# Patient Record
Sex: FEMALE | Race: WHITE | NOT HISPANIC OR LATINO | Employment: OTHER | URBAN - METROPOLITAN AREA
[De-identification: names, ages, dates, MRNs, and addresses within clinical notes are randomized per-mention and may not be internally consistent; named-entity substitution may affect disease eponyms.]

---

## 2017-01-17 RX ORDER — ALENDRONATE SODIUM 70 MG/1
TABLET ORAL
Qty: 12 TABLET | Refills: 0 | Status: SHIPPED | OUTPATIENT
Start: 2017-01-17 | End: 2017-04-03 | Stop reason: SDUPTHER

## 2017-01-23 ENCOUNTER — TELEPHONE (OUTPATIENT)
Dept: INTERNAL MEDICINE | Facility: CLINIC | Age: 77
End: 2017-01-23

## 2017-01-23 ENCOUNTER — DOCUMENTATION ONLY (OUTPATIENT)
Dept: INTERNAL MEDICINE | Facility: CLINIC | Age: 77
End: 2017-01-23

## 2017-01-23 DIAGNOSIS — D64.9 ANEMIA OF UNKNOWN ETIOLOGY: Primary | ICD-10-CM

## 2017-01-23 DIAGNOSIS — E55.9 MILD VITAMIN D DEFICIENCY: Primary | ICD-10-CM

## 2017-01-23 DIAGNOSIS — R06.09 DYSPNEA ON EXERTION: ICD-10-CM

## 2017-01-23 NOTE — TELEPHONE ENCOUNTER
Please call patient and remind her to come in for her blood work prior to her appt. I have added a Ferritin level to her blood work.  Thanks

## 2017-01-23 NOTE — PROGRESS NOTES
Pre-Visit Review & Summary:    75 y/o female with hx of Osteoprosis, Compression Fracture T12, DJD Cervical, Thoracic, and Lumbar Spine with Chronic LBP, Vitamin D Deficiency, GERD, Pre-DM, OA Knees, S/P R-TKR, Obesity, Hx of Colon Polyps, and Hx of DVT 11/2013, has a scheduled appt 2/1/17 for her Annual PE.        PMH:    1. DDD and DJD Cervical, Thoracic, and Lumbar Spine with Kyphoscoliosis           Thoracic Kyphosis with Wedge Deformity T12              Followed by Dr. Beckett ([facility], [place], operated by [facility])            Degenerative Scoliosis with Grade 2 Spondylolisthesis L5-S1            S/P Lumbar Fusion 1/2015            Gabapentin 100 mg BID    2. GERD              Asymptomatic since 11/2013            (-) H.pylori 9/2013, (-) Abd US 1/2014      3. Vit D Deficiency              Vit D Level (3/2014) - 25                                (10/2015): 23            Cholecalciferol 5,000 units/day - resumed 10/2015            Does not tolerate Ergocalciferol    4. Osteoporosis           BMD (1/2014) (-)1.9             Evidence of Thoracic Wedge Deformity (T12)             Calcium + D           Fosamax 70 mg/week - resumed 10/2015           Cholecalciferol 5,000 units/day  - resumed 10/2015    5. Allergic Rhinitis & Allergic Conjunctivitis             Zyrtec 10 mg prn           Hx of (+) Allergy Testing in past      6. OA Knees             Right TKR - Dr. Washburn 11/14/13      7. Obesity(BMI-34)      8. Hx of Colon Polyps             Colonoscopy 7/2011 - hyperplastic polyp      9. Questionable Hx of DVT RLE 11/17/13            Venous US 11/2013 - negative    10. Pre-DM    11. Mild Chronic Anemia             Normal Iron Studies 3/2014    12. Essential Tremor            Inderal 10 mg BID    13. Chronic ACOSTA            Stress Echo (10/2015): negative for Ischemia, normal EF, Diastolic Dysfunction      MEDS:                 Baclofen 10 mg hs         Calcium + D           Cholecalciferol 5,000 units daily         Flonase  Nasal spray prn         Fosamax 70 mg/week         Gabapentin 100 mg BID prn         Norco 5-325 mg prn         Inderal 10 mg BID         Trazodone 50 mg hs prn          Zyrtec 10 mg prn        ALLERGIES:         Sulfa       Oxycodone         Fentanyl         Diclofenac         Cymbalta        Ergocalciferol    Surgical Hx:          Left Shoulder Arthroscopy          Appendectomy          Tonsillectomy          Right TKR 11/14/13         Lumbar Fusion (L5-S1) 1/2015    Social Hx:          Smoking Hx: (+) 1/3 PPD x 40 yrs. Quit 2011.          ETOH: rare          Exercise: Sedentary          Work Hx: Retired           Home Environment: Single, lives next door to daughter who recently had CVA     Family Hx:          (+) HTN - Mother          (+) DM - daughter           (+) CVA - daughter (43 yrs old)          (+) OA - Mother          (+) Breast CA - Mother          (+) Bone CA - Father          (+) Skin CA - Father, Daughter, GM       Preventive Health Screening & Recent Lab Results:           Annual PE: 10/29/15         CBC (12/2015): H/H - 11.6/35.2          CMP (12/2015): BUN-27, Cr-1.0, eGFR-55         TSH (10/2015): 1.092          Lipids (10/2015): Chol-200, TG-224, HDL-37, LDL-118           Hgb A1c   (4/2014): 5.8                        (10/2015): 5.8         Vit D Level (9/2014) - 26                             (10/2015): 23 - not taking Vit D         GYN Exam (12/2014) - Dr. Garrido III           Eye Exam - 9/2014           Mammogram 10/2015 - normal           Colonoscopy 11/2016 - normal, repeat in 5 yrs         Bone Density Study 1/2014 - Hx of T12 Compression Fx = Osteoporosis Dx, T Score (-)1.9           Immunizations                  Influenza - 10/2015                  Tdp - 9/2013                  Pneumovax - 9/2013                 Prevnar 13 - 12/2015                Zostovax - 2011            Other Lab:                  H.pylori (9/2013): negative                Anemia Workup (12/2013 and  3/2014): normal S. 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                 Pharmaceutical Stress Test (10/2015): negative for ischemia with normal EF,                                                                                mild LAE, and (+) Diastolic Dysfunction.    IMP:  1. Annual PE  2. Chronic ACOSTA - negative Pharm Stress Test for Ischemia 10/2015, Diastolic Dysfunction, Obesity, Deconditioning  3. Obesity   4. Osteoporosis   5. DJD Cervical, Thoracic, and Lumbar Spine with Kyphoscoliosis and Chronic LBP  6. Vit D Deficiency   7. GERD - asymptomatic on no medication  8. Allergic Rhinitis  9. OA Knees - S/P R-TKR  10. Hx of Colon Polyps  11. Essential Tremor   12. Pre-DM    Plan:  1. Flu Vaccine, DXA-BMD

## 2017-01-30 ENCOUNTER — LAB VISIT (OUTPATIENT)
Dept: LAB | Facility: HOSPITAL | Age: 77
End: 2017-01-30
Attending: INTERNAL MEDICINE
Payer: MEDICARE

## 2017-01-30 DIAGNOSIS — R06.09 DYSPNEA ON EXERTION: ICD-10-CM

## 2017-01-30 DIAGNOSIS — D64.9 ANEMIA OF UNKNOWN ETIOLOGY: ICD-10-CM

## 2017-01-30 DIAGNOSIS — E55.9 MILD VITAMIN D DEFICIENCY: ICD-10-CM

## 2017-01-30 DIAGNOSIS — R73.03 PRE-DIABETES: ICD-10-CM

## 2017-01-30 DIAGNOSIS — E66.09 OBESITY DUE TO EXCESS CALORIES, UNSPECIFIED OBESITY SEVERITY: ICD-10-CM

## 2017-01-30 LAB
25(OH)D3+25(OH)D2 SERPL-MCNC: 22 NG/ML
ALBUMIN SERPL BCP-MCNC: 3.5 G/DL
ALP SERPL-CCNC: 48 U/L
ALT SERPL W/O P-5'-P-CCNC: 11 U/L
ANION GAP SERPL CALC-SCNC: 6 MMOL/L
AST SERPL-CCNC: 17 U/L
BASOPHILS # BLD AUTO: 0.04 K/UL
BASOPHILS NFR BLD: 0.7 %
BILIRUB SERPL-MCNC: 0.9 MG/DL
BNP SERPL-MCNC: 74 PG/ML
BUN SERPL-MCNC: 19 MG/DL
CALCIUM SERPL-MCNC: 8.9 MG/DL
CHLORIDE SERPL-SCNC: 104 MMOL/L
CHOLEST/HDLC SERPL: 5.1 {RATIO}
CO2 SERPL-SCNC: 28 MMOL/L
CREAT SERPL-MCNC: 0.9 MG/DL
DIFFERENTIAL METHOD: ABNORMAL
EOSINOPHIL # BLD AUTO: 0.1 K/UL
EOSINOPHIL NFR BLD: 1.7 %
ERYTHROCYTE [DISTWIDTH] IN BLOOD BY AUTOMATED COUNT: 12.9 %
EST. GFR  (AFRICAN AMERICAN): >60 ML/MIN/1.73 M^2
EST. GFR  (NON AFRICAN AMERICAN): >60 ML/MIN/1.73 M^2
ESTIMATED AVG GLUCOSE: 117 MG/DL
FERRITIN SERPL-MCNC: 53 NG/ML
GLUCOSE SERPL-MCNC: 96 MG/DL
HBA1C MFR BLD HPLC: 5.7 %
HCT VFR BLD AUTO: 36.1 %
HDL/CHOLESTEROL RATIO: 19.7 %
HDLC SERPL-MCNC: 193 MG/DL
HDLC SERPL-MCNC: 38 MG/DL
HGB BLD-MCNC: 11.6 G/DL
LDLC SERPL CALC-MCNC: 117 MG/DL
LYMPHOCYTES # BLD AUTO: 2.3 K/UL
LYMPHOCYTES NFR BLD: 39.6 %
MCH RBC QN AUTO: 31.2 PG
MCHC RBC AUTO-ENTMCNC: 32.1 %
MCV RBC AUTO: 97 FL
MONOCYTES # BLD AUTO: 0.6 K/UL
MONOCYTES NFR BLD: 10.7 %
NEUTROPHILS # BLD AUTO: 2.7 K/UL
NEUTROPHILS NFR BLD: 47 %
NONHDLC SERPL-MCNC: 155 MG/DL
PLATELET # BLD AUTO: 287 K/UL
PMV BLD AUTO: 9.1 FL
POTASSIUM SERPL-SCNC: 3.7 MMOL/L
PROT SERPL-MCNC: 6.8 G/DL
RBC # BLD AUTO: 3.72 M/UL
SODIUM SERPL-SCNC: 138 MMOL/L
TRIGL SERPL-MCNC: 190 MG/DL
TSH SERPL DL<=0.005 MIU/L-ACNC: 1.4 UIU/ML
WBC # BLD AUTO: 5.79 K/UL

## 2017-01-30 PROCEDURE — 82728 ASSAY OF FERRITIN: CPT

## 2017-01-30 PROCEDURE — 84443 ASSAY THYROID STIM HORMONE: CPT

## 2017-01-30 PROCEDURE — 83036 HEMOGLOBIN GLYCOSYLATED A1C: CPT

## 2017-01-30 PROCEDURE — 80061 LIPID PANEL: CPT

## 2017-01-30 PROCEDURE — 36415 COLL VENOUS BLD VENIPUNCTURE: CPT

## 2017-01-30 PROCEDURE — 83880 ASSAY OF NATRIURETIC PEPTIDE: CPT

## 2017-01-30 PROCEDURE — 82306 VITAMIN D 25 HYDROXY: CPT

## 2017-01-30 PROCEDURE — 85025 COMPLETE CBC W/AUTO DIFF WBC: CPT

## 2017-01-30 PROCEDURE — 80053 COMPREHEN METABOLIC PANEL: CPT

## 2017-02-01 ENCOUNTER — OFFICE VISIT (OUTPATIENT)
Dept: INTERNAL MEDICINE | Facility: CLINIC | Age: 77
End: 2017-02-01
Payer: MEDICARE

## 2017-02-01 VITALS
DIASTOLIC BLOOD PRESSURE: 62 MMHG | OXYGEN SATURATION: 97 % | SYSTOLIC BLOOD PRESSURE: 130 MMHG | HEIGHT: 61 IN | BODY MASS INDEX: 35.84 KG/M2 | WEIGHT: 189.81 LBS | HEART RATE: 67 BPM

## 2017-02-01 DIAGNOSIS — K21.9 GASTROESOPHAGEAL REFLUX DISEASE, ESOPHAGITIS PRESENCE NOT SPECIFIED: ICD-10-CM

## 2017-02-01 DIAGNOSIS — E55.9 MILD VITAMIN D DEFICIENCY: ICD-10-CM

## 2017-02-01 DIAGNOSIS — Z00.00 ANNUAL PHYSICAL EXAM: Primary | ICD-10-CM

## 2017-02-01 DIAGNOSIS — M50.30 DDD (DEGENERATIVE DISC DISEASE), CERVICAL: ICD-10-CM

## 2017-02-01 DIAGNOSIS — E66.01 MORBID OBESITY DUE TO EXCESS CALORIES: ICD-10-CM

## 2017-02-01 DIAGNOSIS — M81.0 OSTEOPOROSIS: ICD-10-CM

## 2017-02-01 DIAGNOSIS — Z86.010 HISTORY OF COLONIC POLYPS: ICD-10-CM

## 2017-02-01 DIAGNOSIS — R73.03 PRE-DIABETES: ICD-10-CM

## 2017-02-01 DIAGNOSIS — Z96.659 STATUS POST TOTAL KNEE REPLACEMENT, UNSPECIFIED LATERALITY: ICD-10-CM

## 2017-02-01 DIAGNOSIS — M17.0 PRIMARY OSTEOARTHRITIS OF BOTH KNEES: ICD-10-CM

## 2017-02-01 DIAGNOSIS — M51.35 DDD (DEGENERATIVE DISC DISEASE), THORACOLUMBAR: ICD-10-CM

## 2017-02-01 PROCEDURE — 99213 OFFICE O/P EST LOW 20 MIN: CPT | Mod: PBBFAC | Performed by: INTERNAL MEDICINE

## 2017-02-01 PROCEDURE — 99215 OFFICE O/P EST HI 40 MIN: CPT | Mod: S$PBB,,, | Performed by: INTERNAL MEDICINE

## 2017-02-01 PROCEDURE — 99999 PR PBB SHADOW E&M-EST. PATIENT-LVL III: CPT | Mod: PBBFAC,,, | Performed by: INTERNAL MEDICINE

## 2017-02-01 RX ORDER — VIT C/E/ZN/COPPR/LUTEIN/ZEAXAN 250MG-90MG
CAPSULE ORAL
Qty: 90 CAPSULE | Refills: 11
Start: 2017-02-01

## 2017-02-01 NOTE — PROGRESS NOTES
Subjective:       Patient ID: Nicho Espinosa is a 76 y.o. female.    Chief Complaint: Annual Exam    HPI Comments: 75 y/o female with hx of Osteoprosis, Compression Fracture T12, DJD Cervical, Thoracic, and Lumbar Spine with Chronic LBP, Vitamin D Deficiency, GERD, Pre-DM, OA Knees, S/P R-TKR, Obesity, Hx of Colon Polyps, and Hx of DVT 11/2013, comes in for her Annual PE.    She has gained 8 lbs since last visit. She recently started [employer] and is planning to continue Exercise to lose weight.    She never started Cholecalciferol as recommended but plans to restart.        PMH:    1. DDD and DJD Cervical, Thoracic, and Lumbar Spine with Kyphoscoliosis           Thoracic Kyphosis with Wedge Deformity T12              Followed by Dr. Beckett ([facility]) & [facility]            Degenerative Scoliosis with Grade 2 Spondylolisthesis L5-S1            S/P Lumbar Fusion 1/2015    2. GERD              Asymptomatic since 11/2013            (-) H.pylori 9/2013, (-) Abd US 1/2014      3. Vit D Deficiency      4. Osteoporosis           BMD (1/2014) (-)1.9             Evidence of Thoracic Wedge Deformity (T12)             Fosamax 70 mg/week - resumed 10/2015    5. Allergic Rhinitis & Allergic Conjunctivitis             Zyrtec 10 mg prn           Hx of (+) Allergy Testing in past      6. OA Knees             Right TKR - Dr. Washburn 11/14/13      7. Obesity(BMI-34)      8. Hx of Colon Polyps             Colonoscopy 7/2011 - hyperplastic polyp      9. Questionable Hx of DVT RLE 11/17/13            Venous US 11/2013 - negative    10. Pre-DM    11. Mild Chronic Anemia    12. Essential Tremor            Inderal 10 mg BID    13. Chronic ACOSTA            Stress Echo (10/2015): negative for Ischemia, normal EF, Diastolic Dysfunction      MEDS:                 Fosamax 70 mg/week         Norco 5-325 mg prn         Inderal 10 mg BID         Trazodone 50 mg hs prn          Zyrtec 10 mg prn        ALLERGIES:          Sulfa       Oxycodone         Fentanyl         Diclofenac         Cymbalta        Ergocalciferol    Surgical Hx:          Left Shoulder Arthroscopy          Appendectomy          Tonsillectomy          Right TKR 11/14/13         Lumbar Fusion (L5-S1) 1/2015    Social Hx:          Smoking Hx: (+) 1/3 PPD x 40 yrs. Quit 2011.          ETOH: rare          Exercise: Sedentary          Work Hx: Retired           Home Environment: Single, lives next door to daughter who recently had CVA     Family Hx:          (+) HTN - Mother          (+) DM - daughter           (+) CVA - daughter (43 yrs old)          (+) OA - Mother          (+) Breast CA - Mother          (+) Bone CA - Father          (+) Skin CA - Father, Daughter, GM       Preventive Health Screening & Recent Lab Results:           Annual PE: 10/29/15         CBC (1/2017): H/H - 11.6/36.1          CMP (1/2017): normal         TSH (1/2017): 1.402          Lipids (1/2017): Chol-193, TG-190, HDL-38, LDL-117          Hgb A1c   (4/2014): 5.8                        (1/2017): 5.7         Vit D Level (9/2014) - 26                             (1/2017): 22 - not taking Vit D         GYN Exam (12/2014) - Dr. Garrido III           Eye Exam - 9/2014           Mammogram 10/2015 - normal           Colonoscopy 11/2016 - normal, repeat in 5 yrs         Bone Density Study 1/2014 - Hx of T12 Compression Fx = Osteoporosis Dx, T Score (-)1.9           Immunizations                  Influenza - 10/2016                 Tdp - 9/2013                  Pneumovax - 9/2013                 Prevnar 13 - 12/2015                Zostovax - 2011            Other Lab:                  Ferritin (1/2017): 53                BNP (1/2017): 74                H.pylori (9/2013): negative                Anemia Workup (12/2013 and 3/2014): normal S. 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                 Pharmaceutical Stress Test (10/2015): negative for ischemia with normal EF,                                                                                mild LAE, and (+) Diastolic Dysfunction.        Review of Systems   Constitutional: Negative for chills and fever.   HENT: Positive for rhinorrhea. Negative for trouble swallowing.    Respiratory: Negative for shortness of breath.    Cardiovascular: Negative for chest pain, palpitations and leg swelling.   Gastrointestinal: Negative for abdominal pain, blood in stool, constipation, diarrhea, nausea and vomiting.   Genitourinary: Negative for dysuria, hematuria and vaginal bleeding.   Musculoskeletal: Positive for back pain.   Neurological: Negative for dizziness, syncope and light-headedness.   Hematological: Negative for adenopathy.       Objective:      Physical Exam   Constitutional: She is oriented to person, place, and time. She appears well-developed and well-nourished. No distress.   HENT:   Head: Normocephalic.   Right Ear: External ear normal.   Left Ear: External ear normal.   Mouth/Throat: Oropharynx is clear and moist.   Eyes: Conjunctivae are normal. No scleral icterus.   Neck: No JVD present. No thyromegaly present.   Cardiovascular: Normal rate and regular rhythm.  Exam reveals no gallop.    No murmur heard.  Pulmonary/Chest: Effort normal and breath sounds normal. She has no wheezes.  She has no rales. Right breast exhibits no mass.   Abdominal: Soft. She exhibits no mass. There is no tenderness.   Musculoskeletal: She exhibits no edema.   Lymphadenopathy:     She has no cervical adenopathy.   Neurological: She is alert and oriented to person, place, and time. She has normal reflexes.   Skin: Skin is warm and dry.       Assessment:   1. Annual PE  2. Chronic ACOSTA - negative Pharm Stress Test for Ischemia 10/2015, Diastolic Dysfunction, Obesity, Deconditioning  3. Obesity   4. Osteoporosis   5. DJD Cervical, Thoracic, and Lumbar Spine with Kyphoscoliosis and Chronic LBP  6. Vit D Deficiency   7. GERD - asymptomatic on no medication  8. Allergic Rhinitis  9. OA Knees - S/P R-TKR  10. Hx of Colon Polyps  11. Essential Tremor   12. Pre-DM    Plan:   1. Resume Cholecalciferol 3,000 units daily. DXA-BMD. Follow up with [facility] - consider Lyrica  2. RTC prn or in 1 yr for Annual PE

## 2017-02-01 NOTE — MR AVS SNAPSHOT
[facility]  [address]  [facility] [address]  Phone: [phone]  Fax: [phone]                  Nicho Espinosa   2017 10:00 AM   Office Visit    Description:  Female : 1940   Provider:  Arleen Lara MD   Department:  [facility]           Reason for Visit     Annual Exam           Diagnoses this Visit        Comments    Annual physical exam    -  Primary     Osteoporosis         History of colonic polyps         Morbid obesity due to excess calories         Mild vitamin D deficiency         DDD (degenerative disc disease), thoracolumbar         DDD (degenerative disc disease), cervical         Primary osteoarthritis of both knees         Status post total knee replacement, unspecified laterality         Pre-diabetes         Gastroesophageal reflux disease, esophagitis presence not specified                To Do List           Goals (5 Years of Data)     None      Follow-Up and Disposition     Return in about 1 year (around 2018) for annual physical with lab.    Follow-up and Disposition History       These Medications        Disp Refills Start End    cholecalciferol, vitamin D3, 1,000 unit capsule 90 capsule 11 2017     Take 3 capsules daily    Pharmacy: [facility] [address] [place] [address] [address] AT [facility] Ph #: [phone]         [facility]     [facility] On Call Nurse Care Line -  Assistance  Registered nurses in the [facility] On Call Center provide clinical advisement, health education, appointment booking, and other advisory services.  Call for this free service at [phone].             Medications           Message regarding Medications     Verify the changes and/or additions to your medication regime listed below are the same as discussed with your clinician today.  If any of these changes or additions are incorrect, please notify your healthcare provider.    "     START taking these NEW medications        Refills    cholecalciferol, vitamin D3, 1,000 unit capsule 11    Sig: Take 3 capsules daily    Class: No Print      STOP taking these medications     baclofen (LIORESAL) 10 MG tablet     DUREZOL 0.05 % Drop ophthalmic solution     fluticasone (FLONASE) 50 mcg/actuation nasal spray 1 spray by Each Nare route once daily.    gabapentin (NEURONTIN) 100 MG capsule Take 1 capsule by mouth once daily.    ketorolac 0.5% (ACULAR) 0.5 % Drop            Verify that the below list of medications is an accurate representation of the medications you are currently taking.  If none reported, the list may be blank. If incorrect, please contact your healthcare provider. Carry this list with you in case of emergency.           Current Medications     alendronate (FOSAMAX) 70 MG tablet TAKE 1 TABLET BY MOUTH EVERY 7 DAYS    cetirizine (ZYRTEC) 10 MG tablet Take 10 mg by mouth once daily.    cholecalciferol, vitamin D3, 1,000 unit capsule Take 3 capsules daily    hydrocodone-acetaminophen 5-325mg (NORCO) 5-325 mg per tablet 0.5 tablets daily as needed.    promethazine (PHENERGAN) 25 MG tablet Take 1 tablet (25 mg total) by mouth every 6 (six) hours as needed for Nausea.    propranolol (INDERAL) 10 MG tablet Take 1 tablet (10 mg total) by mouth 2 (two) times daily.    trazodone (DESYREL) 50 MG tablet TAKE 1 TABLET BY MOUTH NIGHTLY AS NEEDED FOR INSOMNIA           Clinical Reference Information           Vital Signs - Last Recorded  Most recent update: 2/1/2017 10:23 AM by Chioma Lawson MA    BP Pulse Ht Wt LMP SpO2    130/62 (BP Location: Right arm, Patient Position: Sitting, BP Method: Manual) 67 5' 1" (1.549 m) 86.1 kg (189 lb 13.1 oz) 07[birth year]05/1987 97%    BMI                35.87 kg/m2          Blood Pressure          Most Recent Value    BP  130/62      Allergies as of 2/1/2017     Sulfa (Sulfonamide Antibiotics)      Immunizations Administered on Date of Encounter - 2/1/2017     None    "   Orders Placed During Today's Visit     Future Labs/Procedures Expected by Expires    DXA Bone Density Spine And Hip_Axial Skeleton  2/1/2017 2/1/2018

## 2017-02-08 ENCOUNTER — HOSPITAL ENCOUNTER (OUTPATIENT)
Dept: RADIOLOGY | Facility: CLINIC | Age: 77
Discharge: HOME | End: 2017-02-08
Attending: INTERNAL MEDICINE
Payer: MEDICARE

## 2017-02-08 DIAGNOSIS — M81.0 OSTEOPOROSIS: ICD-10-CM

## 2017-02-08 PROCEDURE — 77080 DXA BONE DENSITY AXIAL: CPT | Mod: TC

## 2017-02-08 PROCEDURE — 77080 DXA BONE DENSITY AXIAL: CPT | Mod: 26,,, | Performed by: INTERNAL MEDICINE

## 2017-03-12 RX ORDER — TRAZODONE HYDROCHLORIDE 50 MG/1
TABLET ORAL
Qty: 30 TABLET | Refills: 5 | Status: SHIPPED | OUTPATIENT
Start: 2017-03-12 | End: 2017-06-20 | Stop reason: SDUPTHER

## 2017-04-03 RX ORDER — TRAZODONE HYDROCHLORIDE 50 MG/1
TABLET ORAL
Qty: 30 TABLET | Refills: 5 | Status: SHIPPED | OUTPATIENT
Start: 2017-04-03 | End: 2017-06-20 | Stop reason: SDUPTHER

## 2017-04-03 RX ORDER — ALENDRONATE SODIUM 70 MG/1
TABLET ORAL
Qty: 12 TABLET | Refills: 3 | Status: SHIPPED | OUTPATIENT
Start: 2017-04-03 | End: 2017-06-20 | Stop reason: SDUPTHER

## 2017-05-17 ENCOUNTER — TELEPHONE (OUTPATIENT)
Dept: OBSTETRICS AND GYNECOLOGY | Facility: CLINIC | Age: 77
End: 2017-05-17

## 2017-05-17 NOTE — TELEPHONE ENCOUNTER
----- Message from Dorothy Calderon sent at 5/17/2017 11:44 AM CDT -----  Contact: Self  New Pt is calling in regards scheduling annual apt. The pt can be reached at [phone]. Thanks KG

## 2017-05-26 DIAGNOSIS — M25.562 LEFT KNEE PAIN, UNSPECIFIED CHRONICITY: Primary | ICD-10-CM

## 2017-06-07 DIAGNOSIS — G25.0 ESSENTIAL TREMOR: ICD-10-CM

## 2017-06-07 RX ORDER — PROPRANOLOL HYDROCHLORIDE 10 MG/1
TABLET ORAL
Qty: 180 TABLET | Refills: 0 | Status: SHIPPED | OUTPATIENT
Start: 2017-06-07 | End: 2017-09-04 | Stop reason: SDUPTHER

## 2017-06-08 ENCOUNTER — TELEPHONE (OUTPATIENT)
Dept: ORTHOPEDICS | Facility: CLINIC | Age: 77
End: 2017-06-08

## 2017-06-12 ENCOUNTER — DOCUMENTATION ONLY (OUTPATIENT)
Dept: INTERNAL MEDICINE | Facility: CLINIC | Age: 77
End: 2017-06-12

## 2017-06-12 ENCOUNTER — HOSPITAL ENCOUNTER (OUTPATIENT)
Dept: RADIOLOGY | Facility: HOSPITAL | Age: 77
Discharge: HOME | End: 2017-06-12
Attending: ORTHOPAEDIC SURGERY
Payer: MEDICARE

## 2017-06-12 ENCOUNTER — OFFICE VISIT (OUTPATIENT)
Dept: ORTHOPEDICS | Facility: CLINIC | Age: 77
End: 2017-06-12
Payer: MEDICARE

## 2017-06-12 VITALS — BODY MASS INDEX: 35.84 KG/M2 | WEIGHT: 189.81 LBS | HEIGHT: 61 IN

## 2017-06-12 DIAGNOSIS — M25.562 LEFT KNEE PAIN, UNSPECIFIED CHRONICITY: ICD-10-CM

## 2017-06-12 DIAGNOSIS — M17.12 PRIMARY OSTEOARTHRITIS OF LEFT KNEE: Primary | ICD-10-CM

## 2017-06-12 PROCEDURE — 99203 OFFICE O/P NEW LOW 30 MIN: CPT | Mod: S$PBB,,, | Performed by: ORTHOPAEDIC SURGERY

## 2017-06-12 PROCEDURE — 99212 OFFICE O/P EST SF 10 MIN: CPT | Mod: PBBFAC,25 | Performed by: ORTHOPAEDIC SURGERY

## 2017-06-12 PROCEDURE — 99999 PR PBB SHADOW E&M-EST. PATIENT-LVL II: CPT | Mod: PBBFAC,,, | Performed by: ORTHOPAEDIC SURGERY

## 2017-06-12 PROCEDURE — 1159F MED LIST DOCD IN RCRD: CPT | Mod: ,,, | Performed by: ORTHOPAEDIC SURGERY

## 2017-06-12 PROCEDURE — 1126F AMNT PAIN NOTED NONE PRSNT: CPT | Mod: ,,, | Performed by: ORTHOPAEDIC SURGERY

## 2017-06-12 PROCEDURE — 73562 X-RAY EXAM OF KNEE 3: CPT | Mod: 26,50,, | Performed by: RADIOLOGY

## 2017-06-12 RX ORDER — AMOXICILLIN 500 MG/1
TABLET, FILM COATED ORAL
Refills: 0 | COMMUNITY
Start: 2017-04-04 | End: 2017-06-20

## 2017-06-12 RX ORDER — PREGABALIN 75 MG/1
75 CAPSULE ORAL 2 TIMES DAILY
COMMUNITY
Start: 2017-06-06 | End: 2017-07-27

## 2017-06-12 NOTE — PROGRESS NOTES
Subjective:      Patient ID: Nicho Espinosa is a 76 y.o. female.    Chief Complaint: Pain of the Left Knee    HPI  Nicho Espinosa is a 76 year old female here with a 3 month history of left knee pain. The patient is a  retiree. There was not a history of trauma that initiated the pain, but she did have a fall in her bathroom and struck her knee. The pain began when she began walking for exercise.  The pain is moderate The pain is located in the medial aspect of the knee. There is not radiation.  There is not catching or locking.   The pain is described as achy. The patient has not had prior surgery on that knee.  Right TKA in 2013.  Doing well. It is aggravated by standing and walking.  It is alleviated by rest. There is not numbness or tingling of the lower extremity.  There is  back pain.  She  has tried medications and injections. They have helped.  She does have difficulty getting in or out of a car, getting dressed, or going up or down stairs.  The patient does not use an assistive device.      Past Medical History:   Diagnosis Date    Allergy     Arthritis     Blepharitis of both eyes     Complication of anesthesia     nausea    Degenerative disc disease     GERD (gastroesophageal reflux disease)     patient denies reflux    History of compression fracture of spine 4/10/2014    Hyperlipidemia     Lumbar disc disease     Meibomitis     Obesity 9/19/2013    Osteoporosis     Papilloma of eyelid     RUL    Postoperative anemia 11/21/2013    Thoracic or lumbosacral neuritis or radiculitis, unspecified 9/20/2013    Tremors of nervous system 2/2015     Past Surgical History:   Procedure Laterality Date    APPENDECTOMY      COLONOSCOPY N/A 11/1/2016    Procedure: COLONOSCOPY;  Surgeon: Candelario Oviedo MD;  Location: [address];  Service: Endoscopy;  Laterality: N/A;  may use Prepopik or other low volume prep    FOOT SURGERY      SHOULDER ARTHROSCOPY      left    SPINE SURGERY  1-12-15     R[facility]    TONSILLECTOMY      TOTAL KNEE ARTHROPLASTY       Family History   Problem Relation Age of Onset    Cancer Father      bone    Breast cancer Mother      never had biopsy    Stroke Daughter     Diabetes Daughter      was obese     Social History     Social History    Marital status: Single     Spouse name: N/A    Number of children: N/A    Years of education: N/A     Occupational History    Not on file.     Social History Main Topics    Smoking status: Former Smoker     Packs/day: 0.25     Years: 40.00     Quit date: 1/1/2011    Smokeless tobacco: Never Used    Alcohol use No    Drug use: No    Sexual activity: Not Currently     Birth control/ protection: Post-menopausal     Other Topics Concern    Not on file     Social History Narrative    No narrative on file     Current Outpatient Prescriptions on File Prior to Visit   Medication Sig Dispense Refill    alendronate (FOSAMAX) 70 MG tablet TAKE 1 TABLET BY MOUTH EVERY 7 DAYS 12 tablet 3    cetirizine (ZYRTEC) 10 MG tablet Take 10 mg by mouth once daily.      cholecalciferol, vitamin D3, 1,000 unit capsule Take 3 capsules daily 90 capsule 11    hydrocodone-acetaminophen 5-325mg (NORCO) 5-325 mg per tablet 0.5 tablets daily as needed.  0    promethazine (PHENERGAN) 25 MG tablet Take 1 tablet (25 mg total) by mouth every 6 (six) hours as needed for Nausea. 30 tablet 0    propranolol (INDERAL) 10 MG tablet TAKE 1 TABLET(10 MG) BY MOUTH TWICE DAILY 180 tablet 0    trazodone (DESYREL) 50 MG tablet TAKE 1 TABLET BY MOUTH NIGHTLY AS NEEDED FOR INSOMNIA 30 tablet 5    trazodone (DESYREL) 50 MG tablet TAKE 1 TABLET BY MOUTH EVERY EVENING AS NEEDED FOR INSOMNIA 30 tablet 5    trazodone (DESYREL) 50 MG tablet TAKE 1 TABLET BY MOUTH EVERY EVENING AS NEEDED FOR INSOMNIA 30 tablet 5    [DISCONTINUED] warfarin (COUMADIN) 4 MG tablet Take 1 tablet (4 mg total) by mouth Daily. At 5p as directed by coumadin clinic. 90 tablet 1     No current  "facility-administered medications on file prior to visit.      Review of patient's allergies indicates:   Allergen Reactions    Sulfa (sulfonamide antibiotics) Other (See Comments)       Review of Systems   Constitution: Negative for chills, fever and night sweats.   HENT: Negative for headaches and hearing loss.    Eyes: Negative for blurred vision and double vision.   Cardiovascular: Negative for chest pain, claudication and leg swelling.   Respiratory: Negative for shortness of breath.    Endocrine: Negative for polydipsia, polyphagia and polyuria.   Hematologic/Lymphatic: Negative for adenopathy and bleeding problem. Does not bruise/bleed easily.   Skin: Negative for poor wound healing.   Musculoskeletal: Positive for joint pain.   Gastrointestinal: Negative for diarrhea and heartburn.   Genitourinary: Negative for bladder incontinence.   Neurological: Negative for focal weakness, numbness, paresthesias and sensory change.   Psychiatric/Behavioral: The patient is not nervous/anxious.    Allergic/Immunologic: Negative for persistent infections.         Objective:      Body mass index is 35.87 kg/m².  Vitals:    06/12/17 0854   Weight: 86.1 kg (189 lb 13.1 oz)   Height: 5' 1" (1.549 m)         General    Constitutional: She is oriented to person, place, and time. She appears well-developed and well-nourished.   HENT:   Head: Normocephalic and atraumatic.   Eyes: EOM are normal.   Cardiovascular: Normal rate.    Pulmonary/Chest: Effort normal.   Neurological: She is alert and oriented to person, place, and time.   Psychiatric: She has a normal mood and affect. Her behavior is normal.     General Musculoskeletal Exam   Gait: normal       Right Knee Exam     Inspection   Erythema: absent  Scars: present  Swelling: absent  Effusion: effusion  Deformity: deformity  Bruising: absent    Tenderness   The patient is experiencing no tenderness.         Range of Motion   Extension: 0   Flexion: 130     Tests   Ligament " Examination Lachman: normal (-1 to 2mm)   MCL - Valgus: normal (0 to 2mm)  LCL - Varus: normal  Patella   Passive Patellar Tilt: neutral    Other   Sensation: normal    Left Knee Exam     Inspection   Erythema: absent  Scars: absent  Swelling: absent  Effusion: absent  Deformity: deformity  Bruising: present    Tenderness   The patient tender to palpation of the medial joint line.    Range of Motion   Extension: 0   Flexion: 130     Tests   Stability Lachman: normal (-1 to 2mm)   MCL - Valgus: normal (0 to 2mm)  LCL - Varus: normal (0 to 2mm)  Patella   Passive Patellar Tilt: neutral    Other   Sensation: normal    Muscle Strength   Right Lower Extremity   Hip Abduction: 5/5   Quadriceps:  5/5   Hamstrin/5   Left Lower Extremity   Hip Abduction: 5/5   Quadriceps:  5/5   Hamstrin/5     Reflexes     Left Side  Quadriceps:  2+    Right Side   Quadriceps:  2+    Vascular Exam     Right Pulses  Dorsalis Pedis:      2+          Left Pulses  Dorsalis Pedis:      2+          Edema  Right Lower Leg: absent  Left Lower Leg: absent    Radiographs taken today and reviewed by me demonstrate moderate arthritic change of the left KNEE(s).There  is not bone destruction.  There is not a fracture. The medial compartment is most involved.  There is a varus deformity.  The changes are tricompartmental.              Assessment:       Encounter Diagnosis   Name Primary?    Primary osteoarthritis of left knee Yes          Plan:       Nicho was seen today for pain.    Diagnoses and all orders for this visit:    Primary osteoarthritis of left knee      Treatment options as well as risks and benefits have been discussed.  She as decided to consider Euflexxa injections.  Literature was given.  Nicho Espinosa will call if  she wishes to proceed.    We will get her set up to see her primary.

## 2017-06-12 NOTE — PROGRESS NOTES
Pre-Visit Review & Summary:    75 y/o female with hx of Osteoprosis, Compression Fracture T12, DJD Cervical, Thoracic, and Lumbar Spine with Chronic LBP, Vitamin D Deficiency, GERD, Pre-DM, OA Knees, S/P R-TKR, Obesity, Hx of Colon Polyps, and Hx of DVT 11/2013, has a scheduled appt 6/21/17 for complaint of Syncope.    She was seen in Orthopedics by Dr. Washburn 6/12/17 complaining of Left Knee pain following a fall. X-Rays c/w moderate DJD but no fracture. She is considering Euflexxa injections.        PMH:    1. DDD and DJD Cervical, Thoracic, and Lumbar Spine with Kyphoscoliosis           Thoracic Kyphosis with Wedge Deformity T12              Followed by Dr. Beckett ([facility]) & [place] [facility]            Degenerative Scoliosis with Grade 2 Spondylolisthesis L5-S1            S/P Lumbar Fusion 1/2015    2. GERD              Asymptomatic since 11/2013            (-) H.pylori 9/2013, (-) Abd US 1/2014      3. Vit D Deficiency              Cholecalciferol 3,000 units daily    4. Osteoporosis           BMD (1/2014) (-)1.9             Evidence of Thoracic Wedge Deformity (T12)             Fosamax 70 mg/week - resumed 10/2015    5. Allergic Rhinitis & Allergic Conjunctivitis             Zyrtec 10 mg prn           Hx of (+) Allergy Testing in past      6. OA Knees             Right TKR - Dr. Washburn 11/14/13      7. Obesity(BMI-35)      8. Hx of Colon Polyps             Colonoscopy 7/2011 - hyperplastic polyp      9.  Pre-DM    10. Essential Tremor            Inderal 10 mg BID    14. Chronic ACOSTA            Stress Echo (10/2015): negative for Ischemia, normal EF, Diastolic Dysfunction            15 pk yr hx of Smoking. Quit 2011      MEDS:                 Fosamax 70 mg/week         Norco 5-325 mg prn         Inderal 10 mg BID         Trazodone 50 mg hs prn          Zyrtec 10 mg prn        ALLERGIES:         Sulfa       Oxycodone         Fentanyl         Diclofenac         Cymbalta         Ergocalciferol    Surgical Hx:          Left Shoulder Arthroscopy          Appendectomy          Tonsillectomy          Right TKR 11/14/13         Lumbar Fusion (L5-S1) 1/2015    Social Hx:          Smoking Hx: (+) 1/3 PPD x 40 yrs. Quit 2011.          ETOH: rare          Exercise: Sedentary          Work Hx: Retired           Home Environment: Single, lives next door to daughter who recently had CVA     Family Hx:          (+) HTN - Mother          (+) DM - daughter           (+) CVA - daughter (43 yrs old)          (+) OA - Mother          (+) Breast CA - Mother          (+) Bone CA - Father          (+) Skin CA - Father, Daughter, GM       Preventive Health Screening & Recent Lab Results:           Annual PE: 10/29/15         CBC (1/2017): H/H - 11.6/36.1          CMP (1/2017): normal         TSH (1/2017): 1.402          Lipids (1/2017): Chol-193, TG-190, HDL-38, LDL-117          Hgb A1c   (4/2014): 5.8                        (1/2017): 5.7         Vit D Level (9/2014) - 26                             (1/2017): 22 - not taking Vit D         GYN Exam (12/2014) - Dr. Garrido III           Eye Exam - 9/2014           Mammogram 10/2015 - normal           Colonoscopy 11/2016 - normal, repeat in 5 yrs         Bone Density Study 2/2017 - Hx of T12 Compression Fx = Osteoporosis Dx, T Score (-)1.4           Immunizations                  Influenza - 10/2016                 Tdp - 9/2013                  Pneumovax - 9/2013                 Prevnar 13 - 12/2015                Zostovax - 2011            Other Lab:                  Ferritin (1/2017): 53                BNP (1/2017): 74                H.pylori (9/2013): negative                Anemia Workup (12/2013 and 3/2014): normal S. 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                 Pharmaceutical Stress Test (10/2015): negative for ischemia with normal EF,                                             mild LAE, and (+) Diastolic Dysfunction.    IMP:  1. Hx of Syncope  2. Chronic ACOSTA - negative Pharm Stress Test for Ischemia 10/2015, Diastolic Dysfunction, Obesity, Deconditioning, and Hx of Smoking. Will order Spirometry  3. Obesity   4. Osteoporosis   5. DJD Cervical, Thoracic, and Lumbar Spine with Kyphoscoliosis and Chronic LBP  6. Vit D Deficiency   7. GERD - asymptomatic on no medication  8. Allergic Rhinitis  9. OA Knees - S/P R-TKR  10. Hx of Colon Polyps  11. Essential Tremor   12. Pre-DM

## 2017-06-20 ENCOUNTER — HOSPITAL ENCOUNTER (OUTPATIENT)
Dept: RADIOLOGY | Facility: HOSPITAL | Age: 77
Discharge: HOME | End: 2017-06-20
Attending: INTERNAL MEDICINE
Payer: MEDICARE

## 2017-06-20 ENCOUNTER — OFFICE VISIT (OUTPATIENT)
Dept: INTERNAL MEDICINE | Facility: CLINIC | Age: 77
End: 2017-06-20
Payer: MEDICARE

## 2017-06-20 ENCOUNTER — TELEPHONE (OUTPATIENT)
Dept: INTERNAL MEDICINE | Facility: CLINIC | Age: 77
End: 2017-06-20

## 2017-06-20 VITALS
OXYGEN SATURATION: 96 % | SYSTOLIC BLOOD PRESSURE: 122 MMHG | DIASTOLIC BLOOD PRESSURE: 70 MMHG | WEIGHT: 189.38 LBS | BODY MASS INDEX: 35.75 KG/M2 | HEART RATE: 61 BPM | HEIGHT: 61 IN

## 2017-06-20 DIAGNOSIS — R42 LOSS OF EQUILIBRIUM: ICD-10-CM

## 2017-06-20 DIAGNOSIS — R73.03 PRE-DIABETES: ICD-10-CM

## 2017-06-20 DIAGNOSIS — M50.30 DDD (DEGENERATIVE DISC DISEASE), CERVICAL: ICD-10-CM

## 2017-06-20 DIAGNOSIS — M43.17 SPONDYLOLISTHESIS AT L5-S1 LEVEL: ICD-10-CM

## 2017-06-20 DIAGNOSIS — S09.90XA HEAD TRAUMA, INITIAL ENCOUNTER: ICD-10-CM

## 2017-06-20 DIAGNOSIS — E66.09 NON MORBID OBESITY DUE TO EXCESS CALORIES: ICD-10-CM

## 2017-06-20 DIAGNOSIS — R26.89 BALANCE PROBLEMS: Primary | ICD-10-CM

## 2017-06-20 DIAGNOSIS — R55 VASOVAGAL SYNCOPE: ICD-10-CM

## 2017-06-20 DIAGNOSIS — R55 VASOVAGAL SYNCOPE: Primary | ICD-10-CM

## 2017-06-20 DIAGNOSIS — Z91.81 AT HIGH RISK FOR FALLS: ICD-10-CM

## 2017-06-20 DIAGNOSIS — K21.9 GASTROESOPHAGEAL REFLUX DISEASE, ESOPHAGITIS PRESENCE NOT SPECIFIED: ICD-10-CM

## 2017-06-20 DIAGNOSIS — Z87.81 HISTORY OF COMPRESSION FRACTURE OF SPINE: ICD-10-CM

## 2017-06-20 DIAGNOSIS — E55.9 MILD VITAMIN D DEFICIENCY: ICD-10-CM

## 2017-06-20 DIAGNOSIS — Z86.718 HISTORY OF DEEP VEIN THROMBOSIS (DVT) OF LOWER EXTREMITY: ICD-10-CM

## 2017-06-20 DIAGNOSIS — F51.01 PRIMARY INSOMNIA: ICD-10-CM

## 2017-06-20 DIAGNOSIS — M17.0 PRIMARY OSTEOARTHRITIS OF BOTH KNEES: ICD-10-CM

## 2017-06-20 DIAGNOSIS — Z86.010 HISTORY OF COLONIC POLYPS: ICD-10-CM

## 2017-06-20 DIAGNOSIS — Z96.659 STATUS POST TOTAL KNEE REPLACEMENT, UNSPECIFIED LATERALITY: ICD-10-CM

## 2017-06-20 DIAGNOSIS — M51.35 DDD (DEGENERATIVE DISC DISEASE), THORACOLUMBAR: ICD-10-CM

## 2017-06-20 DIAGNOSIS — Z98.1 S/P LUMBAR SPINAL FUSION: ICD-10-CM

## 2017-06-20 DIAGNOSIS — M81.0 AGE-RELATED OSTEOPOROSIS WITHOUT CURRENT PATHOLOGICAL FRACTURE: ICD-10-CM

## 2017-06-20 PROCEDURE — 70450 CT HEAD/BRAIN W/O DYE: CPT | Mod: TC

## 2017-06-20 PROCEDURE — 70450 CT HEAD/BRAIN W/O DYE: CPT | Mod: 26,,, | Performed by: RADIOLOGY

## 2017-06-20 PROCEDURE — 99999 PR PBB SHADOW E&M-EST. PATIENT-LVL III: CPT | Mod: PBBFAC,,, | Performed by: INTERNAL MEDICINE

## 2017-06-20 PROCEDURE — 1125F AMNT PAIN NOTED PAIN PRSNT: CPT | Mod: ,,, | Performed by: INTERNAL MEDICINE

## 2017-06-20 PROCEDURE — 1159F MED LIST DOCD IN RCRD: CPT | Mod: ,,, | Performed by: INTERNAL MEDICINE

## 2017-06-20 PROCEDURE — 99214 OFFICE O/P EST MOD 30 MIN: CPT | Mod: S$PBB,,, | Performed by: INTERNAL MEDICINE

## 2017-06-20 RX ORDER — ALENDRONATE SODIUM 70 MG/1
TABLET ORAL
Qty: 12 TABLET | Refills: 3 | Status: SHIPPED | OUTPATIENT
Start: 2017-06-20 | End: 2018-06-25 | Stop reason: SDUPTHER

## 2017-06-20 RX ORDER — NYSTATIN 100000 U/G
OINTMENT TOPICAL
COMMUNITY
Start: 2017-06-18 | End: 2017-06-20

## 2017-06-20 NOTE — TELEPHONE ENCOUNTER
Please call patient and tell her no evidence of acute brain injury from her fall on CT Scan. I have placed order for Physical Therapy. Please help her schedule.

## 2017-06-20 NOTE — PROGRESS NOTES
Subjective:       Patient ID: Nicho Espinosa is a 76 y.o. female.    Chief Complaint: No chief complaint on file.    75 y/o female with hx of Osteoprosis, Compression Fracture T12, DJD Cervical, Thoracic, and Lumbar Spine with Chronic LBP, Vitamin D Deficiency, GERD, Pre-DM, OA Knees, S/P R-TKR, Obesity, Hx of Colon Polyps, and Hx of DVT 11/2013, comes in for complaint of a syncopal spell. She was in [place] 6/4/17 and was experiencing constipation and took an over the counter laxative. She awoke in the middle of the night with abdominal cramping followed by a BM and then followed by feeling clammy and nauseous. She had sudden LOC and awoke on the floor in the bathroom. She is unsure how long she was unconscious, but did vomit some bile upon awakening. She suffered a contusion to left forehead and periorbital area. She denies associated chest pain, SOB, motor weakness, numbness, slurred speech, blurry vision, or headache. She has a history of occasional equilibrium issues in past but has noted balance issue is worse since the syncopal episode.     She was seen in Orthopedics by Dr. Washburn 6/12/17 complaining of Left Knee pain following a fall. X-Rays c/w moderate DJD but no fracture. She is considering Euflexxa injections.      PMH:    1. DDD and DJD Cervical, Thoracic, and Lumbar Spine with Kyphoscoliosis           Thoracic Kyphosis with Wedge Deformity T12              Followed by Dr. Beckett ([facility]) & [place] [facility]            Degenerative Scoliosis with Grade 2 Spondylolisthesis L5-S1            S/P Lumbar Fusion 1/2015    2. GERD              Asymptomatic since 11/2013            (-) H.pylori 9/2013, (-) Abd US 1/2014      3. Vit D Deficiency              Cholecalciferol 3,000 units daily    4. Osteoporosis           BMD (1/2014) (-)1.9             Evidence of Thoracic Wedge Deformity (T12)             Fosamax 70 mg/week - resumed 10/2015    5. Allergic Rhinitis & Allergic  Conjunctivitis             Zyrtec 10 mg prn           Hx of (+) Allergy Testing in past      6. OA Knees             Right TKR - Dr. Washburn 11/14/13      7. Obesity(BMI-35)      8. Hx of Colon Polyps             Colonoscopy 7/2011 - hyperplastic polyp      9.  Pre-DM    10. Essential Tremor            Inderal 10 mg BID    14. Chronic ACOSTA            Stress Echo (10/2015): negative for Ischemia, normal EF, Diastolic Dysfunction            15 pk yr hx of Smoking. Quit 2011      MEDS:                 Cholecalciferol 3,000 units daily         Fosamax 70 mg/week         Lyrica 75 mg BID         Norco 5-325 mg prn         Inderal 10 mg BID         Trazodone 50 mg hs prn          Zyrtec 10 mg prn      ALLERGIES:         Sulfa       Oxycodone         Fentanyl         Diclofenac         Cymbalta        Ergocalciferol    Surgical Hx:          Left Shoulder Arthroscopy          Appendectomy          Tonsillectomy          Right TKR 11/14/13         Lumbar Fusion (L5-S1) 1/2015    Social Hx:          Smoking Hx: (+) 1/3 PPD x 40 yrs. Quit 2011.          ETOH: rare          Exercise: Sedentary          Work Hx: Retired           Home Environment: Single, lives next door to daughter who recently had CVA     Family Hx:          (+) HTN - Mother          (+) DM - daughter           (+) CVA - daughter (43 yrs old)          (+) OA - Mother          (+) Breast CA - Mother          (+) Bone CA - Father          (+) Skin CA - Father, Daughter, GM       Preventive Health Screening & Recent Lab Results:           Annual PE: 10/29/15         CBC (1/2017): H/H - 11.6/36.1          CMP (1/2017): normal         TSH (1/2017): 1.402          Lipids (1/2017): Chol-193, TG-190, HDL-38, LDL-117          Hgb A1c   (4/2014): 5.8                        (1/2017): 5.7         Vit D Level (9/2014) - 26                             (1/2017): 22 - not taking Vit D         GYN Exam (12/2014) - Dr. Garrido III           Eye Exam - 9/2014            Mammogram 10/2015 - normal           Colonoscopy 11/2016 - normal, repeat in 5 yrs         Bone Density Study 2/2017 - Hx of T12 Compression Fx = Osteoporosis Dx, T Score (-)1.4           Immunizations                  Influenza - 10/2016                 Tdp - 9/2013                  Pneumovax - 9/2013                 Prevnar 13 - 12/2015                Zostovax - 2011            Other Lab:                  Ferritin (1/2017): 53                BNP (1/2017): 74                H.pylori (9/2013): negative                Anemia Workup (12/2013 and 3/2014): normal S. 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                 Pharmaceutical Stress Test (10/2015): negative for ischemia with normal EF,                                             mild LAE, and (+) Diastolic Dysfunction.            Review of Systems   Constitutional: Negative for appetite change, diaphoresis and fever.   HENT: Negative for sinus pressure.    Eyes: Negative for visual disturbance.   Respiratory: Negative for chest tightness and shortness of breath.    Cardiovascular: Negative for chest pain and palpitations.   Gastrointestinal: Positive for constipation. Negative for abdominal pain, blood in stool, diarrhea, nausea and vomiting.   Genitourinary: Negative for dysuria.   Musculoskeletal:  "Positive for arthralgias (left knee pain).   Neurological: Negative for dizziness, seizures, speech difficulty, weakness, light-headedness, numbness and headaches.        Feeling off balance   Hematological: Negative for adenopathy.   Psychiatric/Behavioral: Negative.        Objective:      Physical Exam   Constitutional: She is oriented to person, place, and time. She appears well-developed and well-nourished. No distress.   HENT:   Head: Normocephalic.   Right Ear: External ear normal.   Left Ear: External ear normal.   Eyes: Conjunctivae are normal.   Neck: No JVD present.   Cardiovascular: Normal rate, regular rhythm and intact distal pulses.  Exam reveals no gallop.    No murmur heard.  Pulmonary/Chest: Effort normal and breath sounds normal. She has no wheezes. She has no rales.   Abdominal: Soft. She exhibits no mass. There is no tenderness.   Musculoskeletal: She exhibits no edema.   Neurological: She is alert and oriented to person, place, and time. She has normal strength. No cranial nerve deficit. She displays a negative Romberg sign. Coordination and gait normal.   Skin: Skin is warm and dry. She is not diaphoretic.   Psychiatric: She has a normal mood and affect.       Assessment:   1. Hx of Syncopal spell - c/w Vasovagal Syncope with subsequent Trauma to Head with contusion Left Forehead. No further symptoms but is complaining of feeling "off-balance" - worse than usual. Normal Neuro exam today without evidence of a gait disturbance  2. Chronic ACOSTA - negative Pharm Stress Test for Ischemia 10/2015, Diastolic Dysfunction, Obesity, Deconditioning, and Hx of Smoking. Will order Spirometry  3. Obesity   4. Osteoporosis   5. DJD Cervical, Thoracic, and Lumbar Spine with Kyphoscoliosis and Chronic LBP  6. Vit D Deficiency   7. GERD - asymptomatic on no medication  8. Allergic Rhinitis  9. OA Knees - S/P R-TKR  10. Hx of Colon Polyps  11. Essential Tremor   12. Pre-DM    Plan:   1. CT Head   2. If CT is " negative, consider PT for Balance Training  3. Keep appt in 6 months for Annual PE. Will get routine PFS at that visit (prior hx of   Smoking)

## 2017-06-21 ENCOUNTER — OFFICE VISIT (OUTPATIENT)
Dept: OBSTETRICS AND GYNECOLOGY | Facility: CLINIC | Age: 77
End: 2017-06-21
Attending: OBSTETRICS & GYNECOLOGY
Payer: MEDICARE

## 2017-06-21 VITALS
BODY MASS INDEX: 35.54 KG/M2 | HEIGHT: 61 IN | WEIGHT: 188.25 LBS | SYSTOLIC BLOOD PRESSURE: 138 MMHG | DIASTOLIC BLOOD PRESSURE: 72 MMHG

## 2017-06-21 DIAGNOSIS — Z01.419 WELL WOMAN EXAM WITH ROUTINE GYNECOLOGICAL EXAM: ICD-10-CM

## 2017-06-21 DIAGNOSIS — Z12.4 PAP SMEAR FOR CERVICAL CANCER SCREENING: ICD-10-CM

## 2017-06-21 DIAGNOSIS — Z12.31 SCREENING MAMMOGRAM, ENCOUNTER FOR: Primary | ICD-10-CM

## 2017-06-21 PROCEDURE — 99212 OFFICE O/P EST SF 10 MIN: CPT | Mod: PBBFAC | Performed by: OBSTETRICS & GYNECOLOGY

## 2017-06-21 PROCEDURE — G0101 CA SCREEN;PELVIC/BREAST EXAM: HCPCS | Mod: S$PBB,,, | Performed by: OBSTETRICS & GYNECOLOGY

## 2017-06-21 PROCEDURE — 99999 PR PBB SHADOW E&M-EST. PATIENT-LVL II: CPT | Mod: PBBFAC,,, | Performed by: OBSTETRICS & GYNECOLOGY

## 2017-06-21 PROCEDURE — 88175 CYTOPATH C/V AUTO FLUID REDO: CPT

## 2017-06-21 NOTE — PROGRESS NOTES
Subjective:       Patient ID: Nicho Espinosa is a 76 y.o. female.    Chief Complaint:  Well Woman      History of Present Illness  HPI  This 76 yr old P2 female is here for routine exam and is new to me.  She is not taking any HRT.  Only issue is discharge that she has been told was normal by her previous MD,  She knew my in laws in college.  We discu ssed exercise and balance and weight bearing exercises.  She walks in pool treadmill three times per week and her back does not hurt afterwards.      GYN & OB History  Patient's last menstrual period was 1987.   Date of Last Pap: No result found    OB History    Para Term  AB Living   2 2       SAB TAB Ectopic Multiple Live Births             # Outcome Date GA Lbr Eladio/2nd Weight Sex Delivery Anes PTL Lv   2 Para            1 Para                   Review of Systems  Review of Systems   Constitutional: Negative for chills and fever.   Respiratory: Negative for shortness of breath.    Cardiovascular: Negative for chest pain.   Gastrointestinal: Negative for abdominal pain, nausea and vomiting.   Genitourinary: Negative for difficulty urinating, dyspareunia, genital sores, menstrual problem, pelvic pain, vaginal bleeding, vaginal discharge and vaginal pain.   Skin: Negative for wound.   Hematological: Negative for adenopathy.           Objective:    Physical Exam:   Constitutional: She is oriented to person, place, and time. She appears well-developed and well-nourished.    HENT:   Head: Normocephalic.    Eyes: EOM are normal.    Neck: Normal range of motion.    Cardiovascular: Normal rate.     Pulmonary/Chest: Effort normal. She exhibits no mass and no tenderness. Right breast exhibits no inverted nipple, no mass, no skin change and no tenderness. Left breast exhibits no inverted nipple, no mass, no skin change and no tenderness.        Abdominal: Soft. She exhibits no distension. There is no tenderness.     Genitourinary: Vagina normal and uterus  normal. There is no rash, tenderness or lesion on the right labia. There is no rash, tenderness or lesion on the left labia. Uterus is not tender. Cervix is normal. Right adnexum displays no mass, no tenderness and no fullness. Left adnexum displays no mass, no tenderness and no fullness. Cervix exhibits no discharge.           Musculoskeletal: Normal range of motion.       Neurological: She is alert and oriented to person, place, and time.    Skin: Skin is warm and dry.    Psychiatric: She has a normal mood and affect.        pt is very atrophic and if pap is not sufficient we do not have to redo  Assessment:        1. Screening mammogram, encounter for    2. Pap smear for cervical cancer screening    3. Well woman exam with routine gynecological exam               Plan:      Follow up in one yr  Yearly mammogram  Exercise three times per week aerobic for 30 min and add weights after month.  This can be one to two lb weights for weight bearing on upper extremities.  If walking, increase speed of walking over time.  Take in three helpings of calcium with vitamin D per day.  One can be in supplement but studies show that three times per day with calcium supplement (non dietary) can increase risk for kidney stones.  Oral intake is better in the form of glass of milk, piece of cheese, yogart, broccoli, or orange juice fortified with calcium and vitamin D     Continue walking in the pool

## 2017-06-27 ENCOUNTER — HOSPITAL ENCOUNTER (OUTPATIENT)
Dept: RADIOLOGY | Facility: HOSPITAL | Age: 77
Discharge: HOME | End: 2017-06-27
Attending: OBSTETRICS & GYNECOLOGY
Payer: MEDICARE

## 2017-06-27 VITALS — HEIGHT: 61 IN | WEIGHT: 188 LBS | BODY MASS INDEX: 35.5 KG/M2

## 2017-06-27 DIAGNOSIS — Z12.31 SCREENING MAMMOGRAM, ENCOUNTER FOR: ICD-10-CM

## 2017-06-27 PROCEDURE — 77067 SCR MAMMO BI INCL CAD: CPT | Mod: TC

## 2017-06-27 PROCEDURE — 77063 BREAST TOMOSYNTHESIS BI: CPT | Mod: 26,,, | Performed by: RADIOLOGY

## 2017-06-27 PROCEDURE — 77067 SCR MAMMO BI INCL CAD: CPT | Mod: 26,,, | Performed by: RADIOLOGY

## 2017-06-29 ENCOUNTER — TELEPHONE (OUTPATIENT)
Dept: RADIOLOGY | Facility: HOSPITAL | Age: 77
End: 2017-06-29

## 2017-06-29 ENCOUNTER — HOSPITAL ENCOUNTER (OUTPATIENT)
Dept: RADIOLOGY | Facility: HOSPITAL | Age: 77
Discharge: HOME | End: 2017-06-29
Attending: OBSTETRICS & GYNECOLOGY
Payer: MEDICARE

## 2017-06-29 DIAGNOSIS — R92.8 ABNORMAL MAMMOGRAM: ICD-10-CM

## 2017-06-29 PROCEDURE — 77061 BREAST TOMOSYNTHESIS UNI: CPT | Mod: TC

## 2017-06-29 PROCEDURE — 76642 ULTRASOUND BREAST LIMITED: CPT | Mod: 26,RT,, | Performed by: RADIOLOGY

## 2017-06-29 PROCEDURE — 77061 BREAST TOMOSYNTHESIS UNI: CPT | Mod: 26,,, | Performed by: RADIOLOGY

## 2017-06-29 PROCEDURE — 77065 DX MAMMO INCL CAD UNI: CPT | Mod: 26,,, | Performed by: RADIOLOGY

## 2017-06-29 NOTE — TELEPHONE ENCOUNTER
Spoke with patient and explained mammogram findings.Patient expressed understanding of results. Patient is scheduled for a abnormal mammogram follow up appointment at [facility] on 6/29/17

## 2017-06-30 ENCOUNTER — TELEPHONE (OUTPATIENT)
Dept: RADIOLOGY | Facility: HOSPITAL | Age: 77
End: 2017-06-30

## 2017-06-30 NOTE — TELEPHONE ENCOUNTER
Spoke with patient. Reviewed breast biopsy procedure and reviewed instructions for breast biopsy. Patient expressed understanding and all questions were answered. Provided patient with my phone number to call for any further concerns or questions.   Patient scheduled breast biopsy at the [facility] on 7/13/17

## 2017-07-06 ENCOUNTER — CLINICAL SUPPORT (OUTPATIENT)
Dept: REHABILITATION | Facility: HOSPITAL | Age: 77
End: 2017-07-06
Attending: INTERNAL MEDICINE
Payer: MEDICARE

## 2017-07-06 DIAGNOSIS — R11.0 NAUSEA: ICD-10-CM

## 2017-07-06 DIAGNOSIS — Z91.81 AT HIGH RISK FOR FALLS: ICD-10-CM

## 2017-07-06 DIAGNOSIS — R26.89 BALANCE PROBLEMS: ICD-10-CM

## 2017-07-06 PROCEDURE — 97110 THERAPEUTIC EXERCISES: CPT

## 2017-07-06 PROCEDURE — G8979 MOBILITY GOAL STATUS: HCPCS | Mod: CK

## 2017-07-06 PROCEDURE — 97161 PT EVAL LOW COMPLEX 20 MIN: CPT

## 2017-07-06 PROCEDURE — G8980 MOBILITY D/C STATUS: HCPCS | Mod: CK

## 2017-07-06 PROCEDURE — G8978 MOBILITY CURRENT STATUS: HCPCS | Mod: CK

## 2017-07-06 RX ORDER — PROMETHAZINE HYDROCHLORIDE 25 MG/1
TABLET ORAL
Qty: 30 TABLET | Refills: 0 | Status: SHIPPED | OUTPATIENT
Start: 2017-07-06 | End: 2018-02-19

## 2017-07-06 NOTE — PROGRESS NOTES
"  [facility]    Date: 07/06/2017  Referring Physician: Arleen Lara MD  Visit #: 1   Start Time:  1330  Stop Time:  1430    History     History of Present Illness: Patient with a h/o back and knee pain drove to visit her son in [place] and was not eating at her normal hours, was taking hydrocodone and lyrica, was constipated and took a laxative  and "passed out" when getting up to go to the bathroom in the middle of the night. No prior history of falls.  Goes to [facility] for water aerobics, which challenges her balance.  Now has increased difficulty with her balance.  Also works in the yard.  Lives in [facility] upstairs with daughter downstairs.  Has 23 steps to enter and is considering moving  Treatment Diagnosis: No diagnosis found.    Past Medical History:   Diagnosis Date    Allergy     Arthritis     Blepharitis of both eyes     Complication of anesthesia     nausea    Degenerative disc disease     GERD (gastroesophageal reflux disease)     patient denies reflux    History of compression fracture of spine 4/10/2014    Hyperlipidemia     Lumbar disc disease     Meibomitis     Obesity 9/19/2013    Osteoporosis     Papilloma of eyelid     RUL    Postoperative anemia 11/21/2013    Thoracic or lumbosacral neuritis or radiculitis, unspecified 9/20/2013    Tremors of nervous system 2/2015     Precautions: fall  Prior Therapy: none  Patient Therapy Goals: "I think I just fell because I was sick and passed out.  I don't think I need therapy"  Living situation: alone  Assistive devices/equipment none  Subjective     Have you had any falls in the last month? no  In the last 6 months? yes  If so, how many? one  Where were you and what were you doing when you fell? Going to the bathroom in the middle of the night  Did you feel lightheaded or dizzy? yes  Did you lose consciousness? yes  Have you almost fallen or lost your balance? no      Pain:  Denies " pain    Objective     Gait  Decreased alma and step length with decreased heel strike and toe off, steady gait without lateral deviation, symmetrical    Posture: forward head, increased thoracic kyphosis, decreased lumbar lordosis     Right Left Right Left    Strength Strength AROM/PROM AROM/PROM   Hip flexion 4 4 WNL WNL   Extension 4 4 WNL WNL   Glute max 4 4 WNL WNL   Abduction 4 4 WNL WNL   Adduction 4 4 WNL WNL   Knee ext 4 4 WNL WNL   Knee flexion 4 4 WNL WNL   DF 4 4 WNL WNL   PF 4 4 WNL WNL         Balance/Proprioception:  Single leg stance: R:  5 sec L:  5 sec  Tandem stance: 20 sec    Sensation: light touch intact BLE    30 second sit to stand: 8      Functional Limitations Reports - G Codes  Category: Mobility  CMS Impairment/Limitation/Restriction for FOTO Muscle, Tendon + Soft Tissue Disorders Survey    Status  Limitation  G-Code   CMS Severity Modifier  Intake   48%  52%   Current Status  CK - At least 40 percent but less than 60 percent  Predicted  61%  39%   Goal Status+   CJ - At least 20 percent but less than 40 percent  D/C Status        CK **only report if this is a one time visit    Treatment:   Standing hip abduction  Standing hip extension  Tandem stance  bridges    History  Co-morbidities and personal factors that may impact the plan of care Examination  Body Structures and Functions, activity limitations and participation restrictions that may impact the plan of care Clinical Presentation   Decision Making/ Complexity Score   Co-morbidities:                 Personal Factors:    Body Regions:  LE    Body Systems:   neuromuscular            Activity limitations:   none    Participation Restrictions:   none        stable and uncomplicated low         Assessment       Initial Assessment:  Patient presents after an isolated fall which she attributes to her medication.  She reports no current limitations or difficulties and is independent with all mobility.  She has been instructed in a home  exercise program and is able to perform independently.  PT is therefore discharged.  Pt agrees    Plan   DC PT with HEP    Recommendations: No further PT needs anticipated  Therapist: Candelario Bennett, PT    I CERTIFY THE NEED FOR THESE SERVICES FURNISHED UNDER THIS PLAN OF TREATMENT AND WHILE UNDER MY CARE    Physician's comments: ________________________________________________________________________________________________________________________________________________    Physician's Name: ___________________________________

## 2017-07-13 ENCOUNTER — HOSPITAL ENCOUNTER (OUTPATIENT)
Dept: RADIOLOGY | Facility: HOSPITAL | Age: 77
Discharge: HOME | End: 2017-07-13
Attending: OBSTETRICS & GYNECOLOGY
Payer: MEDICARE

## 2017-07-13 DIAGNOSIS — N63.0 LUMP OR MASS IN BREAST: Primary | ICD-10-CM

## 2017-07-13 DIAGNOSIS — R92.8 ABNORMAL MAMMOGRAM: ICD-10-CM

## 2017-07-13 PROCEDURE — 77065 DX MAMMO INCL CAD UNI: CPT | Mod: 26,RT,, | Performed by: RADIOLOGY

## 2017-07-13 PROCEDURE — 88305 TISSUE EXAM BY PATHOLOGIST: CPT | Performed by: PATHOLOGY

## 2017-07-13 PROCEDURE — 88360 TUMOR IMMUNOHISTOCHEM/MANUAL: CPT | Performed by: PATHOLOGY

## 2017-07-13 PROCEDURE — 88360 TUMOR IMMUNOHISTOCHEM/MANUAL: CPT | Mod: 26,,, | Performed by: PATHOLOGY

## 2017-07-13 PROCEDURE — A4648 IMPLANTABLE TISSUE MARKER: HCPCS

## 2017-07-13 PROCEDURE — 77065 DX MAMMO INCL CAD UNI: CPT | Mod: TC,RT

## 2017-07-13 PROCEDURE — 88305 TISSUE EXAM BY PATHOLOGIST: CPT | Mod: 26,,, | Performed by: PATHOLOGY

## 2017-07-13 PROCEDURE — 88342 IMHCHEM/IMCYTCHM 1ST ANTB: CPT | Mod: 26,59,, | Performed by: PATHOLOGY

## 2017-07-13 PROCEDURE — 19083 BX BREAST 1ST LESION US IMAG: CPT | Mod: ,,, | Performed by: RADIOLOGY

## 2017-07-14 NOTE — PLAN OF CARE
"  [facility]    Date: 07/06/2017  Referring Physician: Arleen Lara MD  Visit #: 1   Start Time:  1330  Stop Time:  1430    History     History of Present Illness: Patient with a h/o back and knee pain drove to visit her son in [place] and was not eating at her normal hours, was taking hydrocodone and lyrica, was constipated and took a laxative  and "passed out" when getting up to go to the bathroom in the middle of the night. No prior history of falls.  Goes to [facility] for water aerobics, which challenges her balance.  Now has increased difficulty with her balance.  Also works in the yard.  Lives in [facility] upstairs with daughter downstairs.  Has 23 steps to enter and is considering moving  Treatment Diagnosis: No diagnosis found.    Past Medical History:   Diagnosis Date    Allergy     Arthritis     Blepharitis of both eyes     Complication of anesthesia     nausea    Degenerative disc disease     GERD (gastroesophageal reflux disease)     patient denies reflux    History of compression fracture of spine 4/10/2014    Hyperlipidemia     Lumbar disc disease     Meibomitis     Obesity 9/19/2013    Osteoporosis     Papilloma of eyelid     RUL    Postoperative anemia 11/21/2013    Thoracic or lumbosacral neuritis or radiculitis, unspecified 9/20/2013    Tremors of nervous system 2/2015     Precautions: fall  Prior Therapy: none  Patient Therapy Goals: "I think I just fell because I was sick and passed out.  I don't think I need therapy"  Living situation: alone  Assistive devices/equipment none  Subjective     Have you had any falls in the last month? no  In the last 6 months? yes  If so, how many? one  Where were you and what were you doing when you fell? Going to the bathroom in the middle of the night  Did you feel lightheaded or dizzy? yes  Did you lose consciousness? yes  Have you almost fallen or lost your balance? no      Pain:  Denies " pain    Objective     Gait  Decreased alma and step length with decreased heel strike and toe off, steady gait without lateral deviation, symmetrical    Posture: forward head, increased thoracic kyphosis, decreased lumbar lordosis     Right Left Right Left    Strength Strength AROM/PROM AROM/PROM   Hip flexion 4 4 WNL WNL   Extension 4 4 WNL WNL   Glute max 4 4 WNL WNL   Abduction 4 4 WNL WNL   Adduction 4 4 WNL WNL   Knee ext 4 4 WNL WNL   Knee flexion 4 4 WNL WNL   DF 4 4 WNL WNL   PF 4 4 WNL WNL         Balance/Proprioception:  Single leg stance: R:  5 sec L:  5 sec  Tandem stance: 20 sec    Sensation: light touch intact BLE    30 second sit to stand: 8      Functional Limitations Reports - G Codes  Category: Mobility  CMS Impairment/Limitation/Restriction for FOTO Muscle, Tendon + Soft Tissue Disorders Survey    Status  Limitation  G-Code   CMS Severity Modifier  Intake   48%  52%   Current Status  CK - At least 40 percent but less than 60 percent  Predicted  61%  39%   Goal Status+   CJ - At least 20 percent but less than 40 percent  D/C Status        CK **only report if this is a one time visit    Treatment:   Standing hip abduction  Standing hip extension  Tandem stance  bridges    History  Co-morbidities and personal factors that may impact the plan of care Examination  Body Structures and Functions, activity limitations and participation restrictions that may impact the plan of care Clinical Presentation   Decision Making/ Complexity Score   Co-morbidities:                 Personal Factors:    Body Regions:  LE    Body Systems:   neuromuscular            Activity limitations:   none    Participation Restrictions:   none        stable and uncomplicated low         Assessment       Initial Assessment:  Patient presents after an isolated fall which she attributes to her medication.  She reports no current limitations or difficulties and is independent with all mobility.  She has been instructed in a home  exercise program and is able to perform independently.  PT is therefore discharged.  Pt agrees    Plan   DC PT with HEP    Recommendations: No further PT needs anticipated  Therapist: Candelario Bennett, PT    I CERTIFY THE NEED FOR THESE SERVICES FURNISHED UNDER THIS PLAN OF TREATMENT AND WHILE UNDER MY CARE    Physician's comments: ________________________________________________________________________________________________________________________________________________    Physician's Name: ___________________________________

## 2017-07-15 ENCOUNTER — NURSE TRIAGE (OUTPATIENT)
Dept: ADMINISTRATIVE | Facility: CLINIC | Age: 77
End: 2017-07-15

## 2017-07-18 ENCOUNTER — PATIENT MESSAGE (OUTPATIENT)
Dept: OBSTETRICS AND GYNECOLOGY | Facility: CLINIC | Age: 77
End: 2017-07-18

## 2017-07-18 ENCOUNTER — OFFICE VISIT (OUTPATIENT)
Dept: SURGERY | Facility: CLINIC | Age: 77
End: 2017-07-18
Payer: MEDICARE

## 2017-07-18 ENCOUNTER — DOCUMENTATION ONLY (OUTPATIENT)
Dept: SURGERY | Facility: CLINIC | Age: 77
End: 2017-07-18

## 2017-07-18 VITALS
WEIGHT: 189 LBS | HEART RATE: 69 BPM | DIASTOLIC BLOOD PRESSURE: 79 MMHG | BODY MASS INDEX: 35.68 KG/M2 | HEIGHT: 61 IN | TEMPERATURE: 98 F | SYSTOLIC BLOOD PRESSURE: 146 MMHG

## 2017-07-18 DIAGNOSIS — Z17.0 MALIGNANT NEOPLASM OF UPPER-OUTER QUADRANT OF RIGHT BREAST IN FEMALE, ESTROGEN RECEPTOR POSITIVE: Primary | ICD-10-CM

## 2017-07-18 DIAGNOSIS — C50.411 MALIGNANT NEOPLASM OF UPPER-OUTER QUADRANT OF RIGHT BREAST IN FEMALE, ESTROGEN RECEPTOR POSITIVE: Primary | ICD-10-CM

## 2017-07-18 PROBLEM — C50.912 MALIGNANT NEOPLASM OF LEFT FEMALE BREAST: Status: ACTIVE | Noted: 2017-07-18

## 2017-07-18 PROCEDURE — 1126F AMNT PAIN NOTED NONE PRSNT: CPT | Mod: ,,, | Performed by: SURGERY

## 2017-07-18 PROCEDURE — 99213 OFFICE O/P EST LOW 20 MIN: CPT | Mod: PBBFAC,PO | Performed by: SURGERY

## 2017-07-18 PROCEDURE — 99999 PR PBB SHADOW E&M-EST. PATIENT-LVL III: CPT | Mod: PBBFAC,,, | Performed by: SURGERY

## 2017-07-18 PROCEDURE — 99205 OFFICE O/P NEW HI 60 MIN: CPT | Mod: S$PBB,,, | Performed by: SURGERY

## 2017-07-18 PROCEDURE — 1159F MED LIST DOCD IN RCRD: CPT | Mod: ,,, | Performed by: SURGERY

## 2017-07-18 NOTE — Clinical Note
Surgery Wed 8-9-17 for a RIGHT mastectomy with RIGHT axillary SLNB without reconstruction on WEd 8-9-17

## 2017-07-18 NOTE — PROGRESS NOTES
INITIAL OFFICE CONSULTATION    DATE OF CONSULTATION:  07/18/2017.    CHIEF COMPLAINT:  Newly diagnosed invasive infiltrating carcinoma of the right   breast.    HISTORY OF PRESENT ILLNESS:  The patient is a 76-year-old  female who   presents with her daughter for initial consultation regarding her newly   diagnosed invasive breast carcinoma.  She did not note any abnormalities on   self-breast examination and underwent routine screening mammogram on 06/27/2017   and subsequent diagnostic mammogram on 06/29/2017 which revealed a BI-RADS   category BI-RADS category 4 suspicious right breast mass measuring approximately   8 mm in greatest dimension in the posterior upper outer quadrant of the 10   o'clock position of the right breast.  There were no suspicious clinical   findings.  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  The E cadherin was negative,   making the subtype of pleomorphic lobular carcinoma favored.  This was strongly   estrogen and progesterone receptor positive.  It was HER-2/gwyn negative.  She   presents today to discuss surgical options.  The greatest linear length of the   tumor was 4 mm on a core needle biopsy, measuring 8 mm on diagnostic imaging   with ultrasound.  It was found to have nonparallel indistinct margins and   located 8 cm from the nipple.    PAST MEDICAL HISTORY:  Significant for back pain status post a L5 lumbar fusion   by Dr. Rodney Beckett approximately 2-3 years ago.  This pain has been chronic   for at least 10 years.  She still has some pain from this.  She also describes   a tremor.  She denies any history of cardiac, pulmonary or renal disease.    SOCIAL HISTORY:  Significant in that she was approximately a 1-pack per day   smoker for approximately 50 years.  She quit smoking approximately seven years   ago.  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      ALLERGIES:  To sulfa medications and adhesives.    MEDICATIONS:  Per EPIC and include Fosamax, Norco p.r.n., Phenergan p.r.n.,   Inderal, Desyrel, Zyrtec, Lyrica and vitamin D.    FAMILY HISTORY:  Significant in that her mother had breast cancer in her 60s.    The patient's mother did well from her breast cancer, treated with breast   conservation and radiotherapy.  She states that her father had a history of bone   cancer.  There is no family history of ovarian cancer.  She states she had   several members with "gastric" or possibly some form of peritoneal malignancy.    GYNECOLOGICAL HISTORY:  Reveals that she is a  2, para 2 with first   pregnancy and live birth at age 27.  Menarche was at age 13, menopause was   naturally at age 50.  She denies any gynecological surgery.  She denies any   history of hormone replacement therapy.  She took oral contraceptive products in   the remote past for approximately 10 years.      PHYSICAL EXAMINATION:  Reveals vital signs stable and afebrile.  Her physical   exam is essentially within normal limits.  There are no suspicious clinical   findings on breast exam.  The breasts are symmetrical with no suspicious masses,   nodules, densities, skin changes or lymphadenopathy.  There is evidence of a   recent core needle biopsy site with ecchymosis in the upper outer quadrant of   the right breast, but no suspicious clinical findings, no suspicious skin   changes and no lymphadenopathy.  There is no palpable mass.    ASSESSMENT AND PLAN:  Newly diagnosed invasive mammary carcinoma of the right   breast posteriorly upper outer quadrant.  The patient and her daughter were   counseled on the options of breast conservation surgery and radiotherapy versus   mastectomy.  We discussed some of the CALGB data to discuss potentially having   radiotherapy deferred as well with a slight increase in local recurrence to   10%-12%, but no impact " on survival.  We discussed the role for endocrine therapy   as part of her systemic therapy.  We discussed that mastectomy would typically   allow her to avoid radiotherapy and we discussed some of the indications for   postmastectomy radiotherapy, although it would be very unlikely that this   patient would require postmastectomy radiotherapy.  We discussed that the role   for systemic therapy recommendations and options would have no impact on whether   she had lumpectomy or mastectomy.  It would be based on clinical stage,   pathologic stage and receptor status.  Her receptors reveal that her tumor is   strongly estrogen and progesterone receptor positive and HER-2/gwyn negative.    The patient was counseled for approximately 60 minutes.  We discussed the   indications, technique and rationale for sentinel lymph node biopsy.  We   discussed the potential need for reexcision with lumpectomy.  We discussed   reconstruction options.  We discussed autologous tissue reconstruction versus   implant reconstruction.  The patient is wishing to decline any reconstruction   options and appears to be highly motivated for mastectomy.  Approximate time   spent with the patient and her daughter was 60 minutes with greater than 50% of   that time in counseling.  After going over all the options, the patient has   decided to proceed with a right total complete therapeutic mastectomy with right   axillary sentinel lymph node biopsy without reconstruction.  This has been   scheduled for Wednesday, 08/09/2017 under general anesthetic with laryngeal mask   airway as choice for the induction of general anesthesia.  She will also be a   candidate for paravertebral block.  The patient was seen by the clinical nurse   navigator and provided with breast cancer information.  She would also be a   candidate for L-Dex preoperatively.  Consent was obtained for the right total   complete therapeutic mastectomy with sentinel lymph node biopsy,  possible   axillary dissection if frozen section reveals metastatic carcinoma to the lymph   nodes without reconstruction on Wednesday, 08/09/2017 for a T1b 8 mm invasive   mammary carcinoma of the posterior upper outer quadrant of the right breast.      RLC/IN  dd: 07/18/2017 17:22:50 (CDT)  td: 07/19/2017 01:50:38 (CDT)  Doc ID   #[id number]  Job ID #[id number]    CC:     Job # [id number].

## 2017-07-19 DIAGNOSIS — C50.912 MALIGNANT NEOPLASM OF LEFT BREAST IN FEMALE, ESTROGEN RECEPTOR POSITIVE, UNSPECIFIED SITE OF BREAST: Primary | ICD-10-CM

## 2017-07-19 DIAGNOSIS — Z17.0 MALIGNANT NEOPLASM OF LEFT BREAST IN FEMALE, ESTROGEN RECEPTOR POSITIVE, UNSPECIFIED SITE OF BREAST: Primary | ICD-10-CM

## 2017-07-19 DIAGNOSIS — Z17.0 MALIGNANT NEOPLASM OF RIGHT BREAST IN FEMALE, ESTROGEN RECEPTOR POSITIVE, UNSPECIFIED SITE OF BREAST: Primary | ICD-10-CM

## 2017-07-19 DIAGNOSIS — C50.911 MALIGNANT NEOPLASM OF RIGHT BREAST IN FEMALE, ESTROGEN RECEPTOR POSITIVE, UNSPECIFIED SITE OF BREAST: Primary | ICD-10-CM

## 2017-07-21 ENCOUNTER — TELEPHONE (OUTPATIENT)
Dept: SURGERY | Facility: CLINIC | Age: 77
End: 2017-07-21

## 2017-07-21 NOTE — PROGRESS NOTES
Nurse Navigator Note:     Met with patient during her consult with Dr. Rolle. Patient and I reviewed the information she discussed with Dr. Rolle, including treatment options, diagnosis, and future plans for workup. Patient and I went through the new patient binder, explained some of the information and why it is provided.     Patient was given a copy of her appointments, Dr. Rolle's card, and my card. Encouraged her to call me if she has any questions or concerns or would like to schedule any additional appointments. Verbalized understanding of all information.

## 2017-07-21 NOTE — TELEPHONE ENCOUNTER
Called patient to discuss possible visit for PT/baseline LDex measurement. Left voicemail with my callback number

## 2017-07-26 ENCOUNTER — ANESTHESIA EVENT (OUTPATIENT)
Dept: SURGERY | Facility: HOSPITAL | Age: 77
End: 2017-07-26
Payer: MEDICARE

## 2017-07-27 ENCOUNTER — HOSPITAL ENCOUNTER (OUTPATIENT)
Dept: CARDIOLOGY | Facility: CLINIC | Age: 77
Discharge: HOME | End: 2017-07-27
Payer: MEDICARE

## 2017-07-27 ENCOUNTER — HOSPITAL ENCOUNTER (OUTPATIENT)
Dept: PREADMISSION TESTING | Facility: HOSPITAL | Age: 77
Discharge: HOME | End: 2017-07-27
Attending: SURGERY
Payer: MEDICARE

## 2017-07-27 VITALS
SYSTOLIC BLOOD PRESSURE: 125 MMHG | BODY MASS INDEX: 35.94 KG/M2 | OXYGEN SATURATION: 98 % | RESPIRATION RATE: 18 BRPM | TEMPERATURE: 98 F | HEIGHT: 61 IN | WEIGHT: 190.38 LBS | DIASTOLIC BLOOD PRESSURE: 76 MMHG | HEART RATE: 67 BPM

## 2017-07-27 DIAGNOSIS — Z17.0 MALIGNANT NEOPLASM OF UPPER-OUTER QUADRANT OF RIGHT BREAST IN FEMALE, ESTROGEN RECEPTOR POSITIVE: ICD-10-CM

## 2017-07-27 DIAGNOSIS — C50.411 MALIGNANT NEOPLASM OF UPPER-OUTER QUADRANT OF RIGHT BREAST IN FEMALE, ESTROGEN RECEPTOR POSITIVE: ICD-10-CM

## 2017-07-27 PROCEDURE — 93010 ELECTROCARDIOGRAM REPORT: CPT | Mod: S$PBB,,, | Performed by: INTERNAL MEDICINE

## 2017-07-27 PROCEDURE — 93005 ELECTROCARDIOGRAM TRACING: CPT | Mod: PBBFAC | Performed by: INTERNAL MEDICINE

## 2017-07-27 RX ORDER — ASPIRIN 325 MG
325 TABLET ORAL DAILY
COMMUNITY
End: 2018-02-07

## 2017-07-27 RX ORDER — CALCIUM CARBONATE 600 MG
600 TABLET ORAL 2 TIMES DAILY WITH MEALS
COMMUNITY
End: 2019-08-12 | Stop reason: ALTCHOICE

## 2017-07-27 NOTE — DISCHARGE INSTRUCTIONS
Your surgery has been scheduled for:__________________________________________    You should report to:  ____[facility], located on the [place] side of the first floor of the           [facility] ([phone])  ____[facility], located on the [facility] side of the            Second floor of the [facility] [phone])  ____3rd Floor [facility] located on the [facility] side of the [facility] [phone]  Please Note   - Tell your doctor if you take Aspirin, products containing Aspirin, herbal medications  or blood thinners, such as Coumadin, Ticlid, or Plavix.  (Consult your provider regarding holding or stopping before surgery).  - Arrange for someone to drive you home following surgery.  You will not be allowed to leave the surgical facility alone or drive yourself home following sedation and anesthesia.  Before Surgery  - Stop taking all herbal medications 14days prior to surgery  - No Motrin/Advil (Ibuprofen) 7 days before surgery  - No Aleve (Naproxen) 7 days before surgery  - Stop Taking Asprin, products containing Asprin _____days before surgery  - Stop taking blood thinners_______days before surgery  - Refrain from drinking alcoholic beverages for 24hours before and after surgery  - Stop or limit smoking _________days before surgery  Night before Surgery  - DO NOT EAT OR DRINK ANYTHING AFTER MIDNIGHT, INCLUDING GUM, HARD CANDY, MINTS, OR CHEWING TOBACCO.  - Take a shower or bath (shower is recommended).  Bathe with Hibiclens soap or an antibacterial soap from the neck down.  If not supplied by your surgeon, hibiclens soap will need to be purchased over the counter in pharmacy.  Rinse soap off thoroughly.  - Shampoo your hair with your regular shampoo  The Day of Surgery  - Take another bath or shower with hibiclens or any antibacterial soap, to reduce the chance of infection.  - Take heart and blood  pressure medications with a small sip of water, as advised by the perioperative team.  - Do not take fluid pills  - You may brush your teeth and rinse your mouth, but do not swall any additional water.   - Do not apply perfumes, powder, body lotions or deodorant on the day of surgery.  - Nail polish should be removed.  - Do not wear makeup or moisturizer  - Wear comfortable clothes, such as a button front shirt and loose fitting pants.  - Leave all jewelry, including body piercings, and valuables at home.    - Bring any devices you will neeed after surgery such as crutches or canes.  - If you have sleep apnea, please bring your CPAP machine  In the event that your physical condition changes including the onset of a cold or respiratory illness, or if you have to delay or cancel your surgery, please notify your surgeon.  Anesthesia: General Anesthesia  Youre due to have surgery. During surgery, youll be given medication called anesthesia. (It is also called anesthetic.) This will keep you comfortable and pain-free. Your anesthesia provider will use general anesthesia. This sheet tells you more about it.  What is general anesthesia?     You are watched continuously during your procedure by the anesthesia provider   General anesthesia puts you into a state like deep sleep. It goes into the bloodstream (IV anesthetics), into the lungs (gas anesthetics), or both. You feel nothing during the procedure. You will not remember it. During the procedure, the anesthesia provider monitors you continuously. He or she checks your heart rate and rhythm, blood pressure, breathing, and blood oxygen.  · IV Anesthetics. IV anesthetics are given through an IV line in your arm. Theyre often given first. This is so you are asleep before a gas anesthetic is started. Some kinds of IV anesthetics relieve pain. Others relax you. Your doctor will decide which kind is best in your case.  · Gas Anesthetics. Gas anesthetics are breathed into  the lungs. They are often used to keep you asleep. They can be given through a facemask or a tube placed in your larynx or trachea (breathing tube).  ? If you have a facemask, your anesthesia provider will most likely place it over your nose and mouth while youre still awake. Youll breathe oxygen through the mask as your IV anesthetic is started. Gas anesthetic may be added through the mask.  ? If you have a tube in the larynx or trachea, it will be inserted into your throat after youre asleep.  Anesthesia tools and medications  You will likely have:  · IV anesthetics. These are put into an IV line into your bloodstream.  · Gas anesthetics. You breathe these anesthetics into your lungs, where they pass into your bloodstream.  · Pulse oximeter. This is a small clip that is attached to the end of your finger. This measures your blood oxygen level.  · Electrocardiography leads (electrodes). These are small sticky pads that are placed on your chest. They record your heart rate and rhythm.  · Blood pressure cuff. This reads your blood pressure.  Risks and possible complications  General anesthesia has some risks. These include:  · Breathing problems  · Nausea and vomiting  · Sore throat or hoarseness (usually temporary)  · Allergic reaction to the anesthetic  · Irregular heartbeat (rare)  · Cardiac arrest (rare)   Anesthesia safety  · Follow all instructions you are given for how long not to eat or drink before your procedure.  · Be sure your doctor knows what medications and drugs you take. This includes over-the-counter medications, herbs, supplements, alcohol or other drugs. You will be asked when those were last taken.  · Have an adult family member or friend drive you home after the procedure.  · For the first 24 hours after your surgery:  ? Do not drive or use heavy equipment.  ? Have a trusted family member or spouse make important decisions or sign documents.  ? Avoid alcohol.  ? Have a responsible adult stay  with you. He or she can watch for problems and help keep you safe.  Date Last Reviewed: 10/16/2014  © 3519-2024 [employer], [employer]. [address] [place] [address], [place] [address]. All rights reserved. This information is not intended as a substitute for professional medical care. Always follow your healthcare professional's instructions.

## 2017-07-27 NOTE — ANESTHESIA PREPROCEDURE EVALUATION
07/27/2017  Nicho Espinosa is a 76 y.o., female.    Anesthesia Evaluation    I have reviewed the Patient Summary Reports.    I have reviewed the Nursing Notes.   I have reviewed the Medications.     Review of Systems  Anesthesia Hx:  Hx of Anesthetic complications PONV- with knee sx but not with back sx in 2015.  Patient states she had a patch   Social:  Non-Smoker, No Alcohol Use 25 pack year smoker, quit 2010   Hematology/Oncology:  Hematology Normal      Current/Recent Cancer. Breast   Cardiovascular:   Exercise tolerance: good Housework, grocery shopping, can climb a flight of stairs.  Denies chestpain. SOB with 2 flights of stairs-mild Diastolic dysfunction   11/2013 partial age-indeterminant non-occlusive thrombus in the peroneal veins at the mid calf. There is no evidence of venous thrombosis in the superficial veins.        Pulmonary:   Pneumonia (Pneumonia in past >30 + years)  Possible Obstructive Sleep Apnea , (STOP/BANG) Symptoms A - Age > 50, B - BMI > 35, N - Neck circumference > 40 cms and S - Snoring (loud)    Renal/:  Renal/ Normal     Hepatic/GI:   GERD (controlled with diet-) Denies Liver Disease.    Musculoskeletal:   Arthritis (s/p R-TKR)   Musculoskeletal General/Symptoms: Functional capacity is ambulatory without assistance.  Spine Disorders: lumbar Degenerative disease    Neurological:   Denies CVA. Denies Seizures.  Pain , onset is chronic , location of lower back , quality of aching/dull, burning, sharp , severity is a 4 , precipitating factors are standing , alleviating factors are rest.  Peripheral Neuropathy (Sciatica, current low back pain that radiates to left)    Endocrine:  Endocrine Normal    Dermatological:  Skin Normal    Psych:  Psychiatric Normal           Physical Exam  General:  Obesity    Airway/Jaw/Neck:  Airway Findings: Mouth Opening: Small, but > 3cm Tongue:  Large  General Airway Assessment: Adult  Mallampati: III  Improves to II with phonation.  TM Distance: 4 - 6 cm  Jaw/Neck Findings:  Neck ROM: Extension Decreased, Mod.  Neck Findings:  Girth Increased, Short Neck      Dental:  Dental Findings: In tact   Chest/Lungs:  Chest/Lungs Findings: Clear to auscultation, Normal Respiratory Rate     Heart/Vascular:  Heart Findings: Rate: Normal  Rhythm: Regular Rhythm  Sounds: Normal        Mental Status:  Mental Status Findings:  Cooperative, Alert and Oriented         Labs and ekg reviewed    Discussed case with Dr. Leopold Janet T Barbre, RN 7/27/17            Anesthesia Plan  Type of Anesthesia, risks & benefits discussed:  Anesthesia Type:  general, regional  Patient's Preference:   Intra-op Monitoring Plan: standard ASA monitors  Intra-op Monitoring Plan Comments:   Post Op Pain Control Plan: multimodal analgesia and IV/PO Opioids PRN  Post Op Pain Control Plan Comments:   Induction:   IV  Beta Blocker:  Patient is on a Beta-Blocker and has received one dose within the past 24 hours (No further documentation required).       Informed Consent: Patient understands risks and agrees with Anesthesia plan.  Questions answered. Anesthesia consent signed with patient.  ASA Score: 3     Day of Surgery Review of History & Physical:  There are no significant changes.          Ready For Surgery From Anesthesia Perspective.

## 2017-07-27 NOTE — PRE-PROCEDURE INSTRUCTIONS
Preop appointment completed.  Medication and preop instructions reviewed with patient. Patient verbalized understanding.   Patient received a copy of the medication instruction sheet. Patient also received a copy of managing your pain and fall prevention pamphlets.

## 2017-08-08 ENCOUNTER — TELEPHONE (OUTPATIENT)
Dept: SURGERY | Facility: CLINIC | Age: 77
End: 2017-08-08

## 2017-08-09 ENCOUNTER — ANESTHESIA (OUTPATIENT)
Dept: SURGERY | Facility: HOSPITAL | Age: 77
End: 2017-08-09
Payer: MEDICARE

## 2017-08-09 ENCOUNTER — HOSPITAL ENCOUNTER (OUTPATIENT)
Facility: HOSPITAL | Age: 77
Discharge: HOME | End: 2017-08-10
Attending: SURGERY | Admitting: SURGERY
Payer: MEDICARE

## 2017-08-09 ENCOUNTER — HOSPITAL ENCOUNTER (OUTPATIENT)
Dept: RADIOLOGY | Facility: HOSPITAL | Age: 77
Discharge: HOME | End: 2017-08-09
Attending: SURGERY | Admitting: SURGERY
Payer: MEDICARE

## 2017-08-09 DIAGNOSIS — C50.919 BREAST CANCER IN FEMALE: Primary | ICD-10-CM

## 2017-08-09 DIAGNOSIS — C50.911 MALIGNANT NEOPLASM OF RIGHT BREAST IN FEMALE, ESTROGEN RECEPTOR POSITIVE, UNSPECIFIED SITE OF BREAST: ICD-10-CM

## 2017-08-09 DIAGNOSIS — Z17.0 MALIGNANT NEOPLASM OF UPPER-OUTER QUADRANT OF RIGHT BREAST IN FEMALE, ESTROGEN RECEPTOR POSITIVE: ICD-10-CM

## 2017-08-09 DIAGNOSIS — Z17.0 MALIGNANT NEOPLASM OF RIGHT BREAST IN FEMALE, ESTROGEN RECEPTOR POSITIVE, UNSPECIFIED SITE OF BREAST: ICD-10-CM

## 2017-08-09 DIAGNOSIS — C50.411 MALIGNANT NEOPLASM OF UPPER-OUTER QUADRANT OF RIGHT BREAST IN FEMALE, ESTROGEN RECEPTOR POSITIVE: ICD-10-CM

## 2017-08-09 PROCEDURE — 36000706: Performed by: SURGERY

## 2017-08-09 PROCEDURE — 71000033 HC RECOVERY, INTIAL HOUR: Performed by: SURGERY

## 2017-08-09 PROCEDURE — 88341 IMHCHEM/IMCYTCHM EA ADD ANTB: CPT | Mod: 26,,,

## 2017-08-09 PROCEDURE — G0378 HOSPITAL OBSERVATION PER HR: HCPCS

## 2017-08-09 PROCEDURE — 71000039 HC RECOVERY, EACH ADD'L HOUR: Performed by: SURGERY

## 2017-08-09 PROCEDURE — D9220A PRA ANESTHESIA: Mod: CRNA,,, | Performed by: NURSE ANESTHETIST, CERTIFIED REGISTERED

## 2017-08-09 PROCEDURE — D9220A PRA ANESTHESIA: Mod: ANES,,, | Performed by: ANESTHESIOLOGY

## 2017-08-09 PROCEDURE — 36000707: Performed by: SURGERY

## 2017-08-09 PROCEDURE — 64520 N BLOCK LUMBAR/THORACIC: CPT | Performed by: ANESTHESIOLOGY

## 2017-08-09 PROCEDURE — 63600175 PHARM REV CODE 636 W HCPCS: Performed by: SURGERY

## 2017-08-09 PROCEDURE — 25000003 PHARM REV CODE 250: Performed by: STUDENT IN AN ORGANIZED HEALTH CARE EDUCATION/TRAINING PROGRAM

## 2017-08-09 PROCEDURE — 25000003 PHARM REV CODE 250: Performed by: ANESTHESIOLOGY

## 2017-08-09 PROCEDURE — 88342 IMHCHEM/IMCYTCHM 1ST ANTB: CPT | Mod: 26,,,

## 2017-08-09 PROCEDURE — C1729 CATH, DRAINAGE: HCPCS | Performed by: SURGERY

## 2017-08-09 PROCEDURE — 94761 N-INVAS EAR/PLS OXIMETRY MLT: CPT

## 2017-08-09 PROCEDURE — S0028 INJECTION, FAMOTIDINE, 20 MG: HCPCS | Performed by: NURSE ANESTHETIST, CERTIFIED REGISTERED

## 2017-08-09 PROCEDURE — 38792 RA TRACER ID OF SENTINL NODE: CPT | Mod: ,,, | Performed by: RADIOLOGY

## 2017-08-09 PROCEDURE — 25000003 PHARM REV CODE 250: Performed by: NURSE ANESTHETIST, CERTIFIED REGISTERED

## 2017-08-09 PROCEDURE — 88331 PATH CONSLTJ SURG 1 BLK 1SPC: CPT | Mod: 26,,,

## 2017-08-09 PROCEDURE — 37000009 HC ANESTHESIA EA ADD 15 MINS: Performed by: SURGERY

## 2017-08-09 PROCEDURE — 38792 RA TRACER ID OF SENTINL NODE: CPT | Mod: TC

## 2017-08-09 PROCEDURE — 64461 PVB THORACIC SINGLE INJ SITE: CPT | Mod: 59,RT,, | Performed by: ANESTHESIOLOGY

## 2017-08-09 PROCEDURE — 88307 TISSUE EXAM BY PATHOLOGIST: CPT | Mod: 26,,,

## 2017-08-09 PROCEDURE — 27000221 HC OXYGEN, UP TO 24 HOURS

## 2017-08-09 PROCEDURE — 38792 RA TRACER ID OF SENTINL NODE: CPT | Mod: 59,RT,GC, | Performed by: SURGERY

## 2017-08-09 PROCEDURE — 37000008 HC ANESTHESIA 1ST 15 MINUTES: Performed by: SURGERY

## 2017-08-09 PROCEDURE — 63600175 PHARM REV CODE 636 W HCPCS: Performed by: NURSE ANESTHETIST, CERTIFIED REGISTERED

## 2017-08-09 PROCEDURE — 25000003 PHARM REV CODE 250: Performed by: SURGERY

## 2017-08-09 PROCEDURE — 19303 MAST SIMPLE COMPLETE: CPT | Mod: RT,GC,, | Performed by: SURGERY

## 2017-08-09 PROCEDURE — 38525 BIOPSY/REMOVAL LYMPH NODES: CPT | Mod: 51,RT,GC, | Performed by: SURGERY

## 2017-08-09 PROCEDURE — 38900 IO MAP OF SENT LYMPH NODE: CPT | Mod: RT,GC,, | Performed by: SURGERY

## 2017-08-09 PROCEDURE — 63600175 PHARM REV CODE 636 W HCPCS: Performed by: ANESTHESIOLOGY

## 2017-08-09 PROCEDURE — 88341 IMHCHEM/IMCYTCHM EA ADD ANTB: CPT

## 2017-08-09 PROCEDURE — 63600175 PHARM REV CODE 636 W HCPCS: Performed by: STUDENT IN AN ORGANIZED HEALTH CARE EDUCATION/TRAINING PROGRAM

## 2017-08-09 RX ORDER — OXYCODONE AND ACETAMINOPHEN 5; 325 MG/1; MG/1
1 TABLET ORAL EVERY 4 HOURS PRN
Status: DISCONTINUED | OUTPATIENT
Start: 2017-08-09 | End: 2017-08-10 | Stop reason: HOSPADM

## 2017-08-09 RX ORDER — SCOLOPAMINE TRANSDERMAL SYSTEM 1 MG/1
1 PATCH, EXTENDED RELEASE TRANSDERMAL
Status: DISCONTINUED | OUTPATIENT
Start: 2017-08-09 | End: 2017-08-09 | Stop reason: HOSPADM

## 2017-08-09 RX ORDER — FENTANYL CITRATE 50 UG/ML
25 INJECTION, SOLUTION INTRAMUSCULAR; INTRAVENOUS EVERY 5 MIN PRN
Status: DISCONTINUED | OUTPATIENT
Start: 2017-08-09 | End: 2017-08-09 | Stop reason: HOSPADM

## 2017-08-09 RX ORDER — FENTANYL CITRATE 50 UG/ML
INJECTION, SOLUTION INTRAMUSCULAR; INTRAVENOUS
Status: DISCONTINUED | OUTPATIENT
Start: 2017-08-09 | End: 2017-08-09

## 2017-08-09 RX ORDER — MIDAZOLAM HYDROCHLORIDE 1 MG/ML
INJECTION, SOLUTION INTRAMUSCULAR; INTRAVENOUS
Status: DISCONTINUED | OUTPATIENT
Start: 2017-08-09 | End: 2017-08-09

## 2017-08-09 RX ORDER — OXYCODONE AND ACETAMINOPHEN 10; 325 MG/1; MG/1
1 TABLET ORAL EVERY 4 HOURS PRN
Status: DISCONTINUED | OUTPATIENT
Start: 2017-08-09 | End: 2017-08-10 | Stop reason: HOSPADM

## 2017-08-09 RX ORDER — ONDANSETRON 2 MG/ML
4 INJECTION INTRAMUSCULAR; INTRAVENOUS DAILY PRN
Status: DISCONTINUED | OUTPATIENT
Start: 2017-08-09 | End: 2017-08-09 | Stop reason: HOSPADM

## 2017-08-09 RX ORDER — ONDANSETRON 2 MG/ML
INJECTION INTRAMUSCULAR; INTRAVENOUS
Status: DISCONTINUED | OUTPATIENT
Start: 2017-08-09 | End: 2017-08-09

## 2017-08-09 RX ORDER — OXYCODONE AND ACETAMINOPHEN 5; 325 MG/1; MG/1
1 TABLET ORAL
Status: DISCONTINUED | OUTPATIENT
Start: 2017-08-09 | End: 2017-08-09 | Stop reason: HOSPADM

## 2017-08-09 RX ORDER — MIDAZOLAM HYDROCHLORIDE 1 MG/ML
0.5 INJECTION INTRAMUSCULAR; INTRAVENOUS EVERY 5 MIN PRN
Status: DISCONTINUED | OUTPATIENT
Start: 2017-08-09 | End: 2017-08-09 | Stop reason: HOSPADM

## 2017-08-09 RX ORDER — KETAMINE HYDROCHLORIDE 100 MG/ML
INJECTION, SOLUTION INTRAMUSCULAR; INTRAVENOUS
Status: DISCONTINUED | OUTPATIENT
Start: 2017-08-09 | End: 2017-08-09

## 2017-08-09 RX ORDER — LIDOCAINE HYDROCHLORIDE 10 MG/ML
1 INJECTION, SOLUTION EPIDURAL; INFILTRATION; INTRACAUDAL; PERINEURAL ONCE
Status: COMPLETED | OUTPATIENT
Start: 2017-08-09 | End: 2017-08-09

## 2017-08-09 RX ORDER — DEXAMETHASONE SODIUM PHOSPHATE 4 MG/ML
INJECTION, SOLUTION INTRA-ARTICULAR; INTRALESIONAL; INTRAMUSCULAR; INTRAVENOUS; SOFT TISSUE
Status: DISCONTINUED | OUTPATIENT
Start: 2017-08-09 | End: 2017-08-09

## 2017-08-09 RX ORDER — CEFAZOLIN SODIUM 1 G/3ML
INJECTION, POWDER, FOR SOLUTION INTRAMUSCULAR; INTRAVENOUS
Status: DISCONTINUED | OUTPATIENT
Start: 2017-08-09 | End: 2017-08-09

## 2017-08-09 RX ORDER — METOCLOPRAMIDE HYDROCHLORIDE 5 MG/ML
INJECTION INTRAMUSCULAR; INTRAVENOUS
Status: DISCONTINUED | OUTPATIENT
Start: 2017-08-09 | End: 2017-08-09

## 2017-08-09 RX ORDER — HYDROMORPHONE HYDROCHLORIDE 1 MG/ML
0.5 INJECTION, SOLUTION INTRAMUSCULAR; INTRAVENOUS; SUBCUTANEOUS EVERY 4 HOURS PRN
Status: DISCONTINUED | OUTPATIENT
Start: 2017-08-09 | End: 2017-08-10 | Stop reason: HOSPADM

## 2017-08-09 RX ORDER — FAMOTIDINE 10 MG/ML
INJECTION INTRAVENOUS
Status: DISCONTINUED | OUTPATIENT
Start: 2017-08-09 | End: 2017-08-09

## 2017-08-09 RX ORDER — TRAZODONE HYDROCHLORIDE 50 MG/1
50 TABLET ORAL NIGHTLY PRN
Status: DISCONTINUED | OUTPATIENT
Start: 2017-08-09 | End: 2017-08-10 | Stop reason: HOSPADM

## 2017-08-09 RX ORDER — ACETAMINOPHEN 10 MG/ML
INJECTION, SOLUTION INTRAVENOUS
Status: DISCONTINUED | OUTPATIENT
Start: 2017-08-09 | End: 2017-08-09

## 2017-08-09 RX ORDER — SODIUM CHLORIDE 0.9 % (FLUSH) 0.9 %
3 SYRINGE (ML) INJECTION
Status: DISCONTINUED | OUTPATIENT
Start: 2017-08-09 | End: 2017-08-09 | Stop reason: HOSPADM

## 2017-08-09 RX ORDER — DIPHENHYDRAMINE HYDROCHLORIDE 50 MG/ML
INJECTION INTRAMUSCULAR; INTRAVENOUS
Status: DISCONTINUED | OUTPATIENT
Start: 2017-08-09 | End: 2017-08-09

## 2017-08-09 RX ORDER — ISOSULFAN BLUE 50 MG/5ML
INJECTION, SOLUTION SUBCUTANEOUS
Status: DISCONTINUED | OUTPATIENT
Start: 2017-08-09 | End: 2017-08-09 | Stop reason: HOSPADM

## 2017-08-09 RX ORDER — PROPOFOL 10 MG/ML
VIAL (ML) INTRAVENOUS
Status: DISCONTINUED | OUTPATIENT
Start: 2017-08-09 | End: 2017-08-09

## 2017-08-09 RX ORDER — LIDOCAINE HCL/PF 100 MG/5ML
SYRINGE (ML) INTRAVENOUS
Status: DISCONTINUED | OUTPATIENT
Start: 2017-08-09 | End: 2017-08-09

## 2017-08-09 RX ORDER — SODIUM CHLORIDE 9 MG/ML
INJECTION, SOLUTION INTRAVENOUS CONTINUOUS
Status: DISCONTINUED | OUTPATIENT
Start: 2017-08-09 | End: 2017-08-10 | Stop reason: HOSPADM

## 2017-08-09 RX ORDER — GLYCOPYRROLATE 0.2 MG/ML
INJECTION INTRAMUSCULAR; INTRAVENOUS
Status: DISCONTINUED | OUTPATIENT
Start: 2017-08-09 | End: 2017-08-09

## 2017-08-09 RX ORDER — PROPRANOLOL HYDROCHLORIDE 10 MG/1
10 TABLET ORAL 2 TIMES DAILY
Status: DISCONTINUED | OUTPATIENT
Start: 2017-08-09 | End: 2017-08-10 | Stop reason: HOSPADM

## 2017-08-09 RX ADMIN — DEXAMETHASONE SODIUM PHOSPHATE 4 MG: 4 INJECTION, SOLUTION INTRAMUSCULAR; INTRAVENOUS at 09:08

## 2017-08-09 RX ADMIN — SODIUM CHLORIDE: 0.9 INJECTION, SOLUTION INTRAVENOUS at 07:08

## 2017-08-09 RX ADMIN — SCOPALAMINE 1.5 MG: 1 PATCH, EXTENDED RELEASE TRANSDERMAL at 07:08

## 2017-08-09 RX ADMIN — FENTANYL CITRATE 50 MCG: 50 INJECTION, SOLUTION INTRAMUSCULAR; INTRAVENOUS at 09:08

## 2017-08-09 RX ADMIN — EPHEDRINE SULFATE 5 MG: 50 INJECTION, SOLUTION INTRAMUSCULAR; INTRAVENOUS; SUBCUTANEOUS at 09:08

## 2017-08-09 RX ADMIN — DIPHENHYDRAMINE HYDROCHLORIDE 12.5 MG: 50 INJECTION, SOLUTION INTRAMUSCULAR; INTRAVENOUS at 09:08

## 2017-08-09 RX ADMIN — FENTANYL CITRATE 50 MCG: 50 INJECTION INTRAMUSCULAR; INTRAVENOUS at 07:08

## 2017-08-09 RX ADMIN — GLYCOPYRROLATE 0.2 MG: 0.2 INJECTION, SOLUTION INTRAMUSCULAR; INTRAVENOUS at 09:08

## 2017-08-09 RX ADMIN — KETAMINE HYDROCHLORIDE 30 MG: 100 INJECTION, SOLUTION, CONCENTRATE INTRAMUSCULAR; INTRAVENOUS at 10:08

## 2017-08-09 RX ADMIN — FAMOTIDINE 20 MG: 10 INJECTION, SOLUTION INTRAVENOUS at 09:08

## 2017-08-09 RX ADMIN — SODIUM CHLORIDE, SODIUM GLUCONATE, SODIUM ACETATE, POTASSIUM CHLORIDE, MAGNESIUM CHLORIDE, SODIUM PHOSPHATE, DIBASIC, AND POTASSIUM PHOSPHATE: .53; .5; .37; .037; .03; .012; .00082 INJECTION, SOLUTION INTRAVENOUS at 12:08

## 2017-08-09 RX ADMIN — PROPOFOL 150 MG: 10 INJECTION, EMULSION INTRAVENOUS at 09:08

## 2017-08-09 RX ADMIN — LIDOCAINE HYDROCHLORIDE 100 MG: 20 INJECTION, SOLUTION INTRAVENOUS at 09:08

## 2017-08-09 RX ADMIN — LIDOCAINE HYDROCHLORIDE 0.2 MG: 10 INJECTION, SOLUTION EPIDURAL; INFILTRATION; INTRACAUDAL; PERINEURAL at 07:08

## 2017-08-09 RX ADMIN — PROPOFOL 50 MG: 10 INJECTION, EMULSION INTRAVENOUS at 10:08

## 2017-08-09 RX ADMIN — PROPRANOLOL HYDROCHLORIDE 10 MG: 10 TABLET ORAL at 09:08

## 2017-08-09 RX ADMIN — ONDANSETRON 4 MG: 2 INJECTION INTRAMUSCULAR; INTRAVENOUS at 10:08

## 2017-08-09 RX ADMIN — FENTANYL CITRATE 50 MCG: 50 INJECTION, SOLUTION INTRAMUSCULAR; INTRAVENOUS at 10:08

## 2017-08-09 RX ADMIN — MIDAZOLAM HYDROCHLORIDE 1 MG: 1 INJECTION, SOLUTION INTRAMUSCULAR; INTRAVENOUS at 09:08

## 2017-08-09 RX ADMIN — MIDAZOLAM HYDROCHLORIDE 1 MG: 1 INJECTION, SOLUTION INTRAMUSCULAR; INTRAVENOUS at 07:08

## 2017-08-09 RX ADMIN — TRAZODONE HYDROCHLORIDE 50 MG: 50 TABLET ORAL at 09:08

## 2017-08-09 RX ADMIN — CEFAZOLIN 2 G: 1 INJECTION, POWDER, FOR SOLUTION INTRAVENOUS at 09:08

## 2017-08-09 RX ADMIN — KETAMINE HYDROCHLORIDE 20 MG: 100 INJECTION, SOLUTION, CONCENTRATE INTRAMUSCULAR; INTRAVENOUS at 10:08

## 2017-08-09 RX ADMIN — ACETAMINOPHEN 1000 MG: 10 INJECTION, SOLUTION INTRAVENOUS at 09:08

## 2017-08-09 RX ADMIN — OXYCODONE HYDROCHLORIDE AND ACETAMINOPHEN 1 TABLET: 5; 325 TABLET ORAL at 07:08

## 2017-08-09 RX ADMIN — SODIUM CHLORIDE, SODIUM GLUCONATE, SODIUM ACETATE, POTASSIUM CHLORIDE, MAGNESIUM CHLORIDE, SODIUM PHOSPHATE, DIBASIC, AND POTASSIUM PHOSPHATE: .53; .5; .37; .037; .03; .012; .00082 INJECTION, SOLUTION INTRAVENOUS at 09:08

## 2017-08-09 RX ADMIN — OXYCODONE HYDROCHLORIDE AND ACETAMINOPHEN 1 TABLET: 10; 325 TABLET ORAL at 12:08

## 2017-08-09 RX ADMIN — METOCLOPRAMIDE 5 MG: 5 INJECTION, SOLUTION INTRAMUSCULAR; INTRAVENOUS at 09:08

## 2017-08-09 RX ADMIN — PROPOFOL 50 MG: 10 INJECTION, EMULSION INTRAVENOUS at 09:08

## 2017-08-09 RX ADMIN — HYDROMORPHONE HYDROCHLORIDE 0.5 MG: 1 INJECTION, SOLUTION INTRAMUSCULAR; INTRAVENOUS; SUBCUTANEOUS at 09:08

## 2017-08-09 NOTE — ANESTHESIA POSTPROCEDURE EVALUATION
"Anesthesia Post Evaluation    Patient: Nicho Espinosa    Procedure(s) Performed: Procedure(s) (LRB):  MASTECTOMY RIGHT (Right)  INJECTION-NODE-SENTINEL (Right)  BIOPSY-LYMPH NODE-SENTINEL (Right)    Final Anesthesia Type: general  Patient location during evaluation: PACU  Patient participation: Yes- Able to Participate  Post-procedure vital signs: reviewed and stable  Pain management: adequate  Airway patency: patent  PONV status at discharge: No PONV  Anesthetic complications: no      Cardiovascular status: hemodynamically stable  Respiratory status: unassisted  Hydration status: euvolemic  Follow-up not needed.        Visit Vitals  BP (!) 174/90   Pulse 75   Temp 36.8 °C (98.2 °F) (Oral)   Resp 20   Ht 5' 1" (1.549 m)   Wt 84.8 kg (187 lb)   LMP 07/05/1987   SpO2 99%   Breastfeeding? No   BMI 35.33 kg/m²       Pain/Yamile Score: Pain Assessment Performed: Yes (8/9/2017 12:20 PM)  Presence of Pain: denies (8/9/2017 12:20 PM)  Pain Rating Prior to Med Admin: 7 (8/9/2017 12:39 PM)  Yamile Score: 7 (8/9/2017 12:20 PM)      "

## 2017-08-09 NOTE — ANESTHESIA PROCEDURE NOTES
Paravertebral Single Injection Block(s)    Patient location during procedure: pre-op   Block not for primary anesthetic.  Reason for block: at surgeon's request and post-op pain management   Post-op Pain Location: right chest wall pain   Start time: 8/9/2017 7:26 AM  Timeout: 8/9/2017 7:26 AM   End time: 8/9/2017 7:33 AM  Staffing  Anesthesiologist: JAZZY DELGADILLO  Resident/CRNA: CANDIDO RICHARD  Performed: resident/CRNA   Preanesthetic Checklist  Completed: patient identified, site marked, surgical consent, pre-op evaluation, timeout performed, IV checked, risks and benefits discussed and monitors and equipment checked  Peripheral Block  Patient position: sitting  Prep: ChloraPrep  Patient monitoring: heart rate, cardiac monitor, continuous pulse ox, continuous capnometry and frequent blood pressure checks  Block type: paravertebral - thoracic  Laterality: right  Injection technique: single shot  Needle  Needle type: Tuohy   Needle gauge: 17 G  Needle length: 3.5 in  Needle localization: anatomical landmarks     Assessment  Injection assessment: negative aspiration and negative parasthesia  Paresthesia pain: none  Heart rate change: no  Slow fractionated injection: yes  Medications:  Bolus administered: 30 mL of 0.5 ropivacaine  Epinephrine added: 3.75 mcg/mL (1/300,000)  Additional Notes  T2 os at 4 cm  VSS.  [facility] RN monitoring vitals throughout procedure.  Patient tolerated procedure well.

## 2017-08-09 NOTE — TRANSFER OF CARE
"Anesthesia Transfer of Care Note    Patient: Nicho Espinosa    Procedure(s) Performed: Procedure(s) (LRB):  MASTECTOMY RIGHT (Right)  INJECTION-NODE-SENTINEL (Right)  BIOPSY-LYMPH NODE-SENTINEL (Right)    Patient location: PACU    Anesthesia Type: general    Transport from OR: Transported from OR on 6-10 L/min O2 by face mask with adequate spontaneous ventilation    Post pain: adequate analgesia    Post assessment: no apparent anesthetic complications and tolerated procedure well    Post vital signs: stable    Level of consciousness: sedated and responds to stimulation    Nausea/Vomiting: no nausea/vomiting    Complications: none    Transfer of care protocol was followed      Last vitals:   Visit Vitals  BP (!) 154/67 (BP Location: Right arm, Patient Position: Lying, BP Method: Automatic)   Pulse 66   Temp 36.7 °C (98.1 °F) (Oral)   Resp 18   Ht 5' 1" (1.549 m)   Wt 84.8 kg (187 lb)   LMP 07/05/1987   SpO2 97%   Breastfeeding? No   BMI 35.33 kg/m²     "

## 2017-08-09 NOTE — PROGRESS NOTES
Patient arrived to OBS # 8 via stretcher. Patient transferred from stretcher to bed. Bed locked in low with 2 side rails up. Call light within reach. 2LNC in place. Breathing even and non-labored. Daughter present at the bedside. RAINE hose and nonskid socks in place. Patient requesting a regular diet rather than clear liquids. Patient provided water and crackers. Patient tolerated well. Spoke to MD who ordered a regular diet. Menu provided and ordering process explained.No complaints of pain at this time. Surgical bra with fluff in place. 2 CAROL drains noted to the patient's R flank. Both numbered with sanguineous drainage present.

## 2017-08-09 NOTE — NURSING TRANSFER
Nursing Transfer Note      8/9/2017     Transfer To: OBS 8    Transfer via stretcher    Transfer with O2 2L NC, personal belongings     Transported by PCT    Medicines sent: none    Chart send with patient: Yes    Notified: receiving nurse in obs    Patient reassessed at: 8/9/2017  3926

## 2017-08-09 NOTE — PLAN OF CARE
Problem: Patient Care Overview  Goal: Plan of Care Review  Outcome: Ongoing (interventions implemented as appropriate)  Patient AAOx4. Safety maintained. Bed locked in low with 2 side rails up. Call light within reach. No complaints of pain. No S/S of infection. Surgical bra with fluff in place. 2 CAROL drains in place on the right side.

## 2017-08-09 NOTE — PROGRESS NOTES
Sanguineous drainage emptied from both CAROL drains. Drain # 1 40 ml removed. Drain # 2 20 ml removed.

## 2017-08-09 NOTE — ANESTHESIA RELEASE NOTE
"Anesthesia Release from PACU Note    Patient: Nicho Espinosa    Procedure(s) Performed: Procedure(s) (LRB):  MASTECTOMY RIGHT (Right)  INJECTION-NODE-SENTINEL (Right)  BIOPSY-LYMPH NODE-SENTINEL (Right)    Anesthesia type: general    Post pain: Adequate analgesia    Post assessment: tolerated procedure well    Last Vitals:   Visit Vitals  BP (!) 149/72   Pulse 72   Temp 36.6 °C (97.8 °F) (Oral)   Resp 18   Ht 5' 1" (1.549 m)   Wt 84.8 kg (187 lb)   LMP 07/05/1987   SpO2 96%   Breastfeeding? No   BMI 35.33 kg/m²       Post vital signs: stable    Level of consciousness: awake and alert     Nausea/Vomiting: no nausea/no vomiting    Complications: none    Airway Patency: patent    Respiratory: unassisted, spontaneous ventilation, nasal cannula    Cardiovascular: stable and blood pressure at baseline    Hydration: euvolemic  "

## 2017-08-09 NOTE — BRIEF OP NOTE
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, estrogen receptor positive, unspecified site of breast [C50.912, Z17.0]    Post-op Diagnosis:  Post-Op Diagnosis Codes:     * Malignant neoplasm of left breast in female, estrogen receptor positive, unspecified site of breast [C50.912, Z17.0]    Procedure(s) (LRB):  MASTECTOMY RIGHT (Right)  INJECTION-NODE-SENTINEL (Right)  BIOPSY-LYMPH NODE-SENTINEL (Right)    Anesthesia: General    Description of Procedure: R simple mastectomy and SLNB    Description of the findings of the procedure: [place] lymph node hot 4000 and blue - negative    Estimated Blood Loss: 10 mL    Total IV Fluids: Per Anesthesia         Specimens:   Specimen (12h ago through future)    Start     Ordered    08/09/17 1055  Specimen to Pathology - Surgery  Once     Comments:  1. Right axillary sentinel lymph node biopsy, hot blue 4000; (multiple nodes) - frozen2. Right breast total mastectomy; short stitch = superior, long stitch = lateral - PERM      08/09/17 1055

## 2017-08-09 NOTE — H&P (VIEW-ONLY)
INITIAL OFFICE CONSULTATION    DATE OF CONSULTATION:  07/18/2017.    CHIEF COMPLAINT:  Newly diagnosed invasive infiltrating carcinoma of the right   breast.    HISTORY OF PRESENT ILLNESS:  The patient is a 76-year-old  female who   presents with her daughter for initial consultation regarding her newly   diagnosed invasive breast carcinoma.  She did not note any abnormalities on   self-breast examination and underwent routine screening mammogram on 06/27/2017   and subsequent diagnostic mammogram on 06/29/2017 which revealed a BI-RADS   category BI-RADS category 4 suspicious right breast mass measuring approximately   8 mm in greatest dimension in the posterior upper outer quadrant of the 10   o'clock position of the right breast.  There were no suspicious clinical   findings.  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  The E cadherin was negative,   making the subtype of pleomorphic lobular carcinoma favored.  This was strongly   estrogen and progesterone receptor positive.  It was HER-2/gwyn negative.  She   presents today to discuss surgical options.  The greatest linear length of the   tumor was 4 mm on a core needle biopsy, measuring 8 mm on diagnostic imaging   with ultrasound.  It was found to have nonparallel indistinct margins and   located 8 cm from the nipple.    PAST MEDICAL HISTORY:  Significant for back pain status post a L5 lumbar fusion   by Dr. Rodney Beckett approximately 2-3 years ago.  This pain has been chronic   for at least 10 years.  She still has some pain from this.  She also describes   a tremor.  She denies any history of cardiac, pulmonary or renal disease.    SOCIAL HISTORY:  Significant in that she was approximately a 1-pack per day   smoker for approximately 50 years.  She quit smoking approximately seven years   ago.  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      ALLERGIES:  To sulfa medications and adhesives.    MEDICATIONS:  Per EPIC and include Fosamax, Norco p.r.n., Phenergan p.r.n.,   Inderal, Desyrel, Zyrtec, Lyrica and vitamin D.    FAMILY HISTORY:  Significant in that her mother had breast cancer in her 60s.    The patient's mother did well from her breast cancer, treated with breast   conservation and radiotherapy.  She states that her father had a history of bone   cancer.  There is no family history of ovarian cancer.  She states she had   several members with "gastric" or possibly some form of peritoneal malignancy.    GYNECOLOGICAL HISTORY:  Reveals that she is a  2, para 2 with first   pregnancy and live birth at age 27.  Menarche was at age 13, menopause was   naturally at age 50.  She denies any gynecological surgery.  She denies any   history of hormone replacement therapy.  She took oral contraceptive products in   the remote past for approximately 10 years.      PHYSICAL EXAMINATION:  Reveals vital signs stable and afebrile.  Her physical   exam is essentially within normal limits.  There are no suspicious clinical   findings on breast exam.  The breasts are symmetrical with no suspicious masses,   nodules, densities, skin changes or lymphadenopathy.  There is evidence of a   recent core needle biopsy site with ecchymosis in the upper outer quadrant of   the right breast, but no suspicious clinical findings, no suspicious skin   changes and no lymphadenopathy.  There is no palpable mass.    ASSESSMENT AND PLAN:  Newly diagnosed invasive mammary carcinoma of the right   breast posteriorly upper outer quadrant.  The patient and her daughter were   counseled on the options of breast conservation surgery and radiotherapy versus   mastectomy.  We discussed some of the CALGB data to discuss potentially having   radiotherapy deferred as well with a slight increase in local recurrence to   10%-12%, but no impact " on survival.  We discussed the role for endocrine therapy   as part of her systemic therapy.  We discussed that mastectomy would typically   allow her to avoid radiotherapy and we discussed some of the indications for   postmastectomy radiotherapy, although it would be very unlikely that this   patient would require postmastectomy radiotherapy.  We discussed that the role   for systemic therapy recommendations and options would have no impact on whether   she had lumpectomy or mastectomy.  It would be based on clinical stage,   pathologic stage and receptor status.  Her receptors reveal that her tumor is   strongly estrogen and progesterone receptor positive and HER-2/gwyn negative.    The patient was counseled for approximately 60 minutes.  We discussed the   indications, technique and rationale for sentinel lymph node biopsy.  We   discussed the potential need for reexcision with lumpectomy.  We discussed   reconstruction options.  We discussed autologous tissue reconstruction versus   implant reconstruction.  The patient is wishing to decline any reconstruction   options and appears to be highly motivated for mastectomy.  Approximate time   spent with the patient and her daughter was 60 minutes with greater than 50% of   that time in counseling.  After going over all the options, the patient has   decided to proceed with a right total complete therapeutic mastectomy with right   axillary sentinel lymph node biopsy without reconstruction.  This has been   scheduled for Wednesday, 08/09/2017 under general anesthetic with laryngeal mask   airway as choice for the induction of general anesthesia.  She will also be a   candidate for paravertebral block.  The patient was seen by the clinical nurse   navigator and provided with breast cancer information.  She would also be a   candidate for L-Dex preoperatively.  Consent was obtained for the right total   complete therapeutic mastectomy with sentinel lymph node biopsy,  possible   axillary dissection if frozen section reveals metastatic carcinoma to the lymph   nodes without reconstruction on Wednesday, 08/09/2017 for a T1b 8 mm invasive   mammary carcinoma of the posterior upper outer quadrant of the right breast.      RLC/IN  dd: 07/18/2017 17:22:50 (CDT)  td: 07/19/2017 01:50:38 (CDT)  Doc ID   #[id number]  Job ID #[id number]    CC:     Job # [id number].

## 2017-08-10 ENCOUNTER — PATIENT MESSAGE (OUTPATIENT)
Dept: SURGERY | Facility: CLINIC | Age: 77
End: 2017-08-10

## 2017-08-10 VITALS
RESPIRATION RATE: 16 BRPM | BODY MASS INDEX: 35.3 KG/M2 | HEIGHT: 61 IN | WEIGHT: 187 LBS | DIASTOLIC BLOOD PRESSURE: 63 MMHG | OXYGEN SATURATION: 94 % | HEART RATE: 70 BPM | TEMPERATURE: 98 F | SYSTOLIC BLOOD PRESSURE: 131 MMHG

## 2017-08-10 PROCEDURE — G0378 HOSPITAL OBSERVATION PER HR: HCPCS

## 2017-08-10 PROCEDURE — 25000003 PHARM REV CODE 250: Performed by: STUDENT IN AN ORGANIZED HEALTH CARE EDUCATION/TRAINING PROGRAM

## 2017-08-10 RX ORDER — OXYCODONE AND ACETAMINOPHEN 5; 325 MG/1; MG/1
1 TABLET ORAL EVERY 4 HOURS PRN
Qty: 41 TABLET | Refills: 0 | Status: SHIPPED | OUTPATIENT
Start: 2017-08-10 | End: 2018-02-07

## 2017-08-10 RX ORDER — OXYCODONE AND ACETAMINOPHEN 5; 325 MG/1; MG/1
1 TABLET ORAL EVERY 4 HOURS PRN
Qty: 41 TABLET | Refills: 0 | Status: SHIPPED | OUTPATIENT
Start: 2017-08-10 | End: 2017-08-10

## 2017-08-10 RX ADMIN — OXYCODONE HYDROCHLORIDE AND ACETAMINOPHEN 1 TABLET: 5; 325 TABLET ORAL at 04:08

## 2017-08-10 RX ADMIN — OXYCODONE HYDROCHLORIDE AND ACETAMINOPHEN 1 TABLET: 10; 325 TABLET ORAL at 08:08

## 2017-08-10 RX ADMIN — PROPRANOLOL HYDROCHLORIDE 10 MG: 10 TABLET ORAL at 08:08

## 2017-08-10 NOTE — PLAN OF CARE
Problem: Patient Care Overview  Goal: Plan of Care Review  Pt remained free from falls or injuries this shift. Pt independent in repositioning. No skin breakdown noticed. Pt rested well through the night. Luis drain  X2. Pt medicated prn pain x3.  Pt educated on taking pain meds before pain becomes intolerable.

## 2017-08-10 NOTE — PLAN OF CARE
Problem: Patient Care Overview  Goal: Plan of Care Review  Outcome: Outcome(s) achieved Date Met: 08/10/17  POC reviewed with pt. Safety precautions maintained. Bed in low position,wheels locked. Side rails up x 2. Call light within reach. Pt free of falls. Surgical bra with fluff c/d/i.  CAROL drains x 2 intact, serosanguineous drainage.  Pt c/o pain to R side breast with activity. Prn meds given.

## 2017-08-10 NOTE — PLAN OF CARE
DAUGHTER KEM SOUSA   PCP CE DOMINGUEZ     08/10/17 1029   Discharge Assessment   Assessment Type Discharge Planning Assessment   Confirmed/corrected address and phone number on facesheet? Yes   Assessment information obtained from? Patient   Expected Length of Stay (days) 1   Communicated expected length of stay with patient/caregiver yes   Prior to hospitilization cognitive status: Alert/Oriented   Prior to hospitalization functional status: Assistive Equipment   Current cognitive status: Alert/Oriented   Current Functional Status: Assistive Equipment   Arrived From home or self-care   Lives With alone   Able to Return to Prior Arrangements yes   Is patient able to care for self after discharge? Yes   How many people do you have in your home that can help with your care after discharge? 0   Patient's perception of discharge disposition home or selfcare   Readmission Within The Last 30 Days no previous admission in last 30 days   Patient currently being followed by outpatient case management? No   Patient currently receives home health services? No   Does the patient currently use HME? No   Patient currently receives private duty nursing? No   Patient currently receives any other outside agency services? No   Equipment Currently Used at Home walker, standard;walker, rolling;cane, straight   Do you have any problems affording any of your prescribed medications? No   Is the patient taking medications as prescribed? yes   Do you have any financial concerns preventing you from receiving the healthcare you need? No   Does the patient have transportation to healthcare appointments? Yes   Transportation Available car;family or friend will provide   On Dialysis? No   Does the patient receive services at the [facility]mad[facility] Clinic? No   Are there any open cases? No   Discharge Plan A Home   Discharge Plan B Home;Home with family

## 2017-08-10 NOTE — DISCHARGE SUMMARY
[facility]  Discharge Summary  Breast Surgery      Admit Date: 8/9/2017    Discharge Date:  08/10/2017    Attending Physician:     Discharge Provider: Obinna Vuong MD    Reason for Admission: Breast surgery    Procedures Performed: Procedure(s) (LRB):  MASTECTOMY RIGHT (Right)  INJECTION-NODE-SENTINEL (Right)  BIOPSY-LYMPH NODE-SENTINEL (Right)    Hospital Course:  Patient is a 76-year-old female with right breast cancer who presented for planned procedure. She was taken to the Operating Room for right simple mastectomy and sentinel lymph node biopsy. She tolerated the procedure well with no apparent complications. She was transferred to the floor following an uneventful stay in Recovery. She did well overnight. All post-operative milestones were met without delay. She was discharged to home in good condition on POD 1.    Consults: None    Significant Diagnostic Studies: None    Final Diagnoses:   Principal Problem: Breast cancer in female   Secondary Diagnoses: None    Discharged Condition: Good    Disposition: Home or Self Care    Follow Up/Patient Instructions: Follow up with Dr. Rolle in 2 weeks.    Medications:  Reconciled Home Medications:   Discharge Medication List as of 8/10/2017 10:02 AM      START taking these medications    Details   oxycodone-acetaminophen (PERCOCET) 5-325 mg per tablet Take 1 tablet by mouth every 4 (four) hours as needed., Starting Thu 8/10/2017, Print         CONTINUE these medications which have NOT CHANGED    Details   alendronate (FOSAMAX) 70 MG tablet TAKE 1 TABLET BY MOUTH EVERY 7 DAYS, Normal      aspirin 325 MG tablet Take 325 mg by mouth once daily., Historical Med      calcium carbonate (OS-IDANDRA) 600 mg calcium (1,500 mg) Tab Take 600 mg by mouth 2 (two) times daily with meals., Historical Med      cetirizine (ZYRTEC) 10 MG tablet Take 10 mg by mouth once daily., Until Discontinued, Historical Med      cholecalciferol, vitamin D3, 1,000 unit capsule Take 3  capsules daily, No Print      docusate sodium (COLACE) 50 MG capsule Take by mouth every evening., Historical Med      hydrocodone-acetaminophen 5-325mg (NORCO) 5-325 mg per tablet 0.5 tablets daily as needed. PATIENT TAKES 1/2 TABLET 2-3 TIMES A DAY, Starting Mon 11/16/2015, Historical Med      MULTIVIT WITH CALCIUM,IRON,MIN (WOMEN'S DAILY MULTIVITAMIN ORAL) Take by mouth., Historical Med      promethazine (PHENERGAN) 25 MG tablet TAKE 1 TABLET(25 MG) BY MOUTH EVERY 6 HOURS AS NEEDED FOR NAUSEA, Normal      propranolol (INDERAL) 10 MG tablet TAKE 1 TABLET(10 MG) BY MOUTH TWICE DAILY, Normal      trazodone (DESYREL) 50 MG tablet TAKE 1 TABLET BY MOUTH NIGHTLY AS NEEDED FOR INSOMNIA, Normal         STOP taking these medications       warfarin (COUMADIN) 4 MG tablet Comments:   Reason for Stopping:               Discharge Procedure Orders  Diet general     Activity as tolerated     Shower on day dressing removed (No bath)   Order Comments: No dressing required. May shower with soap and water. Dab dry. Do not scrub vigorously. Do not submerge incisions for 2 weeks.     Call MD for:  increased confusion or weakness     Call MD for:  persistent dizziness, light-headedness, or visual disturbances     Call MD for:  worsening rash     Call MD for:  severe persistent headache     Call MD for:  difficulty breathing or increased cough     Call MD for:  redness, tenderness, or signs of infection (pain, swelling, redness, odor or green/yellow discharge around incision site)     Call MD for:  severe uncontrolled pain     Call MD for:  persistent nausea and vomiting or diarrhea     Call MD for:  temperature >100.4     Remove dressing in 24 hours       Follow-up Information     Aurelio Rolle MD In 2 weeks.    Specialty:  General Surgery  Why:  s/p R mastectomy and SLNB  Contact information:  [address]  [facility] [id number]  [phone]

## 2017-08-10 NOTE — NURSING
Pt dc per MD orders. Dc and prescription instructions given.  Pt educated on CAROL drains. Pt verbalizes understanding. PIV removed,catheter tip intact. VSS. No sign of distress noted.

## 2017-08-12 NOTE — OP NOTE
DATE OF PROCEDURE:  08/09/2017.    PRIMARY SURGEON:  Aurelio Rolle MD    ASSISTANT:  Obinna Vuong MD (RES)    PREOPERATIVE DIAGNOSIS:  Invasive infiltrating lobular carcinoma of the right   breast, posterior upper outer quadrant, T1b lesion approximately 8 mm in size   that was ER/W[place] positive.  It was HER-2/gwyn negative.    ANESTHESIA:  General anesthesia with a regional right paravertebral block.    PROCEDURES:  1.  Right breast total complete therapeutic mastectomy.  2.  Right breast injection for sentinel lymph node biopsy using   technetium-labeled radiocolloid and isosulfan Lymphazurin blue dye.  3.  Right deep axillary sentinel lymph node biopsy of right level 1 axillary   lymph nodes.    PROCEDURE IN DETAIL:  The patient was brought to the Operating Room after   undergoing informed consent and having the right-sided paravertebral block   performed in the preop holding area.  The right breast had been marked.  The   history and physical examination had been reviewed and updated.  The consent was   confirmed.  She was injected in the right subareolar region with the   technetium-labeled radiocolloid and isosulfan Lymphazurin blue dye once in the   Operating Room under anesthesia, with the patient supine, the right breast,   anterior chest, right arm and axilla were prepped and draped in a sterile   fashion.  A standard traditional transverse elliptical mastectomy incision was   marked to excise the entire nipple areolar complex as well as the prior core   needle biopsy site in the lateral right breast.  The superior skin incision was   made.  The superior flap was carried cephalad to the clavicle and up and over   the upper outer quadrant axillary tail of Junior breast tissue to a hot spot   noted by the Neoprobe and gamma probe in the right axilla.  We opened the   clavipectoral fascia, could see a blue afferent lymphatic channel leading to a   level 1 sentinel lymph node, which was hot and blue.  The  tissue around this   node was excised.  There may have been a two or three lymph nodes within this   tissue.  The hot blue sentinel lymph node had an ex vivo count of approximately   4000.  It was sent to Pathology for frozen section, which was negative.  Once   the lymph node was removed, the background residual radioactivity counts were   negligible and no further blue dye staining was noted in the axilla and no   further palpable nodes were noted indicating adequate and appropriate sentinel   lymph node mapping and biopsy.  We completed the upper mastectomy flap medially   to midline to the lateral border of the sternum, preserving the intercostal   perforators, superiorly it was dissected up to the clavicle and superolaterally   to identify the superior lateral border of the pectoralis major muscle above and   beyond the axillary tail of Junior breast tissue.  The inferior incision was   made.  The inferior mastectomy flap was carried inferiorly down to 1 cm below   the inframammary fold identifying the fascia of the rectus abdominis muscle that   was carried medially to midline to identify the intercostal perforators,   laterally was identified by identifying the anterior border of the latissimus   dorsi muscle and the serratus anterior muscle.  The breast was removed from a   medial to lateral direction taking the pectoralis fascia with it.  The   dissection was carried to the level 1 region of the axilla to the point where   the sentinel lymph node biopsy tissue had been taken.  The breast was oriented   with a short stitch superiorly and a long stitch laterally and submitted to   pathology for permanent sectioning.  With the frozen section negative on the   sentinel lymph node, the procedure was completed.  The skin flaps were uniform   in thickness, approximately 6 to 7 mm having left only skin and subcutaneous   tissue having performed all the dissection outside the superficial investing   fascial layer of  the breast.  The wound site was irrigated.  Hemostasis was   achieved through 2 separate inferior lateral stab incisions, we brought two #19   round Gurdeep drains, which were anchored to the skin at the exit site with 2-0   nylon suture.  They were placed beneath each mastectomy flap respectively   covering the level 1 region of the axilla as well.  The deep dermal and   subcutaneous layers were reapproximated with Vicryl sutures.  There was a slight   dog ear medially and this was trimmed for approximately 2 cm or so to create an   extension of the incision inferomedially, so that the mastectomy flaps would   lie flat on the chest wall.  The skin was closed with a running 4-0 Monocryl   subcuticular skin closure.  Mastisol, Steri-Strips, sterile fluff gauze dressing   and post-mastectomy bra were placed.  Estimated blood loss was minimal.  All   needle, instrument and sponge counts were correct.  The patient tolerated the   procedure well without complication.  All needle, instrument and sponge counts   were correct.  She was turned over to Anesthesia for extubation and transport to   the recovery area in a satisfactory condition.      HUGO  dd: 08/12/2017 10:22:05 (CDT)  td: 08/12/2017 12:38:28 (CDT)  Doc ID   #[id number]  Job ID #[id number]    CC:

## 2017-08-24 ENCOUNTER — OFFICE VISIT (OUTPATIENT)
Dept: SURGERY | Facility: CLINIC | Age: 77
End: 2017-08-24
Payer: MEDICARE

## 2017-08-24 VITALS
RESPIRATION RATE: 18 BRPM | BODY MASS INDEX: 32.71 KG/M2 | TEMPERATURE: 99 F | HEIGHT: 65 IN | WEIGHT: 196.31 LBS | DIASTOLIC BLOOD PRESSURE: 74 MMHG | HEART RATE: 73 BPM | SYSTOLIC BLOOD PRESSURE: 126 MMHG

## 2017-08-24 DIAGNOSIS — Z17.0 MALIGNANT NEOPLASM OF UPPER-OUTER QUADRANT OF RIGHT BREAST IN FEMALE, ESTROGEN RECEPTOR POSITIVE: Primary | ICD-10-CM

## 2017-08-24 DIAGNOSIS — C50.912 MALIGNANT NEOPLASM OF LEFT FEMALE BREAST, UNSPECIFIED ESTROGEN RECEPTOR STATUS, UNSPECIFIED SITE OF BREAST: ICD-10-CM

## 2017-08-24 DIAGNOSIS — Z90.11 ACQUIRED ABSENCE OF BREAST AND NIPPLE, RIGHT: Primary | ICD-10-CM

## 2017-08-24 DIAGNOSIS — C50.411 MALIGNANT NEOPLASM OF UPPER-OUTER QUADRANT OF RIGHT BREAST IN FEMALE, ESTROGEN RECEPTOR POSITIVE: ICD-10-CM

## 2017-08-24 DIAGNOSIS — Z86.718 HISTORY OF DEEP VEIN THROMBOSIS (DVT) OF LOWER EXTREMITY: ICD-10-CM

## 2017-08-24 DIAGNOSIS — Z17.0 MALIGNANT NEOPLASM OF UPPER-OUTER QUADRANT OF RIGHT BREAST IN FEMALE, ESTROGEN RECEPTOR POSITIVE: ICD-10-CM

## 2017-08-24 DIAGNOSIS — C50.411 MALIGNANT NEOPLASM OF UPPER-OUTER QUADRANT OF RIGHT BREAST IN FEMALE, ESTROGEN RECEPTOR POSITIVE: Primary | ICD-10-CM

## 2017-08-24 PROCEDURE — 99212 OFFICE O/P EST SF 10 MIN: CPT | Mod: PBBFAC,27,PO | Performed by: SURGERY

## 2017-08-24 PROCEDURE — 99999 PR PBB SHADOW E&M-EST. PATIENT-LVL II: CPT | Mod: PBBFAC,,, | Performed by: SURGERY

## 2017-08-24 PROCEDURE — 99999 PR PBB SHADOW E&M-EST. PATIENT-LVL IV: CPT | Mod: PBBFAC,,, | Performed by: INTERNAL MEDICINE

## 2017-08-24 PROCEDURE — 99024 POSTOP FOLLOW-UP VISIT: CPT | Mod: ,,, | Performed by: SURGERY

## 2017-08-24 PROCEDURE — 1125F AMNT PAIN NOTED PAIN PRSNT: CPT | Mod: ,,, | Performed by: INTERNAL MEDICINE

## 2017-08-24 PROCEDURE — 1159F MED LIST DOCD IN RCRD: CPT | Mod: ,,, | Performed by: INTERNAL MEDICINE

## 2017-08-24 PROCEDURE — 99205 OFFICE O/P NEW HI 60 MIN: CPT | Mod: S$PBB,,, | Performed by: INTERNAL MEDICINE

## 2017-08-24 RX ORDER — LIDOCAINE AND PRILOCAINE 25; 25 MG/G; MG/G
CREAM TOPICAL
COMMUNITY
Start: 2017-08-07 | End: 2019-01-28

## 2017-08-24 NOTE — PROGRESS NOTES
Subjective:       Patient ID: Nicho Espinosa is a 76 y.o. female.    Chief Complaint: No chief complaint on file.    HPI   REFERRING PHYSICIAN:  Aurelio Rolle M.D.    REASON FOR REFERRAL:  Recent diagnosis of breast cancer, consideration for   adjuvant treatment.    HISTORY OF PRESENT ILLNESS:  Mrs. Espinosa is a 76-year-old  female who   recently had an abnormal mammogram.  Specifically, a diagnostic mammogram on   2017 was read as BIRADS category 4 and showed an 8 mm mass in the   posterior upper outer quadrant of the right breast.  A biopsy was performed on   2017 and showed a lobular carcinoma that was ER strongly positive, AK   positive and HER-2 negative.  The patient was seen by Dr. Rolle, and on   2017, she underwent a right modified radical mastectomy and sentinel lymph   node biopsy.  The pathology report from the procedure indicates that she had a   2 cm lobular carcinoma; resection margins were clear.  On the right axillary   sentinel lymph nodes isolated tumor cells were seen.  The patient is referred to   discuss adjuvant treatment options.  Of note, an oncotype has been requested   and the results are expected on or around .    PAST MEDICAL HISTORY:  As above.  She has a history of low back pain and she is   status post fusion surgery.  She is also status post bunionectomy and she has   essential tremor.  She is also status post right knee replacement surgery.    SOCIAL HISTORY:  She is .  She used to work as a .  She is   retired.  She has a 50-pack-year history of smoking and she quit smoking   approximately 10 years ago.  She drinks socially.    FAMILY HISTORY:  Mother had breast cancer in her 60s.  The patient's father also   has a history of cancer metastatic to bones.    OB/GYN HISTORY:  Menarche was at 13 and menopause at 50.  First live birth was   at 27 and she is .  She denies any hormone replacement therapy; however,    she took oral contraceptive pills for approximately 10 years.      Review of Systems    Overall she feels well. She denies any anxiety, depression, easy bruising, fevers, chills, night  sweats, weight loss, nausea, vomiting, diarrhea, constipation, diplopia,     blurred vision, headache, chest pain, palpitations, shortness of breath, breast pain, abdominal pain, extremity pain, or difficulty ambulating.  The remainder of the ten-point ROS, including general, skin, lymph, H/N, cardiorespiratory, GI, , Neuro, Endocrine, and psychiatric is negative.       Objective:      Physical Exam    She is alert, oriented to time, place, person, pleasant, well      nourished, in no acute physical distress.                                    VITAL SIGNS:  Reviewed                                      HEENT:  Normal.  There are no nasal, oral, lip, gingival, auricular, lid,    or conjunctival lesions.  Mucosae are moist and pink, and there is no        thrush.  Pupils are equal, reactive to light and accommodation.              Extraocular muscle movements are intact.  Dentition is good.  There is no frontal or maxillary tenderness.                                     NECK:  Supple without JVD, adenopathy, or thyromegaly.                       LUNGS:  Clear to auscultation without wheezing, rales, or rhonchi.           CARDIOVASCULAR:  Reveals an S1, S2, no murmurs, no rubs, no gallops.         ABDOMEN:  Soft, nontender, without organomegaly.  Bowel sounds are    present.                                                                     EXTREMITIES:  No cyanosis, clubbing, or edema.                               BREASTS:  She is status post right mastectomy with a well-healed incision.    Two drainage tubes are in place.  There are no masses in the left breast.                                        LYMPHATIC:  There is no cervical, axillary, or supraclavicular adenopathy.   SKIN:  Warm and moist, without petechiae, rashes,  induration, or ecchymoses.           NEUROLOGIC:  DTRs are 0-1+ bilaterally, symmetrical, motor function is 5/5,  and cranial nerves are  within normal limits.      Assessment:       1. Malignant neoplasm of upper-outer quadrant of right breast in female, estrogen receptor positive    2. History of deep vein thrombosis (DVT) of lower extremity        Plan:         I had a long discussion with her.  She had a 2 cm tumor and also isolated cells in her sentinel node.  Her tumor is ER and MO positive, and HER 2 negative.  Oncotype test has been initiated.  We will wait for the results before making any recommendations.  RTC 2 weeks.

## 2017-08-24 NOTE — Clinical Note
Santiago, her Oncotype DX score came back at low risk at 7.  I let the patient know and told her to f/u with you for endocrine therapy. Thanks, RLC

## 2017-08-26 PROBLEM — Z90.11 ACQUIRED ABSENCE OF RIGHT BREAST AND NIPPLE: Status: ACTIVE | Noted: 2017-08-26

## 2017-08-26 NOTE — PROGRESS NOTES
The patient presents for her initial 2 week postop visit status post a right total complete therapeutic mastectomy on August 9, 2017.  Pathology reveals a 2 cm, T1c grade 2 invasive breast carcinoma that was estrogen and progesterone receptor positive and HER-2/gwyn negative.  The one sentinel node was negative with isolated tumor cells, pN0(i+) making this a T1cpN0(i+) stage IA breast cancer.    The closest surgical margin was the deep margin at 6 mm.  She will not require adjuvant radiotherapy.  She saw Dr. Luis dunn today for consultation.  The patient will be recommended for adjuvant endocrine therapy with aromatase inhibitor and currently the Oncotype DX study that I ordered postoperatively is pending.    She is doing quite well postoperatively without any subjective complaints.  She has no limited range of motion in her right shoulder or right upper extremity.  There is no evidence of lymphedema.  She is declining any offer for postoperative physical therapy.    The incision site at the right mastectomy is clean, dry, and intact.  There are no signs of postoperative infection, hematoma, or seroma.  She had some slight reactive hyperemia around the drain exit sites.  Both drains were removed in clinic today as they were draining a scant amount of serosanguineous transudate of fluid.    A/P:  Await Oncotype DX results.  The patient was given a prescription for a postmastectomy bra and a right breast prosthesis which she will get filled at approximately the 12 weeks postop period.  She was given knitted knocker for the right chest wall.  She will follow-up with me in 6 months for routine breast exam and chest wall exam as part of her ongoing breast cancer screening surveillance follow-up.  The patient will follow-up with Dr. Flores to initiate adjuvant endocrine therapy.    Addendum:  The patient's Oncotype DX score came back at low risk with a score result of 7 which predicted a 10 year risk of distant  recurrence at 6% with tamoxifen alone.  The patient was called with this result on 8/26/2017.  We will forward this result to Dr. Smith as well.

## 2017-09-01 ENCOUNTER — TELEPHONE (OUTPATIENT)
Dept: HEMATOLOGY/ONCOLOGY | Facility: CLINIC | Age: 77
End: 2017-09-01

## 2017-09-01 NOTE — TELEPHONE ENCOUNTER
spoke with pt on today in regards to a f/u visit after labor day, I've informed pt I will send this message to Ashleigh () nurse to clarify with next f/u visit should be.

## 2017-09-04 DIAGNOSIS — G25.0 ESSENTIAL TREMOR: ICD-10-CM

## 2017-09-04 RX ORDER — PROPRANOLOL HYDROCHLORIDE 10 MG/1
TABLET ORAL
Qty: 180 TABLET | Refills: 1 | Status: SHIPPED | OUTPATIENT
Start: 2017-09-04 | End: 2017-10-27 | Stop reason: SDUPTHER

## 2017-09-05 ENCOUNTER — TELEPHONE (OUTPATIENT)
Dept: SURGERY | Facility: CLINIC | Age: 77
End: 2017-09-05

## 2017-09-06 ENCOUNTER — TELEPHONE (OUTPATIENT)
Dept: HEMATOLOGY/ONCOLOGY | Facility: CLINIC | Age: 77
End: 2017-09-06

## 2017-09-06 NOTE — TELEPHONE ENCOUNTER
----- Message from Ashleigh Pickett RN sent at 9/6/2017  9:25 AM CDT -----  Contact: pt   Just appt. No labs her oncotype test is back  ----- Message -----  From: Janis Gallardo  Sent: 9/6/2017   7:19 AM  To: Ariella Davidson RN, Ashleigh Pickett RN    No chart was routed to me, I checked the last note and it does say to RTC in 2 weeks is this correct ? Does she need labs or anything ?    ----- Message -----  From: Ashleigh Pickett RN  Sent: 9/5/2017   1:56 PM  To: Janis Gallardo        ----- Message -----  From: Ijeoma Reid  Sent: 9/1/2017   4:25 PM  To: Ashleigh Pickett RN, #    Pt states she was told at last visit she would see  after labor day, not sure of the communication at visit, but pt is requesting a call from nurse to discuss next f/u visit.  Contact number [phone].        ----- Message -----  From: Ashleigh Pickett RN  Sent: 9/1/2017  10:59 AM  To: Janis Gallardo        ----- Message -----  From: Lorenza Desir  Sent: 9/1/2017  10:31 AM  To: Luis Henderson Staff    Pt would like to be called back regarding scheduling an appt. This is pt 3rd attempt to contact doctor's office.       Pt can be reached at [phone].

## 2017-09-08 NOTE — PROGRESS NOTES
Subjective:       Patient ID: Nicho Espinosa is a 76 y.o. female.    Chief Complaint: No chief complaint on file.    HPI   Mrs. Espinosa returns today for follow up.  Her Oncotype score is 7, suggesting that she has a 6 % chance of recurrence at ten years with tamoxifen alone.  Briefly, she is a 76-year-old  female who, on 08/09/2017, underwent a right modified radical mastectomy and sentinel lymph   node biopsy.  The pathology report from the procedure indicates that she had a 2 cm lobular carcinoma that was ER strongly positive, [place]   positive and HER-2 negative; resection margins were clear.  On the right axillary sentinel lymph nodes isolated tumor cells were seen.        Review of Systems    Overall she feels well. She ahs fully recuperated from ehr surgery.  ECOG PS is 1.  She  denies any anxiety, depression, easy bruising, fevers, chills, night  sweats, weight loss, nausea, vomiting, diarrhea, constipation, diplopia,     blurred vision, headache, chest pain, palpitations, shortness of breath, breast pain, abdominal pain, extremity pain, or difficulty ambulating.  The remainder of the ten-point ROS, including general, skin, lymph, H/N, cardiorespiratory, GI, , Neuro, Endocrine, and psychiatric is negative.       Objective:      Physical Exam    She is alert, oriented to time, place, person, pleasant, well      nourished, in no acute physical distress.                                    VITAL SIGNS:  Reviewed                                      HEENT:  Normal.  There are no nasal, oral, lip, gingival, auricular, lid,    or conjunctival lesions.  Mucosae are moist and pink, and there is no        thrush.  Pupils are equal, reactive to light and accommodation.              Extraocular muscle movements are intact.  Dentition is good.                                   NECK:  Supple without JVD, adenopathy, or thyromegaly.                       LUNGS:  Clear to auscultation without wheezing, rales, or  rhonchi.           CARDIOVASCULAR:  Reveals an S1, S2, no murmurs, no rubs, no gallops.         ABDOMEN:  Soft, nontender, without organomegaly.  Bowel sounds are    present.                                                                     EXTREMITIES:  No cyanosis, clubbing, or edema.                               BREASTS:  She is status post right mastectomy with a well-healed incision.    There are no masses in the left breast.                                        LYMPHATIC:  There is no cervical, axillary, or supraclavicular adenopathy.   SKIN:  Warm and moist, without petechiae, rashes, induration, or ecchymoses.           NEUROLOGIC:  DTRs are 0-1+ bilaterally, symmetrical, motor function is 5/5,  and cranial nerves are  within normal limits.      Assessment:       1. Malignant neoplasm of upper-outer quadrant of right breast in female, estrogen receptor positive        Plan:         I had a long discussion with her. Given her Oncotype score of 7, I recommended that she be treated with anastrazole.  The pros and cons of adjuvant hormonal therapy with anastrazole were discussed in detail; she was given a prescription for anastrazole 1 mg tablets with instructions to take one per day, and she will return to see me in one month with a DXA scan.  Her questions were answered to her satisfaction.

## 2017-09-11 ENCOUNTER — OFFICE VISIT (OUTPATIENT)
Dept: HEMATOLOGY/ONCOLOGY | Facility: CLINIC | Age: 77
End: 2017-09-11
Payer: MEDICARE

## 2017-09-11 ENCOUNTER — OFFICE VISIT (OUTPATIENT)
Dept: SPORTS MEDICINE | Facility: CLINIC | Age: 77
End: 2017-09-11
Payer: MEDICARE

## 2017-09-11 VITALS
HEIGHT: 65 IN | HEART RATE: 74 BPM | DIASTOLIC BLOOD PRESSURE: 90 MMHG | WEIGHT: 196 LBS | SYSTOLIC BLOOD PRESSURE: 146 MMHG | BODY MASS INDEX: 32.65 KG/M2

## 2017-09-11 VITALS — DIASTOLIC BLOOD PRESSURE: 85 MMHG | SYSTOLIC BLOOD PRESSURE: 145 MMHG | HEART RATE: 82 BPM | TEMPERATURE: 98 F

## 2017-09-11 DIAGNOSIS — M17.12 PRIMARY OSTEOARTHRITIS OF LEFT KNEE: ICD-10-CM

## 2017-09-11 DIAGNOSIS — M17.10 ARTHRITIS OF KNEE: ICD-10-CM

## 2017-09-11 DIAGNOSIS — Z17.0 MALIGNANT NEOPLASM OF UPPER-OUTER QUADRANT OF RIGHT BREAST IN FEMALE, ESTROGEN RECEPTOR POSITIVE: Primary | ICD-10-CM

## 2017-09-11 DIAGNOSIS — M81.8 OSTEOPOROSIS DUE TO AROMATASE INHIBITOR: ICD-10-CM

## 2017-09-11 DIAGNOSIS — M54.50 CHRONIC BILATERAL LOW BACK PAIN WITHOUT SCIATICA: Primary | ICD-10-CM

## 2017-09-11 DIAGNOSIS — T38.6X5A OSTEOPOROSIS DUE TO AROMATASE INHIBITOR: ICD-10-CM

## 2017-09-11 DIAGNOSIS — C50.411 MALIGNANT NEOPLASM OF UPPER-OUTER QUADRANT OF RIGHT BREAST IN FEMALE, ESTROGEN RECEPTOR POSITIVE: Primary | ICD-10-CM

## 2017-09-11 DIAGNOSIS — G89.29 CHRONIC BILATERAL LOW BACK PAIN WITHOUT SCIATICA: Primary | ICD-10-CM

## 2017-09-11 PROCEDURE — 1159F MED LIST DOCD IN RCRD: CPT | Mod: ,,, | Performed by: INTERNAL MEDICINE

## 2017-09-11 PROCEDURE — 99212 OFFICE O/P EST SF 10 MIN: CPT | Mod: PBBFAC,27 | Performed by: INTERNAL MEDICINE

## 2017-09-11 PROCEDURE — 99213 OFFICE O/P EST LOW 20 MIN: CPT | Mod: PBBFAC,PO,25 | Performed by: ORTHOPAEDIC SURGERY

## 2017-09-11 PROCEDURE — 99213 OFFICE O/P EST LOW 20 MIN: CPT | Mod: 25,S$PBB,, | Performed by: ORTHOPAEDIC SURGERY

## 2017-09-11 PROCEDURE — 20610 DRAIN/INJ JOINT/BURSA W/O US: CPT | Mod: PBBFAC,PO | Performed by: ORTHOPAEDIC SURGERY

## 2017-09-11 PROCEDURE — 99999 PR PBB SHADOW E&M-EST. PATIENT-LVL III: CPT | Mod: PBBFAC,,, | Performed by: ORTHOPAEDIC SURGERY

## 2017-09-11 PROCEDURE — 99214 OFFICE O/P EST MOD 30 MIN: CPT | Mod: S$PBB,,, | Performed by: INTERNAL MEDICINE

## 2017-09-11 PROCEDURE — 99999 PR PBB SHADOW E&M-EST. PATIENT-LVL II: CPT | Mod: PBBFAC,,, | Performed by: INTERNAL MEDICINE

## 2017-09-11 PROCEDURE — 1125F AMNT PAIN NOTED PAIN PRSNT: CPT | Mod: ,,, | Performed by: ORTHOPAEDIC SURGERY

## 2017-09-11 PROCEDURE — 1159F MED LIST DOCD IN RCRD: CPT | Mod: ,,, | Performed by: ORTHOPAEDIC SURGERY

## 2017-09-11 RX ORDER — TRIAMCINOLONE ACETONIDE 40 MG/ML
40 INJECTION, SUSPENSION INTRA-ARTICULAR; INTRAMUSCULAR
Status: DISCONTINUED | OUTPATIENT
Start: 2017-09-11 | End: 2017-09-11 | Stop reason: HOSPADM

## 2017-09-11 RX ORDER — ANASTROZOLE 1 MG/1
1 TABLET ORAL DAILY
Qty: 90 TABLET | Refills: 2 | Status: SHIPPED | OUTPATIENT
Start: 2017-09-11 | End: 2018-02-19 | Stop reason: SDUPTHER

## 2017-09-11 RX ADMIN — TRIAMCINOLONE ACETONIDE 40 MG: 40 INJECTION, SUSPENSION INTRA-ARTICULAR; INTRAMUSCULAR at 11:09

## 2017-09-11 NOTE — PROCEDURES
Large Joint Aspiration/Injection  Date/Time: 9/11/2017 11:56 AM  Performed by: TAD GUERRA  Authorized by: TAD GUERRA     Consent Done?:  Yes (Verbal)  Indications:  Pain  Procedure site marked: Yes    Timeout: Prior to procedure the correct patient, procedure, and site was verified      Location:  Knee  Site:  L knee  Prep: Patient was prepped and draped in usual sterile fashion    Ultrasonic Guidance for needle placement: No  Needle size:  22 G  Approach:  Lateral  Medications:  40 mg triamcinolone acetonide 40 mg/mL  Patient tolerance:  Patient tolerated the procedure well with no immediate complications

## 2017-09-11 NOTE — PROGRESS NOTES
CHIEF COMPLAINT: Left knee pain.                                                          HISTORY OF PRESENT ILLNESS:  The patient is a 76 y.o. female  who presents  for evaluation of her left knee pain. History of previous right TKA in 2013. Evaluated by  Dr. Washburn this past spring. Plan at that time was for Euflexxa injection series.     Pain Duration: years  Pain Quality: dull  Pain Context:worsening  Pain Timing: intermittent  Pain Location:medial  Pain Severity: moderate  Modifying Factors: Better with rest  Associated Signs and Symptoms:none    She  presents for further treatment recommendations.    She denies radicular pain or low back pain.  She denies distal paresthesias, lower extremity edema, or calf pain concerning for vascular claudication.  She has no history of discreet prior trauma.              Also reports chronic low back pain with history of previous fusion surgery. Requesting recommendation regarding referral for spine specialist.                                                                                                       PAST MEDICAL HISTORY:    Past Medical History:   Diagnosis Date    Allergy     Arthritis     Blepharitis of both eyes     Complication of anesthesia     nausea    Degenerative disc disease     GERD (gastroesophageal reflux disease)     patient denies reflux    History of compression fracture of spine 4/10/2014    Hyperlipidemia     Lumbar disc disease     Malignant neoplasm of left female breast 7/18/2017    Meibomitis     Obesity 9/19/2013    Osteoporosis     Papilloma of eyelid     RUL    Postoperative anemia 11/21/2013    Thoracic or lumbosacral neuritis or radiculitis, unspecified 9/20/2013    Tremors of nervous system 2/2015                                                                                                            PAST SURGICAL HISTORY:    Past Surgical History:   Procedure Laterality Date    APPENDECTOMY      COLONOSCOPY N/A  "11/1/2016    Procedure: COLONOSCOPY;  Surgeon: Candelario Oviedo MD;  Location: [facility] ([address] [place] [address]);  Service: Endoscopy;  Laterality: N/A;  may use Prepopik or other low volume prep    FOOT SURGERY      SHOULDER ARTHROSCOPY      left    SPINE SURG[facility]  1-12-15    [facility]    TONSILLECTOMY      TOTAL KNEE ARTHROPLASTY                                                                                                                 SOCIAL HISTORY:  Reviewed per EPIC history for tobacco or alcohol use and she   is an active  76 y.o.  female                                                                             FAMILY MEDICAL HISTORY:  family history includes Breast cancer in her mother; Cancer in her father; Diabetes in her daughter; Stroke in her daughter.                                                                REVIEW OF SYSTEMS:  Constitution: Negative. Negative for chills, fever and night sweats.    Hematologic/Lymphatic: Negative for bleeding problem. Does not bruise/bleed easily.   Skin: Negative for dry skin, itching and rash.   Musculoskeletal: Negative for falls. Positive for knee pain and  muscle weakness.                                                                                                                                   PHYSICAL EXAMINATION:                                                        BP (!) 146/90   Pulse 74   Ht 5' 5" (1.651 m)   Wt 88.9 kg (196 lb)   LMP 07/05/1987   BMI 32.62 kg/m²   General: Well-developed well-nourished 76 y.o. femalein no acute distress   Cardiovascular: Regular rate by palpation of distal pulse, normal color and temperature, no concerning varicosities on symptomatic side   Lungs: No labored breathing or wheezing appreciated   Neuro: Alert and oriented ×3   Psychiatric: well oriented to person, place and time, demonstrates normal mood and affect   Skin: No rashes, lesions or ulcers, normal temperature, turgor, and texture on involved extremity    "              EXTREMITIES:  Examination of lower extremities reveals there is no visible mass or deformity.    Left knee:  ROM 0-130    Ligamentously stable to varus/valgus stress.    Anterior and posterior drawers negative.    No pain over pes bursa.    No warmth    No erythema    Effusion No    Right knee:  ROM 0-120    Ligamentously stable to varus/valgus stress.    Anterior and posterior drawers negative.    No pain over pes bursa.    No warmth    No erythema    Effusion No    She has a  painless range of motion of the hips and ankles bilaterally.   Sensation is intact in both lower extremities.    There are no motor deficits in the lower extremities bilaterally.   Pedal pulses are palpable distally bilaterally.    She has no calf tenderness to palpation nor edema.                                      She has imaging which was personally reviewed with the patient and shows moderate osteoarthritis.                                                                               IMPRESSION: Osteoarthritis left knee.                             The conservative options including NSAIDs, activity modification, physical therapy, corticosteroid injection, and viscosupplimentation were discussed.    Patient elects to proceed with steroid injection today. She tolerated this well. I recommended she see one of my partners for her back. Referral placed for Dr. Sanjeev Hubbard.    Procedures

## 2017-09-19 ENCOUNTER — TUMOR BOARD CONFERENCE (OUTPATIENT)
Dept: SURGERY | Facility: CLINIC | Age: 77
End: 2017-09-19

## 2017-09-19 NOTE — PROGRESS NOTES
Interdisciplinary Breast Cancer Conference    Nicho Espinosa    Female    Date Presented to Tumor Board: 09/19/17    HOSPTIAL/CLINIC PRESENTING: [facility]    TUMOR SITE: RIGHT    TUMOR SITE: UOQ (10:00)    Presenter: Candelario Vasques (on behalf of Aurelio Rolle MD)    Reason For Consultation: Initial Presentation    Specialties Present: Surgical Oncology;Medical Oncology;Radiation Oncology;Navigation;Pathology;Research;Genetics;Radiology    Patient Status: a current patient    Treatment to Date: Surgical Intervention(s) (mastectomy, oncotype was ordered and came back as 7)    Clinical Trial Eligibility: None available    Estrogen Receptor Status: Positive    Progesterone Status: Positive    Her2/KAREY Status: Negative    T1cN0i+, 1 lymph node with isolated tumor cells    RECOMMENDED PLAN: Endocrine Therapy   With lobular diagnosis and size of tumor, expected low oncotype.     No need for radiation    UNSTAGEABLE: No         PRESENTATION AT CANCER CONFERENCE: [facility]

## 2017-09-25 ENCOUNTER — TELEPHONE (OUTPATIENT)
Dept: NEUROSURGERY | Facility: CLINIC | Age: 77
End: 2017-09-25

## 2017-09-25 ENCOUNTER — TELEPHONE (OUTPATIENT)
Dept: SPINE | Facility: CLINIC | Age: 77
End: 2017-09-25

## 2017-09-25 DIAGNOSIS — Z98.1 S/P LUMBAR FUSION: Primary | ICD-10-CM

## 2017-09-25 NOTE — TELEPHONE ENCOUNTER
Spoke with pt, she wants to come in to see Dr. Beckett . Her appointments were schedule and she was notified

## 2017-10-06 RX ORDER — TRAZODONE HYDROCHLORIDE 50 MG/1
TABLET ORAL
Qty: 30 TABLET | Refills: 0 | Status: SHIPPED | OUTPATIENT
Start: 2017-10-06 | End: 2017-10-27 | Stop reason: SDUPTHER

## 2017-10-16 ENCOUNTER — OFFICE VISIT (OUTPATIENT)
Dept: HEMATOLOGY/ONCOLOGY | Facility: CLINIC | Age: 77
End: 2017-10-16
Payer: MEDICARE

## 2017-10-16 ENCOUNTER — TELEPHONE (OUTPATIENT)
Dept: HEMATOLOGY/ONCOLOGY | Facility: CLINIC | Age: 77
End: 2017-10-16

## 2017-10-16 VITALS
WEIGHT: 182 LBS | BODY MASS INDEX: 30.32 KG/M2 | HEART RATE: 88 BPM | OXYGEN SATURATION: 98 % | HEIGHT: 65 IN | DIASTOLIC BLOOD PRESSURE: 90 MMHG | TEMPERATURE: 98 F | SYSTOLIC BLOOD PRESSURE: 190 MMHG | RESPIRATION RATE: 18 BRPM

## 2017-10-16 DIAGNOSIS — Z17.0 MALIGNANT NEOPLASM OF UPPER-OUTER QUADRANT OF RIGHT BREAST IN FEMALE, ESTROGEN RECEPTOR POSITIVE: Primary | ICD-10-CM

## 2017-10-16 DIAGNOSIS — C50.411 MALIGNANT NEOPLASM OF UPPER-OUTER QUADRANT OF RIGHT BREAST IN FEMALE, ESTROGEN RECEPTOR POSITIVE: Primary | ICD-10-CM

## 2017-10-16 DIAGNOSIS — Z79.811 PROPHYLACTIC USE OF ANASTROZOLE (ARIMIDEX): ICD-10-CM

## 2017-10-16 PROCEDURE — 99214 OFFICE O/P EST MOD 30 MIN: CPT | Mod: S$PBB,,, | Performed by: INTERNAL MEDICINE

## 2017-10-16 NOTE — PROGRESS NOTES
Subjective:       Patient ID: Nicho Espinosa is a 76 y.o. female.    Chief Complaint: No chief complaint on file.    HPI   Mrs. Espinosa returns today for follow up.  She has been on anastrazole for a month now, and has tolerated it well so far.  Of note, her Oncotype score was 7, suggesting that she has a 6 % chance of recurrence at ten years with tamoxifen alone.  Briefly, she is a 76-year-old  female who, on 08/09/2017, underwent a right modified radical mastectomy and sentinel lymph   node biopsy.  The pathology report from the procedure indicates that she had a 2 cm lobular carcinoma that was ER strongly positive, RI   positive and HER-2 negative; resection margins were clear.  On the right axillary sentinel lymph nodes isolated tumor cells were seen.        Review of Systems    Overall she feels well. She has tolerated the anastrazole well so far and has not experienced any hot flashes or joint stiffness.  Her  ECOG PS is 1.  She  denies any anxiety, depression, easy bruising, fevers, chills, night  sweats, weight loss, nausea, vomiting, diarrhea, constipation, diplopia,     blurred vision, headache, chest pain, palpitations, shortness of breath, breast pain, abdominal pain, extremity pain, or difficulty ambulating.  The remainder of the ten-point ROS, including general, skin, lymph, H/N, cardiorespiratory, GI, , Neuro, Endocrine, and psychiatric is negative.       Objective:      Physical Exam    She is alert, oriented to time, place, person, pleasant, well      nourished, in no acute physical distress.                                    VITAL SIGNS:  Reviewed                                      HEENT:  Normal.  There are no nasal, oral, lip, gingival, auricular, lid,    or conjunctival lesions.  Mucosae are moist and pink, and there is no        thrush.  Pupils are equal, reactive to light and accommodation.              Extraocular muscle movements are intact.  Dentition is good.                                    NECK:  Supple without JVD, adenopathy, or thyromegaly.                       LUNGS:  Clear to auscultation without wheezing, rales, or rhonchi.           CARDIOVASCULAR:  Reveals an S1, S2, a grade I systolic ejection murmur at the LSB, no rubs, no gallops.         ABDOMEN:  Soft, nontender, without organomegaly.  Bowel sounds are    present.                                                                     EXTREMITIES:  No cyanosis, clubbing, or edema.  There is a one square cm chemical burn from a  wart in her right distale forearm; apparently she tried to burn it herself using a home kit!.                             BREASTS:  She is status post right mastectomy with a well-healed incision.    There are no masses in the left breast.                                        LYMPHATIC:  There is no cervical, axillary, or supraclavicular adenopathy.   SKIN:  Warm and moist, without petechiae, rashes, induration, or ecchymoses.           NEUROLOGIC:  DTRs are 0-1+ bilaterally, symmetrical, motor function is 5/5,  and cranial nerves are  within normal limits.      Assessment:       1. Malignant neoplasm of upper-outer quadrant of right breast in female, estrogen receptor positive    2. Prophylactic use of anastrozole (Arimidex)      3.   Burn as described above.  Plan:         I have asked her to remain on anastrazole, which she will take through the end o August 2022.  I will see her again in 3 months.  I asked her to discuss with her PCp about her murmur and whether an echocardiogram si indicated. Finally, I advised her AGAINST using any home kits foe warts in the future.  Her DXA scan will be repeated in a year and a half from now, in February 2019..  Her questions were answered to her satisfaction.

## 2017-10-27 ENCOUNTER — OFFICE VISIT (OUTPATIENT)
Dept: INTERNAL MEDICINE | Facility: CLINIC | Age: 77
End: 2017-10-27
Payer: MEDICARE

## 2017-10-27 ENCOUNTER — IMMUNIZATION (OUTPATIENT)
Dept: INTERNAL MEDICINE | Facility: CLINIC | Age: 77
End: 2017-10-27
Payer: MEDICARE

## 2017-10-27 VITALS
HEART RATE: 65 BPM | SYSTOLIC BLOOD PRESSURE: 126 MMHG | WEIGHT: 190.69 LBS | DIASTOLIC BLOOD PRESSURE: 72 MMHG | OXYGEN SATURATION: 97 % | BODY MASS INDEX: 36 KG/M2 | HEIGHT: 61 IN

## 2017-10-27 DIAGNOSIS — M50.30 DDD (DEGENERATIVE DISC DISEASE), CERVICAL: ICD-10-CM

## 2017-10-27 DIAGNOSIS — G47.00 INSOMNIA, UNSPECIFIED TYPE: ICD-10-CM

## 2017-10-27 DIAGNOSIS — Z17.0 MALIGNANT NEOPLASM OF UPPER-OUTER QUADRANT OF RIGHT BREAST IN FEMALE, ESTROGEN RECEPTOR POSITIVE: ICD-10-CM

## 2017-10-27 DIAGNOSIS — E55.9 MILD VITAMIN D DEFICIENCY: ICD-10-CM

## 2017-10-27 DIAGNOSIS — R01.1 MURMUR: Primary | ICD-10-CM

## 2017-10-27 DIAGNOSIS — R73.03 PRE-DIABETES: ICD-10-CM

## 2017-10-27 DIAGNOSIS — C50.411 MALIGNANT NEOPLASM OF UPPER-OUTER QUADRANT OF RIGHT BREAST IN FEMALE, ESTROGEN RECEPTOR POSITIVE: ICD-10-CM

## 2017-10-27 DIAGNOSIS — M51.35 DDD (DEGENERATIVE DISC DISEASE), THORACOLUMBAR: ICD-10-CM

## 2017-10-27 DIAGNOSIS — Z90.11 ACQUIRED ABSENCE OF RIGHT BREAST AND NIPPLE: ICD-10-CM

## 2017-10-27 DIAGNOSIS — K21.9 GASTROESOPHAGEAL REFLUX DISEASE, ESOPHAGITIS PRESENCE NOT SPECIFIED: ICD-10-CM

## 2017-10-27 DIAGNOSIS — M81.0 AGE-RELATED OSTEOPOROSIS WITHOUT CURRENT PATHOLOGICAL FRACTURE: ICD-10-CM

## 2017-10-27 DIAGNOSIS — G25.0 ESSENTIAL TREMOR: ICD-10-CM

## 2017-10-27 PROCEDURE — 99214 OFFICE O/P EST MOD 30 MIN: CPT | Mod: S$PBB,,, | Performed by: NURSE PRACTITIONER

## 2017-10-27 PROCEDURE — 99999 PR PBB SHADOW E&M-EST. PATIENT-LVL IV: CPT | Mod: PBBFAC,,, | Performed by: NURSE PRACTITIONER

## 2017-10-27 PROCEDURE — G0008 ADMIN INFLUENZA VIRUS VAC: HCPCS | Mod: PBBFAC

## 2017-10-27 PROCEDURE — 99214 OFFICE O/P EST MOD 30 MIN: CPT | Mod: PBBFAC,25 | Performed by: NURSE PRACTITIONER

## 2017-10-27 RX ORDER — TRAZODONE HYDROCHLORIDE 50 MG/1
50 TABLET ORAL NIGHTLY
Qty: 30 TABLET | Refills: 5 | Status: SHIPPED | OUTPATIENT
Start: 2017-10-27 | End: 2018-02-19 | Stop reason: SDUPTHER

## 2017-10-27 RX ORDER — PROPRANOLOL HYDROCHLORIDE 10 MG/1
TABLET ORAL
Qty: 180 TABLET | Refills: 1 | Status: SHIPPED | OUTPATIENT
Start: 2017-10-27 | End: 2018-02-19 | Stop reason: SDUPTHER

## 2017-10-27 NOTE — PROGRESS NOTES
"INTERNAL MEDICINE PROGRESS/URGENT CARE NOTE    CHIEF COMPLAINT     "my oncologist told me I have a heart murmur"     HPI     Nicho Espinosa is a 76 y.o. female who with Osteoprosis, Compression Fracture T12, DJD Cervical, Thoracic, and Lumbar Spine with Chronic LBP, Vitamin D Deficiency, GERD, Pre-DM, OA Knees, S/P R-TKR, Obesity, Hx of Colon Polyps, and Hx of DVT 11/2013  presents for a follow up visit today.  She is an established pt of Dr. Lara who will transfer care to Dr. Walker     Would like her heart checked bc her oncologist suspects murmur.     Right upper-outer breast cancer- followed by [facility] appointment 10/16/2017, On anastrozole. 08/09/2017, underwent a right modified radical mastectomy and sentinel lymph node biopsy.  The pathology report from the procedure indicates that she had a 2 cm lobular carcinoma that was ER strongly positive, AZ positive and HER-2 negative; resection margins were clear.  On the right axillary sentinel lymph nodes isolated tumor cells were seen.      DDD and DJD Cervica, thoracic and lumbar spine with kyphoscoliosis   -followed by Dr. Beckett ([facility]) & [place] [facility]   - S/p lumbar fusion 1/2015    - imaging shows Degenerative Scoliosis with Grade 2 Spondylolisthesis L5-S1 and Thoracic Kyphosis with Wedge Deformity T12     - to have steroid injection on Monday  With [place] [facility]    - Has appt with neuro in December with CT scan. Does find pain relief when using a walker or when leaning against a grocery cart. Uses hydrocodone- 1/2 pill in AM, 1/2 pill mid- to late-afternoon, and 1/2 pill at bedime. Also uses lidocaine patches occasionally on back (her daughter's prescription- works well but cost on her insurance plan is too high.    GERD- (-) H.pylori 9/2013, (-) Abd US 1/2014      Vit D def- 22 1/2017    Osteoporosis- on fosamax 70mg/week    -DEXA 2/2017- Osteoporosis on basis of vertebral fracture. [place] hip BMD declined 4% since 2014 and lumbar " spine increased 11    OA of the knees- right TKA 11/14/2013 Dr. Washburn     Colon polyps- Colonoscopy 7/2011 - hyperplastic polyp      Pre-DM- a1c 5.7 1/2017    Anemia- H&H 12 and 35.7    Insomnia- uses trazodone and hydrocodone. Reports early waking with depressing thoughts, but gets better once she gets up.    Tremor Essential- on inderal 10mg po BID     ACOSTA- stress echo (10/2015): negative for Ischemia, normal EF, Diastolic Dysfunction    Vasovagal syncope- one episode as noted 6/20, no further episodes, was straining on toilet during episode     Past Medical History:  Past Medical History:   Diagnosis Date    Allergy     Arthritis     Blepharitis of both eyes     Complication of anesthesia     nausea    Degenerative disc disease     GERD (gastroesophageal reflux disease)     patient denies reflux    History of compression fracture of spine 4/10/2014    Hyperlipidemia     Lumbar disc disease     Malignant neoplasm of left female breast 7/18/2017    Meibomitis     Obesity 9/19/2013    Osteoporosis     Papilloma of eyelid     RUL    Postoperative anemia 11/21/2013    Thoracic or lumbosacral neuritis or radiculitis, unspecified 9/20/2013    Tremors of nervous system 2/2015       Home Medications:  Prior to Admission medications    Medication Sig Start Date End Date Taking? Authorizing Provider   alendronate (FOSAMAX) 70 MG tablet TAKE 1 TABLET BY MOUTH EVERY 7 DAYS 6/20/17  Yes Arleen Lara MD   anastrozole (ARIMIDEX) 1 mg Tab Take 1 tablet (1 mg total) by mouth once daily. 9/11/17 9/11/18 Yes Santiago Smith MD   calcium carbonate (OS-DIANDRA) 600 mg calcium (1,500 mg) Tab Take 600 mg by mouth 2 (two) times daily with meals.   Yes Historical Provider, MD   cholecalciferol, vitamin D3, 1,000 unit capsule Take 3 capsules daily 2/1/17  Yes Arleen Lara MD   docusate sodium (COLACE) 50 MG capsule Take by mouth every evening.   Yes Historical Provider, MD   hydrocodone-acetaminophen  5-325mg (NORCO) 5-325 mg per tablet 0.5 tablets daily as needed. PATIENT TAKES 1/2 TABLET 2-3 TIMES A DAY 11/16/15  Yes Historical Provider, MD   lidocaine-prilocaine (EMLA) cream  8/7/17  Yes Historical Provider, MD   MULTIVIT WITH CALCIUM,IRON,MIN (WOMEN'S DAILY MULTIVITAMIN ORAL) Take by mouth.   Yes Historical Provider, MD   promethazine (PHENERGAN) 25 MG tablet TAKE 1 TABLET(25 MG) BY MOUTH EVERY 6 HOURS AS NEEDED FOR NAUSEA 7/6/17  Yes Arleen Lara MD   propranolol (INDERAL) 10 MG tablet TAKE 1 TABLET(10 MG) BY MOUTH TWICE DAILY 9/4/17  Yes Arleen Lara MD   trazodone (DESYREL) 50 MG tablet TAKE 1 TABLET BY MOUTH NIGHTLY AS NEEDED FOR INSOMNIA 1/24/16  Yes Arleen Lara MD   aspirin 325 MG tablet Take 325 mg by mouth once daily.    Historical Provider, MD   cetirizine (ZYRTEC) 10 MG tablet Take 10 mg by mouth once daily.    Historical Provider, MD   oxycodone-acetaminophen (PERCOCET) 5-325 mg per tablet Take 1 tablet by mouth every 4 (four) hours as needed. 8/10/17   Travis Muller MD   trazodone (DESYREL) 50 MG tablet TAKE 1 TABLET BY MOUTH EVERY EVENING AS NEEDED FOR INSOMNIA 10/6/17 10/27/17  Jenniffer Lam MD   warfarin (COUMADIN) 4 MG tablet Take 1 tablet (4 mg total) by mouth Daily. At 5p as directed by coumadin clinic. 11/26/13 12/31/13  Mel Camargo NP       Review of Systems:  Review of Systems   Constitutional: Negative for activity change and unexpected weight change.   HENT: Negative for hearing loss, rhinorrhea and trouble swallowing.    Eyes: Negative for discharge and visual disturbance.   Respiratory: Negative for chest tightness and wheezing.    Cardiovascular: Negative for chest pain and palpitations.   Gastrointestinal: Negative for blood in stool, constipation, diarrhea and vomiting.   Endocrine: Negative for polydipsia and polyuria.   Genitourinary: Negative for difficulty urinating, dysuria, hematuria and menstrual problem.   Musculoskeletal:  "Positive for arthralgias. Negative for joint swelling and neck pain.   Neurological: Negative for weakness and headaches.   Psychiatric/Behavioral: Negative for confusion and dysphoric mood.   Constitutional: Negative for chills, fatigue and fever.   HENT: Positive for congestion (in mornings). Negative for postnasal drip and sore throat.    Eyes: Negative for visual disturbance.   Respiratory: Negative for cough, chest tightness, shortness of breath and wheezing.    Cardiovascular: Negative for chest pain, palpitations and leg swelling.   Gastrointestinal: Positive for constipation (takes colace and probiotic, eats prunes). Negative for diarrhea, nausea and vomiting.   Genitourinary: Negative.    Musculoskeletal: Positive for arthralgias (left knee- see ortho) and back pain. Negative for myalgias.   Skin: Negative for color change and wound.   Allergic/Immunologic: Positive for environmental allergies.   Neurological: Negative for dizziness, weakness, light-headedness and headaches.   Psychiatric/Behavioral: Negative for confusion, decreased concentration and sleep     Health Maintainence:   Immunizations:  Health Maintenance       Date Due Completion Date    Influenza Vaccine 08/01/2017 10/3/2016 (Done)    Override on 10/3/2016: Done    DEXA SCAN 02/08/2019 2/8/2017    Colonoscopy 11/01/2021 11/1/2016    Lipid Panel 01/30/2022 1/30/2017    TETANUS VACCINE 09/19/2023 9/19/2013           PHYSICAL EXAM     /72 (BP Location: Left arm, Patient Position: Sitting, BP Method: Large (Manual))   Pulse 65   Ht 5' 1" (1.549 m)   Wt 86.5 kg (190 lb 11.2 oz)   LMP 07/05/1987   SpO2 97%   BMI 36.03 kg/m²     Physical Exam   Constitutional: She appears well-developed and well-nourished.   HENT:   Head: Normocephalic.   Eyes: EOM are normal. Pupils are equal, round, and reactive to light.   Neck: Normal range of motion. Neck supple. No tracheal deviation present. No thyromegaly present.   Cardiovascular: Normal rate, " regular rhythm and intact distal pulses.  Exam reveals no gallop and no friction rub.    Murmur (2/6 at right sternal border ) heard.  Pulmonary/Chest: Effort normal and breath sounds normal. No respiratory distress. She has no wheezes. She has no rales. She exhibits no tenderness.   Abdominal: Soft. Bowel sounds are normal. She exhibits no distension and no mass. There is no tenderness. There is no rebound and no guarding. No hernia.   Musculoskeletal: She exhibits no edema.   Lymphadenopathy:     She has no cervical adenopathy.   Vitals reviewed.      LABS     Lab Results   Component Value Date    HGBA1C 5.7 01/30/2017     CMP  Sodium   Date Value Ref Range Status   07/27/2017 135 (L) 136 - 145 mmol/L Final     Potassium   Date Value Ref Range Status   07/27/2017 4.2 3.5 - 5.1 mmol/L Final     Chloride   Date Value Ref Range Status   07/27/2017 99 95 - 110 mmol/L Final     CO2   Date Value Ref Range Status   07/27/2017 24 23 - 29 mmol/L Final     Glucose   Date Value Ref Range Status   07/27/2017 120 (H) 70 - 110 mg/dL Final     BUN, Bld   Date Value Ref Range Status   07/27/2017 19 8 - 23 mg/dL Final     Creatinine   Date Value Ref Range Status   07/27/2017 1.0 0.5 - 1.4 mg/dL Final     Calcium   Date Value Ref Range Status   07/27/2017 9.7 8.7 - 10.5 mg/dL Final     Total Protein   Date Value Ref Range Status   01/30/2017 6.8 6.0 - 8.4 g/dL Final     Albumin   Date Value Ref Range Status   01/30/2017 3.5 3.5 - 5.2 g/dL Final     Total Bilirubin   Date Value Ref Range Status   01/30/2017 0.9 0.1 - 1.0 mg/dL Final     Comment:     For infants and newborns, interpretation of results should be based  on gestational age, weight and in agreement with clinical  observations.  Premature Infant recommended reference ranges:  Up to 24 hours.............<8.0 mg/dL  Up to 48 hours............<12.0 mg/dL  3-5 days..................<15.0 mg/dL  6-29 days.................<15.0 mg/dL       Alkaline Phosphatase   Date Value Ref  Range Status   01/30/2017 48 (L) 55 - 135 U/L Final     AST   Date Value Ref Range Status   01/30/2017 17 10 - 40 U/L Final     ALT   Date Value Ref Range Status   01/30/2017 11 10 - 44 U/L Final     Anion Gap   Date Value Ref Range Status   07/27/2017 12 8 - 16 mmol/L Final     eGFR if    Date Value Ref Range Status   07/27/2017 >60.0 >60 mL/min/1.73 m^2 Final     eGFR if non    Date Value Ref Range Status   07/27/2017 54.9 (A) >60 mL/min/1.73 m^2 Final     Comment:     Calculation used to obtain the estimated glomerular filtration  rate (eGFR) is the CKD-EPI equation. Since race is unknown   in our information system, the eGFR values for   -American and Non--American patients are given   for each creatinine result.       Lab Results   Component Value Date    WBC 7.40 07/27/2017    HGB 12.0 07/27/2017    HCT 35.7 (L) 07/27/2017    MCV 94 07/27/2017     07/27/2017     Lab Results   Component Value Date    CHOL 193 01/30/2017    CHOL 200 (H) 10/22/2015    CHOL 193 09/26/2014     Lab Results   Component Value Date    HDL 38 (L) 01/30/2017    HDL 37 (L) 10/22/2015    HDL 37 (L) 09/26/2014     Lab Results   Component Value Date    LDLCALC 117.0 01/30/2017    LDLCALC 118.2 10/22/2015    LDLCALC 113.8 09/26/2014     Lab Results   Component Value Date    TRIG 190 (H) 01/30/2017    TRIG 224 (H) 10/22/2015    TRIG 211 (H) 09/26/2014     Lab Results   Component Value Date    CHOLHDL 19.7 (L) 01/30/2017    CHOLHDL 18.5 (L) 10/22/2015    CHOLHDL 19.2 (L) 09/26/2014     Lab Results   Component Value Date    TSH 1.402 01/30/2017       ASSESSMENT/PLAN     Nicho Espinosa is a 76 y.o. female with  Past Medical History:   Diagnosis Date    Allergy     Arthritis     Blepharitis of both eyes     Complication of anesthesia     nausea    Degenerative disc disease     GERD (gastroesophageal reflux disease)     patient denies reflux    History of compression fracture of spine  4/10/2014    Hyperlipidemia     Lumbar disc disease     Malignant neoplasm of left female breast 7/18/2017    Meibomitis     Obesity 9/19/2013    Osteoporosis     Papilloma of eyelid     RUL    Postoperative anemia 11/21/2013    Thoracic or lumbosacral neuritis or radiculitis, unspecified 9/20/2013    Tremors of nervous system 2/2015     Murmur-?flow murmur 2/2 anemia. Will send for echo. Asymptomatic.   -     2D Echo w/ Color Flow Doppler; Future    Essential tremor- Stable. will refill.   -     propranolol (INDERAL) 10 MG tablet; TAKE 1 TABLET(10 MG) BY MOUTH TWICE DAILY  Dispense: 180 tablet; Refill: 1    Insomnia, unspecified type- controlled with trazodone. May use as needed   -     traZODone (DESYREL) 50 MG tablet; Take 1 tablet (50 mg total) by mouth nightly.  Dispense: 30 tablet; Refill: 5    DDD (degenerative disc disease), cervical  DDD (degenerative disc disease), thoracolumbar- still with pain, has appointments with pain mgmt and neurosurgery.     Malignant neoplasm of upper-outer quadrant of right breast in female, estrogen receptor positive/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- cont Arimidex. F/u with Heme Onc.     Pre-diabetes- last a1c 1/17 at goal. Will repeat 1/18. Discussed low sugar and carb diet.     Mild vitamin D deficiency- cont OTC 1000 untis vit d and calcium     Gastroesophageal reflux disease, esophagitis presence not specified- stable. Cont dietary modifications.     Age-related osteoporosis without current pathological fracture- dexa reviewed. Will cont fosamax.     Flu today         Follow up with PCP in 1/2018    Patient education provided from [place]. Patient was counseled on when and how to seek emergent care.       Yani GARCIA, APRN, FNP-c   Department of Internal Medicine - [facility]  10:39 AM

## 2017-10-27 NOTE — MEDICAL/APP STUDENT
"Subjective:       Patient ID: Nicho Espinosa is a 76 y.o. female.    Chief Complaint: No chief complaint on file.    HPI     Pt here for follow up.     Would like her "heart checked" because her oncologist thought he heard a murmur.    Back pain- sees pain specialist and will be getting steroid injection in SI joints. Has appt with neuro in December with CT scan. Does find pain relief when using a walker or when leaning against a grocery cart. Uses hydrocodone- 1/2 pill in AM, 1/2 pill mid- to late-afternoon, and 1/2 pill at bedime. Also uses lidocaine patches occasionally on back (her daughter's prescription- works well but cost on her insurance plan is too high.    Uses trazadone to help with sleep as well. Reports early waking with depressing thoughts, but gets better once she gets up.    Tremors- compliant with propranolol    GERD- no symptoms at this time, only symptomatic if she takes a full dose of hydrocodone    Allergies- reports taking a mild OTC allergy relief medication (unsure of name)    Vasovagal syncope- one episode as noted 6/20, no further episodes, was straining on toilet during episode     Review of Systems   Constitutional: Negative for chills, fatigue and fever.   HENT: Positive for congestion (in mornings). Negative for postnasal drip and sore throat.    Eyes: Negative for visual disturbance.   Respiratory: Negative for cough, chest tightness, shortness of breath and wheezing.    Cardiovascular: Negative for chest pain, palpitations and leg swelling.   Gastrointestinal: Positive for constipation (takes colace and probiotic, eats prunes). Negative for diarrhea, nausea and vomiting.   Genitourinary: Negative.    Musculoskeletal: Positive for arthralgias (left knee- see ortho) and back pain. Negative for myalgias.   Skin: Negative for color change and wound.   Allergic/Immunologic: Positive for environmental allergies.   Neurological: Negative for dizziness, weakness, light-headedness and " headaches.   Psychiatric/Behavioral: Negative for confusion, decreased concentration and sleep disturbance.       Objective:      Physical Exam   Constitutional: She is oriented to person, place, and time. She appears well-developed and well-nourished.   HENT:   Head: Normocephalic and atraumatic.   Right Ear: External ear normal.   Left Ear: External ear normal.   Nose: Nose normal.   Mouth/Throat: Oropharynx is clear and moist.   Eyes: Conjunctivae are normal. Pupils are equal, round, and reactive to light.   Neck: Normal range of motion. Neck supple.   Cardiovascular: Normal rate, regular rhythm, normal heart sounds and intact distal pulses.    Pulmonary/Chest: Effort normal and breath sounds normal.   Abdominal: Soft. Bowel sounds are normal.   Musculoskeletal: Normal range of motion.   Neurological: She is alert and oriented to person, place, and time.   Skin: Skin is warm and dry.   Psychiatric: She has a normal mood and affect. Her behavior is normal.   Vitals reviewed.      Assessment:       No diagnosis found.    Plan:

## 2017-11-01 ENCOUNTER — TELEPHONE (OUTPATIENT)
Dept: INTERNAL MEDICINE | Facility: CLINIC | Age: 77
End: 2017-11-01

## 2017-11-01 ENCOUNTER — HOSPITAL ENCOUNTER (OUTPATIENT)
Dept: CARDIOLOGY | Facility: CLINIC | Age: 77
Discharge: HOME | End: 2017-11-01
Payer: MEDICARE

## 2017-11-01 DIAGNOSIS — R01.1 MURMUR: ICD-10-CM

## 2017-11-01 LAB
AORTIC VALVE REGURGITATION: NORMAL
ESTIMATED PA SYSTOLIC PRESSURE: 24.9
MITRAL VALVE MOBILITY: NORMAL
RETIRED EF AND QEF - SEE NOTES: 65 (ref 55–65)

## 2017-11-01 PROCEDURE — 93306 TTE W/DOPPLER COMPLETE: CPT | Mod: PBBFAC | Performed by: INTERNAL MEDICINE

## 2017-12-05 ENCOUNTER — OFFICE VISIT (OUTPATIENT)
Dept: SPINE | Facility: CLINIC | Age: 77
End: 2017-12-05
Attending: NEUROLOGICAL SURGERY
Payer: MEDICARE

## 2017-12-05 ENCOUNTER — HOSPITAL ENCOUNTER (OUTPATIENT)
Dept: RADIOLOGY | Facility: OTHER | Age: 77
Discharge: HOME | End: 2017-12-05
Attending: NEUROLOGICAL SURGERY
Payer: MEDICARE

## 2017-12-05 VITALS — HEIGHT: 61 IN | BODY MASS INDEX: 35.87 KG/M2 | WEIGHT: 190 LBS

## 2017-12-05 DIAGNOSIS — Z98.1 S/P LUMBAR SPINAL FUSION: Primary | ICD-10-CM

## 2017-12-05 DIAGNOSIS — Z98.1 S/P LUMBAR FUSION: ICD-10-CM

## 2017-12-05 DIAGNOSIS — S32.009K PSEUDOARTHROSIS OF LUMBAR SPINE: ICD-10-CM

## 2017-12-05 PROCEDURE — 99999 PR PBB SHADOW E&M-EST. PATIENT-LVL II: CPT | Mod: PBBFAC,,, | Performed by: NEUROLOGICAL SURGERY

## 2017-12-05 PROCEDURE — 99212 OFFICE O/P EST SF 10 MIN: CPT | Mod: PBBFAC,25 | Performed by: NEUROLOGICAL SURGERY

## 2017-12-05 PROCEDURE — 72131 CT LUMBAR SPINE W/O DYE: CPT | Mod: TC

## 2017-12-05 PROCEDURE — 99213 OFFICE O/P EST LOW 20 MIN: CPT | Mod: S$PBB,,, | Performed by: NEUROLOGICAL SURGERY

## 2017-12-05 PROCEDURE — 72131 CT LUMBAR SPINE W/O DYE: CPT | Mod: 26,,, | Performed by: RADIOLOGY

## 2017-12-05 NOTE — PROGRESS NOTES
CHIEF COMPLAINT:    2 year follow-up    HPI:    Nicho Espinosa is a 77 y.o.-year-old female who presents today for post-operative follow-up. She is s/p right TLIF L5/S1  that was done by Dr. Beckett on 1/12/2015 . Currently, the patient's symptoms are unchanged especially back pain since surgery. The patient is complaining of pain in the lower back moving into the knees . She denies any new onset of weakness. The patient describes the pain as stabbing . The patient rates the pain 7 on the pain scale. The pain is worse with standing and better with laying down. Post-op medications the patient is currently taking are: percocet.    ROS:    NAD    PE:    AAOX3  PERRL  EOMI    Lumbar incision:  C/D/I    ROM:   Decreased secondary to pain    Sensation:  Intact to light touch (All 4 extremities)    Strength: Full strength      Deltoids Biceps Triceps Wrist Ext. Wrist Flex. Hand    RUE         LUE          Hip Flex. Knee Flex. Knee Ext. Dorsi Flex Plantar Flex EHL   RLE         LLE           Gait:  normal    DTR:  2+ and symmetric bilaterally    no abnormal reflexes    SLR- negative    IMAGING:    XRays: none    CT: L-spine- L5-S1 MIS TLIF with pseudoarthrosis and lucency around the pedicle screws    MRI: none    ASSESSMENT:   Back pain due to pseudoarthrosis    PLAN:   Trial of bone stimulator 3-6 months. She has a > 40-year smoking history. Follow up 6 months with repeat CT scan to monitor progression of spinal fusion

## 2017-12-08 ENCOUNTER — TELEPHONE (OUTPATIENT)
Dept: INTERNAL MEDICINE | Facility: CLINIC | Age: 77
End: 2017-12-08

## 2017-12-08 NOTE — TELEPHONE ENCOUNTER
----- Message from Beatriz Daley sent at 12/8/2017 10:23 AM CST -----  Doctor appointment and lab have been scheduled.  Please link lab orders to the lab appointment.  Date of doctor appointment:  2-19  Physical or EP:  physical  Date of lab appointment: 2-12   Comments: Formerly  pt

## 2018-02-01 DIAGNOSIS — M25.562 LEFT KNEE PAIN, UNSPECIFIED CHRONICITY: Primary | ICD-10-CM

## 2018-02-07 ENCOUNTER — HOSPITAL ENCOUNTER (OUTPATIENT)
Dept: RADIOLOGY | Facility: HOSPITAL | Age: 78
Discharge: HOME | End: 2018-02-07
Attending: ORTHOPAEDIC SURGERY
Payer: MEDICARE

## 2018-02-07 ENCOUNTER — OFFICE VISIT (OUTPATIENT)
Dept: ORTHOPEDICS | Facility: CLINIC | Age: 78
End: 2018-02-07
Payer: MEDICARE

## 2018-02-07 VITALS — WEIGHT: 190.06 LBS | HEIGHT: 61 IN | BODY MASS INDEX: 35.88 KG/M2

## 2018-02-07 DIAGNOSIS — M17.12 PRIMARY OSTEOARTHRITIS OF LEFT KNEE: Primary | ICD-10-CM

## 2018-02-07 DIAGNOSIS — M25.562 LEFT KNEE PAIN, UNSPECIFIED CHRONICITY: ICD-10-CM

## 2018-02-07 PROCEDURE — 3008F BODY MASS INDEX DOCD: CPT | Mod: S$GLB,,, | Performed by: ORTHOPAEDIC SURGERY

## 2018-02-07 PROCEDURE — 73562 X-RAY EXAM OF KNEE 3: CPT | Mod: 26,LT,, | Performed by: RADIOLOGY

## 2018-02-07 PROCEDURE — 1125F AMNT PAIN NOTED PAIN PRSNT: CPT | Mod: S$GLB,,, | Performed by: ORTHOPAEDIC SURGERY

## 2018-02-07 PROCEDURE — 1159F MED LIST DOCD IN RCRD: CPT | Mod: S$GLB,,, | Performed by: ORTHOPAEDIC SURGERY

## 2018-02-07 PROCEDURE — 99213 OFFICE O/P EST LOW 20 MIN: CPT | Mod: S$GLB,,, | Performed by: ORTHOPAEDIC SURGERY

## 2018-02-07 PROCEDURE — 73560 X-RAY EXAM OF KNEE 1 OR 2: CPT | Mod: TC,RT,59

## 2018-02-07 PROCEDURE — 73562 X-RAY EXAM OF KNEE 3: CPT | Mod: TC,LT

## 2018-02-07 PROCEDURE — 73560 X-RAY EXAM OF KNEE 1 OR 2: CPT | Mod: 26,59,RT, | Performed by: RADIOLOGY

## 2018-02-07 PROCEDURE — 99999 PR PBB SHADOW E&M-EST. PATIENT-LVL II: CPT | Mod: PBBFAC,,, | Performed by: ORTHOPAEDIC SURGERY

## 2018-02-07 RX ORDER — HYALURONATE SODIUM 20 MG/2 ML
20 SYRINGE (ML) INTRAARTICULAR WEEKLY
Status: DISCONTINUED | OUTPATIENT
Start: 2018-02-07 | End: 2019-02-05 | Stop reason: HOSPADM

## 2018-02-07 NOTE — PROGRESS NOTES
"Subjective:      Patient ID: Nicho Espinosa is a 77 y.o. female.    Chief Complaint: Pain of the Left Knee    HPI  Nicho Espinosa has left knee pain.  The pain has worsened slightly. The pain is located in the medial aspect of the knee.  There  is not radiation. There is associated stiffness.   There is not catching and locking. The pain is described as achy. The pain is aggravated by activity.  It is alleviated by rest.  .  Her history, medications and problem list were reviewed.    Review of Systems   Constitution: Negative for chills, fever and night sweats.   HENT: Negative for hearing loss.    Eyes: Negative for blurred vision and double vision.   Cardiovascular: Negative for chest pain, claudication and leg swelling.   Respiratory: Negative for shortness of breath.    Endocrine: Negative for polydipsia, polyphagia and polyuria.   Hematologic/Lymphatic: Negative for adenopathy and bleeding problem. Does not bruise/bleed easily.   Skin: Negative for poor wound healing.   Musculoskeletal: Positive for joint pain.   Gastrointestinal: Negative for diarrhea and heartburn.   Genitourinary: Negative for bladder incontinence.   Neurological: Negative for focal weakness, headaches, numbness, paresthesias and sensory change.   Psychiatric/Behavioral: The patient is not nervous/anxious.    Allergic/Immunologic: Negative for persistent infections.         Objective:      Body mass index is 35.91 kg/m².  Vitals:    02/07/18 1611   Weight: 86.2 kg (190 lb 0.6 oz)   Height: 5' 1" (1.549 m)           General    Constitutional: She is oriented to person, place, and time. She appears well-developed and well-nourished.   HENT:   Head: Normocephalic and atraumatic.   Eyes: EOM are normal.   Cardiovascular: Normal rate.    Pulmonary/Chest: Effort normal.   Neurological: She is alert and oriented to person, place, and time.   Psychiatric: She has a normal mood and affect. Her behavior is normal.     General Musculoskeletal Exam "   Gait: normal       Right Knee Exam     Inspection   Erythema: absent  Scars: present  Swelling: absent  Effusion: effusion  Deformity: deformity  Bruising: absent    Tenderness   The patient is experiencing no tenderness.         Range of Motion   Extension: 0   Flexion: 120     Tests   Ligament Examination Lachman: normal (-1 to 2mm)   MCL - Valgus: normal (0 to 2mm)  LCL - Varus: normal  Patella   Passive Patellar Tilt: neutral    Other   Sensation: normal    Left Knee Exam     Inspection   Erythema: absent  Scars: absent  Swelling: absent  Effusion: absent  Deformity: present (mild varus)  Bruising: absent    Tenderness   The patient tender to palpation of the medial joint line.    Range of Motion   Extension: 0   Flexion: 120     Tests   Stability Lachman: normal (-1 to 2mm)   MCL - Valgus: normal (0 to 2mm)  LCL - Varus: normal (0 to 2mm)  Patella   Passive Patellar Tilt: neutral    Other   Sensation: normal    Muscle Strength   Right Lower Extremity   Hip Abduction: 5/5   Quadriceps:  5/5   Hamstrin/5   Left Lower Extremity   Hip Abduction: 5/5   Quadriceps:  5/5   Hamstrin/5     Reflexes     Left Side  Quadriceps:  2+    Right Side   Quadriceps:  2+    Vascular Exam     Right Pulses  Dorsalis Pedis:      2+          Left Pulses  Dorsalis Pedis:      2+          Edema  Right Lower Leg: absent  Left Lower Leg: absent    Radiographs taken today and reviewed by me demonstrate moderate arthritic change of the left KNEE(s).There  is not bone destruction.  There is not a fracture. The medial compartment is most involved.  There is a varus deformity.  The changes are tricompartmental.    Radiographs taken today were reviewed by me.  There is a prosthetic replacement of the right knee(s).  The prosthesis is well positioned.  There is not evidence of bone loss, osteolysis, or loosening. There is not a fracture.                Assessment:       Encounter Diagnosis   Name Primary?    Primary osteoarthritis  of left knee Yes          Plan:       Nicho was seen today for pain.    Diagnoses and all orders for this visit:    Primary osteoarthritis of left knee  -     Prior Authorization Order    Other orders  -     EUFLEXXA 10 mg/mL(mw 2.4 -3.6 million) injection Syrg 20 mg; Inject 2 mLs (20 mg total) into the articular space once a week.      Treatment options have been discussed.  We have decided to proceed with left knee Euflexxa injections.  The risk of pseudoseptic reactions were discussed, as was the minimal risk of infection.  Nicho OLIVE Salasyojana will return for her first injection..

## 2018-02-16 NOTE — PROGRESS NOTES
Subjective:       Patient ID: Nicho Espinosa is a 77 y.o. female.    Chief Complaint: No chief complaint on file.    HPI   Mrs. Espinosa returns today for follow up.  She has been on anastrazole since August 2017, and has tolerated it well so far.  Of note, her Oncotype score was 7, suggesting that she has a 6 % chance of recurrence at ten years with tamoxifen alone.  Briefly, she is a 77-year-old  female who, on 08/09/2017, underwent a right modified radical mastectomy and sentinel lymph node biopsy.  The pathology report from the procedure indicates that she had a 2 cm lobular carcinoma that was ER strongly positive, TN positive and HER-2 negative; resection margins were clear.  On the right axillary sentinel lymph nodes isolated tumor cells were seen.      Review of Systems    Overall she feels well. She has tolerated the anastrazole well so far and has not experienced any hot flashe.  However, she does mention her chronic back pain, which predated the onset of her cancer.  She has a history of prior fusion surgery.    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  Of note, she is on fosamax and she also underwent a left knee joint injection earlier today.   She  denies any anxiety, depression, easy bruising, fevers, chills, night  sweats, weight loss, nausea, vomiting, diarrhea, constipation, diplopia,   blurred vision, headache, chest pain, palpitations, shortness of breath, breast pain, abdominal pain, extremity pain, or difficulty ambulating.  The remainder of the ten-point ROS, including general, skin, lymph, H/N, cardiorespiratory, GI, , Neuro, Endocrine, and psychiatric is negative.       Objective:      Physical Exam    She is alert, oriented to time, place, person, pleasant, well      nourished, in no acute physical distress.                                    VITAL SIGNS:  Reviewed                                       HEENT:  Normal.  There are no nasal, oral, lip, gingival, auricular, lid,    or conjunctival lesions.  Mucosae are moist and pink, and there is no        thrush.  Pupils are equal, reactive to light and accommodation.              Extraocular muscle movements are intact.  Dentition is good.                                   NECK:  Supple without JVD, adenopathy, or thyromegaly.                       LUNGS:  Clear to auscultation without wheezing, rales, or rhonchi.           CARDIOVASCULAR:  Reveals an S1, S2, a grade I systolic ejection murmur at the LSB, no rubs, no gallops.         ABDOMEN:  Soft, nontender, without organomegaly.  Bowel sounds are    present.                                                                     EXTREMITIES:  No cyanosis, clubbing, or edema.            BREASTS:  She is status post right mastectomy with a well-healed incision.    There are no masses in the left breast.                                        LYMPHATIC:  There is no cervical, axillary, or supraclavicular adenopathy.   SKIN:  Warm and moist, without petechiae, rashes, induration, or ecchymoses.           NEUROLOGIC:  DTRs are 0-1+ bilaterally, symmetrical, motor function is 5/5,  and cranial nerves are  within normal limits.      Assessment:       1. Malignant neoplasm of upper-outer quadrant of right breast in female, estrogen receptor positive    2. Prophylactic use of anastrozole (Arimidex)      3.   Extensive DJD, s/p fusion of lumbar spine.  Plan:         I have asked her to remain on anastrazole, which she will take through the end of August 2022.  I will see her again in 3 months.  Her DXA scan will be repeated a year from now, in February 2019, and her mammogram will be repeated in July 2018.  Finally, in regards to her joint / back pains, I offered a bone scan, but she stated she would prefer to wait since she recently had a CT scan that showed extensive DJD (see above).  I  think that waiting would be a reasonable approach.    Her questions were answered to her satisfaction.

## 2018-02-19 ENCOUNTER — OFFICE VISIT (OUTPATIENT)
Dept: ORTHOPEDICS | Facility: CLINIC | Age: 78
End: 2018-02-19
Payer: MEDICARE

## 2018-02-19 ENCOUNTER — OFFICE VISIT (OUTPATIENT)
Dept: INTERNAL MEDICINE | Facility: CLINIC | Age: 78
End: 2018-02-19
Payer: MEDICARE

## 2018-02-19 ENCOUNTER — OFFICE VISIT (OUTPATIENT)
Dept: HEMATOLOGY/ONCOLOGY | Facility: CLINIC | Age: 78
End: 2018-02-19
Payer: MEDICARE

## 2018-02-19 ENCOUNTER — LAB VISIT (OUTPATIENT)
Dept: LAB | Facility: HOSPITAL | Age: 78
End: 2018-02-19
Attending: INTERNAL MEDICINE
Payer: MEDICARE

## 2018-02-19 VITALS
WEIGHT: 187.63 LBS | HEIGHT: 61 IN | HEART RATE: 81 BPM | DIASTOLIC BLOOD PRESSURE: 84 MMHG | SYSTOLIC BLOOD PRESSURE: 118 MMHG | BODY MASS INDEX: 35.43 KG/M2 | OXYGEN SATURATION: 97 %

## 2018-02-19 VITALS
RESPIRATION RATE: 18 BRPM | WEIGHT: 187 LBS | HEART RATE: 88 BPM | DIASTOLIC BLOOD PRESSURE: 72 MMHG | SYSTOLIC BLOOD PRESSURE: 114 MMHG | BODY MASS INDEX: 35.3 KG/M2 | HEIGHT: 61 IN | OXYGEN SATURATION: 99 % | TEMPERATURE: 98 F

## 2018-02-19 VITALS — HEIGHT: 61 IN | WEIGHT: 187.38 LBS | BODY MASS INDEX: 35.38 KG/M2

## 2018-02-19 DIAGNOSIS — Z13.220 LIPID SCREENING: ICD-10-CM

## 2018-02-19 DIAGNOSIS — Z98.1 S/P LUMBAR SPINAL FUSION: ICD-10-CM

## 2018-02-19 DIAGNOSIS — M17.12 PRIMARY OSTEOARTHRITIS OF LEFT KNEE: Primary | ICD-10-CM

## 2018-02-19 DIAGNOSIS — R79.9 ABNORMAL FINDING OF BLOOD CHEMISTRY: ICD-10-CM

## 2018-02-19 DIAGNOSIS — R11.0 NAUSEA: ICD-10-CM

## 2018-02-19 DIAGNOSIS — D63.8 ANEMIA OF CHRONIC DISEASE: ICD-10-CM

## 2018-02-19 DIAGNOSIS — G44.209 TENSION HEADACHE: ICD-10-CM

## 2018-02-19 DIAGNOSIS — M81.0 AGE-RELATED OSTEOPOROSIS WITHOUT CURRENT PATHOLOGICAL FRACTURE: ICD-10-CM

## 2018-02-19 DIAGNOSIS — R73.03 PRE-DIABETES: ICD-10-CM

## 2018-02-19 DIAGNOSIS — Z17.0 MALIGNANT NEOPLASM OF UPPER-OUTER QUADRANT OF RIGHT BREAST IN FEMALE, ESTROGEN RECEPTOR POSITIVE: Primary | ICD-10-CM

## 2018-02-19 DIAGNOSIS — C50.411 MALIGNANT NEOPLASM OF UPPER-OUTER QUADRANT OF RIGHT BREAST IN FEMALE, ESTROGEN RECEPTOR POSITIVE: ICD-10-CM

## 2018-02-19 DIAGNOSIS — M51.35 DDD (DEGENERATIVE DISC DISEASE), THORACOLUMBAR: ICD-10-CM

## 2018-02-19 DIAGNOSIS — C50.411 MALIGNANT NEOPLASM OF UPPER-OUTER QUADRANT OF RIGHT BREAST IN FEMALE, ESTROGEN RECEPTOR POSITIVE: Primary | ICD-10-CM

## 2018-02-19 DIAGNOSIS — M17.0 PRIMARY OSTEOARTHRITIS OF BOTH KNEES: ICD-10-CM

## 2018-02-19 DIAGNOSIS — Z17.0 MALIGNANT NEOPLASM OF UPPER-OUTER QUADRANT OF RIGHT BREAST IN FEMALE, ESTROGEN RECEPTOR POSITIVE: ICD-10-CM

## 2018-02-19 DIAGNOSIS — K21.9 GASTROESOPHAGEAL REFLUX DISEASE, ESOPHAGITIS PRESENCE NOT SPECIFIED: ICD-10-CM

## 2018-02-19 DIAGNOSIS — G25.0 ESSENTIAL TREMOR: ICD-10-CM

## 2018-02-19 DIAGNOSIS — Z79.811 PROPHYLACTIC USE OF ANASTROZOLE (ARIMIDEX): ICD-10-CM

## 2018-02-19 DIAGNOSIS — Z00.00 VISIT FOR ANNUAL HEALTH EXAMINATION: Primary | ICD-10-CM

## 2018-02-19 LAB
25(OH)D3+25(OH)D2 SERPL-MCNC: 61 NG/ML
ALBUMIN SERPL BCP-MCNC: 4.2 G/DL
ALP SERPL-CCNC: 52 U/L
ALT SERPL W/O P-5'-P-CCNC: 18 U/L
ANION GAP SERPL CALC-SCNC: 10 MMOL/L
AST SERPL-CCNC: 19 U/L
BASOPHILS # BLD AUTO: 0.06 K/UL
BASOPHILS NFR BLD: 0.9 %
BILIRUB SERPL-MCNC: 0.8 MG/DL
BUN SERPL-MCNC: 19 MG/DL
CALCIUM SERPL-MCNC: 9.8 MG/DL
CHLORIDE SERPL-SCNC: 101 MMOL/L
CHOLEST SERPL-MCNC: 218 MG/DL
CHOLEST/HDLC SERPL: 5.5 {RATIO}
CO2 SERPL-SCNC: 28 MMOL/L
CREAT SERPL-MCNC: 0.9 MG/DL
DIFFERENTIAL METHOD: ABNORMAL
EOSINOPHIL # BLD AUTO: 0.1 K/UL
EOSINOPHIL NFR BLD: 1.9 %
ERYTHROCYTE [DISTWIDTH] IN BLOOD BY AUTOMATED COUNT: 12.8 %
EST. GFR  (AFRICAN AMERICAN): >60 ML/MIN/1.73 M^2
EST. GFR  (NON AFRICAN AMERICAN): >60 ML/MIN/1.73 M^2
ESTIMATED AVG GLUCOSE: 111 MG/DL
FERRITIN SERPL-MCNC: 70 NG/ML
GLUCOSE SERPL-MCNC: 105 MG/DL
HBA1C MFR BLD HPLC: 5.5 %
HCT VFR BLD AUTO: 36.3 %
HDLC SERPL-MCNC: 40 MG/DL
HDLC SERPL: 18.3 %
HGB BLD-MCNC: 11.7 G/DL
IRON SERPL-MCNC: 101 UG/DL
LDLC SERPL CALC-MCNC: 122.6 MG/DL
LYMPHOCYTES # BLD AUTO: 1.9 K/UL
LYMPHOCYTES NFR BLD: 27 %
MCH RBC QN AUTO: 32 PG
MCHC RBC AUTO-ENTMCNC: 32.2 G/DL
MCV RBC AUTO: 99 FL
MONOCYTES # BLD AUTO: 0.7 K/UL
MONOCYTES NFR BLD: 9.7 %
NEUTROPHILS # BLD AUTO: 4.2 K/UL
NEUTROPHILS NFR BLD: 60.2 %
NONHDLC SERPL-MCNC: 178 MG/DL
NRBC BLD-RTO: 0 /100 WBC
PLATELET # BLD AUTO: 304 K/UL
PMV BLD AUTO: 9.4 FL
POTASSIUM SERPL-SCNC: 4.4 MMOL/L
PROT SERPL-MCNC: 7.5 G/DL
RBC # BLD AUTO: 3.66 M/UL
SATURATED IRON: 27 %
SODIUM SERPL-SCNC: 139 MMOL/L
TOTAL IRON BINDING CAPACITY: 377 UG/DL
TRANSFERRIN SERPL-MCNC: 255 MG/DL
TRIGL SERPL-MCNC: 277 MG/DL
WBC # BLD AUTO: 7.01 K/UL

## 2018-02-19 PROCEDURE — 3008F BODY MASS INDEX DOCD: CPT | Mod: S$GLB,,, | Performed by: INTERNAL MEDICINE

## 2018-02-19 PROCEDURE — 99999 PR PBB SHADOW E&M-EST. PATIENT-LVL III: CPT | Mod: PBBFAC,,, | Performed by: PHYSICIAN ASSISTANT

## 2018-02-19 PROCEDURE — 85025 COMPLETE CBC W/AUTO DIFF WBC: CPT

## 2018-02-19 PROCEDURE — 1125F AMNT PAIN NOTED PAIN PRSNT: CPT | Mod: S$GLB,,, | Performed by: INTERNAL MEDICINE

## 2018-02-19 PROCEDURE — 82728 ASSAY OF FERRITIN: CPT

## 2018-02-19 PROCEDURE — 99397 PER PM REEVAL EST PAT 65+ YR: CPT | Mod: S$GLB,,, | Performed by: INTERNAL MEDICINE

## 2018-02-19 PROCEDURE — 80061 LIPID PANEL: CPT

## 2018-02-19 PROCEDURE — 99499 UNLISTED E&M SERVICE: CPT | Mod: S$GLB,,, | Performed by: PHYSICIAN ASSISTANT

## 2018-02-19 PROCEDURE — 99213 OFFICE O/P EST LOW 20 MIN: CPT | Mod: S$GLB,,, | Performed by: INTERNAL MEDICINE

## 2018-02-19 PROCEDURE — 36415 COLL VENOUS BLD VENIPUNCTURE: CPT

## 2018-02-19 PROCEDURE — 1159F MED LIST DOCD IN RCRD: CPT | Mod: S$GLB,,, | Performed by: INTERNAL MEDICINE

## 2018-02-19 PROCEDURE — 82306 VITAMIN D 25 HYDROXY: CPT

## 2018-02-19 PROCEDURE — 99999 PR PBB SHADOW E&M-EST. PATIENT-LVL III: CPT | Mod: PBBFAC,,, | Performed by: INTERNAL MEDICINE

## 2018-02-19 PROCEDURE — 20610 DRAIN/INJ JOINT/BURSA W/O US: CPT | Mod: LT,S$GLB,, | Performed by: PHYSICIAN ASSISTANT

## 2018-02-19 PROCEDURE — 83540 ASSAY OF IRON: CPT

## 2018-02-19 PROCEDURE — 83036 HEMOGLOBIN GLYCOSYLATED A1C: CPT

## 2018-02-19 PROCEDURE — 80053 COMPREHEN METABOLIC PANEL: CPT

## 2018-02-19 RX ORDER — TRAZODONE HYDROCHLORIDE 50 MG/1
50 TABLET ORAL NIGHTLY PRN
Qty: 90 TABLET | Refills: 1 | Status: SHIPPED | OUTPATIENT
Start: 2018-02-19 | End: 2018-07-11 | Stop reason: SDUPTHER

## 2018-02-19 RX ORDER — NAPROXEN 500 MG/1
500 TABLET ORAL 2 TIMES DAILY WITH MEALS
Qty: 14 TABLET | Refills: 0 | Status: SHIPPED | OUTPATIENT
Start: 2018-02-19 | End: 2018-02-26

## 2018-02-19 RX ORDER — ONDANSETRON 4 MG/1
4 TABLET, ORALLY DISINTEGRATING ORAL
COMMUNITY
End: 2018-02-19 | Stop reason: SDUPTHER

## 2018-02-19 RX ORDER — DESONIDE 0.5 MG/ML
LOTION TOPICAL 2 TIMES DAILY
COMMUNITY
End: 2020-02-03

## 2018-02-19 RX ORDER — ANASTROZOLE 1 MG/1
1 TABLET ORAL DAILY
Qty: 90 TABLET | Refills: 2 | Status: SHIPPED | OUTPATIENT
Start: 2018-02-19 | End: 2018-02-19 | Stop reason: SDUPTHER

## 2018-02-19 RX ORDER — ONDANSETRON 4 MG/1
4 TABLET, ORALLY DISINTEGRATING ORAL EVERY 8 HOURS PRN
Qty: 270 TABLET | Refills: 0 | Status: SHIPPED | OUTPATIENT
Start: 2018-02-19 | End: 2018-08-30 | Stop reason: SDUPTHER

## 2018-02-19 RX ORDER — PROPRANOLOL HYDROCHLORIDE 10 MG/1
10 TABLET ORAL 3 TIMES DAILY
Qty: 270 TABLET | Refills: 1 | Status: SHIPPED | OUTPATIENT
Start: 2018-02-19 | End: 2018-07-11 | Stop reason: SDUPTHER

## 2018-02-19 RX ORDER — ANASTROZOLE 1 MG/1
1 TABLET ORAL DAILY
Qty: 90 TABLET | Refills: 3 | Status: SHIPPED | OUTPATIENT
Start: 2018-02-19 | End: 2019-02-19

## 2018-02-19 RX ADMIN — Medication 20 MG: at 02:02

## 2018-02-19 NOTE — PROGRESS NOTES
Nicho Espinosa presents to clinic today for the first left knee Euflexxa injection.    Exam demonstrates the no effusion in the  left knee, and the skin is intact.    Diagnosis: osteoarthritis knee    After time out was performed and patient ID, side, and site were verified, the  left  knee was sterilly prepped in the standard fashion.  A 22-gauge needle was introduced into left knee joint from an james-lateral site without complication and knee was then injected with 2 ml of Euflexxa.  Sterile dressing was applied.  The patient was instructed to resume activities as tolerated and to call with any problems.     We will see Nicho Espinosa back next week for the second injection.

## 2018-02-19 NOTE — PROGRESS NOTES
INTERNAL MEDICINE INITIAL VISIT NOTE      CHIEF COMPLAINT     Chief Complaint   Patient presents with    [facility]       HPI     Nicho SCHAEFER Jesús is a 77 y.o.  female who presents with a PMHx of R sided breast ca s/p R modified radical mastectomy 8/2017and on arimidex, DDD c hx compression fx of spine and spndylolisthesis s/p lumbar fusion 1/2015, preDM, OA B knees, insomnia, essential tremor, osteoporosis c Vit D def, hx tob use but quit in the past, hx DVT 11/2013 remigio-operative prev on coumadin but only for a few weeks, GERD, here to est care.  Formerly a pt of Dr. Lara.    C/o persistent low back pain despite lumbar fusion.  Thinks PT made it worse.  Wears a brace.  Says injections in the past have helped but effects are short-lived.  Takes hydrocodone 1/2 tablet in AM and 1/2 in the late afternoon and 1/2 tablet at bedtime.  Pain managed by Dr. Maria Esther sawyer mgmt.  Currently has worsening of sciatic sx on L so took a whole tablet last night.    Sat woke up c dull pain on R side of head, 2/10 pain.  Now currently 1.5/10.  No radiation.  Usually does not have h/a.  Increased stress due to her back pain.  Denies vision changes.      Also thinks her tremor has gotten worse. On propranolol bid which she says was prev rx'ed tid.      Past Medical History:  Past Medical History:   Diagnosis Date    Allergy     Arthritis     Blepharitis of both eyes     Complication of anesthesia     nausea    Degenerative disc disease     GERD (gastroesophageal reflux disease)     patient denies reflux    History of compression fracture of spine 4/10/2014    Hyperlipidemia     Lumbar disc disease     Malignant neoplasm of left female breast 7/18/2017    Meibomitis     Obesity 9/19/2013    Osteoporosis     Papilloma of eyelid     RUL    Postoperative anemia 11/21/2013    Thoracic or lumbosacral neuritis or radiculitis, unspecified 9/20/2013    Tremors of nervous system 2/2015       Past Surgical  History:  Past Surgical History:   Procedure Laterality Date    APPENDECTOMY      COLONOSCOPY N/A 11/1/2016    Procedure: COLONOSCOPY;  Surgeon: Candelario Oviedo MD;  Location: [address]);  Service: Endoscopy;  Laterality: N/A;  may use Prepopik or other low volume prep    FOOT SURGERY      SHOULDER ARTHROSCOPY      left    SPINE SURG[facility]  1-12-15    [facility]    TONSILLECTOMY      TOTAL KNEE ARTHROPLASTY         Home Medications:  Prior to Admission medications    Medication Sig Start Date End Date Taking? Authorizing Provider   alendronate (FOSAMAX) 70 MG tablet TAKE 1 TABLET BY MOUTH EVERY 7 DAYS 6/20/17  Yes Arleen Lara MD   calcium carbonate (OS-DIANDRA) 600 mg calcium (1,500 mg) Tab Take 600 mg by mouth 2 (two) times daily with meals.   Yes Historical Provider, MD   cholecalciferol, vitamin D3, 1,000 unit capsule Take 3 capsules daily 2/1/17  Yes Arleen Lara MD   desonide (DESOWEN) 0.05 % lotion Apply topically 2 (two) times daily.   Yes Historical Provider, MD   hydrocodone-acetaminophen 5-325mg (NORCO) 5-325 mg per tablet 0.5 tablets daily as needed. PATIENT TAKES 1/2 TABLET 2-3 TIMES A DAY 11/16/15  Yes Historical Provider, MD   MULTIVIT WITH CALCIUM,IRON,MIN (WOMEN'S DAILY MULTIVITAMIN ORAL) Take by mouth.   Yes Historical Provider, MD   ondansetron (ZOFRAN-ODT) 4 MG TbDL Take 4 mg by mouth as needed.   Yes Historical Provider, MD   propranolol (INDERAL) 10 MG tablet TAKE 1 TABLET(10 MG) BY MOUTH TWICE DAILY 10/27/17  Yes Yani Jolley NP   traZODone (DESYREL) 50 MG tablet Take 1 tablet (50 mg total) by mouth nightly. 10/27/17  Yes Yani Jolley NP   promethazine (PHENERGAN) 25 MG tablet TAKE 1 TABLET(25 MG) BY MOUTH EVERY 6 HOURS AS NEEDED FOR NAUSEA 7/6/17 2/19/18 Yes Arleen Lara MD   anastrozole (ARIMIDEX) 1 mg Tab Take 1 tablet (1 mg total) by mouth once daily. 9/11/17 9/11/18  Santiago Smith MD   docusate sodium (COLACE) 50 MG capsule Take by mouth  every evening.    Historical Provider, MD   lidocaine-prilocaine (EMLA) cream  8/7/17   Historical Provider, MD   warfarin (COUMADIN) 4 MG tablet Take 1 tablet (4 mg total) by mouth Daily. At 5p as directed by coumadin clinic. 11/26/13 12/31/13  Mel Camargo NP       Allergies:  Review of patient's allergies indicates:   Allergen Reactions    Adhesive Hives and Itching    Sulfa (sulfonamide antibiotics) Other (See Comments)     Yeast infection and irritation       Family History:  Family History   Problem Relation Age of Onset    Cancer Father      bone    Breast cancer Mother      never had biopsy    Stroke Daughter     Diabetes Daughter      was obese       Social History:  Social History   Substance Use Topics    Smoking status: Former Smoker     Packs/day: 0.25     Years: 40.00     Types: Cigarettes     Quit date: 1/1/2011    Smokeless tobacco: Never Used    Alcohol use No      Comment: 1 glass wine 2x/year       Review of Systems:  Review of Systems   Constitutional: Negative for appetite change, chills, fatigue, fever and unexpected weight change.   HENT: Negative for congestion, hearing loss and rhinorrhea.    Eyes: Negative for pain and visual disturbance.   Respiratory: Positive for shortness of breath (c exertion at baseline, mild and unchanged per pt). Negative for cough, chest tightness and wheezing.    Cardiovascular: Negative for chest pain, palpitations and leg swelling.   Gastrointestinal: Negative for abdominal distention and abdominal pain.   Endocrine: Negative for polydipsia and polyuria.   Genitourinary: Negative for decreased urine volume, difficulty urinating, dysuria, hematuria and vaginal discharge.   Musculoskeletal: Positive for arthralgias, back pain and neck pain. Negative for joint swelling, myalgias and neck stiffness.   Neurological: Negative for weakness, numbness and headaches.   Psychiatric/Behavioral: Positive for sleep disturbance. Negative for behavioral  "problems, confusion and dysphoric mood.       Health Maintenance:   Immunizations:   Influenza is up to date 10/2017  TDap is up to date 9/2013  Pneumovax is up to date. 9/2013, 12/2015  Zostavax is UTD.     2011    Cancer Screening:  PAP: is up to date. 6/2017 wnl  Mammogram: is up to date. 6/2017 c R breast mass as per HPI  Colonoscopy: is up to date. 11/2016, diverticula; otherwise normal, rec repeat csc in 5 years  DEXA:  is up to date. 2/2017      PHYSICAL EXAM     /84 (BP Location: Right arm, Patient Position: Sitting, BP Method: Large (Manual))   Pulse 81   Ht 5' 1" (1.549 m)   Wt 85.1 kg (187 lb 9.8 oz)   LMP 07/05/1987   SpO2 97%   BMI 35.45 kg/m²     GEN - A+OX4, NAD, ambulates c cane  HEENT - PERRL, EOMI, OP clear  Neck - No thyromegaly or cervical LAD. No thyroid masses felt.  CV - RRR, no m/r/g  Chest - CTAB, no wheezing, crackles, or rhonchi  Abd - S/NT/ND/+BS.   Ext - 2+BDP and radial pulses. No C/C/E..  LN - No LAD appreciated.  Skin - Normal color and texture, no rash, no skin lesions.      LABS     Lab Results   Component Value Date    WBC 7.40 07/27/2017    HGB 12.0 07/27/2017    HCT 35.7 (L) 07/27/2017    MCV 94 07/27/2017     07/27/2017     Lab Results   Component Value Date    IRON 65 12/19/2013    TIBC 314 12/19/2013    FERRITIN 53 01/30/2017     CMP  Sodium   Date Value Ref Range Status   07/27/2017 135 (L) 136 - 145 mmol/L Final     Potassium   Date Value Ref Range Status   07/27/2017 4.2 3.5 - 5.1 mmol/L Final     Chloride   Date Value Ref Range Status   07/27/2017 99 95 - 110 mmol/L Final     CO2   Date Value Ref Range Status   07/27/2017 24 23 - 29 mmol/L Final     Glucose   Date Value Ref Range Status   07/27/2017 120 (H) 70 - 110 mg/dL Final     BUN, Bld   Date Value Ref Range Status   07/27/2017 19 8 - 23 mg/dL Final     Creatinine   Date Value Ref Range Status   07/27/2017 1.0 0.5 - 1.4 mg/dL Final     Calcium   Date Value Ref Range Status   07/27/2017 9.7 8.7 - " 10.5 mg/dL Final     Total Protein   Date Value Ref Range Status   01/30/2017 6.8 6.0 - 8.4 g/dL Final     Albumin   Date Value Ref Range Status   01/30/2017 3.5 3.5 - 5.2 g/dL Final     Total Bilirubin   Date Value Ref Range Status   01/30/2017 0.9 0.1 - 1.0 mg/dL Final     Comment:     For infants and newborns, interpretation of results should be based  on gestational age, weight and in agreement with clinical  observations.  Premature Infant recommended reference ranges:  Up to 24 hours.............<8.0 mg/dL  Up to 48 hours............<12.0 mg/dL  3-5 days..................<15.0 mg/dL  6-29 days.................<15.0 mg/dL       Alkaline Phosphatase   Date Value Ref Range Status   01/30/2017 48 (L) 55 - 135 U/L Final     AST   Date Value Ref Range Status   01/30/2017 17 10 - 40 U/L Final     ALT   Date Value Ref Range Status   01/30/2017 11 10 - 44 U/L Final     Anion Gap   Date Value Ref Range Status   07/27/2017 12 8 - 16 mmol/L Final     eGFR if    Date Value Ref Range Status   07/27/2017 >60.0 >60 mL/min/1.73 m^2 Final     eGFR if non    Date Value Ref Range Status   07/27/2017 54.9 (A) >60 mL/min/1.73 m^2 Final     Comment:     Calculation used to obtain the estimated glomerular filtration  rate (eGFR) is the CKD-EPI equation. Since race is unknown   in our information system, the eGFR values for   -American and Non--American patients are given   for each creatinine result.       Lab Results   Component Value Date    LDLCALC 117.0 01/30/2017     Lab Results   Component Value Date    TSH 1.402 01/30/2017     Lab Results   Component Value Date    HGBA1C 5.7 01/30/2017         ASSESSMENT/PLAN     Nicho Espinosa is a 77 y.o. female with  Nicho was seen today for establish care.    Diagnoses and all orders for this visit:    Visit for annual health examination  History and physical exam completed.  Health maintenance reviewed as above.    Malignant neoplasm of  upper-outer quadrant of right breast in female, estrogen receptor positive  As per HPI, has appt today c onc.  Cont arimidex through 8/2022 as per Dr. Flores.  Tolerating thus far.  dexa utd.    DDD (degenerative disc disease), thoracolumbar  S/P lumbar spinal fusion  On hydrocodone prn as per Pain mgmt  No improvement c PT  Injections have helped previously  Refills as per Pain mgmt  Ok for nsaids prn acute flare currently, take c food  -     naproxen (NAPROSYN) 500 MG tablet; Take 1 tablet (500 mg total) by mouth 2 (two) times daily with meals.    Primary osteoarthritis of both knees  Sees Dr. Washburn.  Awaiting injections.    Age-related osteoporosis without current pathological fracture  Cont alendronate and vit d  Will check levels  Wt bearing exercises as tolerated  -     Vitamin D; Future; Expected date: 02/19/2018    Pre-diabetes  Counseled on diet  Will check labs  -     Comprehensive metabolic panel; Future; Expected date: 02/19/2018  -     Hemoglobin A1c; Future; Expected date: 02/19/2018  -     Lipid panel; Future; Expected date: 02/19/2018    Gastroesophageal reflux disease, esophagitis presence not specified  Stable, only when noncompliant c diet.  Cont diet mod.  nsaid use c caution    Essential tremor  Slight worsening  Will increase BB from bid to tid.  -     propranolol (INDERAL) 10 MG tablet; Take 1 tablet (10 mg total) by mouth 3 (three) times daily. TAKE 1 TABLET(10 MG) BY MOUTH TWICE DAILY    Nausea  2/2 pain meds  -     ondansetron (ZOFRAN-ODT) 4 MG TbDL; Take 1 tablet (4 mg total) by mouth every 8 (eight) hours as needed.    Anemia of chronic disease  Mild  Last ferritin wnl  Csc utd  Asymptomatic  Will monitor  -     CBC auto differential; Future; Expected date: 02/19/2018  -     Iron and TIBC; Future; Expected date: 02/19/2018  -     Ferritin; Future; Expected date: 02/19/2018    Lipid screening  -     Lipid panel; Future; Expected date: 02/19/2018    Tension headache  Mild, may improve c  naproxen    HM as above    RTC in  4-6 months, sooner if needed and depending on labs.    Azalea Walker MD  Department of Internal Medicine - [facility]  02/19/2018

## 2018-02-19 NOTE — LETTER
February 19, 2018      Sami Washburn MD  [address]  [facility] [address]           [facility] - Orthopedics  [address]  [facility] [address]  Phone: [phone]          Patient: Nicho Espinosa   MR Number: [id number]   YOB: 1940   Date of Visit: 2/19/2018       Dear Dr. Sami Washburn:    Thank you for referring Nicho Espinosa to me for evaluation. Attached you will find relevant portions of my assessment and plan of care.    If you have questions, please do not hesitate to call me. I look forward to following Nicho Espinosa along with you.    Sincerely,    Cindy Thrasher PA-C    Enclosure  CC:  No Recipients    If you would like to receive this communication electronically, please contact [email or web address] or [phone] to request more information on [employer] access.    For providers and/or their staff who would like to refer a patient to [facility], please contact us through our one-stop-shop provider referral line, [facility] , at [phone].    If you feel you have received this communication in error or would no longer like to receive these types of communications, please e-mail [email or web address]

## 2018-02-20 ENCOUNTER — TELEPHONE (OUTPATIENT)
Dept: INTERNAL MEDICINE | Facility: CLINIC | Age: 78
End: 2018-02-20

## 2018-02-20 DIAGNOSIS — E78.1 HYPERTRIGLYCERIDEMIA: Primary | ICD-10-CM

## 2018-02-20 RX ORDER — FENOFIBRATE 54 MG/1
54 TABLET ORAL DAILY
Qty: 90 TABLET | Refills: 2 | Status: SHIPPED | OUTPATIENT
Start: 2018-02-20 | End: 2018-06-28

## 2018-02-20 NOTE — TELEPHONE ENCOUNTER
AoCD stable.  Vit d and preDM normalized  Elevated Trig, will start fenofibrate.  Pt notified. rx sent.

## 2018-02-26 ENCOUNTER — OFFICE VISIT (OUTPATIENT)
Dept: ORTHOPEDICS | Facility: CLINIC | Age: 78
End: 2018-02-26
Payer: MEDICARE

## 2018-02-26 VITALS — HEIGHT: 61 IN | BODY MASS INDEX: 34.96 KG/M2 | WEIGHT: 185.19 LBS

## 2018-02-26 DIAGNOSIS — M17.12 PRIMARY OSTEOARTHRITIS OF LEFT KNEE: Primary | ICD-10-CM

## 2018-02-26 PROCEDURE — 99999 PR PBB SHADOW E&M-EST. PATIENT-LVL III: CPT | Mod: PBBFAC,,, | Performed by: PHYSICIAN ASSISTANT

## 2018-02-26 PROCEDURE — 99499 UNLISTED E&M SERVICE: CPT | Mod: S$GLB,,, | Performed by: PHYSICIAN ASSISTANT

## 2018-02-26 PROCEDURE — 20610 DRAIN/INJ JOINT/BURSA W/O US: CPT | Mod: LT,S$GLB,, | Performed by: PHYSICIAN ASSISTANT

## 2018-02-26 RX ADMIN — Medication 20 MG: at 01:02

## 2018-02-26 NOTE — PROGRESS NOTES
Nicho Espinosa presents to clinic today for the second left knee Euflexxa injection.    Exam demonstrates the no effusion in the  left knee, and the skin is intact.    Diagnosis: osteoarthritis knee    After time out was performed and patient ID, side, and site were verified, the  left  knee was sterilly prepped in the standard fashion.  A 22-gauge needle was introduced into left knee joint from an james-lateral site without complication and knee was then injected with 2 ml of Euflexxa.  Sterile dressing was applied.  The patient was instructed to resume activities as tolerated and to call with any problems.     We will see Nicho Espinosa back next week for the third injection.

## 2018-02-27 ENCOUNTER — OFFICE VISIT (OUTPATIENT)
Dept: SURGERY | Facility: CLINIC | Age: 78
End: 2018-02-27
Payer: MEDICARE

## 2018-02-27 VITALS
WEIGHT: 189.06 LBS | SYSTOLIC BLOOD PRESSURE: 176 MMHG | BODY MASS INDEX: 35.7 KG/M2 | TEMPERATURE: 98 F | HEIGHT: 61 IN | DIASTOLIC BLOOD PRESSURE: 84 MMHG | HEART RATE: 68 BPM

## 2018-02-27 DIAGNOSIS — Z85.3 HISTORY OF RIGHT BREAST CANCER: Primary | ICD-10-CM

## 2018-02-27 PROCEDURE — 3008F BODY MASS INDEX DOCD: CPT | Mod: S$GLB,,, | Performed by: SURGERY

## 2018-02-27 PROCEDURE — 99999 PR PBB SHADOW E&M-EST. PATIENT-LVL III: CPT | Mod: PBBFAC,,, | Performed by: SURGERY

## 2018-02-27 PROCEDURE — 1159F MED LIST DOCD IN RCRD: CPT | Mod: S$GLB,,, | Performed by: SURGERY

## 2018-02-27 PROCEDURE — 1126F AMNT PAIN NOTED NONE PRSNT: CPT | Mod: S$GLB,,, | Performed by: SURGERY

## 2018-02-27 PROCEDURE — 99214 OFFICE O/P EST MOD 30 MIN: CPT | Mod: S$GLB,,, | Performed by: SURGERY

## 2018-02-27 NOTE — Clinical Note
F/u with me prn. Patient should F/U with WILEY Leslie in June 2018 for CBE with left diag MMG with kayden when due for annual left mammogram.  She had had a R mastectomy last year. Thanks, RLC

## 2018-02-27 NOTE — PROGRESS NOTES
Surgery Clinic Note    Subjective:     Nicho Espinosa   No diagnosis found.  Review of patient's allergies indicates:   Allergen Reactions    Adhesive Hives and Itching    Sulfa (sulfonamide antibiotics) Other (See Comments)     Yeast infection and irritation       Nicho Espinosa is a 77 y.o. female who presents to clinic for follow up s/p Right total complete mastectomy (8/9/2017).   Pathology 2 cm (T1c) grade II invasive lobular CA strongly ER/N[place]+  HER2/gwyn negative. 1 sentinel node was negative. Surgical margins negative (>6 mm)  Staged as T1cpN0(i+) stage IA breast cancer.    Her oncotype was 7 (low risk). She did not receive any adjuvant chemoXRT therapy. She is maintained on Anastrozole. She follows with Dr Smith    She is doing well post op. No subjective complaints. No lymphedema. She does report some RUE range of motion pain that is improving with exercise. She also has chronic back pain s/p spinal fusion that she attributes most of her pain too.     Objective:     PHYSICAL EXAM:  Vital Signs (Most Recent)  Temp: 98 °F (36.7 °C) (02/27/18 0957)  Pulse: 68 (02/27/18 0957)  BP: (!) 176/84 (02/27/18 0957)    Physical Exam:  NAD  CTAB  RRR  Right mastectomy incision is well healed, no new mass or discharge. No evidence of lymphedema. Good range of motion on my exam  Abdomen- soft, nondistended, nontender.     Pathology- reviewed  Specimens     None        Specimen (12h ago through future)    None             Assessment:     77 y.o. female s/p Right total mastectomy / SLNB pathology a 2 cm (T1c) N0(i+) grade II invasive lobular CA strongly ER/NJ+  HER2/gwyn negative. Low oncotype (7). No adjuvant chemoXRT. Maintained on Anastrozole    Plan:     - Mammogram in June for left breast  - Can f/u with Fara after her mammogram  - Continue following with Dr Luis Navarro MD   [facility]  I have personally taken the history and examined this patient and agree with the resident's note  "as stated above.  Pt doing well.  Feel occasionally "warm/hot" with aromatase inhibitors.  Otherwise doing well with no subjective complaints.  Pt had low Oncotype DX score of 7 so she had no adjuvant chemotherapy.  LUZ ELENA s/p R mastectomy and SLNB in 2017 for stage I breast cancer.  R chest wall exam without masses, skin changes, or LAD.  Left Breast WNL ( no masses, skin changes, or LAD).  F/U with me prn.  Patient will f/u with our NP, Fara Leslie, in June 2018 for CBE and with annual contralateral left side MMG same day.                    "

## 2018-02-28 PROBLEM — Z85.3 HISTORY OF RIGHT BREAST CANCER: Status: ACTIVE | Noted: 2018-02-28

## 2018-03-05 ENCOUNTER — OFFICE VISIT (OUTPATIENT)
Dept: ORTHOPEDICS | Facility: CLINIC | Age: 78
End: 2018-03-05
Payer: MEDICARE

## 2018-03-05 VITALS — WEIGHT: 187.38 LBS | HEIGHT: 61 IN | BODY MASS INDEX: 35.38 KG/M2

## 2018-03-05 DIAGNOSIS — M17.12 PRIMARY OSTEOARTHRITIS OF LEFT KNEE: Primary | ICD-10-CM

## 2018-03-05 PROCEDURE — 20610 DRAIN/INJ JOINT/BURSA W/O US: CPT | Mod: LT,S$GLB,, | Performed by: PHYSICIAN ASSISTANT

## 2018-03-05 PROCEDURE — 99999 PR PBB SHADOW E&M-EST. PATIENT-LVL III: CPT | Mod: PBBFAC,,, | Performed by: PHYSICIAN ASSISTANT

## 2018-03-05 PROCEDURE — 99499 UNLISTED E&M SERVICE: CPT | Mod: S$GLB,,, | Performed by: PHYSICIAN ASSISTANT

## 2018-03-05 RX ADMIN — Medication 20 MG: at 02:03

## 2018-03-05 NOTE — PROGRESS NOTES
Nicho Espinosa presents to clinic today for the third left knee Euflexxa injection. She has noticed improvement.    Exam demonstrates the no effusion in the  left knee, and the skin is intact.    Diagnosis: osteoarthritis knee    After time out was performed and patient ID, side, and site were verified, the  left  knee was sterilly prepped in the standard fashion.  A 22-gauge needle was introduced into left knee joint from an james-lateral site without complication and knee was then injected with 2 ml of Euflexxa.  Sterile dressing was applied.  The patient was instructed to resume activities as tolerated and to call with any problems.     F/u as needed.

## 2018-04-04 ENCOUNTER — TELEPHONE (OUTPATIENT)
Dept: INTERNAL MEDICINE | Facility: CLINIC | Age: 78
End: 2018-04-04

## 2018-05-21 ENCOUNTER — TELEPHONE (OUTPATIENT)
Dept: HEMATOLOGY/ONCOLOGY | Facility: CLINIC | Age: 78
End: 2018-05-21

## 2018-05-21 NOTE — TELEPHONE ENCOUNTER
----- Message from Ashleigh Pickett RN sent at 5/21/2018 11:43 AM CDT -----  Contact: pt      ----- Message -----  From: Doris Alvarez  Sent: 5/21/2018  11:16 AM  To: Luis Henderson Staff    Pt would like a call to reschedule her appointment.       Pt can be contacted [phone]

## 2018-05-25 ENCOUNTER — TELEPHONE (OUTPATIENT)
Dept: HEMATOLOGY/ONCOLOGY | Facility: CLINIC | Age: 78
End: 2018-05-25

## 2018-05-25 NOTE — TELEPHONE ENCOUNTER
----- Message from Ashleigh Pickett RN sent at 5/25/2018  2:55 PM CDT -----  Contact: Pt      ----- Message -----  From: Dorothy Beverly  Sent: 5/25/2018   2:48 PM  To: Luis Henderson Staff    Pt is calling to r/s appt and can be reached at [phone].    Thank you

## 2018-06-11 NOTE — PROGRESS NOTES
Subjective:       Patient ID: Nicho Espinosa is a 77 y.o. female.    Chief Complaint: No chief complaint on file.    HPI   Mrs. Espinosa returns today for follow up.  She has been on anastrazole since August 2017, and has tolerated it well so far.  Of note, her Oncotype score was 7, suggesting that she has a 6 % chance of recurrence at ten years with tamoxifen alone.  Briefly, she is a 77-year-old  female who, on 08/09/2017, underwent a right modified radical mastectomy and sentinel lymph node biopsy.  The pathology report from the procedure indicates that she had a 2 cm lobular carcinoma that was ER strongly positive, [place] positive and HER-2 negative; resection margins were clear.  On the right axillary sentinel lymph nodes isolated tumor cells were seen.      Review of Systems    Overall she feels well. She has tolerated the anastrazole well so far and has not experienced any hot flashes.  However, she does mention her chronic back pain, which predated the onset of her cancer.  She has a history of prior fusion surgery.    Her most recent CT of the lumbar spine shows severe degenerative disc disease in the lower thoracic and upper lumbar spine with scoliosis.  She  denies any anxiety, depression, easy bruising, fevers, chills, night  sweats, weight loss, nausea, vomiting, diarrhea, constipation, diplopia,   blurred vision, headache, chest pain, palpitations, shortness of breath, breast pain, abdominal pain, extremity pain, or difficulty ambulating.  The remainder of the ten-point ROS, including general, skin, lymph, H/N, cardiorespiratory, GI, , Neuro, Endocrine, and psychiatric is negative.       Objective:      Physical Exam    She is alert, oriented to time, place, person, pleasant, well      nourished, in no acute physical distress.                                    VITAL SIGNS:  Reviewed                                      HEENT:  Normal.  There are no nasal, oral, lip, gingival, auricular, lid,    or  conjunctival lesions.  Mucosae are moist and pink, and there is no        thrush.  Pupils are equal, reactive to light and accommodation.              Extraocular muscle movements are intact.  Dentition is good.                                   NECK:  Supple without JVD, adenopathy, or thyromegaly.                       LUNGS:  Clear to auscultation without wheezing, rales, or rhonchi.           CARDIOVASCULAR:  Reveals an S1, S2, a grade I systolic ejection murmur at the LSB, no rubs, no gallops.         ABDOMEN:  Soft, nontender, without organomegaly.  Bowel sounds are    present.                                                                     EXTREMITIES:  No cyanosis, clubbing, or edema.            BREASTS:  She is status post right mastectomy with a well-healed incision.    There are no masses in the left breast.                                        LYMPHATIC:  There is no cervical, axillary, or supraclavicular adenopathy.   SKIN:  Warm and moist, without petechiae, rashes, induration, or ecchymoses.           NEUROLOGIC:  DTRs are 0-1+ bilaterally, symmetrical, motor function is 5/5,  and cranial nerves are  within normal limits.      Assessment:       1. Malignant neoplasm of upper-outer quadrant of right breast in female, estrogen receptor positive    2. Prophylactic use of anastrozole (Arimidex)      3.   Extensive DJD, s/p fusion of lumbar spine.  Plan:         I have asked her to remain on anastrazole, which she will take through the end of August 2022.  I will see her again in 3 months.  I offered a bone scan to ascertain that her back pain is from DJD and DJD only, however, she stated that she felt comfortable and did not want to pursue one.  I have asked her to let me know if her symptoms worsen.  Her DXA scan will be repeated  in February 2019, and her mammogram will be repeated later today.  .    Her questions were answered to her satisfaction.

## 2018-06-12 ENCOUNTER — HOSPITAL ENCOUNTER (OUTPATIENT)
Dept: RADIOLOGY | Facility: HOSPITAL | Age: 78
Discharge: HOME | End: 2018-06-12
Attending: SURGERY
Payer: MEDICARE

## 2018-06-12 ENCOUNTER — OFFICE VISIT (OUTPATIENT)
Dept: SURGERY | Facility: CLINIC | Age: 78
End: 2018-06-12
Payer: MEDICARE

## 2018-06-12 ENCOUNTER — OFFICE VISIT (OUTPATIENT)
Dept: HEMATOLOGY/ONCOLOGY | Facility: CLINIC | Age: 78
End: 2018-06-12
Payer: MEDICARE

## 2018-06-12 VITALS
SYSTOLIC BLOOD PRESSURE: 116 MMHG | DIASTOLIC BLOOD PRESSURE: 63 MMHG | BODY MASS INDEX: 35.3 KG/M2 | WEIGHT: 187 LBS | TEMPERATURE: 99 F | HEIGHT: 61 IN | WEIGHT: 185 LBS | HEART RATE: 61 BPM | BODY MASS INDEX: 34.93 KG/M2 | HEIGHT: 61 IN

## 2018-06-12 DIAGNOSIS — Z17.0 MALIGNANT NEOPLASM OF UPPER-OUTER QUADRANT OF RIGHT BREAST IN FEMALE, ESTROGEN RECEPTOR POSITIVE: Primary | ICD-10-CM

## 2018-06-12 DIAGNOSIS — Z79.811 PROPHYLACTIC USE OF ANASTROZOLE (ARIMIDEX): ICD-10-CM

## 2018-06-12 DIAGNOSIS — Z85.3 HISTORY OF RIGHT BREAST CANCER: ICD-10-CM

## 2018-06-12 DIAGNOSIS — Z85.3 PERSONAL HISTORY OF BREAST CANCER: Primary | ICD-10-CM

## 2018-06-12 DIAGNOSIS — C50.411 MALIGNANT NEOPLASM OF UPPER-OUTER QUADRANT OF RIGHT BREAST IN FEMALE, ESTROGEN RECEPTOR POSITIVE: Primary | ICD-10-CM

## 2018-06-12 PROCEDURE — 77061 BREAST TOMOSYNTHESIS UNI: CPT | Mod: 26,LT,, | Performed by: RADIOLOGY

## 2018-06-12 PROCEDURE — 77065 DX MAMMO INCL CAD UNI: CPT | Mod: 26,LT,, | Performed by: RADIOLOGY

## 2018-06-12 PROCEDURE — 76642 ULTRASOUND BREAST LIMITED: CPT | Mod: 26,LT,, | Performed by: RADIOLOGY

## 2018-06-12 PROCEDURE — 99213 OFFICE O/P EST LOW 20 MIN: CPT | Mod: S$GLB,,, | Performed by: INTERNAL MEDICINE

## 2018-06-12 PROCEDURE — 77061 BREAST TOMOSYNTHESIS UNI: CPT | Mod: TC,PO,LT

## 2018-06-12 PROCEDURE — 77065 DX MAMMO INCL CAD UNI: CPT | Mod: TC,PO,LT

## 2018-06-12 PROCEDURE — 99213 OFFICE O/P EST LOW 20 MIN: CPT | Mod: S$GLB,,, | Performed by: NURSE PRACTITIONER

## 2018-06-12 PROCEDURE — 99999 PR PBB SHADOW E&M-EST. PATIENT-LVL IV: CPT | Mod: PBBFAC,,, | Performed by: NURSE PRACTITIONER

## 2018-06-12 PROCEDURE — 76642 ULTRASOUND BREAST LIMITED: CPT | Mod: TC,PO,LT

## 2018-06-12 PROCEDURE — 99999 PR PBB SHADOW E&M-EST. PATIENT-LVL I: CPT | Mod: PBBFAC,,, | Performed by: INTERNAL MEDICINE

## 2018-06-12 NOTE — PROGRESS NOTES
Subjective:      Patient ID: Nicho Espinosa is a 77 y.o. female.    Chief Complaint: Breast Cancer      HPI: (PF, EPF - 1-3) (Detailed, Comp, - 4) new patient presents to me today for breast cancer surveillance, previously seen by Dr Rolle. Denies left breast mass, pain, nipple discharge, skin changes. S/p right mastectomy, denies mass or skin changes anterior chest wall, new onset bone pain, unexplained weight loss    7- right core needle biopsy with invasive mammary carcinoma, ER/NM+, HER-2 negative  8-9-2017 right mastectomy with 2cm ILC, one SN with isolated tumor cells. Declined reconstructed. Adjuvant endocrine therapy    6- last bilat screening mmg    Review of Systems   Constitutional: Negative for appetite change and fatigue.   Respiratory: Negative for cough and shortness of breath.    Cardiovascular: Negative for chest pain.   Musculoskeletal: Negative for back pain.     Objective:   Physical Exam   Pulmonary/Chest: She exhibits no mass, no tenderness, no laceration, no edema and no retraction. Left breast exhibits no inverted nipple, no mass, no nipple discharge, no skin change and no tenderness.   Lymphadenopathy:     She has no cervical adenopathy.     She has no axillary adenopathy.        Right: No supraclavicular adenopathy present.        Left: No supraclavicular adenopathy present.     Assessment:       1. Personal history of breast cancer        Plan:       Left mmg today, left breast lateral region 3 o'clock axis middle depth, there is an oval low-density mass with circumscribed margins measuring 0.4 cm, new since prior exams.      Limited left breast ultrasound:   In the left breast 3 o'clock axis 2 cm from the nipple, there is an oval complicated cyst measuring 0.4 cm. This corresponds to the mammographic finding, BI-RADS Category:   Left: 2 - Benign  Overall: 2 - Benign    Clinically [facility]  Return in 6 months for CBE, prefers this in addition to f/u with medical oncology,  continue with AI as planned  Call for any interval palpable breast mass, pain, nipple discharge, skin changes or other breast related concerns  Discussed s/s of recurrence

## 2018-06-25 ENCOUNTER — OFFICE VISIT (OUTPATIENT)
Dept: INTERNAL MEDICINE | Facility: CLINIC | Age: 78
End: 2018-06-25
Payer: MEDICARE

## 2018-06-25 VITALS
BODY MASS INDEX: 34.58 KG/M2 | HEART RATE: 65 BPM | SYSTOLIC BLOOD PRESSURE: 122 MMHG | HEIGHT: 61 IN | DIASTOLIC BLOOD PRESSURE: 84 MMHG | OXYGEN SATURATION: 97 % | WEIGHT: 183.19 LBS

## 2018-06-25 DIAGNOSIS — M17.0 PRIMARY OSTEOARTHRITIS OF BOTH KNEES: ICD-10-CM

## 2018-06-25 DIAGNOSIS — D63.8 ANEMIA OF CHRONIC DISEASE: ICD-10-CM

## 2018-06-25 DIAGNOSIS — R73.03 PRE-DIABETES: ICD-10-CM

## 2018-06-25 DIAGNOSIS — Z98.1 S/P LUMBAR SPINAL FUSION: ICD-10-CM

## 2018-06-25 DIAGNOSIS — E78.1 HYPERTRIGLYCERIDEMIA: ICD-10-CM

## 2018-06-25 DIAGNOSIS — G25.0 ESSENTIAL TREMOR: ICD-10-CM

## 2018-06-25 DIAGNOSIS — Z96.659 STATUS POST TOTAL KNEE REPLACEMENT, UNSPECIFIED LATERALITY: ICD-10-CM

## 2018-06-25 DIAGNOSIS — Z85.3 HISTORY OF RIGHT BREAST CANCER: Primary | ICD-10-CM

## 2018-06-25 DIAGNOSIS — K21.9 GASTROESOPHAGEAL REFLUX DISEASE, ESOPHAGITIS PRESENCE NOT SPECIFIED: ICD-10-CM

## 2018-06-25 DIAGNOSIS — E55.9 MILD VITAMIN D DEFICIENCY: ICD-10-CM

## 2018-06-25 DIAGNOSIS — M43.17 SPONDYLOLISTHESIS AT L5-S1 LEVEL: ICD-10-CM

## 2018-06-25 DIAGNOSIS — M81.0 AGE-RELATED OSTEOPOROSIS WITHOUT CURRENT PATHOLOGICAL FRACTURE: ICD-10-CM

## 2018-06-25 DIAGNOSIS — Z79.811 PROPHYLACTIC USE OF ANASTROZOLE (ARIMIDEX): ICD-10-CM

## 2018-06-25 PROCEDURE — 99999 PR PBB SHADOW E&M-EST. PATIENT-LVL III: CPT | Mod: PBBFAC,,, | Performed by: INTERNAL MEDICINE

## 2018-06-25 PROCEDURE — 99214 OFFICE O/P EST MOD 30 MIN: CPT | Mod: S$GLB,,, | Performed by: INTERNAL MEDICINE

## 2018-06-25 RX ORDER — ALENDRONATE SODIUM 70 MG/1
TABLET ORAL
Qty: 12 TABLET | Refills: 3 | Status: SHIPPED | OUTPATIENT
Start: 2018-06-25 | End: 2018-08-30 | Stop reason: SDUPTHER

## 2018-06-25 NOTE — PROGRESS NOTES
INTERNAL MEDICINE ESTABLISHED PATIENT VISIT NOTE    Subjective:     Chief Complaint: Follow-up (4 month)  cholesterol, anemia     Patient ID: Nicho Espinosa is a 77 y.o. female with PMHx of R sided breast ca s/p R modified radical mastectomy 8/2017and on arimidex, DDD c hx compression fx of spine and spndylolisthesis s/p lumbar fusion 1/2015, preDM, OA B knees, insomnia, essential tremor, osteoporosis c Vit D def, hx tob use but quit in the past, hx DVT 11/2013 remigio-operative prev on coumadin but only for a few weeks, GERD, anemia of chronic disease, elevated triglycerides, last seen by me in Feb, here today for routine f/u.    At last appt, was started on Fenofibrate for elevated triglycerides.  Has been taking meds s issues.    S/p Euflexxa injection in her L knee.  Says it helped a little but feels relief was short-lived.  Also still c chronic back issues rfollowed by pain mgmt.  Has been followed by Dr. Leslie and Dr. Flores for hx breast ca and still on Anastrazole through 8/2022.    Past Medical History:  Past Medical History:   Diagnosis Date    Allergy     Arthritis     Blepharitis of both eyes     breast ca 7/18/2017    right    Complication of anesthesia     nausea    Degenerative disc disease     GERD (gastroesophageal reflux disease)     patient denies reflux    History of compression fracture of spine 4/10/2014    Hyperlipidemia     Lumbar disc disease     Meibomitis     Obesity 9/19/2013    Osteoporosis     Papilloma of eyelid     RUL    Postoperative anemia 11/21/2013    Thoracic or lumbosacral neuritis or radiculitis, unspecified 9/20/2013    Tremors of nervous system 2/2015       Home Medications:  Prior to Admission medications    Medication Sig Start Date End Date Taking? Authorizing Provider   alendronate (FOSAMAX) 70 MG tablet TAKE 1 TABLET BY MOUTH EVERY 7 DAYS 6/20/17  Yes Arleen Lara MD   anastrozole (ARIMIDEX) 1 mg Tab Take 1 tablet (1 mg total) by mouth once daily.  2/19/18 2/19/19 Yes Santiago Smith MD   calcium carbonate (OS-DIANDRA) 600 mg calcium (1,500 mg) Tab Take 600 mg by mouth 2 (two) times daily with meals.   Yes Historical Provider, MD   cholecalciferal (CHOLECALCIFERAL) 100,000 IU/mL injecttion  2/20/18  Yes Historical Provider, MD   cholecalciferol, vitamin D3, 1,000 unit capsule Take 3 capsules daily 2/1/17  Yes Arlene Lara MD   desonide (DESOWEN) 0.05 % lotion Apply topically 2 (two) times daily.   Yes Historical Provider, MD   fenofibrate (TRICOR) 54 MG tablet Take 1 tablet (54 mg total) by mouth once daily. 2/20/18 2/20/19 Yes Azalea Walker MD   hydrocodone-acetaminophen 5-325mg (NORCO) 5-325 mg per tablet 0.5 tablets daily as needed. PATIENT TAKES 1/2 TABLET 2-3 TIMES A DAY 11/16/15  Yes Historical Provider, MD   lidocaine-prilocaine (EMLA) cream  8/7/17  Yes Historical Provider, MD   MULTIVIT WITH CALCIUM,IRON,MIN (WOMEN'S DAILY MULTIVITAMIN ORAL) Take by mouth.   Yes Historical Provider, MD   multivitamin-Ca-iron-minerals 27-0.4 mg Tab  2/20/18  Yes Historical Provider, MD   propranolol (INDERAL) 10 MG tablet Take 1 tablet (10 mg total) by mouth 3 (three) times daily. TAKE 1 TABLET(10 MG) BY MOUTH TWICE DAILY 2/19/18  Yes Azalea Walker MD   traZODone (DESYREL) 50 MG tablet Take 1 tablet (50 mg total) by mouth nightly as needed for Insomnia. 2/19/18  Yes Azalea Walker MD   ondansetron (ZOFRAN-ODT) 4 MG TbDL Take 1 tablet (4 mg total) by mouth every 8 (eight) hours as needed. 2/19/18   Azalea Walker MD   warfarin (COUMADIN) 4 MG tablet Take 1 tablet (4 mg total) by mouth Daily. At 5p as directed by coumadin clinic. 11/26/13 12/31/13  Mel Camargo NP       Allergies:  Review of patient's allergies indicates:   Allergen Reactions    Adhesive Hives and Itching    Sulfa (sulfonamide antibiotics) Other (See Comments)     Yeast infection and irritation       Social History:  Social History   Substance Use Topics    Smoking status: Former Smoker      "Packs/day: 0.25     Years: 40.00     Types: Cigarettes     Quit date: 1/1/2011    Smokeless tobacco: Never Used    Alcohol use No      Comment: 1 glass wine 2x/year        Review of Systems   Constitutional: Negative for chills, fatigue and fever.   Respiratory: Negative for cough, chest tightness and shortness of breath.    Cardiovascular: Negative for chest pain.   Gastrointestinal: Negative for abdominal pain and blood in stool.   Genitourinary: Negative for dysuria and frequency.         Health Maintenance:   Immunizations:   Influenza is up to date 10/2017  TDap is up to date 9/2013  Pneumovax is up to date. 9/2013, 12/2015  Zostavax is UTD.     2011  Shingrix rec once back in stock     Cancer Screening:  PAP: is up to date. 6/2017 wnl  Mammogram: is up to date. 6/2018 on L and L u/s c cyst, benign  Colonoscopy: is up to date. 11/2016, diverticula; otherwise normal, rec repeat csc in 5 years  DEXA:  is up to date. 2/2017, repeat 2 yrs    Objective:   /84 (BP Location: Right arm, Patient Position: Sitting, BP Method: Large (Manual))   Pulse 65   Ht 5' 1" (1.549 m)   Wt 83.1 kg (183 lb 3.2 oz)   LMP 07/05/1987   SpO2 97%   BMI 34.62 kg/m²        General: AAO x3, no apparent distress  CV: RRR, no m/r/g  Pulm: Lungs CTAB, no crackles, no wheezes  Abd: s/NT/ND +BS  Extremities: no c/c/e    Labs:     Lab Results   Component Value Date    WBC 7.01 02/19/2018    HGB 11.7 (L) 02/19/2018    HCT 36.3 (L) 02/19/2018    MCV 99 (H) 02/19/2018     02/19/2018     Lab Results   Component Value Date    IRON 101 02/19/2018    TIBC 377 02/19/2018    FERRITIN 70 02/19/2018     CMP  Sodium   Date Value Ref Range Status   02/19/2018 139 136 - 145 mmol/L Final     Potassium   Date Value Ref Range Status   02/19/2018 4.4 3.5 - 5.1 mmol/L Final     Chloride   Date Value Ref Range Status   02/19/2018 101 95 - 110 mmol/L Final     CO2   Date Value Ref Range Status   02/19/2018 28 23 - 29 mmol/L Final     Glucose "   Date Value Ref Range Status   02/19/2018 105 70 - 110 mg/dL Final     BUN, Bld   Date Value Ref Range Status   02/19/2018 19 8 - 23 mg/dL Final     Creatinine   Date Value Ref Range Status   02/19/2018 0.9 0.5 - 1.4 mg/dL Final     Calcium   Date Value Ref Range Status   02/19/2018 9.8 8.7 - 10.5 mg/dL Final     Total Protein   Date Value Ref Range Status   02/19/2018 7.5 6.0 - 8.4 g/dL Final     Albumin   Date Value Ref Range Status   02/19/2018 4.2 3.5 - 5.2 g/dL Final     Total Bilirubin   Date Value Ref Range Status   02/19/2018 0.8 0.1 - 1.0 mg/dL Final     Comment:     For infants and newborns, interpretation of results should be based  on gestational age, weight and in agreement with clinical  observations.  Premature Infant recommended reference ranges:  Up to 24 hours.............<8.0 mg/dL  Up to 48 hours............<12.0 mg/dL  3-5 days..................<15.0 mg/dL  6-29 days.................<15.0 mg/dL       Alkaline Phosphatase   Date Value Ref Range Status   02/19/2018 52 (L) 55 - 135 U/L Final     AST   Date Value Ref Range Status   02/19/2018 19 10 - 40 U/L Final     ALT   Date Value Ref Range Status   02/19/2018 18 10 - 44 U/L Final     Anion Gap   Date Value Ref Range Status   02/19/2018 10 8 - 16 mmol/L Final     eGFR if    Date Value Ref Range Status   02/19/2018 >60 >60 mL/min/1.73 m^2 Final     eGFR if non    Date Value Ref Range Status   02/19/2018 >60 >60 mL/min/1.73 m^2 Final     Comment:     Calculation used to obtain the estimated glomerular filtration  rate (eGFR) is the CKD-EPI equation.        Lab Results   Component Value Date    LDLCALC 122.6 02/19/2018     Lab Results   Component Value Date    CHOL 218 (H) 02/19/2018    CHOL 193 01/30/2017    CHOL 200 (H) 10/22/2015     Lab Results   Component Value Date    HDL 40 02/19/2018    HDL 38 (L) 01/30/2017    HDL 37 (L) 10/22/2015     Lab Results   Component Value Date    LDLCALC 122.6 02/19/2018    LDLCALC  117.0 01/30/2017    LDLCALC 118.2 10/22/2015     Lab Results   Component Value Date    TRIG 277 (H) 02/19/2018    TRIG 190 (H) 01/30/2017    TRIG 224 (H) 10/22/2015     Lab Results   Component Value Date    CHOLHDL 18.3 (L) 02/19/2018    CHOLHDL 19.7 (L) 01/30/2017    CHOLHDL 18.5 (L) 10/22/2015     Lab Results   Component Value Date    HGBA1C 5.5 02/19/2018     Lab Results   Component Value Date    TSH 1.402 01/30/2017         Assessment/Plan     Nicho was seen today for follow-up.    Diagnoses and all orders for this visit:    History of right breast cancer  Prophylactic use of anastrozole (Arimidex)  Followed by breast clinic and Dr. Flores, plan to cont arimidex through 8/2022 as per Dr. Flores.    Age-related osteoporosis without current pathological fracture  Mild vitamin D deficiency  Cont alendronate and vit d  Last Vit D level wnl, can check annually  Wt bearing exercises as tolerated  -     alendronate (FOSAMAX) 70 MG tablet; TAKE 1 TABLET BY MOUTH EVERY 7 DAYS    Primary osteoarthritis of both knees  Status post total knee replacement, unspecified laterality  Euflexxa injections in the past  Has f/u in Nov  Cont f/u c Ortho    Spondylolisthesis at L5-S1 level  S/P lumbar spinal fusion  On hydrocodone prn as per Pain mgmt  No improvement c PT  Injections have helped previously  Refills as per Pain mgmt  Ok for nsaids prn acute flare currently, take c food    Gastroesophageal reflux disease, esophagitis presence not specified  Stable, no issues    Pre-diabetes  Normalized on last check  Lab Results   Component Value Date    HGBA1C 5.5 02/19/2018     Can check annually, cont lifestyle modifications  -     Comprehensive metabolic panel; Future    Anemia of chronic disease  Stable  Will monitor  -     CBC auto differential; Future    Hypertriglyceridemia  Now on fenofibrate  Will repeat fasting labs in AM  Cont meds for now.  -     Lipid panel; Future    Essential tremor  Propranolol increased at last appt from bid  to tid  No acute issues, cont meds    HM as above  RTC in 6 mos for f/u, labs tomorrow.    Azalea Walker MD  Department of Internal Medicine - [facility]  06/25/2018

## 2018-06-26 ENCOUNTER — LAB VISIT (OUTPATIENT)
Dept: LAB | Facility: HOSPITAL | Age: 78
End: 2018-06-26
Attending: INTERNAL MEDICINE
Payer: MEDICARE

## 2018-06-26 DIAGNOSIS — E78.1 HYPERTRIGLYCERIDEMIA: ICD-10-CM

## 2018-06-26 DIAGNOSIS — R73.03 PRE-DIABETES: ICD-10-CM

## 2018-06-26 DIAGNOSIS — D63.8 ANEMIA OF CHRONIC DISEASE: ICD-10-CM

## 2018-06-26 LAB
ALBUMIN SERPL BCP-MCNC: 4 G/DL
ALP SERPL-CCNC: 44 U/L
ALT SERPL W/O P-5'-P-CCNC: 11 U/L
ANION GAP SERPL CALC-SCNC: 8 MMOL/L
AST SERPL-CCNC: 15 U/L
BASOPHILS # BLD AUTO: 0.03 K/UL
BASOPHILS NFR BLD: 0.5 %
BILIRUB SERPL-MCNC: 0.6 MG/DL
BUN SERPL-MCNC: 16 MG/DL
CALCIUM SERPL-MCNC: 10.5 MG/DL
CHLORIDE SERPL-SCNC: 104 MMOL/L
CHOLEST SERPL-MCNC: 186 MG/DL
CHOLEST/HDLC SERPL: 4.8 {RATIO}
CO2 SERPL-SCNC: 27 MMOL/L
CREAT SERPL-MCNC: 1 MG/DL
DIFFERENTIAL METHOD: ABNORMAL
EOSINOPHIL # BLD AUTO: 0.1 K/UL
EOSINOPHIL NFR BLD: 2.3 %
ERYTHROCYTE [DISTWIDTH] IN BLOOD BY AUTOMATED COUNT: 12.4 %
EST. GFR  (AFRICAN AMERICAN): >60 ML/MIN/1.73 M^2
EST. GFR  (NON AFRICAN AMERICAN): 54 ML/MIN/1.73 M^2
GLUCOSE SERPL-MCNC: 108 MG/DL
HCT VFR BLD AUTO: 35.2 %
HDLC SERPL-MCNC: 39 MG/DL
HDLC SERPL: 21 %
HGB BLD-MCNC: 11.7 G/DL
LDLC SERPL CALC-MCNC: 103.4 MG/DL
LYMPHOCYTES # BLD AUTO: 2.4 K/UL
LYMPHOCYTES NFR BLD: 38.8 %
MCH RBC QN AUTO: 31.8 PG
MCHC RBC AUTO-ENTMCNC: 33.2 G/DL
MCV RBC AUTO: 96 FL
MONOCYTES # BLD AUTO: 0.7 K/UL
MONOCYTES NFR BLD: 10.6 %
NEUTROPHILS # BLD AUTO: 2.9 K/UL
NEUTROPHILS NFR BLD: 47.5 %
NONHDLC SERPL-MCNC: 147 MG/DL
PLATELET # BLD AUTO: 312 K/UL
PMV BLD AUTO: 8.6 FL
POTASSIUM SERPL-SCNC: 4.2 MMOL/L
PROT SERPL-MCNC: 7.1 G/DL
RBC # BLD AUTO: 3.68 M/UL
SODIUM SERPL-SCNC: 139 MMOL/L
TRIGL SERPL-MCNC: 218 MG/DL
WBC # BLD AUTO: 6.16 K/UL

## 2018-06-26 PROCEDURE — 80053 COMPREHEN METABOLIC PANEL: CPT

## 2018-06-26 PROCEDURE — 36415 COLL VENOUS BLD VENIPUNCTURE: CPT

## 2018-06-26 PROCEDURE — 80061 LIPID PANEL: CPT

## 2018-06-26 PROCEDURE — 85025 COMPLETE CBC W/AUTO DIFF WBC: CPT

## 2018-06-28 ENCOUNTER — TELEPHONE (OUTPATIENT)
Dept: INTERNAL MEDICINE | Facility: CLINIC | Age: 78
End: 2018-06-28

## 2018-06-28 DIAGNOSIS — E78.1 HYPERTRIGLYCERIDEMIA: ICD-10-CM

## 2018-06-28 RX ORDER — FENOFIBRATE 134 MG/1
134 CAPSULE ORAL
Qty: 90 CAPSULE | Refills: 1 | Status: SHIPPED | OUTPATIENT
Start: 2018-06-28 | End: 2019-01-09 | Stop reason: SDUPTHER

## 2018-06-29 ENCOUNTER — TELEPHONE (OUTPATIENT)
Dept: INTERNAL MEDICINE | Facility: CLINIC | Age: 78
End: 2018-06-29

## 2018-06-29 NOTE — TELEPHONE ENCOUNTER
----- Message from Shannon Brush sent at 6/29/2018  2:26 PM CDT -----  Contact: Self [phone]  Patient would like a call back concerning her medications.

## 2018-07-11 DIAGNOSIS — G25.0 ESSENTIAL TREMOR: ICD-10-CM

## 2018-07-11 RX ORDER — TRAZODONE HYDROCHLORIDE 50 MG/1
TABLET ORAL
Qty: 90 TABLET | Refills: 1 | Status: SHIPPED | OUTPATIENT
Start: 2018-07-11 | End: 2019-01-09 | Stop reason: SDUPTHER

## 2018-07-11 RX ORDER — PROPRANOLOL HYDROCHLORIDE 10 MG/1
TABLET ORAL
Qty: 270 TABLET | Refills: 1 | Status: SHIPPED | OUTPATIENT
Start: 2018-07-11 | End: 2019-01-09 | Stop reason: SDUPTHER

## 2018-08-30 DIAGNOSIS — R11.0 NAUSEA: ICD-10-CM

## 2018-08-30 DIAGNOSIS — M81.0 AGE-RELATED OSTEOPOROSIS WITHOUT CURRENT PATHOLOGICAL FRACTURE: ICD-10-CM

## 2018-08-30 RX ORDER — ALENDRONATE SODIUM 70 MG/1
TABLET ORAL
Qty: 12 TABLET | Refills: 3 | Status: SHIPPED | OUTPATIENT
Start: 2018-08-30 | End: 2018-09-07 | Stop reason: SDUPTHER

## 2018-08-30 RX ORDER — ONDANSETRON 4 MG/1
4 TABLET, ORALLY DISINTEGRATING ORAL EVERY 8 HOURS PRN
Qty: 270 TABLET | Refills: 0 | Status: SHIPPED | OUTPATIENT
Start: 2018-08-30 | End: 2018-09-07 | Stop reason: SDUPTHER

## 2018-09-07 DIAGNOSIS — R11.0 NAUSEA: ICD-10-CM

## 2018-09-07 DIAGNOSIS — M81.0 AGE-RELATED OSTEOPOROSIS WITHOUT CURRENT PATHOLOGICAL FRACTURE: ICD-10-CM

## 2018-09-07 NOTE — TELEPHONE ENCOUNTER
----- Message from Aura Gonzalez sent at 9/7/2018  9:55 AM CDT -----  Contact: Cindy @ [email or web address]   RX request - refill or new RX.  Is this a refill or new RX:  Refill  RX name and strength: alendronate (FOSAMAX) 70 MG tablet  ondansetron (ZOFRAN-ODT) 4 MG TbDL  Directions:   Is this a 30 day or 90 day RX:  90  Local pharmacy or mail order pharmacy:  [email or web address] - [place] [address] [address]  Pharmacy name and phone # ): [email or web address] phone# [phone], Fax# [phone]

## 2018-09-10 RX ORDER — ALENDRONATE SODIUM 70 MG/1
TABLET ORAL
Qty: 12 TABLET | Refills: 3 | Status: SHIPPED | OUTPATIENT
Start: 2018-09-10 | End: 2019-01-09 | Stop reason: SDUPTHER

## 2018-09-10 RX ORDER — ONDANSETRON 4 MG/1
4 TABLET, ORALLY DISINTEGRATING ORAL EVERY 8 HOURS PRN
Qty: 90 TABLET | Refills: 0 | Status: SHIPPED | OUTPATIENT
Start: 2018-09-10 | End: 2019-02-12

## 2018-09-13 ENCOUNTER — OFFICE VISIT (OUTPATIENT)
Dept: HEMATOLOGY/ONCOLOGY | Facility: CLINIC | Age: 78
End: 2018-09-13
Payer: MEDICARE

## 2018-09-13 VITALS
OXYGEN SATURATION: 94 % | HEIGHT: 64 IN | RESPIRATION RATE: 16 BRPM | DIASTOLIC BLOOD PRESSURE: 70 MMHG | HEART RATE: 70 BPM | SYSTOLIC BLOOD PRESSURE: 141 MMHG | BODY MASS INDEX: 31.76 KG/M2 | WEIGHT: 186 LBS | TEMPERATURE: 98 F

## 2018-09-13 DIAGNOSIS — T38.6X5A OSTEOPOROSIS DUE TO AROMATASE INHIBITOR: ICD-10-CM

## 2018-09-13 DIAGNOSIS — Z79.811 PROPHYLACTIC USE OF ANASTROZOLE (ARIMIDEX): ICD-10-CM

## 2018-09-13 DIAGNOSIS — M81.8 OSTEOPOROSIS DUE TO AROMATASE INHIBITOR: ICD-10-CM

## 2018-09-13 DIAGNOSIS — Z17.0 MALIGNANT NEOPLASM OF UPPER-OUTER QUADRANT OF RIGHT BREAST IN FEMALE, ESTROGEN RECEPTOR POSITIVE: Primary | ICD-10-CM

## 2018-09-13 DIAGNOSIS — C50.411 MALIGNANT NEOPLASM OF UPPER-OUTER QUADRANT OF RIGHT BREAST IN FEMALE, ESTROGEN RECEPTOR POSITIVE: Primary | ICD-10-CM

## 2018-09-13 PROCEDURE — 99999 PR PBB SHADOW E&M-EST. PATIENT-LVL III: CPT | Mod: PBBFAC,,, | Performed by: INTERNAL MEDICINE

## 2018-09-13 PROCEDURE — 99213 OFFICE O/P EST LOW 20 MIN: CPT | Mod: PBBFAC | Performed by: INTERNAL MEDICINE

## 2018-09-13 PROCEDURE — 1101F PT FALLS ASSESS-DOCD LE1/YR: CPT | Mod: CPTII,,, | Performed by: INTERNAL MEDICINE

## 2018-09-13 PROCEDURE — 99213 OFFICE O/P EST LOW 20 MIN: CPT | Mod: S$PBB,,, | Performed by: INTERNAL MEDICINE

## 2018-09-13 NOTE — PROGRESS NOTES
Subjective:       Patient ID: Nicho Esipnosa is a 77 y.o. female.    Chief Complaint: No chief complaint on file.    HPI   Mrs. Espinosa returns today for follow up.  She has been on anastrazole since August 2017, and has tolerated it well so far.  Of note, her Oncotype score was 7, suggesting that she has a 6 % chance of recurrence at ten years with tamoxifen alone.  Briefly, she is a 77-year-old  female who, on 08/09/2017, underwent a right modified radical mastectomy and sentinel lymph node biopsy.  The pathology report from the procedure indicates that she had a 2 cm lobular carcinoma that was ER strongly positive, MS positive and HER-2 negative; resection margins were clear.  On the right axillary sentinel lymph nodes isolated tumor cells were seen.      Review of Systems    Overall she feels well. She has tolerated the anastrazole well so far and has not experienced any hot flashes.  However, she again mentions her chronic back pain, and her bilateral knee pains which predated the onset of her cancer.  She has a history of prior fusion surgery.   She  denies any anxiety, depression, easy bruising, fevers, chills, night  sweats, weight loss, nausea, vomiting, diarrhea, constipation, diplopia,   blurred vision, headache, chest pain, palpitations, shortness of breath, breast pain, abdominal pain, extremity pain, or difficulty ambulating.  The remainder of the ten-point ROS, including general, skin, lymph, H/N, cardiorespiratory, GI, , Neuro, Endocrine, and psychiatric is negative.       Objective:      Physical Exam    She is alert, oriented to time, place, person, pleasant, well      nourished, in no acute physical distress.                                    VITAL SIGNS:  Reviewed                                      HEENT:  Normal.  There are no nasal, oral, lip, gingival, auricular, lid,    or conjunctival lesions.  Mucosae are moist and pink, and there is no        thrush.  Pupils are equal, reactive  to light and accommodation.              Extraocular muscle movements are intact.  Dentition is good.                                   NECK:  Supple without JVD, adenopathy, or thyromegaly.                       LUNGS:  Clear to auscultation without wheezing, rales, or rhonchi.           CARDIOVASCULAR:  Reveals an S1, S2, a grade I systolic ejection murmur at the LSB, no rubs, no gallops.         ABDOMEN:  Soft, nontender, without organomegaly.  Bowel sounds are    present.                                                                     EXTREMITIES:  No cyanosis, clubbing, or edema.            BREASTS:  She is status post right mastectomy with a well-healed incision.    There are no masses in the left breast.                                        LYMPHATIC:  There is no cervical, axillary, or supraclavicular adenopathy.   SKIN:  Warm and moist, without petechiae, rashes, induration, or ecchymoses.           NEUROLOGIC:  DTRs are 0-1+ bilaterally, symmetrical, motor function is 5/5,  and cranial nerves are  within normal limits.      Assessment:       1. Malignant neoplasm of upper-outer quadrant of right breast in female, estrogen receptor positive    2. Prophylactic use of anastrozole (Arimidex)      3.   Extensive DJD, s/p fusion of lumbar spine.  Plan:         I have asked her to remain on anastrazole, which she will take through the end of August 2022.  I will see her again in 4 months with a DXA scan.  Her mammogram will be repeated 6 months from now.    Her questions were answered to her satisfaction.

## 2018-10-23 DIAGNOSIS — M25.562 LEFT KNEE PAIN, UNSPECIFIED CHRONICITY: Primary | ICD-10-CM

## 2018-10-31 ENCOUNTER — OFFICE VISIT (OUTPATIENT)
Dept: ORTHOPEDICS | Facility: CLINIC | Age: 78
End: 2018-10-31
Payer: MEDICARE

## 2018-10-31 ENCOUNTER — HOSPITAL ENCOUNTER (OUTPATIENT)
Dept: RADIOLOGY | Facility: HOSPITAL | Age: 78
Discharge: HOME | End: 2018-10-31
Attending: ORTHOPAEDIC SURGERY
Payer: MEDICARE

## 2018-10-31 VITALS — WEIGHT: 185.19 LBS | BODY MASS INDEX: 31.62 KG/M2 | HEIGHT: 64 IN

## 2018-10-31 DIAGNOSIS — M17.12 PRIMARY OSTEOARTHRITIS OF LEFT KNEE: Primary | ICD-10-CM

## 2018-10-31 DIAGNOSIS — M25.562 LEFT KNEE PAIN, UNSPECIFIED CHRONICITY: ICD-10-CM

## 2018-10-31 PROCEDURE — 73560 X-RAY EXAM OF KNEE 1 OR 2: CPT | Mod: TC,HCNC,RT,59

## 2018-10-31 PROCEDURE — 20610 DRAIN/INJ JOINT/BURSA W/O US: CPT | Mod: S$PBB,LT,, | Performed by: ORTHOPAEDIC SURGERY

## 2018-10-31 PROCEDURE — 99213 OFFICE O/P EST LOW 20 MIN: CPT | Mod: 25,S$PBB,HCNC, | Performed by: ORTHOPAEDIC SURGERY

## 2018-10-31 PROCEDURE — 73562 X-RAY EXAM OF KNEE 3: CPT | Mod: TC,HCNC,LT

## 2018-10-31 PROCEDURE — 99212 OFFICE O/P EST SF 10 MIN: CPT | Mod: PBBFAC,25,HCNC | Performed by: ORTHOPAEDIC SURGERY

## 2018-10-31 PROCEDURE — 1101F PT FALLS ASSESS-DOCD LE1/YR: CPT | Mod: CPTII,HCNC,, | Performed by: ORTHOPAEDIC SURGERY

## 2018-10-31 PROCEDURE — 99999 PR PBB SHADOW E&M-EST. PATIENT-LVL II: CPT | Mod: PBBFAC,HCNC,, | Performed by: ORTHOPAEDIC SURGERY

## 2018-10-31 PROCEDURE — 73560 X-RAY EXAM OF KNEE 1 OR 2: CPT | Mod: 26,HCNC,XS,RT | Performed by: RADIOLOGY

## 2018-10-31 PROCEDURE — 73562 X-RAY EXAM OF KNEE 3: CPT | Mod: 26,HCNC,LT, | Performed by: RADIOLOGY

## 2018-10-31 RX ORDER — TRIAMCINOLONE ACETONIDE 40 MG/ML
40 INJECTION, SUSPENSION INTRA-ARTICULAR; INTRAMUSCULAR
Status: COMPLETED | OUTPATIENT
Start: 2018-10-31 | End: 2018-10-31

## 2018-10-31 RX ADMIN — TRIAMCINOLONE ACETONIDE 40 MG: 40 INJECTION, SUSPENSION INTRA-ARTICULAR; INTRAMUSCULAR at 12:10

## 2018-10-31 NOTE — PROGRESS NOTES
"Subjective:      Patient ID: Nicho Espinosa is a 77 y.o. female.    Chief Complaint: Pain of the Left Knee    HPI  Nicho Espinosa has left knee pain.  The pain has worsened slightly. Injections lasted 4 months. The pain is located in the medial aspect of the knee.  There  is not radiation.   There is associated stiffness.   There is not catching and locking. The pain is described as achy. The pain is aggravated by activity.  It is alleviated by rest.  There is not associated back pain.  Her history, medications and problem list were reviewed.    Review of Systems   Constitution: Negative for chills, fever and night sweats.   HENT: Negative for hearing loss.    Eyes: Negative for blurred vision and double vision.   Cardiovascular: Negative for chest pain, claudication and leg swelling.   Respiratory: Negative for shortness of breath.    Endocrine: Negative for polydipsia, polyphagia and polyuria.   Hematologic/Lymphatic: Negative for adenopathy and bleeding problem. Does not bruise/bleed easily.   Skin: Negative for poor wound healing.   Musculoskeletal: Positive for joint pain.   Gastrointestinal: Negative for diarrhea and heartburn.   Genitourinary: Negative for bladder incontinence.   Neurological: Negative for focal weakness, headaches, numbness, paresthesias and sensory change.   Psychiatric/Behavioral: The patient is not nervous/anxious.    Allergic/Immunologic: Negative for persistent infections.         Objective:      Body mass index is 31.79 kg/m².  Vitals:    10/31/18 1137   Weight: 84 kg (185 lb 3 oz)   Height: 5' 4" (1.626 m)           General    Constitutional: She is oriented to person, place, and time. She appears well-developed and well-nourished.   HENT:   Head: Normocephalic and atraumatic.   Eyes: EOM are normal.   Cardiovascular: Normal rate.    Pulmonary/Chest: Effort normal.   Neurological: She is alert and oriented to person, place, and time.   Psychiatric: She has a normal mood and affect. Her " behavior is normal.     General Musculoskeletal Exam   Gait: normal       Right Knee Exam     Inspection   Erythema: absent  Scars: present  Swelling: absent  Effusion: absent  Deformity: absent  Bruising: absent    Tenderness   The patient is experiencing no tenderness.     Range of Motion   Extension: 0   Flexion: 130     Tests   Ligament Examination Lachman: normal (-1 to 2mm)   MCL - Valgus: normal (0 to 2mm)  LCL - Varus: normal  Patella   Passive Patellar Tilt: neutral    Other   Sensation: normal    Left Knee Exam     Inspection   Erythema: absent  Scars: absent  Swelling: present  Effusion: absent  Deformity: absent  Bruising: absent    Tenderness   The patient tender to palpation of the medial joint line.    Crepitus   The patient has crepitus of the patella.    Range of Motion   Extension: 0   Flexion: 120     Tests   Stability Lachman: normal (-1 to 2mm)   MCL - Valgus: normal (0 to 2mm)  LCL - Varus: normal (0 to 2mm)  Patella   Passive Patellar Tilt: neutral    Other   Sensation: normal    Muscle Strength   Right Lower Extremity   Hip Abduction: 5/5   Quadriceps:  5/5   Hamstrin/5   Left Lower Extremity   Hip Abduction: 5/5   Quadriceps:  5/5   Hamstrin/5     Reflexes     Left Side  Quadriceps:  2+    Right Side   Quadriceps:  2+    Vascular Exam     Right Pulses  Dorsalis Pedis:      2+          Left Pulses  Dorsalis Pedis:      2+          Edema  Right Lower Leg: absent  Left Lower Leg: absent    Radiographs taken today and reviewed by me demonstrate moderately severe arthritic change of the left KNEE(s).There  is not bone destruction.  There is not a fracture. The medial compartment is most involved.  There is a varus deformity.  The changes are tricompartmental.              Assessment:       Encounter Diagnosis   Name Primary?    Primary osteoarthritis of left knee Yes          Plan:       Nicho was seen today for pain.    Diagnoses and all orders for this visit:    Primary osteoarthritis  of left knee    Other orders  -     triamcinolone acetonide injection 40 mg        Treatment options have been discussed.  We have decided to proceed with a  cortico- steroid injection.  The risk of elevated blood glucose and the extremely low risk of infection were discussed. Verbal consent was obtained. .    After time out was performed and patient ID, side, and site were verified, the left knee  was prepped in the standard sterile fashion.  A 22-gauge needle was introduced into the left knee joint from an james-lateral site without complication. The left knee(s) was then injected with 40mg of triamcinolone.  Sterile dressing was applied.  The patient was informed that they may resume activities as tolerated. She was instructed to call if there were any problems.     We will see Nicho Espinosa  back in 6 weeks to see how she has responded.

## 2018-12-12 ENCOUNTER — OFFICE VISIT (OUTPATIENT)
Dept: ORTHOPEDICS | Facility: CLINIC | Age: 78
End: 2018-12-12
Payer: MEDICARE

## 2018-12-12 VITALS — BODY MASS INDEX: 31.05 KG/M2 | HEIGHT: 64 IN | WEIGHT: 181.88 LBS

## 2018-12-12 DIAGNOSIS — M17.12 PRIMARY OSTEOARTHRITIS OF LEFT KNEE: Primary | ICD-10-CM

## 2018-12-12 PROCEDURE — 99213 OFFICE O/P EST LOW 20 MIN: CPT | Mod: HCNC,S$GLB,, | Performed by: ORTHOPAEDIC SURGERY

## 2018-12-12 PROCEDURE — 1101F PT FALLS ASSESS-DOCD LE1/YR: CPT | Mod: CPTII,HCNC,S$GLB, | Performed by: ORTHOPAEDIC SURGERY

## 2018-12-12 PROCEDURE — 99999 PR PBB SHADOW E&M-EST. PATIENT-LVL III: CPT | Mod: PBBFAC,HCNC,, | Performed by: ORTHOPAEDIC SURGERY

## 2018-12-12 NOTE — PROGRESS NOTES
Subjective:      Patient ID: Nicho Espinosa is a 78 y.o. female.    Chief Complaint: Pain of the Left Knee    HPI  Nicho Espinosa has left knee pain.  The pain is unchanged and severe. No help with injection. The symptoms have worsened to the point where it is interfering with her activities of daily living.  She has difficulty with stairs, getting dressed and getting in an out of a car.   The pain is located in the global aspect of the knee.  There  is not radiation.  There is associated stiffness.   There is not catching and locking. The pain is described as achy. The pain is aggravated by pretty much everyhing.  It is alleviated by nothing consistently.  There is associated back pain.  Her history, medications and problem list were reviewed.  Past Medical History:   Diagnosis Date    Allergy     Arthritis     Blepharitis of both eyes     breast ca 7/18/2017    right    Complication of anesthesia     nausea    Degenerative disc disease     GERD (gastroesophageal reflux disease)     patient denies reflux    History of compression fracture of spine 4/10/2014    Hyperlipidemia     Lumbar disc disease     Meibomitis     Obesity 9/19/2013    Osteoporosis     Papilloma of eyelid     RUL    Postoperative anemia 11/21/2013    Thoracic or lumbosacral neuritis or radiculitis, unspecified 9/20/2013    Tremors of nervous system 2/2015       Review of Systems   Constitution: Negative for chills, fever and night sweats.   HENT: Negative for hearing loss.    Eyes: Negative for blurred vision and double vision.   Cardiovascular: Negative for chest pain, claudication and leg swelling.   Respiratory: Negative for shortness of breath.    Endocrine: Negative for polydipsia, polyphagia and polyuria.   Hematologic/Lymphatic: Negative for adenopathy and bleeding problem. Does not bruise/bleed easily.   Skin: Negative for poor wound healing.   Musculoskeletal: Positive for back pain and joint pain.   Gastrointestinal:  "Negative for diarrhea and heartburn.   Genitourinary: Negative for bladder incontinence.   Neurological: Negative for focal weakness, headaches, numbness, paresthesias and sensory change.   Psychiatric/Behavioral: The patient is not nervous/anxious.    Allergic/Immunologic: Negative for persistent infections.         Objective:      Body mass index is 31.22 kg/m².  Vitals:    12/12/18 1417   Weight: 82.5 kg (181 lb 14.1 oz)   Height: 5' 4" (1.626 m)           General    Constitutional: She is oriented to person, place, and time. She appears well-developed and well-nourished.   HENT:   Head: Normocephalic and atraumatic.   Eyes: EOM are normal.   Cardiovascular: Normal rate.    Pulmonary/Chest: Effort normal.   Neurological: She is alert and oriented to person, place, and time.   Psychiatric: She has a normal mood and affect. Her behavior is normal.     General Musculoskeletal Exam   Gait: antalgic       Right Knee Exam     Inspection   Erythema: absent  Scars: present  Swelling: absent  Effusion: absent  Deformity: absent  Bruising: absent    Tenderness   The patient is experiencing no tenderness.     Range of Motion   Extension: 0   Flexion: 130     Tests   Ligament Examination Lachman: normal (-1 to 2mm)   MCL - Valgus: normal (0 to 2mm)  LCL - Varus: normal  Patella   Passive Patellar Tilt: neutral    Other   Sensation: normal    Left Knee Exam     Inspection   Erythema: absent  Scars: absent  Swelling: present  Effusion: present  Deformity: present (varus)  Bruising: absent    Tenderness   The patient tender to palpation of the medial joint line and patella.    Crepitus   The patient has crepitus of the patella.    Range of Motion   Extension: 0   Flexion: 130     Tests   Stability Lachman: normal (-1 to 2mm)   MCL - Valgus: normal (0 to 2mm)  LCL - Varus: normal (0 to 2mm)  Patella   Passive Patellar Tilt: neutral    Other   Sensation: normal    Muscle Strength   Right Lower Extremity   Hip Abduction: 5/5 "   Quadriceps:  5/5   Hamstrin/5   Left Lower Extremity   Hip Abduction: 5/5   Quadriceps:  5/5   Hamstrin/5     Reflexes     Left Side  Quadriceps:  2+    Right Side   Quadriceps:  2+    Vascular Exam     Right Pulses  Dorsalis Pedis:      2+          Left Pulses  Dorsalis Pedis:      2+          Edema  Right Lower Leg: absent  Left Lower Leg: absent              Assessment:       Encounter Diagnosis   Name Primary?    Primary osteoarthritis of left knee Yes          Plan:       Nicho was seen today for pain.    Diagnoses and all orders for this visit:    Primary osteoarthritis of left knee  -     Ambulatory Referral to Physical/Occupational Therapy      Treatment options were discussed. The surgical process of left knee replacement was discussed in detail with the patient including a detailed discussion of the procedure itself (including visual model, x-ray review, and literature review). The typical perioperative and post-operative course was discussed and perioperative risks were discussed to the patient's satisfaction.  Risks and complications discussed included but were not limited to the risks of anesthetic complications, infection, bleeding, wound healing complications, stiffness, aseptic loosening, instability, limb length inequality, neurologic dysfunction including numbness and weakness, additional surgery,  DVT, pulmonary embolism, perioperative medical risks (cardiac, pulmonary, renal, neurologic), and death and the patient elects to proceed.  The patient should get medically cleared and attend the joint seminar.    Due to breast CA, will go with warfarin/ASA bridge and switch to Eliguis at 7 days.    Pre-hab ordered

## 2018-12-13 DIAGNOSIS — M17.12 PRIMARY OSTEOARTHRITIS OF LEFT KNEE: Primary | ICD-10-CM

## 2018-12-14 ENCOUNTER — TELEPHONE (OUTPATIENT)
Dept: ORTHOPEDICS | Facility: CLINIC | Age: 78
End: 2018-12-14

## 2019-01-09 ENCOUNTER — LAB VISIT (OUTPATIENT)
Dept: LAB | Facility: HOSPITAL | Age: 79
End: 2019-01-09
Attending: INTERNAL MEDICINE
Payer: MEDICARE

## 2019-01-09 ENCOUNTER — OFFICE VISIT (OUTPATIENT)
Dept: INTERNAL MEDICINE | Facility: CLINIC | Age: 79
End: 2019-01-09
Payer: MEDICARE

## 2019-01-09 VITALS
WEIGHT: 180.13 LBS | DIASTOLIC BLOOD PRESSURE: 72 MMHG | BODY MASS INDEX: 30.75 KG/M2 | HEART RATE: 73 BPM | OXYGEN SATURATION: 97 % | HEIGHT: 64 IN | SYSTOLIC BLOOD PRESSURE: 138 MMHG

## 2019-01-09 DIAGNOSIS — D63.8 ANEMIA OF CHRONIC DISEASE: ICD-10-CM

## 2019-01-09 DIAGNOSIS — Z17.0 MALIGNANT NEOPLASM OF UPPER-OUTER QUADRANT OF RIGHT BREAST IN FEMALE, ESTROGEN RECEPTOR POSITIVE: ICD-10-CM

## 2019-01-09 DIAGNOSIS — M81.0 AGE-RELATED OSTEOPOROSIS WITHOUT CURRENT PATHOLOGICAL FRACTURE: ICD-10-CM

## 2019-01-09 DIAGNOSIS — Z01.818 PRE-OP EXAMINATION: ICD-10-CM

## 2019-01-09 DIAGNOSIS — M43.17 SPONDYLOLISTHESIS AT L5-S1 LEVEL: ICD-10-CM

## 2019-01-09 DIAGNOSIS — R73.03 PRE-DIABETES: ICD-10-CM

## 2019-01-09 DIAGNOSIS — Z01.818 PRE-OP EXAMINATION: Primary | ICD-10-CM

## 2019-01-09 DIAGNOSIS — R30.0 DYSURIA: ICD-10-CM

## 2019-01-09 DIAGNOSIS — E78.1 HYPERTRIGLYCERIDEMIA: ICD-10-CM

## 2019-01-09 DIAGNOSIS — M51.35 DDD (DEGENERATIVE DISC DISEASE), THORACOLUMBAR: ICD-10-CM

## 2019-01-09 DIAGNOSIS — N30.00 ACUTE CYSTITIS WITHOUT HEMATURIA: ICD-10-CM

## 2019-01-09 DIAGNOSIS — Z98.1 S/P LUMBAR SPINAL FUSION: ICD-10-CM

## 2019-01-09 DIAGNOSIS — R94.31 ABNORMAL EKG: ICD-10-CM

## 2019-01-09 DIAGNOSIS — Z79.811 PROPHYLACTIC USE OF ANASTROZOLE (ARIMIDEX): ICD-10-CM

## 2019-01-09 DIAGNOSIS — G25.0 ESSENTIAL TREMOR: ICD-10-CM

## 2019-01-09 DIAGNOSIS — M50.30 DDD (DEGENERATIVE DISC DISEASE), CERVICAL: ICD-10-CM

## 2019-01-09 DIAGNOSIS — M17.0 PRIMARY OSTEOARTHRITIS OF BOTH KNEES: ICD-10-CM

## 2019-01-09 DIAGNOSIS — C50.411 MALIGNANT NEOPLASM OF UPPER-OUTER QUADRANT OF RIGHT BREAST IN FEMALE, ESTROGEN RECEPTOR POSITIVE: ICD-10-CM

## 2019-01-09 LAB
ALBUMIN SERPL BCP-MCNC: 3.8 G/DL
ALP SERPL-CCNC: 43 U/L
ALT SERPL W/O P-5'-P-CCNC: 16 U/L
ANION GAP SERPL CALC-SCNC: 8 MMOL/L
AST SERPL-CCNC: 21 U/L
BASOPHILS # BLD AUTO: 0.03 K/UL
BASOPHILS NFR BLD: 0.6 %
BILIRUB SERPL-MCNC: 0.4 MG/DL
BUN SERPL-MCNC: 18 MG/DL
CALCIUM SERPL-MCNC: 9.3 MG/DL
CHLORIDE SERPL-SCNC: 103 MMOL/L
CHOLEST SERPL-MCNC: 182 MG/DL
CHOLEST/HDLC SERPL: 4.1 {RATIO}
CO2 SERPL-SCNC: 27 MMOL/L
CREAT SERPL-MCNC: 0.9 MG/DL
DIFFERENTIAL METHOD: ABNORMAL
EOSINOPHIL # BLD AUTO: 0.1 K/UL
EOSINOPHIL NFR BLD: 3 %
ERYTHROCYTE [DISTWIDTH] IN BLOOD BY AUTOMATED COUNT: 13.3 %
EST. GFR  (AFRICAN AMERICAN): >60 ML/MIN/1.73 M^2
EST. GFR  (NON AFRICAN AMERICAN): >60 ML/MIN/1.73 M^2
ESTIMATED AVG GLUCOSE: 114 MG/DL
GLUCOSE SERPL-MCNC: 97 MG/DL
HBA1C MFR BLD HPLC: 5.6 %
HCT VFR BLD AUTO: 36.3 %
HDLC SERPL-MCNC: 44 MG/DL
HDLC SERPL: 24.2 %
HGB BLD-MCNC: 11.2 G/DL
LDLC SERPL CALC-MCNC: 104.8 MG/DL
LYMPHOCYTES # BLD AUTO: 2.1 K/UL
LYMPHOCYTES NFR BLD: 44.6 %
MCH RBC QN AUTO: 30.6 PG
MCHC RBC AUTO-ENTMCNC: 30.9 G/DL
MCV RBC AUTO: 99 FL
MONOCYTES # BLD AUTO: 0.8 K/UL
MONOCYTES NFR BLD: 17.2 %
NEUTROPHILS # BLD AUTO: 1.6 K/UL
NEUTROPHILS NFR BLD: 34.2 %
NONHDLC SERPL-MCNC: 138 MG/DL
PLATELET # BLD AUTO: 284 K/UL
PMV BLD AUTO: 9.1 FL
POTASSIUM SERPL-SCNC: 4.1 MMOL/L
PROT SERPL-MCNC: 7.3 G/DL
RBC # BLD AUTO: 3.66 M/UL
SODIUM SERPL-SCNC: 138 MMOL/L
TRIGL SERPL-MCNC: 166 MG/DL
WBC # BLD AUTO: 4.66 K/UL

## 2019-01-09 PROCEDURE — 93005 ELECTROCARDIOGRAM TRACING: CPT | Mod: HCNC,S$GLB,, | Performed by: INTERNAL MEDICINE

## 2019-01-09 PROCEDURE — 36415 COLL VENOUS BLD VENIPUNCTURE: CPT | Mod: HCNC

## 2019-01-09 PROCEDURE — 99214 OFFICE O/P EST MOD 30 MIN: CPT | Mod: HCNC,S$GLB,, | Performed by: INTERNAL MEDICINE

## 2019-01-09 PROCEDURE — 93010 EKG 12-LEAD: ICD-10-PCS | Mod: HCNC,S$GLB,, | Performed by: INTERNAL MEDICINE

## 2019-01-09 PROCEDURE — 85025 COMPLETE CBC W/AUTO DIFF WBC: CPT | Mod: HCNC

## 2019-01-09 PROCEDURE — 83036 HEMOGLOBIN GLYCOSYLATED A1C: CPT | Mod: HCNC

## 2019-01-09 PROCEDURE — 80061 LIPID PANEL: CPT | Mod: HCNC

## 2019-01-09 PROCEDURE — 1101F PT FALLS ASSESS-DOCD LE1/YR: CPT | Mod: CPTII,HCNC,S$GLB, | Performed by: INTERNAL MEDICINE

## 2019-01-09 PROCEDURE — 93005 EKG 12-LEAD: ICD-10-PCS | Mod: HCNC,S$GLB,, | Performed by: INTERNAL MEDICINE

## 2019-01-09 PROCEDURE — 99999 PR PBB SHADOW E&M-EST. PATIENT-LVL IV: CPT | Mod: PBBFAC,HCNC,, | Performed by: INTERNAL MEDICINE

## 2019-01-09 PROCEDURE — 80053 COMPREHEN METABOLIC PANEL: CPT | Mod: HCNC

## 2019-01-09 PROCEDURE — 99999 PR PBB SHADOW E&M-EST. PATIENT-LVL IV: ICD-10-PCS | Mod: PBBFAC,HCNC,, | Performed by: INTERNAL MEDICINE

## 2019-01-09 PROCEDURE — 1101F PR PT FALLS ASSESS DOC 0-1 FALLS W/OUT INJ PAST YR: ICD-10-PCS | Mod: CPTII,HCNC,S$GLB, | Performed by: INTERNAL MEDICINE

## 2019-01-09 PROCEDURE — 93010 ELECTROCARDIOGRAM REPORT: CPT | Mod: HCNC,S$GLB,, | Performed by: INTERNAL MEDICINE

## 2019-01-09 PROCEDURE — 99214 PR OFFICE/OUTPT VISIT, EST, LEVL IV, 30-39 MIN: ICD-10-PCS | Mod: HCNC,S$GLB,, | Performed by: INTERNAL MEDICINE

## 2019-01-09 RX ORDER — PROPRANOLOL HYDROCHLORIDE 10 MG/1
10 TABLET ORAL 3 TIMES DAILY
Qty: 270 TABLET | Refills: 1 | Status: SHIPPED | OUTPATIENT
Start: 2019-01-09 | End: 2020-02-03

## 2019-01-09 RX ORDER — ALENDRONATE SODIUM 70 MG/1
TABLET ORAL
Qty: 12 TABLET | Refills: 3 | Status: SHIPPED | OUTPATIENT
Start: 2019-01-09 | End: 2020-03-03

## 2019-01-09 RX ORDER — FENOFIBRATE 67 MG/1
134 CAPSULE ORAL
Qty: 180 CAPSULE | Refills: 1 | Status: SHIPPED | OUTPATIENT
Start: 2019-01-09 | End: 2019-09-16 | Stop reason: SDUPTHER

## 2019-01-09 RX ORDER — TRAZODONE HYDROCHLORIDE 50 MG/1
TABLET ORAL
Qty: 90 TABLET | Refills: 1 | Status: SHIPPED | OUTPATIENT
Start: 2019-01-09 | End: 2019-07-29 | Stop reason: SDUPTHER

## 2019-01-09 RX ORDER — CIPROFLOXACIN 250 MG/1
250 TABLET, FILM COATED ORAL 2 TIMES DAILY
Qty: 14 TABLET | Refills: 0 | Status: SHIPPED | OUTPATIENT
Start: 2019-01-09 | End: 2019-01-16

## 2019-01-09 NOTE — PROGRESS NOTES
Subjective:       Patient ID: Nicho Espinosa is a 78 y.o. female.    Chief Complaint: Malignant neoplasm of upper-outer quadrant of right breast i    HPI   Mrs. Espinosa returns today for follow up.  She has been on anastrazole since August 2017, and has tolerated it well so far.  She is having a left knee replacement 2/5/19.       Briefly, she is a 78-year-old  female who, on 08/09/2017, underwent a right modified radical mastectomy and sentinel lymph node biopsy.  The pathology report from the procedure indicates that she had a 2 cm lobular carcinoma that was ER strongly positive, WA positive and HER-2 negative; resection margins were clear.  On the right axillary sentinel lymph nodes isolated tumor cells were seen.  She has been on anastrazole since August 2017  Of note, her Oncotype score was 7, suggesting that she has a 6 % chance of recurrence at ten years with tamoxifen alone.    BMD is scheduled for March 2019.     Review of Systems    Overall she feels well except for her usual arthralgias. She has chronic back pain and knee pain which predated her cancer. She has a history of prior fusion surgery. She has generalized joint pain which started after Anastrozole but tolerable.  No hot flashes.   She  denies any anxiety, depression, easy bruising, fevers, chills, night  sweats, weight loss, nausea, vomiting, diarrhea, constipation, diplopia,   blurred vision, headache, chest pain, palpitations, shortness of breath, breast pain, abdominal pain, or difficulty ambulating.  The remainder of the ten-point ROS, including general, skin, lymph, H/N, cardiorespiratory, GI, , Neuro, Endocrine, and psychiatric is negative.       Objective:      Physical Exam    She is alert, oriented to time, place, person, pleasant, well      nourished, in no acute physical distress.  Using walker to assist with ambulation.                                VITAL SIGNS:  Reviewed                                      HEENT:  Normal.   There are no nasal, oral, lip, gingival, auricular, lid,    or conjunctival lesions.  Mucosae are moist and pink, and there is no        thrush.  Pupils are equal, reactive to light and accommodation.              Extraocular muscle movements are intact.  Dentition is good.                                   NECK:  Supple without JVD, adenopathy, or thyromegaly.                       LUNGS:  Clear to auscultation without wheezing, rales, or rhonchi.           CARDIOVASCULAR:  Reveals an S1, S2, a grade I systolic ejection murmur at the LSB, no rubs, no gallops.         ABDOMEN:  Soft, nontender, without organomegaly.  Bowel sounds are    present.                                                                     EXTREMITIES:  No cyanosis, clubbing, or edema. Left knee with mild swelling.         BREASTS:  She is status post right mastectomy with a well-healed incision.    There are no masses in the left breast.                                        LYMPHATIC:  There is no cervical, axillary, or supraclavicular adenopathy.   SKIN:  Warm and moist, without petechiae, rashes, induration, or ecchymoses.           NEUROLOGIC:  DTRs are 0-1+ bilaterally, symmetrical, motor function is 5/5,  and cranial nerves are  within normal limits.      Assessment:       1. Malignant neoplasm of upper-outer quadrant of right breast in female, estrogen receptor positive    2. Aromatase inhibitor use    3. Age-related osteoporosis without current pathological fracture    4. Primary osteoarthritis of both knees    5. History of deep vein thrombosis (DVT) of lower extremity    6.      Extensive DJD, s/p fusion of lumbar spine.  Plan:           -Doing well, LUZ ELENA.   -Continue anastrozole, which she will take through the end of August 2022.   -She is having a left knee replacement 2/5/19 -  Effie asked her to hold the Anastrozole 1 week prior and to resume once ambulating. She verbalized understanding. She will also note if her joint pains  improve being off of the anastrozole to determine if this is contributing.   -RTC in 4 months to see Dr. Flores  -BMD scheduled for 3/6/2019. (postponed by 1 month, per patient request, due to upcoming surgery)  -Mammogram due 6/2019.  -Yearly labs, including Vit D, and screening tests per PCP.   -Continue alendronate as per PCP Rx. She will discuss with PCP, length of time to hold prior to surgery.    Patient is in agreement with the proposed treatment plan. All questions were answered to the patient's satisfaction. Pt knows to call clinic for any new or worsening symptoms and if anything is needed before the next clinic visit.      Roger Mckeon, FNP-C  Hematology & Oncology  [facility] [place] [address]  ph. [phone]  Fax. [phone]     I spent 30 minutes (face to face) with the patient, more than 50% was in counseling and coordination of care as detailed above.           Distress Screening Results: Psychosocial Distress screening score of Distress Score: 0 noted and reviewed. No intervention indicated.

## 2019-01-09 NOTE — PROGRESS NOTES
INTERNAL MEDICINE ESTABLISHED PATIENT VISIT NOTE    Subjective:     Chief Complaint: Pre-op Exam (left knee surgery)       Patient ID: Nicho Espinosa is a 78 y.o. female with PMHx of R sided breast ca s/p R modified radical mastectomy 8/2017and on arimidex, DDD c hx compression fx of spine and spndylolisthesis s/p lumbar fusion 1/2015, preDM, OA B knees, insomnia, essential tremor, osteoporosis c Vit D def, hx tob use but quit in the past, hx DVT 11/2013 remigio-operative prev on coumadin but only for a few weeks, GERD, anemia of chronic disease, elevated triglycerides, last seen by me in June 2018, here today for pre-op assessment for L knee surgery.  Scheduled for a L TKA in Feb.    Denies cp or sob.  Can do heavy housework at home s issues.  Can walk several blocks s cp or sob and is only limited by her knees.  Denies complications with anesthesia in the past.    Has been followed by Dr. Leslie and Dr. Flores for hx breast ca and still on Anastrazole through 8/2022.    Recently had some dysuria.  Took otc Azo and subsequently developed fever, n/v x1 episode.  Says fever has resolved but still has some dysuria.  N/v resolved.  No flank pain.    Past Medical History:  Past Medical History:   Diagnosis Date    Allergy     Arthritis     Blepharitis of both eyes     breast ca 7/18/2017    right    Complication of anesthesia     nausea    Degenerative disc disease     GERD (gastroesophageal reflux disease)     patient denies reflux    History of compression fracture of spine 4/10/2014    Hyperlipidemia     Lumbar disc disease     Meibomitis     Obesity 9/19/2013    Osteoporosis     Papilloma of eyelid     RUL    Postoperative anemia 11/21/2013    Thoracic or lumbosacral neuritis or radiculitis, unspecified 9/20/2013    Tremors of nervous system 2/2015       Home Medications:  Prior to Admission medications    Medication Sig Start Date End Date Taking? Authorizing Provider   alendronate (FOSAMAX) 70 MG  tablet TAKE 1 TABLET BY MOUTH EVERY 7 DAYS 9/10/18  Yes Azalea Walker MD   anastrozole (ARIMIDEX) 1 mg Tab Take 1 tablet (1 mg total) by mouth once daily. 2/19/18 2/19/19 Yes Santiago Smith MD   calcium carbonate (OS-DIANDRA) 600 mg calcium (1,500 mg) Tab Take 600 mg by mouth 2 (two) times daily with meals.   Yes Historical Provider, MD   cholecalciferal (CHOLECALCIFERAL) 100,000 IU/mL injecttion  2/20/18  Yes Historical Provider, MD   cholecalciferol, vitamin D3, 1,000 unit capsule Take 3 capsules daily 2/1/17  Yes Arleen Lara MD   desonide (DESOWEN) 0.05 % lotion Apply topically 2 (two) times daily.   Yes Historical Provider, MD   fenofibrate micronized (LOFIBRA) 134 MG Cap Take 1 capsule (134 mg total) by mouth daily with breakfast. 6/28/18 6/28/19 Yes Azalea Walker MD   hydrocodone-acetaminophen 5-325mg (NORCO) 5-325 mg per tablet 0.5 tablets daily as needed. PATIENT TAKES 1/2 TABLET 2-3 TIMES A DAY 11/16/15  Yes Historical Provider, MD   lidocaine-prilocaine (EMLA) cream  8/7/17  Yes Historical Provider, MD   MULTIVIT WITH CALCIUM,IRON,MIN (WOMEN'S DAILY MULTIVITAMIN ORAL) Take by mouth.   Yes Historical Provider, MD   ondansetron (ZOFRAN-ODT) 4 MG TbDL Take 1 tablet (4 mg total) by mouth every 8 (eight) hours as needed. 9/10/18  Yes Azalea Walker MD   propranolol (INDERAL) 10 MG tablet TAKE 1 TABLET THREE TIMES DAILY 7/11/18  Yes Azalea Walker MD   traZODone (DESYREL) 50 MG tablet TAKE 1 TABLET EVERY NIGHT AS NEEDED FOR INSOMNIA 7/11/18  Yes Azalea Walker MD   warfarin (COUMADIN) 4 MG tablet Take 1 tablet (4 mg total) by mouth Daily. At 5p as directed by coumadin clinic. 11/26/13 12/31/13  Mel Camargo NP       Allergies:  Review of patient's allergies indicates:   Allergen Reactions    Adhesive Hives and Itching    Sulfa (sulfonamide antibiotics) Other (See Comments)     Yeast infection and irritation       Social History:  Social History     Tobacco Use    Smoking status: Former Smoker     Packs/day:  "0.25     Years: 40.00     Pack years: 10.00     Types: Cigarettes     Last attempt to quit: 2011     Years since quittin.0    Smokeless tobacco: Never Used   Substance Use Topics    Alcohol use: No     Comment: 1 glass wine 2x/year    Drug use: No        Review of Systems   Constitutional: Positive for fever (now resolved). Negative for chills and fatigue.   Respiratory: Negative for cough, chest tightness and shortness of breath.    Cardiovascular: Negative for chest pain.   Gastrointestinal: Negative for abdominal pain and blood in stool.   Genitourinary: Positive for dysuria. Negative for frequency.   Musculoskeletal: Positive for arthralgias, back pain and neck pain.         Health Maintenance:     Immunizations:   Influenza is up to date 10/2018 via [employer]  TDap is up to date 2013  Pneumovax is up to date. 2013, 2015  Zostavax is UTD.       Shingrix rec once back in stock     Cancer Screening:  PAP: is up to date. 2017 wnl  Mammogram: is up to date. 2018 on L and L u/s c cyst, benign  Colonoscopy: is up to date. 2016, diverticula; otherwise normal, rec repeat csc in 5 years  DEXA:  is up to date. 2017, repeat 2 yrs, due for DEXA next month, already ordered by Dr. Flores      Objective:   /72 (BP Location: Right arm, Patient Position: Sitting, BP Method: Large (Manual))   Pulse 73   Ht 5' 4" (1.626 m)   Wt 81.7 kg (180 lb 1.9 oz)   LMP 1987   SpO2 97%   BMI 30.92 kg/m²        General: AAO x3, no apparent distress  HEENT: PERRL, OP clear  CV: RRR, no m/r/g  Pulm: Lungs CTAB, no crackles, no wheezes  Abd: s/NT/ND +BS  Extremities: no c/c/e; crepitus on knees    Labs:     Lab Results   Component Value Date    WBC 6.16 2018    HGB 11.7 (L) 2018    HCT 35.2 (L) 2018    MCV 96 2018     2018     Lab Results   Component Value Date    IRON 101 2018    TIBC 377 2018    FERRITIN 70 2018     CMP  Sodium   Date Value Ref " Range Status   06/26/2018 139 136 - 145 mmol/L Final     Potassium   Date Value Ref Range Status   06/26/2018 4.2 3.5 - 5.1 mmol/L Final     Chloride   Date Value Ref Range Status   06/26/2018 104 95 - 110 mmol/L Final     CO2   Date Value Ref Range Status   06/26/2018 27 23 - 29 mmol/L Final     Glucose   Date Value Ref Range Status   06/26/2018 108 70 - 110 mg/dL Final     BUN, Bld   Date Value Ref Range Status   06/26/2018 16 8 - 23 mg/dL Final     Creatinine   Date Value Ref Range Status   06/26/2018 1.0 0.5 - 1.4 mg/dL Final     Calcium   Date Value Ref Range Status   06/26/2018 10.5 8.7 - 10.5 mg/dL Final     Total Protein   Date Value Ref Range Status   06/26/2018 7.1 6.0 - 8.4 g/dL Final     Albumin   Date Value Ref Range Status   06/26/2018 4.0 3.5 - 5.2 g/dL Final     Total Bilirubin   Date Value Ref Range Status   06/26/2018 0.6 0.1 - 1.0 mg/dL Final     Comment:     For infants and newborns, interpretation of results should be based  on gestational age, weight and in agreement with clinical  observations.  Premature Infant recommended reference ranges:  Up to 24 hours.............<8.0 mg/dL  Up to 48 hours............<12.0 mg/dL  3-5 days..................<15.0 mg/dL  6-29 days.................<15.0 mg/dL       Alkaline Phosphatase   Date Value Ref Range Status   06/26/2018 44 (L) 55 - 135 U/L Final     AST   Date Value Ref Range Status   06/26/2018 15 10 - 40 U/L Final     ALT   Date Value Ref Range Status   06/26/2018 11 10 - 44 U/L Final     Anion Gap   Date Value Ref Range Status   06/26/2018 8 8 - 16 mmol/L Final     eGFR if    Date Value Ref Range Status   06/26/2018 >60 >60 mL/min/1.73 m^2 Final     eGFR if non    Date Value Ref Range Status   06/26/2018 54 (A) >60 mL/min/1.73 m^2 Final     Comment:     Calculation used to obtain the estimated glomerular filtration  rate (eGFR) is the CKD-EPI equation.        Lab Results   Component Value Date    LDLCALC 103.4  06/26/2018     Lab Results   Component Value Date    HGBA1C 5.5 02/19/2018     Lab Results   Component Value Date    TSH 1.402 01/30/2017         Assessment/Plan     Nicho was seen today for pre-op exam.    Diagnoses and all orders for this visit:    Pre-op examination  Primary osteoarthritis of both knees  Plan for TKA L knee.  Overall, pt is a low-risk patient for an intermediate-risk procedure.  Needs updated labs and tx of likely UTI, will order testing today.  However, her EKG shows some new TWI in the lateral leads so will have Cardiology evaluate prior to surgery.  Pt is asymptomatic.  BP borderline but not requiring meds.  Advised to hold ASA one week prior to surgery and can resume once ok with Ortho  -     CBC auto differential; Future  -     Comprehensive metabolic panel; Future  -     IN OFFICE EKG 12-LEAD (to Muse)    Anemia of chronic disease  H/h stable on last check, will monitor  -     CBC auto differential; Future    Age-related osteoporosis without current pathological fracture  On alendronate.  No acute issues.  Repeat dexa pending via h/o.    DDD (degenerative disc disease), cervical  DDD (degenerative disc disease), thoracolumbar  Spondylolisthesis at L5-S1 level  S/P lumbar spinal fusion  Pain managed by pain clinic, no acute issues.    Essential tremor  No acute issues.    Hypertriglyceridemia  On fenofibrate, reports the 134 mg dose price went up so will try 67 mg tablet, take 2 daily  Repeat labs  -     Lipid panel; Future    Malignant neoplasm of upper-outer quadrant of right breast in female, estrogen receptor positive  Prophylactic use of anastrozole (Arimidex)  As per HPI  Cont f/u c H/O, mmg utd    Pre-diabetes  Normalized on last check, will check annual lab  -     Comprehensive metabolic panel; Future  -     Hemoglobin A1c; Future    Dysuria  As per HPI  Will check urine studies and tx empirically c Cipro based on sx  -     URINALYSIS; Future  -     Urine culture; Future      HM as  above  RTC in 6 mos for f/u.    Azalea Walker MD  Department of Internal Medicine - [facility]  01/09/2019

## 2019-01-10 ENCOUNTER — OFFICE VISIT (OUTPATIENT)
Dept: HEMATOLOGY/ONCOLOGY | Facility: CLINIC | Age: 79
End: 2019-01-10
Payer: MEDICARE

## 2019-01-10 ENCOUNTER — OFFICE VISIT (OUTPATIENT)
Dept: CARDIOLOGY | Facility: CLINIC | Age: 79
End: 2019-01-10
Payer: MEDICARE

## 2019-01-10 VITALS
DIASTOLIC BLOOD PRESSURE: 78 MMHG | RESPIRATION RATE: 16 BRPM | SYSTOLIC BLOOD PRESSURE: 145 MMHG | HEART RATE: 59 BPM | TEMPERATURE: 99 F | BODY MASS INDEX: 34.34 KG/M2 | OXYGEN SATURATION: 95 % | WEIGHT: 181.88 LBS | HEIGHT: 61 IN

## 2019-01-10 VITALS
WEIGHT: 181.88 LBS | SYSTOLIC BLOOD PRESSURE: 120 MMHG | HEIGHT: 61 IN | DIASTOLIC BLOOD PRESSURE: 56 MMHG | BODY MASS INDEX: 34.34 KG/M2 | HEART RATE: 69 BPM

## 2019-01-10 DIAGNOSIS — Z79.811 AROMATASE INHIBITOR USE: ICD-10-CM

## 2019-01-10 DIAGNOSIS — E66.09 CLASS 1 OBESITY DUE TO EXCESS CALORIES WITH SERIOUS COMORBIDITY AND BODY MASS INDEX (BMI) OF 34.0 TO 34.9 IN ADULT: ICD-10-CM

## 2019-01-10 DIAGNOSIS — Z86.718 HISTORY OF DEEP VEIN THROMBOSIS (DVT) OF LOWER EXTREMITY: ICD-10-CM

## 2019-01-10 DIAGNOSIS — Z17.0 MALIGNANT NEOPLASM OF UPPER-OUTER QUADRANT OF RIGHT BREAST IN FEMALE, ESTROGEN RECEPTOR POSITIVE: Primary | ICD-10-CM

## 2019-01-10 DIAGNOSIS — C50.411 MALIGNANT NEOPLASM OF UPPER-OUTER QUADRANT OF RIGHT BREAST IN FEMALE, ESTROGEN RECEPTOR POSITIVE: Primary | ICD-10-CM

## 2019-01-10 DIAGNOSIS — Z17.0 MALIGNANT NEOPLASM OF UPPER-OUTER QUADRANT OF RIGHT BREAST IN FEMALE, ESTROGEN RECEPTOR POSITIVE: ICD-10-CM

## 2019-01-10 DIAGNOSIS — C50.411 MALIGNANT NEOPLASM OF UPPER-OUTER QUADRANT OF RIGHT BREAST IN FEMALE, ESTROGEN RECEPTOR POSITIVE: ICD-10-CM

## 2019-01-10 DIAGNOSIS — R73.03 PRE-DIABETES: ICD-10-CM

## 2019-01-10 DIAGNOSIS — Z85.3 HISTORY OF RIGHT BREAST CANCER: ICD-10-CM

## 2019-01-10 DIAGNOSIS — R94.31 NONSPECIFIC ABNORMAL ELECTROCARDIOGRAM (ECG) (EKG): ICD-10-CM

## 2019-01-10 DIAGNOSIS — M17.0 PRIMARY OSTEOARTHRITIS OF BOTH KNEES: ICD-10-CM

## 2019-01-10 DIAGNOSIS — Z01.810 PREOPERATIVE CARDIOVASCULAR EXAMINATION: Primary | ICD-10-CM

## 2019-01-10 DIAGNOSIS — M81.0 AGE-RELATED OSTEOPOROSIS WITHOUT CURRENT PATHOLOGICAL FRACTURE: ICD-10-CM

## 2019-01-10 PROCEDURE — 99214 OFFICE O/P EST MOD 30 MIN: CPT | Mod: HCNC,S$GLB,, | Performed by: NURSE PRACTITIONER

## 2019-01-10 PROCEDURE — 1101F PR PT FALLS ASSESS DOC 0-1 FALLS W/OUT INJ PAST YR: ICD-10-PCS | Mod: CPTII,HCNC,S$GLB, | Performed by: NURSE PRACTITIONER

## 2019-01-10 PROCEDURE — 99203 OFFICE O/P NEW LOW 30 MIN: CPT | Mod: HCNC,S$GLB,, | Performed by: INTERNAL MEDICINE

## 2019-01-10 PROCEDURE — 99999 PR PBB SHADOW E&M-EST. PATIENT-LVL III: ICD-10-PCS | Mod: PBBFAC,HCNC,, | Performed by: NURSE PRACTITIONER

## 2019-01-10 PROCEDURE — 99203 PR OFFICE/OUTPT VISIT, NEW, LEVL III, 30-44 MIN: ICD-10-PCS | Mod: HCNC,S$GLB,, | Performed by: INTERNAL MEDICINE

## 2019-01-10 PROCEDURE — 1101F PT FALLS ASSESS-DOCD LE1/YR: CPT | Mod: CPTII,HCNC,S$GLB, | Performed by: INTERNAL MEDICINE

## 2019-01-10 PROCEDURE — 99999 PR PBB SHADOW E&M-EST. PATIENT-LVL III: CPT | Mod: PBBFAC,HCNC,, | Performed by: NURSE PRACTITIONER

## 2019-01-10 PROCEDURE — 1101F PR PT FALLS ASSESS DOC 0-1 FALLS W/OUT INJ PAST YR: ICD-10-PCS | Mod: CPTII,HCNC,S$GLB, | Performed by: INTERNAL MEDICINE

## 2019-01-10 PROCEDURE — 1101F PT FALLS ASSESS-DOCD LE1/YR: CPT | Mod: CPTII,HCNC,S$GLB, | Performed by: NURSE PRACTITIONER

## 2019-01-10 PROCEDURE — 99999 PR PBB SHADOW E&M-EST. PATIENT-LVL III: CPT | Mod: PBBFAC,HCNC,, | Performed by: INTERNAL MEDICINE

## 2019-01-10 PROCEDURE — 99214 PR OFFICE/OUTPT VISIT, EST, LEVL IV, 30-39 MIN: ICD-10-PCS | Mod: HCNC,S$GLB,, | Performed by: NURSE PRACTITIONER

## 2019-01-10 PROCEDURE — 99999 PR PBB SHADOW E&M-EST. PATIENT-LVL III: ICD-10-PCS | Mod: PBBFAC,HCNC,, | Performed by: INTERNAL MEDICINE

## 2019-01-10 NOTE — PROGRESS NOTES
Subjective:   Patient ID:  Nicho Espinosa is a 78 y.o. female who presents for evaluation of Pre-op Exam (left knee replacement) and Abnormal ECG      HPI: Very pleasant woman with no significant cardiovascular history presents for evaluation prior to left knee replacement.  She was seen by IM yesterday and an ECG was performed and it was abnormal with RBBB and nonspecific T wave inversion.  Beyond her joint pain, she is doing well with no new symptoms or cardiovascular complaints and no change in exercise capacity.  He denies chest discomfort, ACOSTA, palpitations, PND/orthopnea, lightheadedness and syncope.      Internal Medicine note from yesterday: Nicho Espinosa is a 78 y.o. female with PMHx of R sided breast ca s/p R modified radical mastectomy 8/2017and on arimidex, DDD c hx compression fx of spine and spndylolisthesis s/p lumbar fusion 1/2015, preDM, OA B knees, insomnia, essential tremor, osteoporosis c Vit D def, hx tob use but quit in the past, hx DVT 11/2013 remigio-operative prev on coumadin but only for a few weeks, GERD, anemia of chronic disease, elevated triglycerides, last seen by me in June 2018, here today for pre-op assessment for L knee surgery.  Scheduled for a L TKA in Feb.     Has been followed by Dr. Leslie and Dr. Flores for hx breast ca and still on Anastrazole through 8/2022.     Recently had some dysuria.  Took otc Azo and subsequently developed fever, n/v x1 episode.  Says fever has resolved but still has some dysuria.  N/v resolved.  No flank pain.    Past Medical History:   Diagnosis Date    Allergy     Arthritis     Blepharitis of both eyes     breast ca 7/18/2017    right    Complication of anesthesia     nausea    Degenerative disc disease     GERD (gastroesophageal reflux disease)     patient denies reflux    History of compression fracture of spine 4/10/2014    Hyperlipidemia     Lumbar disc disease     Meibomitis     Obesity 9/19/2013    Osteoporosis     Papilloma of  eyelid     RUL    Postoperative anemia 2013    Thoracic or lumbosacral neuritis or radiculitis, unspecified 2013    Tremors of nervous system 2015       Past Surgical History:   Procedure Laterality Date    APPENDECTOMY      ARTHROPLASTY, KNEE, TOTAL Right 2013    [facility]    BIOPSY-LYMPH NODE-SENTINEL Right 2017    [facility] at [facility] 2ND [facility]LR    BREAST BIOPSY Right     COLONOSCOPY N/A 2016    Per[facility]o [facility] at [facility] ENDO (4TH FLR)    FOOT SURGERY      FUSION-TRANSLUMINAL LUMBAR INTERBODY (TLIF) CPT:69552-34,04080,96690,50913 Right 2015    [facility] at [facility] 2ND FLR    INJECTION-NODE-SENTINEL Right 2017    [facility] 2[facility]LR    MASTECTOMY Right 2017    MASTECTOMY RIGHT Right 2017    [facility] 2ND FLR    SHOULDER ARTHROSCOPY      left    SPINE SURGERY  1-12-15    RICH    TONSILLECTOMY      TOTAL KNEE ARTHROPLASTY         Social History     Tobacco Use    Smoking status: Former Smoker     Packs/day: 0.25     Years: 40.00     Pack years: 10.00     Types: Cigarettes     Last attempt to quit: 2011     Years since quittin.0    Smokeless tobacco: Never Used   Substance Use Topics    Alcohol use: No     Comment: 1 glass wine 2x/year    Drug use: No       Family History   Problem Relation Age of Onset    Cancer Father         bone    Breast cancer Mother         never had biopsy    Stroke Daughter     Diabetes Daughter         was obese    Hypertension Neg Hx        Current Outpatient Medications   Medication Sig    alendronate (FOSAMAX) 70 MG tablet TAKE 1 TABLET BY MOUTH EVERY 7 DAYS    anastrozole (ARIMIDEX) 1 mg Tab Take 1 tablet (1 mg total) by mouth once daily.    calcium carbonate (OS-DIANDRA) 600 mg calcium (1,500 mg) Tab Take 600 mg by mouth 2 (two)  times daily with meals.    cholecalciferal (CHOLECALCIFERAL) 100,000 IU/mL injecttion     cholecalciferol, vitamin D3, 1,000 unit capsule Take 3 capsules daily    desonide (DESOWEN) 0.05 % lotion Apply topically 2 (two) times daily.    fenofibrate micronized (LOFIBRA) 67 MG capsule Take 2 capsules (134 mg total) by mouth daily with breakfast.    hydrocodone-acetaminophen 5-325mg (NORCO) 5-325 mg per tablet 0.5 tablets daily as needed. PATIENT TAKES 1/2 TABLET 2-3 TIMES A DAY    MULTIVIT WITH CALCIUM,IRON,MIN (WOMEN'S DAILY MULTIVITAMIN ORAL) Take by mouth.    ondansetron (ZOFRAN-ODT) 4 MG TbDL Take 1 tablet (4 mg total) by mouth every 8 (eight) hours as needed.    propranolol (INDERAL) 10 MG tablet Take 1 tablet (10 mg total) by mouth 3 (three) times daily.    traZODone (DESYREL) 50 MG tablet TAKE 1 TABLET EVERY NIGHT AS NEEDED FOR INSOMNIA    ciprofloxacin HCl (CIPRO) 250 MG tablet Take 1 tablet (250 mg total) by mouth 2 (two) times daily. for 7 days    lidocaine-prilocaine (EMLA) cream      Current Facility-Administered Medications   Medication    EUFLEXXA 10 mg/mL(mw 2.4 -3.6 million) injection Syrg 20 mg       Review of patient's allergies indicates:   Allergen Reactions    Adhesive Hives and Itching    Sulfa (sulfonamide antibiotics) Other (See Comments)     Yeast infection and irritation       Review of Systems   Constitution: Negative.   HENT: Negative.    Eyes: Negative.    Cardiovascular: Negative.  Negative for chest pain, dyspnea on exertion, near-syncope, orthopnea and palpitations.   Respiratory: Negative.  Negative for cough, hemoptysis and shortness of breath.    Endocrine: Negative.    Hematologic/Lymphatic: Negative.    Skin: Negative.    Musculoskeletal: Positive for joint pain.   Gastrointestinal: Negative.    Genitourinary: Negative.    Neurological: Negative.    Psychiatric/Behavioral: Negative.      Objective:   Physical Exam   Constitutional: She is oriented to person, place, and  time. She appears well-developed and well-nourished.   HENT:   Head: Normocephalic and atraumatic.   Mouth/Throat: Oropharynx is clear and moist.   Eyes: Conjunctivae and EOM are normal. No scleral icterus.   Neck: Normal range of motion. Neck supple. No JVD present.   Cardiovascular: Normal rate, regular rhythm, normal heart sounds and intact distal pulses. Exam reveals no gallop and no friction rub.   No murmur heard.  Pulmonary/Chest: Effort normal and breath sounds normal. She has no wheezes. She has no rales.   Abdominal: Soft. Bowel sounds are normal. She exhibits no distension. There is no tenderness.   Musculoskeletal: She exhibits no edema or deformity.   Neurological: She is alert and oriented to person, place, and time.   Skin: Skin is warm and dry. No rash noted. No erythema.   Psychiatric: She has a normal mood and affect. Her behavior is normal. Judgment and thought content normal.   Vitals reviewed.      Lab Results   Component Value Date    WBC 4.66 01/09/2019    HGB 11.2 (L) 01/09/2019    HCT 36.3 (L) 01/09/2019    MCV 99 (H) 01/09/2019     01/09/2019         Chemistry        Component Value Date/Time     01/09/2019 1210    K 4.1 01/09/2019 1210     01/09/2019 1210    CO2 27 01/09/2019 1210    BUN 18 01/09/2019 1210    CREATININE 0.9 01/09/2019 1210    GLU 97 01/09/2019 1210        Component Value Date/Time    CALCIUM 9.3 01/09/2019 1210    ALKPHOS 43 (L) 01/09/2019 1210    AST 21 01/09/2019 1210    ALT 16 01/09/2019 1210    BILITOT 0.4 01/09/2019 1210    ESTGFRAFRICA >60 01/09/2019 1210    EGFRNONAA >60 01/09/2019 1210            Lab Results   Component Value Date    CHOL 182 01/09/2019    CHOL 186 06/26/2018    CHOL 218 (H) 02/19/2018     Lab Results   Component Value Date    HDL 44 01/09/2019    HDL 39 (L) 06/26/2018    HDL 40 02/19/2018     Lab Results   Component Value Date    LDLCALC 104.8 01/09/2019    LDLCALC 103.4 06/26/2018    LDLCALC 122.6 02/19/2018     Lab Results    Component Value Date    TRIG 166 (H) 01/09/2019    TRIG 218 (H) 06/26/2018    TRIG 277 (H) 02/19/2018     Lab Results   Component Value Date    CHOLHDL 24.2 01/09/2019    CHOLHDL 21.0 06/26/2018    CHOLHDL 18.3 (L) 02/19/2018       Lab Results   Component Value Date    TSH 1.402 01/30/2017       Lab Results   Component Value Date    HGBA1C 5.6 01/09/2019         Assessment:     1. Preoperative cardiovascular examination    2. History of right breast cancer    3. Malignant neoplasm of upper-outer quadrant of right breast in female, estrogen receptor positive    4. Class 1 obesity due to excess calories with serious comorbidity and body mass index (BMI) of 34.0 to 34.9 in adult    5. Pre-diabetes    6. Nonspecific abnormal electrocardiogram (ECG) (EKG)        Plan:     She has no angina, heart failure, or unstable arrhythmia.  No further cardiovascular testing is required prior to proceeding to the operating room.  The ECG is not meaningfully changed since 2017 and its findings are not cause for concern here.    F/U PRN

## 2019-01-10 NOTE — LETTER
January 10, 2019      Azalea Walker MD  [address]  [place] [address]           [facility] - Cardiology  [address]  [facility] [address]  Phone: [phone]          Patient: Nicho Espinosa   MR Number: [id number]   YOB: 1940   Date of Visit: 1/10/2019       Dear Dr. Cheney:    Thank you for referring Nicho Espinosa to me for evaluation. Attached you will find relevant portions of my assessment and plan of care.    If you have questions, please do not hesitate to call me. I look forward to following Nicho Espinosa along with you.    Sincerely,    Leandro Mccullough MD    Enclosure  CC:  No Recipients    If you would like to receive this communication electronically, please contact [email or web address] or [phone] to request more information on [facility] access.    For providers and/or their staff who would like to refer a patient to [facility], please contact us through our one-stop-shop provider referral line, [facility] , at [phone].    If you feel you have received this communication in error or would no longer like to receive these types of communications, please e-mail [email or web address]

## 2019-01-14 NOTE — PRE ADMISSION SCREENING
Anesthesia Assessment: Preoperative EQUATION    Planned Procedure: Procedure(s) (LRB):  REPLACEMENT-KNEE-TOTAL-SIGHT ASSISTED (Left)  Requested Anesthesia Type:Regional  Surgeon: Sami Washburn MD  Service: Orthopedics  Known or anticipated Date of Surgery:2/5/2019      Plan:    Testing:  PT/INR   Pre-anesthesia  visit       Visit focus: possible regional anesthesia and/or nerve block      Consultation:Patient's PCP for a statement of optimization  Cardiology-Dr. Jamel Dugan, RN 01/16/2019

## 2019-01-16 ENCOUNTER — ANESTHESIA EVENT (OUTPATIENT)
Dept: SURGERY | Facility: HOSPITAL | Age: 79
DRG: 470 | End: 2019-01-16
Payer: MEDICARE

## 2019-01-16 NOTE — ANESTHESIA PREPROCEDURE EVALUATION
Pre-operative evaluation for Procedure(s) (LRB):  REPLACEMENT-KNEE-TOTAL-SIGHT ASSISTED (Left)    Nicho Espinosa is a 74 y.o., female. With PMH of Osteoprosis, Compression Fracture, DJD (Cervical, Thoracic, and Lumbar) Spine with Chronic Low back pain, Vitamin D Deficiency, GERD, OA Knees,  Obesity, Colon Polyps, and DVT S/P R-TKA (11/2013)    Patient Active Problem List   Diagnosis    Essential tremor    Primary osteoarthritis of both knees    DDD (degenerative disc disease), cervical    DDD (degenerative disc disease), thoracolumbar    Mild vitamin D deficiency    Obesity    History of colonic polyps    Hypertriglyceridemia    Insomnia    Anemia of chronic disease    Status post total knee replacement    History of compression fracture of spine    Spondylolisthesis at L5-S1 level    Pre-diabetes    S/P lumbar spinal fusion    GERD (gastroesophageal reflux disease)    Age-related osteoporosis without current pathological fracture    History of deep vein thrombosis (DVT) of lower extremity    Malignant neoplasm of upper-outer quadrant of right breast in female, estrogen receptor positive    Breast cancer in female    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    Prophylactic use of anastrozole (Arimidex)    History of right breast cancer    Aromatase inhibitor use    Pain and swelling of left knee    Decreased range of motion with decreased strength    Impaired functional mobility, balance, gait, and endurance        Current Facility-Administered Medications on File Prior to Encounter   Medication Dose Route Frequency Provider Last Rate Last Dose    EUFLEXXA 10 mg/mL(mw 2.4 -3.6 million) injection Syrg 20 mg  20 mg Intra-articular Weekly Sami Washburn MD   20 mg at 03/05/18 1418     Current Outpatient Medications on File Prior to Encounter   Medication Sig Dispense Refill    anastrozole (ARIMIDEX) 1 mg Tab Take 1 tablet (1 mg total) by mouth once daily. 90 tablet 3    calcium  carbonate (OS-DIANDRA) 600 mg calcium (1,500 mg) Tab Take 600 mg by mouth 2 (two) times daily with meals.      cholecalciferol, vitamin D3, 1,000 unit capsule Take 3 capsules daily (Patient taking differently: Take 1,000 Units by mouth 2 (two) times daily. Take 3 capsules daily) 90 capsule 11    desonide (DESOWEN) 0.05 % lotion Apply topically 2 (two) times daily.      hydrocodone-acetaminophen 5-325mg (NORCO) 5-325 mg per tablet 0.5 tablets daily as needed. PATIENT TAKES 1/2 TABLET 3-4 TIMES A DAY  0    MULTIVIT WITH CALCIUM,IRON,MIN (WOMEN'S DAILY MULTIVITAMIN ORAL) Take by mouth.      ondansetron (ZOFRAN-ODT) 4 MG TbDL Take 1 tablet (4 mg total) by mouth every 8 (eight) hours as needed. 90 tablet 0    [DISCONTINUED] warfarin (COUMADIN) 4 MG tablet Take 1 tablet (4 mg total) by mouth Daily. At 5p as directed by coumadin clinic. 90 tablet 1       Past Surgical History:   Procedure Laterality Date    APPENDECTOMY      ARTHROPLASTY, KNEE, TOTAL Right 11/14/2013    Performed by [facility] OR 2ND FLR    BIOPSY-LYMPH NODE-SENTINEL Right 8/9/2017    Performed by [facility]lio Rolle [facility] at [facility] OR 2ND FLR    BREAST BIOPSY Right 2017    COLONOSCOPY N/A 11/1/2016    Performed by Candelario Oviedo MD at [facility] ENDO (4TH FLR)    FOOT SURGERY      FUSION-TRANSLUMINAL LUMBAR INTERBODY (TLIF) CPT:86347-98,31767,50285,81510 Right 1/12/2015    Performed by Rodney Beckett MD at [facility] OR 2ND FLR    INJECTION-NODE-SENTINEL Right 8/9/2017    Performed by [facility] Rolle [facility] at [facility] OR 2ND FLR    MASTECTOMY Right 08/2017    MASTECTOMY RIGHT Right 8/9/2017    [facility]    SHOULDER ARTHROSCOPY      left    SPINE SURGERY  1-12-15    RICH    TONSILLECTOMY      TOTAL KNEE ARTHROPLASTY         2D Echo:  CONCLUSIONS     1 - Mild left atrial enlargement.     2 - Normal left ventricular systolic function (EF 60-65%).     3 - No wall motion abnormalities.     4 -  "Normal right ventricular systolic function .     5 - The estimated PA systolic pressure is 25 mmHg.     6 - Indeterminate LV diastolic function.       Anesthesia Evaluation         Review of Systems  Anesthesia Hx:  Hx of Anesthetic complications PONV no problems after back 2015 sx or breast sx 2017    S/p R-TKR 11/2013(2013 anesthesia record indicated:Pt has DDD and spinal stenosis. Difficulty with 3.5" spinal needle, kept hitting oss. Changed to tuohy for an introducer, got MEGHAN and spinal was easy with this method)    TLIF 2015: Method of Intubation: Glidescope; Inserted by: Anesthesia Resident; Airway Device: Endotracheal Tube; Mask Ventilation: Mod Diff - oral; Intubated:   Social:  Former Smoker, No Alcohol Use    Hematology/Oncology:         -- Anemia: Current/Recent Cancer. Breast right surgery   EENT/Dental:   Seasonal allergies   Cardiovascular:   Ambulates with walker.  Water aerobics 1-3 times a week, climbs ladder, painting.  Denies chest pain or sob 2017 echo-Indeterminate LV diastolic function.        US 11/2013:  Right Leg: Color flow evaluation of the lower extremity demonstrates partial age-indeterminant non-occlusive thrombus in the peroneal veins at the mid calf. There is no evidence of venous thrombosis in the superficial veins.    Pulmonary:   Pneumonia (past hx 1970's)  Pulmonary Symptoms:  are productive cough.  Clear. Recent chest cold.     Renal/:   Was tx with Cipro 1/2019-patient was reporting,flank pain, fever, and burning with urination.   Urine culture done 1/9/2018:  Multiple organisms isolated. None in predominance.  Repeat if clinically necessary    Patient reports symptoms resolved     Hepatic/GI:   Denies PUD. GERD (in past. not on medication. can lie flat) Denies Liver Disease.    Musculoskeletal:  Musculoskeletal General/Symptoms:  Joint Disease:  Arthritis, Osteoarthritis  Cervical Spine Disorder, Cervical Disc Disease, S/P Cervical Fusion  Lumbar Spine Disorders, Lumbar Disc " Disease, S/P Lumbar Fusion (2015)   CT spine 2017  Status post L5-S1 fusion.  There is new lucency surrounding the pedicle screws at S1, subtle loosening cannot be excluded.  This is a new finding.  Severe degenerative disc disease in the lower thoracic and upper lumbar spine with levoscoliosis    Neurological:   Denies CVA. Denies Seizures.  Neuro Symptoms of tremor Osteoarthritis    Endocrine:  Endocrine Normal    Dermatological:   dermatitis   Psych:  Psychiatric Normal           Physical Exam  General:  Well nourished, Obesity    Airway/Jaw/Neck:  Airway Findings: Mouth Opening: Normal Tongue: Normal  General Airway Assessment: Adult  Jaw/Neck Findings:  Neck ROM: Normal ROM  Neck Findings:  Girth Increased, Short Neck      Dental:  Dental Findings: molar caps, In tact   Chest/Lungs:  Chest/Lungs Findings: Clear to auscultation, Normal Respiratory Rate     Heart/Vascular:  Heart Findings: Rate: Normal  Rhythm: Regular Rhythm  Sounds: Normal  Heart Murmur      Musculoskeletal:  Musculoskeletal Findings: Scoliosis Left mid-back slightly more prominent      Mental Status:  Mental Status Findings:  Cooperative, Alert and Oriented         Lab reviewed    Discharge plans: home at daughter's house([facility]) [phone]    PCPDenise, 1/10  Plan for TKA L knee.  Overall, pt is a low-risk patient for an intermediate-risk procedure.  Needs updated labs and tx of likely UTI, will order testing today.  However, her EKG shows some new TWI in the lateral leads so will have Cardiology evaluate prior to surgery.  Pt is asymptomatic.    Cardiology evaluation, Dr. Mccullough, 1/10  She has no angina, heart failure, or unstable arrhythmia.  No further cardiovascular testing is required prior to proceeding to the operating room.  The ECG is not meaningfully changed since 2017 and its findings are not cause for concern here.     Griselda Dugan, ROBERTA 01/28/2019          Anesthesia Plan  Type of Anesthesia, risks & benefits  discussed:  Anesthesia Type:  CSE, epidural, general, regional, spinal  Patient's Preference:   Intra-op Monitoring Plan: standard ASA monitors  Intra-op Monitoring Plan Comments:   Post Op Pain Control Plan:   Post Op Pain Control Plan Comments:   Induction:    Beta Blocker:  Patient is not currently on a Beta-Blocker (No further documentation required).       Informed Consent: Patient understands risks and agrees with Anesthesia plan.  Questions answered. Anesthesia consent signed with patient.  ASA Score: 3     Day of Surgery Review of History & Physical:    H&P update referred to the surgeon.         Ready For Surgery From Anesthesia Perspective.

## 2019-01-17 ENCOUNTER — CLINICAL SUPPORT (OUTPATIENT)
Dept: REHABILITATION | Facility: OTHER | Age: 79
End: 2019-01-17
Payer: MEDICARE

## 2019-01-17 ENCOUNTER — TELEPHONE (OUTPATIENT)
Dept: PREADMISSION TESTING | Facility: HOSPITAL | Age: 79
End: 2019-01-17

## 2019-01-17 DIAGNOSIS — M25.562 PAIN AND SWELLING OF LEFT KNEE: ICD-10-CM

## 2019-01-17 DIAGNOSIS — R53.1 DECREASED RANGE OF MOTION WITH DECREASED STRENGTH: ICD-10-CM

## 2019-01-17 DIAGNOSIS — Z74.09 IMPAIRED FUNCTIONAL MOBILITY, BALANCE, GAIT, AND ENDURANCE: ICD-10-CM

## 2019-01-17 DIAGNOSIS — M79.606 PAIN OF LOWER EXTREMITY, UNSPECIFIED LATERALITY: Primary | ICD-10-CM

## 2019-01-17 DIAGNOSIS — M25.462 PAIN AND SWELLING OF LEFT KNEE: ICD-10-CM

## 2019-01-17 DIAGNOSIS — M25.60 DECREASED RANGE OF MOTION WITH DECREASED STRENGTH: ICD-10-CM

## 2019-01-17 PROCEDURE — 97110 THERAPEUTIC EXERCISES: CPT | Mod: HCNC,PN

## 2019-01-17 PROCEDURE — G8978 MOBILITY CURRENT STATUS: HCPCS | Mod: CK,HCNC,PN

## 2019-01-17 PROCEDURE — G8979 MOBILITY GOAL STATUS: HCPCS | Mod: CJ,HCNC,PN

## 2019-01-17 PROCEDURE — 97162 PT EVAL MOD COMPLEX 30 MIN: CPT | Mod: HCNC,PN

## 2019-01-17 NOTE — PLAN OF CARE
[facility]  Physical Therapy Initial Evaluation    Name: Nicho SCHAEFER Muryojana  Clinic Number: [id number]    Therapy Diagnosis:   Encounter Diagnoses   Name Primary?    Pain and swelling of left knee     Decreased range of motion with decreased strength     Impaired functional mobility, balance, gait, and endurance      Physician: Sami Washburn MD    Physician Orders: PT Eval and Treat - pre-hab with TKA scheduled for 02/05/2019  Medical Diagnosis from Referral: M17.12 (ICD-10-CM) - Primary osteoarthritis of left knee  Evaluation Date: 1/17/2019  Authorization Period Expiration: 12/31/2019  Plan of Care Expiration: 04/16/2019  Visit # / Visits authorized: 1/ 20    Time In: 10:00  Time Out: 11:00  Total Billable Time: 60 minutes    Precautions: Standard, chronic low back pain s/p fusion, osteoporosis, Hx Breast Cancer, R TKA    Subjective   Date of onset: >5 years  History of current condition - Nicho reports: Chronic left knee pain that she notes failed response from previous injections. She notes that there is significant pain with all weight bearing activities and that she feels that it throws her balance off. She requires HHA freqiently with standing, walking, stair climbing, derssing, and getting in/out of a car/chair. C/c is global knee pain. Reports intermittent crepitus but denies locking, popping, radiating pain. She also has a Hx of chronic low back pain that she says will probably interfere with her therapy.      Medical History:   Past Medical History:   Diagnosis Date    Allergy     Arthritis     Blepharitis of both eyes     breast ca 7/18/2017    right    Complication of anesthesia     nausea    Degenerative disc disease     GERD (gastroesophageal reflux disease)     patient denies reflux    History of compression fracture of spine 4/10/2014    Hyperlipidemia     Lumbar disc disease     Meibomitis     Obesity 9/19/2013    Osteoporosis     Papilloma of eyelid      RUL    Postoperative anemia 11/21/2013    Thoracic or lumbosacral neuritis or radiculitis, unspecified 9/20/2013    Tremors of nervous system 2/2015       Surgical History:   Nicho Espinosa  has a past surgical history that includes Shoulder arthroscopy; Tonsillectomy; Appendectomy; Total knee arthroplasty; Spine surgery (1-12-15); Foot surgery; Colonoscopy (N/A, 11/1/2016); Breast biopsy (Right, 2017); and Mastectomy (Right, 08/2017).    Medications:   Nicoh has a current medication list which includes the following prescription(s): alendronate, anastrozole, calcium carbonate, cholecalciferal, cholecalciferol (vitamin d3), desonide, fenofibrate micronized, hydrocodone-acetaminophen, lidocaine-prilocaine, multivit with calcium,iron,min, ondansetron, propranolol, trazodone, and warfarin, and the following Facility-Administered Medications: euflexxa.    Allergies:   Review of patient's allergies indicates:   Allergen Reactions    Adhesive Hives and Itching    Sulfa (sulfonamide antibiotics) Other (See Comments)     Yeast infection and irritation        Imaging, Xray of knee on 10/23/18: Findings - Small left suprapatellar joint effusion.  The tricompartment DJD change left knee particularly medial joint space with limited medial subluxation of femur in respect to tibia, internal joint arrangement, stable.  Left total knee joint prostatic changes stable.    Prior Therapy: yes - [facility] for back pain (2016), 2014 following R TKA  Social History: recreation - exercising, aquatics at [facility]. Lives with their daughter (disabled from CVA), duplex - pt lives upstairs (23 steps), and daughter lives downstairs  Occupation: retired  Prior Level of Function: Independent   Current Level of Function: pain and difficulty with all functional activiites    Pain:  Current 0/10, worst 10/10, best 0/10   Location: left knee   Description: Aching, Dull and stiff - varies with activity  Aggravating Factors:  Standing, Bending, Walking, Extension, Lifting, Getting out of bed/chair and squatting, stair climbing, pivoting, balance, sleeping, HHCs  Easing Factors: ice, rest and pain medication (for LBP). Factors are not consistent at reducing knee pain.    Pts goals: be able to walk without knee pain      Objective     Observation/palpation: increased warmth over anterior L knee, increased enlargement of R knee    Knee  Right   Left  Pain/Dysfunction with Movement    AROM PROM MMT AROM PROM MMT    Flexion 115 126 4 112 126 4-    Extension 0 4 4 4 6 4-      Girth (mid patella): R = 42 cm, L = 48 cm    Hip ROM: WFL  Hip strength: grossly 3+/5  Ankle ROM: WFL  Ankle strength: 5/5    Flexibility:   Hip flexors / quads = poor  Hamstrings = fair    Joint Mobility: Patellar glides (ML, sup/inf) = 1+/6 with crepitus    Special Tests:    Anterior Drawer: -  Posterior Drawer: -  Jose's: -  Posterior Sag: -  Varus/Valgus stress test: -      Gait: I x antalgic with increased trunk sway, decreased LLE stance time due to pain  T/F sit<>stand: HHA x2 with increased WS to RLE  Bed mobility: Mod I with HHA due to LBP  Balance: SLS NT 2* to pain. Pt able to maintain WBOS due to pain      CMS Impairment/Limitation/Restriction for FOTO Knee Survey    Therapist reviewed FOTO scores for Nicho Espinosa on 1/17/2019.   FOTO documents entered into [employer] - see Media section.    Limitation Score: 55%  Category: Mobility    Current : CK = at least 40% but < 60% impaired, limited or restricted  Goal: CJ = at least 20% but < 40% impaired, limited or restricted  Discharge: NA         TREATMENT   Treatment Time In: 10:30  Treatment Time Out: 11:00  Total Treatment time separate from Evaluation: 30 minutes    Nicho received therapeutic exercises to develop strength, endurance, ROM, flexibility, posture and core stabilization for 25 minutes including:  Heel slides 3 min  Heel prop 4 min  Quad sets 2 min x 5: holds  SLR 20x  Seated heel slide 10x  Seated  "LAQ 10 x 5"    Nicho received the following manual therapy techniques: Joint mobilizations were applied to the: patella (all directions) for 5 minutes    Home Exercises and Patient Education Provided    Education provided:   - Patient educated regarding surgical procedure, pathogenesis, diagnosis, protocol, prognosis, POC, and HEP. Written Home Exercises Provided with written and verbal instructions for frequency and duration of the following exercises: see EMR. Pt educated on HEP and activity modifications to reduce c/o pain and improve overall function.     - Pt was educated in posture and body mechanics.  Educated on use of tubigrip to reduce pain and swelling and improve tolerance with weight bearing activities. Size F tubigrip provided today    - Pt also educated on use of modalities prn to reduce c/o pain and dysfunction.       Written Home Exercises Provided: yes.  Exercises were reviewed and Nicho was able to demonstrate them prior to the end of the session.  Nicho demonstrated good  understanding of the education provided.     See EMR under Patient Instructions for exercises provided 1/17/2019.    Assessment   Nicho is a 78 y.o. female referred to outpatient Physical Therapy with a medical diagnosis of left knee OA. Pt presents with limitations in flexibility, strength, ROM, and joint mobility, which coorelate to c/o pain and referring diagnosis of arthritic changes.    Pt prognosis is Good.   Pt will benefit from skilled outpatient Physical Therapy to address the deficits stated above and in the chart below, provide pt/family education, and to maximize pt's level of independence.     Plan of care discussed with patient: Yes  Pt's spiritual, cultural and educational needs considered and patient is agreeable to the plan of care and goals as stated below:     Anticipated Barriers for therapy: chronic low back pain     Medical Necessity is demonstrated by the following  History  Co-morbidities and personal factors " that may impact the plan of care Co-morbidities:   advanced age, consumption of bladder irritants, coping style/mechanism, difficulty sleeping, education level, excessive commute time/distance, financial considerations, level of undertstanding of current condition, prior lumbar surgery and recurrent urinary infections    Personal Factors:   education level  social background  lifestyle  attitudes     moderate   Examination  Body Structures and Functions, activity limitations and participation restrictions that may impact the plan of care Body Regions:   back  lower extremities  trunk    Body Systems:    gross symmetry  ROM  strength  gross coordinated movement  balance  gait  transfers  transitions  motor control  motor learning  edema  scar formation  joint mobility, flexibility    Participation Restrictions:   ADLs, HHCs, recreational activities, sleeipng, driving, stair climbing at home, taking care of disabled daughter    Activity limitations:   Learning and applying knowledge  no deficits    General Tasks and Commands  no deficits    Communication  no deficits    Mobility  lifting and carrying objects  walking  driving (bike, car, motorcycle)    Self care  washing oneself (bathing, drying, washing hands)  caring for body parts (brushing teeth, shaving, grooming)  toileting  dressing  looking after one's health    Domestic Life  shopping  cooking  doing house work (cleaning house, washing dishes, laundry)  assisting others    Interactions/Relationships  no deficits    Life Areas  no deficits    Community and Social Life  community life  recreation and leisure         moderate   Clinical Presentation evolving clinical presentation with changing clinical characteristics moderate   Decision Making/ Complexity Score: moderate     Goals:  Short Term Goals (6 weeks)  1. Pt will demonstrate improvements in left knee ROM to 0-120 for ease with ambulation  2. Pt will demonstrate improvements in left knee strength to 4+/5  for ease with getting out of a chair.  3. Pt will be able to ambulate 200 ft with LRAD and without the presence of antalgic gait pattern  4. Pt will report <5/10 pain within the left knee for ease with ADL's  5. Pt presents with improved functional mobility with FOTO limitation <=40% disability.    Long Term Goals (12 weeks)  1. Pt will demonstrate improvements in left knee ROM to 0-125 for ease with ambulation  2. Pt will demonstrate 5/5 strength within the left knee for ease with house hold chores and climbing stairs  3. Pt will report being independent with his/her HEP for maintenance of improvements gained during therapy sessions  4. Pt will report <3/10 pain within the left knee for ease with ADLs  5. Pt will demonstrate ambulation x 300 ft without AD or antalgic gait.   6. Pt presents with improved functional mobility with FOTO limitation <=30% disability.        Plan   Plan of care Certification: 1/17/2019 to 04/16/2019.    Outpatient Physical Therapy 1 times weekly for 3 weeks until her surgery on 02/05/2019, then resume prn after surgery for 2 times weekly for 12 weeks to include the following interventions: Aquatic Therapy, Cervical/Lumbar Traction, Electrical Stimulation prn, Gait Training, Iontophoresis (with dexamethasone prn), Manual Therapy, Moist Heat/ Ice, Neuromuscular Re-ed, Patient Education, Self Care, Therapeutic Activites, Therapeutic Exercise and IASTYM, therapeutic taping, dry needling. Progress HEP towards D/C. Recommend F/U with MD if symptoms worsen or do not resolve. Patient may be seen by a PTA for treatment to carry out their plan of care.  Face-to-face conferences will be held.        Bianca Gibson, PT

## 2019-01-17 NOTE — TELEPHONE ENCOUNTER
----- Message from Griselda Dugan RN sent at 1/16/2019  4:24 PM CST -----  Anesthesia Assessment: Preoperative EQUATION    Planned Procedure: Procedure(s) (LRB):  REPLACEMENT-KNEE-TOTAL-SIGHT ASSISTED (Left)  Requested Anesthesia Type:Regional  Surgeon: Sami Washburn MD  Service: Orthopedics  Known or anticipated Date of Surgery:2/5/2019      Plan:    Testing:  PT/INR- tell patient additional lab     Pre-anesthesia  visit  -Griselda

## 2019-01-18 NOTE — PRE-PROCEDURE INSTRUCTIONS
Patient called to ask about any blood thinners.  Patient states she is not on any routine blood thinners but take aspirin for pain.  Patient not sure of the aspirin dosage but will bring a list of all her prescribed and unprescribed medications when she comes in 1/28.

## 2019-01-22 NOTE — PROGRESS NOTES
"  Physical Therapy Daily Treatment Note     Name: Nicho SCHAEFER [facility]  Clinic Number: [id number]    Therapy Diagnosis:   Encounter Diagnoses   Name Primary?    Pain and swelling of left knee     Decreased range of motion with decreased strength     Impaired functional mobility, balance, gait, and endurance      Physician: Sami Washburn MD    Visit Date: 1/23/2019    Physician Orders: PT Eval and Treat - pre-hab with TKA scheduled for 02/05/2019  Medical Diagnosis from Referral: M17.12 (ICD-10-CM) - Primary osteoarthritis of left knee  Evaluation Date: 1/17/2019  Authorization Period Expiration: 12/31/2019  Plan of Care Expiration: 04/16/2019  Visit # / Visits authorized: 1/ 20     Time In: 12:00 PM  Time Out: 12:55 PM  Total Billable Time: 55 minutes (30 min 1:1 with PT)    Precautions: Standard, chronic low back pain s/p fusion, osteoporosis, Hx Breast Cancer, R TKA     Subjective     Pt reports: no change today. "I did the exercises, but they take too long."  She was compliant with home exercise program.  Response to previous treatment: no change  Functional change: no change    Pain: 6/10  Location: left knee     Objective     Nicho received therapeutic exercises to develop strength, ROM and flexibility for 50 minutes including:    Recumbent bicycle 5 min to warm up tissues and improve ROM  Heel slides 3 min  Heel prop 4 min - NP  Quad sets 2 min x 5: holds  SLR 2 x 10  Seated heel slide 10x - NP  Seated LAQ 10 x 5"  +Bridges 2 x 10  +clams 5"x 20x  +Seated hip flexion 20x  +SAQ 30x    Nicho received the following manual therapy techniques: tape application were applied to the: L knee for 5 minutes, including:  Kinesiotape applied to medial L knee for pain relief. Patient received education regarding appropriate care and removal of Kinesiotape. Patient instructed in proper removal techniques if skin irritation occurs.        Home Exercises Provided and Patient Education Provided     Education provided:   - Patient " received education regarding appropriate care and removal of Kinesiotape. Patient instructed in proper removal techniques if skin irritation occurs.    Written Home Exercises Provided: Patient instructed to cont prior HEP.  Exercises were reviewed and Nicho was able to demonstrate them prior to the end of the session.  Nicho demonstrated good  understanding of the education provided.     See EMR under Patient Instructions for exercises provided prior visit.    Assessment     Good tolerance to all therex today. Kinesiotape applied to medial L knee for pain reduction, with pt reporting relief. Visual and verbal cues for technique with therex.  Nicho is progressing well towards her goals.   Pt prognosis is Good.     Pt will continue to benefit from skilled outpatient physical therapy to address the deficits listed in the problem list box on initial evaluation, provide pt/family education and to maximize pt's level of independence in the home and community environment.     Pt's spiritual, cultural and educational needs considered and pt agreeable to plan of care and goals.     Anticipated barriers to physical therapy: chronic low back pain     Goals: IE 1/17/2019, PN due 2/15/2019  Short Term Goals (6 weeks)  1. Pt will demonstrate improvements in left knee ROM to 0-120 for ease with ambulation. Progressing, not met  2. Pt will demonstrate improvements in left knee strength to 4+/5 for ease with getting out of a chair. Progressing, not met  3. Pt will be able to ambulate 200 ft with LRAD and without the presence of antalgic gait pattern. Progressing, not met  4. Pt will report <5/10 pain within the left knee for ease with ADL's. Progressing, not met  5. Pt presents with improved functional mobility with FOTO limitation <=40% disability. Progressing, not met     Long Term Goals (12 weeks)  1. Pt will demonstrate improvements in left knee ROM to 0-125 for ease with ambulation. Progressing, not met  2. Pt will demonstrate 5/5  strength within the left knee for ease with house hold chores and climbing stairs. Progressing, not met  3. Pt will report being independent with his/her HEP for maintenance of improvements gained during therapy sessions. Progressing, not met  4. Pt will report <3/10 pain within the left knee for ease with ADLs. Progressing, not met  5. Pt will demonstrate ambulation x 300 ft without AD or antalgic gait. Progressing, not met  6. Pt presents with improved functional mobility with FOTO limitation <=30% disability. Progressing, not met      Plan     Continue plan of care with focus on improved strength of L VARUN.     Jenniffer Rios, PT

## 2019-01-23 ENCOUNTER — CLINICAL SUPPORT (OUTPATIENT)
Dept: REHABILITATION | Facility: OTHER | Age: 79
End: 2019-01-23
Payer: MEDICARE

## 2019-01-23 DIAGNOSIS — Z74.09 IMPAIRED FUNCTIONAL MOBILITY, BALANCE, GAIT, AND ENDURANCE: ICD-10-CM

## 2019-01-23 DIAGNOSIS — M25.562 PAIN AND SWELLING OF LEFT KNEE: ICD-10-CM

## 2019-01-23 DIAGNOSIS — M25.60 DECREASED RANGE OF MOTION WITH DECREASED STRENGTH: ICD-10-CM

## 2019-01-23 DIAGNOSIS — M25.562 LEFT KNEE PAIN, UNSPECIFIED CHRONICITY: Primary | ICD-10-CM

## 2019-01-23 DIAGNOSIS — R53.1 DECREASED RANGE OF MOTION WITH DECREASED STRENGTH: ICD-10-CM

## 2019-01-23 DIAGNOSIS — M25.462 PAIN AND SWELLING OF LEFT KNEE: ICD-10-CM

## 2019-01-23 PROCEDURE — 97110 THERAPEUTIC EXERCISES: CPT | Mod: HCNC,PN | Performed by: PHYSICAL THERAPIST

## 2019-01-28 ENCOUNTER — HOSPITAL ENCOUNTER (OUTPATIENT)
Dept: RADIOLOGY | Facility: HOSPITAL | Age: 79
Discharge: HOME | End: 2019-01-28
Attending: PHYSICIAN ASSISTANT
Payer: MEDICARE

## 2019-01-28 ENCOUNTER — HOSPITAL ENCOUNTER (OUTPATIENT)
Dept: PREADMISSION TESTING | Facility: HOSPITAL | Age: 79
Discharge: HOME | End: 2019-01-28
Attending: ANESTHESIOLOGY
Payer: MEDICARE

## 2019-01-28 ENCOUNTER — OFFICE VISIT (OUTPATIENT)
Dept: ORTHOPEDICS | Facility: CLINIC | Age: 79
End: 2019-01-28
Payer: MEDICARE

## 2019-01-28 VITALS
WEIGHT: 180.63 LBS | OXYGEN SATURATION: 98 % | TEMPERATURE: 98 F | HEART RATE: 76 BPM | HEIGHT: 60 IN | SYSTOLIC BLOOD PRESSURE: 129 MMHG | DIASTOLIC BLOOD PRESSURE: 62 MMHG | BODY MASS INDEX: 35.46 KG/M2

## 2019-01-28 VITALS — HEIGHT: 61 IN | WEIGHT: 182.13 LBS | BODY MASS INDEX: 34.39 KG/M2

## 2019-01-28 DIAGNOSIS — M17.12 PRIMARY OSTEOARTHRITIS OF LEFT KNEE: Primary | ICD-10-CM

## 2019-01-28 DIAGNOSIS — M25.562 LEFT KNEE PAIN, UNSPECIFIED CHRONICITY: ICD-10-CM

## 2019-01-28 PROCEDURE — 73560 X-RAY EXAM OF KNEE 1 OR 2: CPT | Mod: 26,HCNC,LT, | Performed by: RADIOLOGY

## 2019-01-28 PROCEDURE — 99999 PR PBB SHADOW E&M-EST. PATIENT-LVL IV: CPT | Mod: PBBFAC,HCNC,, | Performed by: PHYSICIAN ASSISTANT

## 2019-01-28 PROCEDURE — 73560 XR KNEE 1 OR 2 VIEW LEFT: ICD-10-PCS | Mod: 26,HCNC,LT, | Performed by: RADIOLOGY

## 2019-01-28 PROCEDURE — 99999 PR PBB SHADOW E&M-EST. PATIENT-LVL IV: ICD-10-PCS | Mod: PBBFAC,HCNC,, | Performed by: PHYSICIAN ASSISTANT

## 2019-01-28 PROCEDURE — 99499 NO LOS: ICD-10-PCS | Mod: HCNC,S$GLB,, | Performed by: PHYSICIAN ASSISTANT

## 2019-01-28 PROCEDURE — 73560 X-RAY EXAM OF KNEE 1 OR 2: CPT | Mod: TC,HCNC,LT

## 2019-01-28 PROCEDURE — 99499 UNLISTED E&M SERVICE: CPT | Mod: HCNC,S$GLB,, | Performed by: PHYSICIAN ASSISTANT

## 2019-01-28 RX ORDER — ACETAMINOPHEN 10 MG/ML
1000 INJECTION, SOLUTION INTRAVENOUS ONCE
Status: CANCELLED | OUTPATIENT
Start: 2019-01-28 | End: 2019-01-28

## 2019-01-28 RX ORDER — MORPHINE SULFATE 10 MG/ML
2 INJECTION, SOLUTION INTRAMUSCULAR; INTRAVENOUS
Status: CANCELLED | OUTPATIENT
Start: 2019-01-28

## 2019-01-28 RX ORDER — FAMOTIDINE 20 MG/1
20 TABLET, FILM COATED ORAL 2 TIMES DAILY
Status: CANCELLED | OUTPATIENT
Start: 2019-01-28

## 2019-01-28 RX ORDER — SODIUM CHLORIDE 9 MG/ML
INJECTION, SOLUTION INTRAVENOUS CONTINUOUS
Status: CANCELLED | OUTPATIENT
Start: 2019-01-28 | End: 2019-01-29

## 2019-01-28 RX ORDER — BROMPHENIRAMINE MALEATE, DEXTROMETHORPHAN HBR, PHENYLEPHRINE HCL, DIPHENHYDRAMINE HCL, PHENYLEPHRINE HCL 0.52G
0.52 KIT ORAL DAILY
COMMUNITY
End: 2019-05-06

## 2019-01-28 RX ORDER — RAMELTEON 8 MG/1
8 TABLET ORAL NIGHTLY PRN
Status: CANCELLED | OUTPATIENT
Start: 2019-01-28

## 2019-01-28 RX ORDER — CELECOXIB 100 MG/1
200 CAPSULE ORAL DAILY
Status: CANCELLED | OUTPATIENT
Start: 2019-01-29

## 2019-01-28 RX ORDER — OXYCODONE HYDROCHLORIDE 5 MG/1
10 TABLET ORAL
Status: CANCELLED | OUTPATIENT
Start: 2019-01-28

## 2019-01-28 RX ORDER — PREGABALIN 25 MG/1
75 CAPSULE ORAL NIGHTLY
Status: CANCELLED | OUTPATIENT
Start: 2019-01-28

## 2019-01-28 RX ORDER — ROPIVACAINE HYDROCHLORIDE 2 MG/ML
8 INJECTION, SOLUTION EPIDURAL; INFILTRATION; PERINEURAL CONTINUOUS
Status: CANCELLED | OUTPATIENT
Start: 2019-01-28

## 2019-01-28 RX ORDER — TITANIUM DIOXIDE, OCTINOXATE, ZINC OXIDE 4.61; 1.6; .78 G/40ML; G/40ML; G/40ML
CREAM TOPICAL
COMMUNITY
End: 2019-05-06

## 2019-01-28 RX ORDER — BISACODYL 10 MG
10 SUPPOSITORY, RECTAL RECTAL EVERY 12 HOURS PRN
Status: CANCELLED | OUTPATIENT
Start: 2019-01-28

## 2019-01-28 RX ORDER — MUPIROCIN 20 MG/G
1 OINTMENT TOPICAL
Status: CANCELLED | OUTPATIENT
Start: 2019-01-28

## 2019-01-28 RX ORDER — LIDOCAINE HYDROCHLORIDE 10 MG/ML
1 INJECTION, SOLUTION EPIDURAL; INFILTRATION; INTRACAUDAL; PERINEURAL
Status: CANCELLED | OUTPATIENT
Start: 2019-01-28

## 2019-01-28 RX ORDER — SODIUM CHLORIDE 9 MG/ML
INJECTION, SOLUTION INTRAVENOUS
Status: CANCELLED | OUTPATIENT
Start: 2019-01-28

## 2019-01-28 RX ORDER — ONDANSETRON 2 MG/ML
4 INJECTION INTRAMUSCULAR; INTRAVENOUS EVERY 8 HOURS PRN
Status: CANCELLED | OUTPATIENT
Start: 2019-01-28

## 2019-01-28 RX ORDER — ACETAMINOPHEN 500 MG
1000 TABLET ORAL EVERY 6 HOURS
Status: CANCELLED | OUTPATIENT
Start: 2019-01-28 | End: 2019-01-30

## 2019-01-28 RX ORDER — DEXTROMETHORPHAN HYDROBROMIDE, GUAIFENESIN 5; 100 MG/5ML; MG/5ML
650 LIQUID ORAL EVERY 8 HOURS
Status: ON HOLD | COMMUNITY
End: 2019-02-05 | Stop reason: HOSPADM

## 2019-01-28 RX ORDER — NALOXONE HCL 0.4 MG/ML
0.02 VIAL (ML) INJECTION
Status: CANCELLED | OUTPATIENT
Start: 2019-01-28

## 2019-01-28 RX ORDER — OXYCODONE HYDROCHLORIDE 5 MG/1
5 TABLET ORAL
Status: CANCELLED | OUTPATIENT
Start: 2019-01-28

## 2019-01-28 RX ORDER — AMOXICILLIN 250 MG
1 CAPSULE ORAL 2 TIMES DAILY
Status: CANCELLED | OUTPATIENT
Start: 2019-01-28

## 2019-01-28 RX ORDER — ASPIRIN 81 MG/1
81 TABLET ORAL 2 TIMES DAILY
Status: CANCELLED | OUTPATIENT
Start: 2019-01-28

## 2019-01-28 RX ORDER — SODIUM CHLORIDE 0.9 % (FLUSH) 0.9 %
3 SYRINGE (ML) INJECTION EVERY 8 HOURS PRN
Status: CANCELLED | OUTPATIENT
Start: 2019-01-28

## 2019-01-28 RX ORDER — PREGABALIN 25 MG/1
75 CAPSULE ORAL
Status: CANCELLED | OUTPATIENT
Start: 2019-01-28

## 2019-01-28 RX ORDER — CELECOXIB 100 MG/1
400 CAPSULE ORAL
Status: CANCELLED | OUTPATIENT
Start: 2019-01-28

## 2019-01-28 RX ORDER — FENTANYL CITRATE 50 UG/ML
25 INJECTION, SOLUTION INTRAMUSCULAR; INTRAVENOUS EVERY 5 MIN PRN
Status: CANCELLED | OUTPATIENT
Start: 2019-01-28

## 2019-01-28 RX ORDER — MUPIROCIN 20 MG/G
1 OINTMENT TOPICAL 2 TIMES DAILY
Status: CANCELLED | OUTPATIENT
Start: 2019-01-28 | End: 2019-02-02

## 2019-01-28 RX ORDER — MIDAZOLAM HYDROCHLORIDE 5 MG/ML
1 INJECTION INTRAMUSCULAR; INTRAVENOUS EVERY 5 MIN PRN
Status: CANCELLED | OUTPATIENT
Start: 2019-01-28

## 2019-01-28 RX ORDER — OXYCODONE HYDROCHLORIDE 5 MG/1
15 TABLET ORAL
Status: CANCELLED | OUTPATIENT
Start: 2019-01-28

## 2019-01-28 RX ORDER — POLYETHYLENE GLYCOL 3350 17 G/17G
17 POWDER, FOR SOLUTION ORAL DAILY
Status: CANCELLED | OUTPATIENT
Start: 2019-01-29

## 2019-01-28 NOTE — H&P (VIEW-ONLY)
CC: Left knee pain    Nicho Espinosa is a 78 y.o. female with 3 year history of Left knee pain. Pain is worse with activity and weight bearing.  Patient has experienced interference of activities of daily living due to decreased range of motion and an increase in joint pain and swelling.  Patient has failed non-operative treatment including NSAIDs, corticosteroid injections, viscosupplement injections, and activity modification.  Nicho Espinosa currently ambulates using a walker. She had R TKA in 2013 by Dr. Washburn.      Relevant medical conditions of significance in perioperative period:  BRCA: right sided modified radical mastectomy 8/2017; on arimidex   Lumbar pain: h/o compression fracture, lumbar fusion 2015  Anemia: Hb is 11.2  VTE: during PO of TKA in 2013 had US with partial non-occlusive peroneal vein clot    Past Medical History:   Diagnosis Date    Allergy     Arthritis     Blepharitis of both eyes     breast ca 7/18/2017    right    Complication of anesthesia     nausea    Degenerative disc disease     GERD (gastroesophageal reflux disease)     patient denies reflux    History of compression fracture of spine 4/10/2014    Hyperlipidemia     Lumbar disc disease     Meibomitis     Obesity 9/19/2013    Osteoporosis     Papilloma of eyelid     RUL    Postoperative anemia 11/21/2013    Thoracic or lumbosacral neuritis or radiculitis, unspecified 9/20/2013    Tremors of nervous system 2/2015       Past Surgical History:   Procedure Laterality Date    APPENDECTOMY      ARTHROPLASTY, KNEE, TOTAL Right 11/14/2013    [facility] 2ND FLR    BIOPSY-LYMPH NODE-SENTINEL Right 8/9/2017    Performed by Aurelio Rolle [facility] at [facility] OR 2ND FLR    BREAST BIOPSY Right 2017    COLONOSCOPY N/A 11/1/2016    Performed by Candelario Oviedo MD at [facility] ENDO (4TH FLR)    FOOT SURGERY      FUSION-TRANSLUMINAL LUMBAR INTERBODY (TLIF) CPT:69688-86,20247,99881,69750 Right  1/12/2015    [facility] at [facility] 2ND FLR    INJECTION-NODE-SENTINEL Right 8/9/2017    Performed by Aurelio Rolle MD at [facility] OR 2ND FLR    MASTECTOMY Right 08/2017    MASTECTOMY RIGHT Right 8/9/2017    [facility] at [facility] OR 2ND FLR    SHOULDER ARTHROSCOPY      left    SPINE SURGERY  1-12-15    RICH    TONSILLECTOMY      TOTAL KNEE ARTHROPLASTY         Family History   Problem Relation Age of Onset    Cancer Father         bone    Breast cancer Mother         never had biopsy    Stroke Daughter     Diabetes Daughter         was obese    Hypertension Neg Hx        Review of patient's allergies indicates:   Allergen Reactions    Sulfa (sulfonamide antibiotics) Other (See Comments)     Yeast infection and irritation    Adhesive Hives and Itching         Current Outpatient Medications:     alendronate (FOSAMAX) 70 MG tablet, TAKE 1 TABLET BY MOUTH EVERY 7 DAYS, Disp: 12 tablet, Rfl: 3    anastrozole (ARIMIDEX) 1 mg Tab, Take 1 tablet (1 mg total) by mouth once daily., Disp: 90 tablet, Rfl: 3    calcium carbonate (OS-DIANDRA) 600 mg calcium (1,500 mg) Tab, Take 600 mg by mouth 2 (two) times daily with meals., Disp: , Rfl:     cholecalciferol, vitamin D3, 1,000 unit capsule, Take 3 capsules daily (Patient taking differently: Take 1,000 Units by mouth 2 (two) times daily. Take 3 capsules daily), Disp: 90 capsule, Rfl: 11    desonide (DESOWEN) 0.05 % lotion, Apply topically 2 (two) times daily., Disp: , Rfl:     hydrocodone-acetaminophen 5-325mg (NORCO) 5-325 mg per tablet, 0.5 tablets daily as needed. PATIENT TAKES 1/2 TABLET 3-4 TIMES A DAY, Disp: , Rfl: 0    MULTIVIT WITH CALCIUM,IRON,MIN (WOMEN'S DAILY MULTIVITAMIN ORAL), Take by mouth., Disp: , Rfl:     propranolol (INDERAL) 10 MG tablet, Take 1 tablet (10 mg total) by mouth 3 (three) times daily. (Patient taking differently: Take 10 mg by mouth 2 (two) times daily. ), Disp: 270 tablet, Rfl: 1    " traZODone (DESYREL) 50 MG tablet, TAKE 1 TABLET EVERY NIGHT AS NEEDED FOR INSOMNIA, Disp: 90 tablet, Rfl: 1    acetaminophen (TYLENOL) 650 MG TbSR, Take 650 mg by mouth every 8 (eight) hours., Disp: , Rfl:     cranberry 400 mg Cap, Take by mouth., Disp: , Rfl:     fenofibrate micronized (LOFIBRA) 67 MG capsule, Take 2 capsules (134 mg total) by mouth daily with breakfast., Disp: 180 capsule, Rfl: 1    Lactobacillus rhamnosus GG (CULTURELLE) 10 billion cell capsule, Take 1 capsule by mouth once daily., Disp: , Rfl:     ondansetron (ZOFRAN-ODT) 4 MG TbDL, Take 1 tablet (4 mg total) by mouth every 8 (eight) hours as needed., Disp: 90 tablet, Rfl: 0    psyllium 0.52 gram capsule, Take 0.52 g by mouth once daily., Disp: , Rfl:     vit C/vit E/lutein/min/omega-3 (OCUVITE ORAL), Take by mouth., Disp: , Rfl:     Current Facility-Administered Medications:     EUFLEXXA 10 mg/mL(mw 2.4 -3.6 million) injection Syrg 20 mg, 20 mg, Intra-articular, Weekly, Sami Washburn MD, 20 mg at 03/05/18 1418    Review of Systems:  Constitutional: no fever or chills  Eyes: no visual changes  ENT: no nasal congestion or sore throat  Respiratory: no cough or shortness of breath  Cardiovascular: no chest pain or palpitations  Gastrointestinal: no nausea or vomiting, tolerating diet  Genitourinary: no hematuria or dysuria  Integument/Breast: no rash or pruritis  Hematologic/Lymphatic: no easy bruising or lymphadenopathy  Musculoskeletal: positive for knee pain  Neurological: no seizures or tremors  Behavioral/Psych: no auditory or visual hallucinations  Endocrine: no heat or cold intolerance    PE:  Ht 5' 1" (1.549 m)   Wt 82.6 kg (182 lb 1.6 oz)   LMP 07/05/1987   BMI 34.41 kg/m²   General: Pleasant, cooperative, NAD   Gait: antalgic  HEENT: NCAT, sclera nonicteric   Lungs: Respirations clear bilaterally; equal and unlabored.   CV: S1S2; 2+ bilateral upper and lower extremity pulses.   Skin: Intact throughout with no rashes, " erythema, or lesions  Extremities: No LE edema,  no erythema or warmth of the skin in either lower extremity.    Left knee exam:  Knee Range of Motion:0-125 active, pain with passive range of motion  Effusion:not significant  Condition of skin:intact  Location of tenderness:Medial joint line   Strength:3of 5 quadriceps strength and 4 of 5 hamstring strength  Stability: stable to testing    Hip Examination:full painless range of motion, without tenderness    Radiographs: Radiographs reveal advanced degenerative changes including subchondral cyst formation, subchondral sclerosis, osteophyte formation, joint space narrowing.     Knee Alignment:  Moderate varus    Diagnosis: osteoarthritis Left knee    Plan: Left total knee arthroplasty    Due to the serious nature of total joint infection and the high prevalence of community acquired MRSA, vancomycin will be used perioperatively.

## 2019-01-28 NOTE — H&P
CC: Left knee pain    Nicho Espinosa is a 78 y.o. female with 3 year history of Left knee pain. Pain is worse with activity and weight bearing.  Patient has experienced interference of activities of daily living due to decreased range of motion and an increase in joint pain and swelling.  Patient has failed non-operative treatment including NSAIDs, corticosteroid injections, viscosupplement injections, and activity modification.  Nicho Espinosa currently ambulates using a walker. She had R TKA in 2013 by Dr. Washburn.      Relevant medical conditions of significance in perioperative period:  BRCA: right sided modified radical mastectomy 8/2017; on arimidex   Lumbar pain: h/o compression fracture, lumbar fusion 2015  Anemia: Hb is 11.2  VTE: during PO of TKA in 2013 had US with partial non-occlusive peroneal vein clot    Past Medical History:   Diagnosis Date    Allergy     Arthritis     Blepharitis of both eyes     breast ca 7/18/2017    right    Complication of anesthesia     nausea    Degenerative disc disease     GERD (gastroesophageal reflux disease)     patient denies reflux    History of compression fracture of spine 4/10/2014    Hyperlipidemia     Lumbar disc disease     Meibomitis     Obesity 9/19/2013    Osteoporosis     Papilloma of eyelid     RUL    Postoperative anemia 11/21/2013    Thoracic or lumbosacral neuritis or radiculitis, unspecified 9/20/2013    Tremors of nervous system 2/2015       Past Surgical History:   Procedure Laterality Date    APPENDECTOMY      ARTHROPLASTY, KNEE, TOTAL Right 11/14/2013    Performed by Sami [facility]burn [facility] at [facility] OR 2ND FLR    BIOPSY-LYMPH NODE-SENTINEL Right 8/9/2017    Performed by Aurelio Rolle MD at [facility] OR 2ND FLR    BREAST BIOPSY Right 2017    COLONOSCOPY N/A 11/1/2016    Performed by Candelario Oviedo MD at [facility] ENDO (4TH FLR)    FOOT SURGERY      FUSION-TRANSLUMINAL LUMBAR INTERBODY (TLIF) CPT:58550-39,67978,[id number] Right  1/12/2015    Performed by [facility]ett [facility] at [facility] OR 2ND FLR    INJECTION-NODE-SENTINEL Right 8/9/2017    Performed by [facility] Rolle MD at [facility] OR 2ND FLR    MASTECTOMY Right 08/2017    MASTECTOMY RIGHT Right 8/9/2017    Performed by [facility] Rolle [facility] at [facility] OR 2ND FLR    SHOULDER ARTHROSCOPY      left    SPINE SURGERY  1-12-15    RICH    TONSILLECTOMY      TOTAL KNEE ARTHROPLASTY         Family History   Problem Relation Age of Onset    Cancer Father         bone    Breast cancer Mother         never had biopsy    Stroke Daughter     Diabetes Daughter         was obese    Hypertension Neg Hx        Review of patient's allergies indicates:   Allergen Reactions    Sulfa (sulfonamide antibiotics) Other (See Comments)     Yeast infection and irritation    Adhesive Hives and Itching         Current Outpatient Medications:     alendronate (FOSAMAX) 70 MG tablet, TAKE 1 TABLET BY MOUTH EVERY 7 DAYS, Disp: 12 tablet, Rfl: 3    anastrozole (ARIMIDEX) 1 mg Tab, Take 1 tablet (1 mg total) by mouth once daily., Disp: 90 tablet, Rfl: 3    calcium carbonate (OS-DIANDRA) 600 mg calcium (1,500 mg) Tab, Take 600 mg by mouth 2 (two) times daily with meals., Disp: , Rfl:     cholecalciferol, vitamin D3, 1,000 unit capsule, Take 3 capsules daily (Patient taking differently: Take 1,000 Units by mouth 2 (two) times daily. Take 3 capsules daily), Disp: 90 capsule, Rfl: 11    desonide (DESOWEN) 0.05 % lotion, Apply topically 2 (two) times daily., Disp: , Rfl:     hydrocodone-acetaminophen 5-325mg (NORCO) 5-325 mg per tablet, 0.5 tablets daily as needed. PATIENT TAKES 1/2 TABLET 3-4 TIMES A DAY, Disp: , Rfl: 0    MULTIVIT WITH CALCIUM,IRON,MIN (WOMEN'S DAILY MULTIVITAMIN ORAL), Take by mouth., Disp: , Rfl:     propranolol (INDERAL) 10 MG tablet, Take 1 tablet (10 mg total) by mouth 3 (three) times daily. (Patient taking differently: Take 10 mg by mouth 2 (two) times daily. ), Disp: 270 tablet, Rfl: 1    " traZODone (DESYREL) 50 MG tablet, TAKE 1 TABLET EVERY NIGHT AS NEEDED FOR INSOMNIA, Disp: 90 tablet, Rfl: 1    acetaminophen (TYLENOL) 650 MG TbSR, Take 650 mg by mouth every 8 (eight) hours., Disp: , Rfl:     cranberry 400 mg Cap, Take by mouth., Disp: , Rfl:     fenofibrate micronized (LOFIBRA) 67 MG capsule, Take 2 capsules (134 mg total) by mouth daily with breakfast., Disp: 180 capsule, Rfl: 1    Lactobacillus rhamnosus GG (CULTURELLE) 10 billion cell capsule, Take 1 capsule by mouth once daily., Disp: , Rfl:     ondansetron (ZOFRAN-ODT) 4 MG TbDL, Take 1 tablet (4 mg total) by mouth every 8 (eight) hours as needed., Disp: 90 tablet, Rfl: 0    psyllium 0.52 gram capsule, Take 0.52 g by mouth once daily., Disp: , Rfl:     vit C/vit E/lutein/min/omega-3 (OCUVITE ORAL), Take by mouth., Disp: , Rfl:     Current Facility-Administered Medications:     EUFLEXXA 10 mg/mL(mw 2.4 -3.6 million) injection Syrg 20 mg, 20 mg, Intra-articular, Weekly, Sami Washburn MD, 20 mg at 03/05/18 1418    Review of Systems:  Constitutional: no fever or chills  Eyes: no visual changes  ENT: no nasal congestion or sore throat  Respiratory: no cough or shortness of breath  Cardiovascular: no chest pain or palpitations  Gastrointestinal: no nausea or vomiting, tolerating diet  Genitourinary: no hematuria or dysuria  Integument/Breast: no rash or pruritis  Hematologic/Lymphatic: no easy bruising or lymphadenopathy  Musculoskeletal: positive for knee pain  Neurological: no seizures or tremors  Behavioral/Psych: no auditory or visual hallucinations  Endocrine: no heat or cold intolerance    PE:  Ht 5' 1" (1.549 m)   Wt 82.6 kg (182 lb 1.6 oz)   LMP 07/05/1987   BMI 34.41 kg/m²   General: Pleasant, cooperative, NAD   Gait: antalgic  HEENT: NCAT, sclera nonicteric   Lungs: Respirations clear bilaterally; equal and unlabored.   CV: S1S2; 2+ bilateral upper and lower extremity pulses.   Skin: Intact throughout with no rashes, " erythema, or lesions  Extremities: No LE edema,  no erythema or warmth of the skin in either lower extremity.    Left knee exam:  Knee Range of Motion:0-125 active, pain with passive range of motion  Effusion:not significant  Condition of skin:intact  Location of tenderness:Medial joint line   Strength:3of 5 quadriceps strength and 4 of 5 hamstring strength  Stability: stable to testing    Hip Examination:full painless range of motion, without tenderness    Radiographs: Radiographs reveal advanced degenerative changes including subchondral cyst formation, subchondral sclerosis, osteophyte formation, joint space narrowing.     Knee Alignment:  Moderate varus    Diagnosis: osteoarthritis Left knee    Plan: Left total knee arthroplasty    Due to the serious nature of total joint infection and the high prevalence of community acquired MRSA, vancomycin will be used perioperatively.

## 2019-01-28 NOTE — DISCHARGE INSTRUCTIONS
Your surgery has been scheduled for:__________________________________________    You should report to:  ____[facility], located on the [place] side of the first floor of the           [facility] ([phone])  ____[facility], located on the [facility] side of the            Second floor of the [facility] ([phone])  ____3rd Floor [facility] located on the [facility] side of the [facility] [phone]  Please Note   - Tell your doctor if you take Aspirin, products containing Aspirin, herbal medications  or blood thinners, such as Coumadin, Ticlid, or Plavix.  (Consult your provider regarding holding or stopping before surgery).  - Arrange for someone to drive you home following surgery.  You will not be allowed to leave the surgical facility alone or drive yourself home following sedation and anesthesia.  Before Surgery  - Stop taking all herbal medications 14days prior to surgery  - No Motrin/Advil (Ibuprofen) 7 days before surgery  - No Aleve (Naproxen) 7 days before surgery  - No Goody's/BC powder 7 days before surgery  - Stop Taking Asprin, products containing Asprin _____days before surgery  - Stop taking blood thinners_______days before surgery  - Refrain from drinking alcoholic beverages for 24hours before and after surgery  - Stop or limit smoking _________days before surgery  - You may take Tylenol for pain  Night before Surgery  - Take a shower or bath (shower is recommended).  Bathe with Hibiclens soap or an antibacterial soap from the neck down.  If not supplied by your surgeon, hibiclens soap will need to be purchased over the counter in pharmacy.  Rinse soap off thoroughly.  - Shampoo your hair with your regular shampoo                           Food and Beverage Instructions  1. Stop ALL solid food, gum, candy (including vitamins) 8 hours before surgery/procedure time.  2. The patient should be  ENCOURAGED to drink carbohydrate-rich clear liquids (sports drinks, clear juices) until 2 hours prior to surgery/procedure time.  3. CLEAR liquids include only water, black coffee NO creamer, clear oral rehydration drinks, clear sports drinks or clear fruit juices (no orange juice, no pulpy juices, no apple cider). Advise patients if they can read newsprint through the liquid, it qualifies as clear liquid.   4. IF IN DOUBT, drink water instead.   5. NOTHING  TO DRINK 2 hours before to arrival for surgery/procedure time. If you are told to take medication on the morning of surgery, it may be taken with a sip of water.     FOLLOW YOUR SURGEON'S INSTRUCTIONS REGARDING FOOD AND BEVERAGES IF DIFFERENT THAN ABOVE INSTRUCTIONS.    The Day of Surgery  - Take another bath or shower with hibiclens or any antibacterial soap, to reduce the chance of infection.  - Take heart and blood pressure medications with a small sip of water, as advised by the perioperative team.  - Do not take fluid pills  - You may brush your teeth and rinse your mouth, but do not swall any additional water.   - Do not apply perfumes, powder, body lotions or deodorant on the day of surgery.  - Nail polish should be removed.  - Do not wear makeup or moisturizer  - Wear comfortable clothes, such as a button front shirt and loose fitting pants.  - Leave all jewelry, including body piercings, and valuables at home.    - Bring any devices you will neeed after surgery such as crutches or canes.  - If you have sleep apnea, please bring your CPAP machine  In the event that your physical condition changes including the onset of a cold or respiratory illness, or if you have to delay or cancel your surgery, please notify your surgeon.    Anesthesia: Regional Anesthesia    Youre scheduled for surgery. During surgery, youll receive medicine called anesthesia to keep you comfortable and pain-free. Your surgeon has decided that youll receive regional anesthesia.  This sheet tells you what to expect with this type of anesthesia.  What is regional anesthesia?  Regional anesthesia numbs one region of your body. The anesthesia may be given around nerves or into veins in your arms, neck, or legs (nerve block or [place] block). Or it may be sent into the spinal fluid (spinal anesthesia) or into the space just outside the spinal fluid (epidural anesthesia). You may also be given sedatives to help you relax.  Nerve block or [place] block  A small area of the body, such as an arm or leg, can be numbed using a nerve block or [place] block.  · Nerve block. During a nerve block, your skin is numbed. A needle is then inserted near nerves that serve the area to be numbed. Anesthetic is sent through the needle.  · IV regional or [place] block. For this type of block, an IV line is put into a vein. The blood flow to the area to be numbed is blocked for a short time. Anesthetic is sent through the IV.  Spinal anesthesia  Spinal anesthesia numbs your body from about the waist down.  · Anesthetic is injected into the spinal fluid. This is a substance that surrounds the spinal cord in your spinal column. The anesthetic blocks pain traveling from the body to the brain.  · To receive the anesthetic, your skin is numbed at the injection site on your back.  · A needle is then inserted into the spinal space. Anesthetic is sent into the spinal fluid through the needle.  Epidural anesthesia  Epidural anesthesia is most commonly used during childbirth and may also be used after surgical procedures of the chest, belly, and legs.  · Anesthetic is injected into the epidural space. This is just outside the dural sac which contains the spinal fluid.  · To receive the anesthetic, your skin is numbed at the injection site on your back.  · A needle is then inserted into the epidural space. Anesthetic is sent into the epidural space through the needle.  · A small flexible catheter may be attached to the needle and left in  place. This allows for continuous injections or infusions of anesthetic.  Anesthesia tools and medicines that might be near you during your procedure  · Local anesthetic. This medicine is given through a needle numbs one region of your body.  · Electrocardiography leads (electrodes). These are used to record your heart rate and rhythm.  · Blood pressure cuff. A cuff is placed on your arm to keep track of your blood pressure.  · Pulse oximeter. This small clip is placed on the end of the finger. It measures your blood oxygen level.  · Sedatives. These medicines may be given through an IV. They help to relax you and keep you comfortable. You may stay awake or sleep lightly.  · Oxygen. You may be given oxygen through a facemask.  Risks and possible complications  Regional anesthesia carries some risks. These include:  · Nausea and vomiting  · Headache  · Backache  · Decreased blood pressure  · Allergic reaction to the anesthetic  · Ongoing numbness (rare)  · Irregular heartbeat (rare)  · Cardiac arrest (rare)   Date Last Reviewed: 12/1/2016  © 8077-4771 [employer] [address] [place] [address]. All rights reserved. This information is not intended as a substitute for professional medical care. Always follow your healthcare professional's instructions.

## 2019-01-28 NOTE — PROGRESS NOTES
Nicho Espinosa is a 78 y.o. year old here today for a pre-operative visit in preparation for a Left total knee arthroplasty to be performed by  Dr. Washburn on 2/5/2019.  she was last seen and treated in the clinic on 12/12/2018. she will be medically optimized by the pre op center. There has been no significant change in medical status since last visit. No fever, chills, malaise, or unexplained weight change.      Allergies, Medications, past medical and surgical history reviewed.    Focused examination performed.    Patient declined to see Dr. Washburn today in clinic.   All questions answered. Patient encouraged to call with questions. Contact information given.     Pre, remigio, and post operative procedures and expectations discussed. Questions were answered. Nicho Espinosa has been educated and is ready to proceed with surgery. Approximately 30 minutes was spent discussing surgical outcomes, plans, procedures pre, remigio, and post operative expections and care.  Surgical consent signed.    Nicho Espinosa will contact us if there are any questions, concerns, or changes in medical status prior to surgery.

## 2019-01-30 ENCOUNTER — CLINICAL SUPPORT (OUTPATIENT)
Dept: REHABILITATION | Facility: OTHER | Age: 79
End: 2019-01-30
Payer: MEDICARE

## 2019-01-30 DIAGNOSIS — M25.462 PAIN AND SWELLING OF LEFT KNEE: ICD-10-CM

## 2019-01-30 DIAGNOSIS — M25.562 PAIN AND SWELLING OF LEFT KNEE: ICD-10-CM

## 2019-01-30 DIAGNOSIS — Z74.09 IMPAIRED FUNCTIONAL MOBILITY, BALANCE, GAIT, AND ENDURANCE: ICD-10-CM

## 2019-01-30 DIAGNOSIS — M25.60 DECREASED RANGE OF MOTION WITH DECREASED STRENGTH: ICD-10-CM

## 2019-01-30 DIAGNOSIS — R53.1 DECREASED RANGE OF MOTION WITH DECREASED STRENGTH: ICD-10-CM

## 2019-01-30 PROCEDURE — 97110 THERAPEUTIC EXERCISES: CPT | Mod: HCNC,PN

## 2019-01-30 NOTE — PROGRESS NOTES
"  Physical Therapy Daily Treatment Note     Name: Nicho SCHAEFER [facility]  Clinic Number: [id number]    Therapy Diagnosis:   Encounter Diagnoses   Name Primary?    Pain and swelling of left knee     Decreased range of motion with decreased strength     Impaired functional mobility, balance, gait, and endurance      Physician: Sami Washburn MD    Visit Date: 1/30/2019    Physician Orders: PT Eval and Treat - pre-hab with TKA scheduled for 02/05/2019  Medical Diagnosis from Referral: M17.12 (ICD-10-CM) - Primary osteoarthritis of left knee  Evaluation Date: 1/17/2019  Authorization Period Expiration: 12/31/2019  Plan of Care Expiration: 04/16/2019  Visit # / Visits authorized: 2/ 20     Time In: 12:00 PM  Time Out: 1:00 PM  Total Billable Time: 60 minutes (45 min 1:1 with PT)    Precautions: Standard, chronic low back pain s/p fusion, osteoporosis, Hx Breast Cancer, R TKA     Subjective     Pt reports: knee pain is getting worse and that she is ready for her knee replacement, scheduled for 2/5/19.  She was compliant with home exercise program.  Response to previous treatment: no change  Functional change: no change    Pain: 6/10  Location: left knee     Objective     Nicho received therapeutic exercises to develop strength, ROM and flexibility for 40 minutes including:    Recumbent bicycle 7 min to warm up tissues and improve ROM  Heel slides 3 min  Heel prop 4 min - NP  Quad sets 2 min x 5: holds  SLR 2 x 10  Seated heel slide 10x - NP  Seated LAQ 10 x 5"  Bridges 2 x 10  clams 5"x 20x  Seated hip flexion 20x  SAQ 30x    Nicho received the following manual therapy techniques: tape application were applied to the: L knee for 5 minutes, including:  Kinesiotape applied to medial L knee for pain relief. Patient received education regarding appropriate care and removal of Kinesiotape. Patient instructed in proper removal techniques if skin irritation occurs.    Modalities: 10 min x ice on L knee at end of session.    Home " Exercises Provided and Patient Education Provided     Education provided:   - Patient received education regarding appropriate care and removal of Kinesiotape. Patient instructed in proper removal techniques if skin irritation occurs.  - HHPT, post-op protocols and expectations for PT, patterns and foot placement with stair climbing with/without AD  -use of modalities and HEP to reduce pain and swelling after surgery and improve walking tolerance.    Written Home Exercises Provided: Patient instructed to cont prior HEP.  Exercises were reviewed and Nicho was able to demonstrate them prior to the end of the session.  Nicho demonstrated good  understanding of the education provided.     See EMR under Patient Instructions for exercises provided prior visit.    Assessment     Good tolerance to all therex today. Pt able to perform HEP with good return technique, indicating good compliance at home. Pt is scheduled for a L TKA on 2/5/19 with Dr. Washburn. Pt was instructed to call clinic for future therapy sessions as needed following surgery.   Nicho is progressing well towards her goals.   Pt prognosis is Good.     Pt will continue to benefit from skilled outpatient physical therapy to address the deficits listed in the problem list box on initial evaluation, provide pt/family education and to maximize pt's level of independence in the home and community environment.     Pt's spiritual, cultural and educational needs considered and pt agreeable to plan of care and goals.     Anticipated barriers to physical therapy: chronic low back pain     Goals: IE 1/17/2019, PN due 2/15/2019  Short Term Goals (6 weeks)  1. Pt will demonstrate improvements in left knee ROM to 0-120 for ease with ambulation. Progressing, not met  2. Pt will demonstrate improvements in left knee strength to 4+/5 for ease with getting out of a chair. Progressing, not met  3. Pt will be able to ambulate 200 ft with LRAD and without the presence of antalgic  gait pattern. Progressing, not met  4. Pt will report <5/10 pain within the left knee for ease with ADL's. Progressing, not met  5. Pt presents with improved functional mobility with FOTO limitation <=40% disability. Progressing, not met     Long Term Goals (12 weeks)  1. Pt will demonstrate improvements in left knee ROM to 0-125 for ease with ambulation. Progressing, not met  2. Pt will demonstrate 5/5 strength within the left knee for ease with house hold chores and climbing stairs. Progressing, not met  3. Pt will report being independent with his/her HEP for maintenance of improvements gained during therapy sessions. Progressing, not met  4. Pt will report <3/10 pain within the left knee for ease with ADLs. Progressing, not met  5. Pt will demonstrate ambulation x 300 ft without AD or antalgic gait. Progressing, not met  6. Pt presents with improved functional mobility with FOTO limitation <=30% disability. Progressing, not met      Plan     Continue plan of care with focus on improved strength of L LE.     Bianca Gibson, PT

## 2019-02-04 ENCOUNTER — TELEPHONE (OUTPATIENT)
Dept: ORTHOPEDICS | Facility: CLINIC | Age: 79
End: 2019-02-04

## 2019-02-04 NOTE — TELEPHONE ENCOUNTER
Spoke to pt informed her arrival time for tomorrow surgery is 0500, 2nd floor surgery center, no food or drink after midnight. Pt verbalized understanding.

## 2019-02-05 ENCOUNTER — HOSPITAL ENCOUNTER (INPATIENT)
Facility: HOSPITAL | Age: 79
LOS: 1 days | Discharge: HOME HEALTH | DRG: 470 | End: 2019-02-06
Attending: ORTHOPAEDIC SURGERY | Admitting: ORTHOPAEDIC SURGERY
Payer: MEDICARE

## 2019-02-05 ENCOUNTER — ANESTHESIA (OUTPATIENT)
Dept: SURGERY | Facility: HOSPITAL | Age: 79
DRG: 470 | End: 2019-02-05
Payer: MEDICARE

## 2019-02-05 DIAGNOSIS — M17.0 PRIMARY OSTEOARTHRITIS OF BOTH KNEES: Primary | ICD-10-CM

## 2019-02-05 DIAGNOSIS — M17.12 PRIMARY OSTEOARTHRITIS OF LEFT KNEE: ICD-10-CM

## 2019-02-05 LAB
ANION GAP SERPL CALC-SCNC: 7 MMOL/L
BUN SERPL-MCNC: 21 MG/DL
CALCIUM SERPL-MCNC: 8.9 MG/DL
CHLORIDE SERPL-SCNC: 106 MMOL/L
CO2 SERPL-SCNC: 23 MMOL/L
CREAT SERPL-MCNC: 1 MG/DL
EST. GFR  (AFRICAN AMERICAN): >60 ML/MIN/1.73 M^2
EST. GFR  (NON AFRICAN AMERICAN): 54.1 ML/MIN/1.73 M^2
GLUCOSE SERPL-MCNC: 110 MG/DL
INR PPP: 1
POTASSIUM SERPL-SCNC: 3.9 MMOL/L
PROTHROMBIN TIME: 10.4 SEC
SODIUM SERPL-SCNC: 136 MMOL/L

## 2019-02-05 PROCEDURE — 25000003 PHARM REV CODE 250: Performed by: STUDENT IN AN ORGANIZED HEALTH CARE EDUCATION/TRAINING PROGRAM

## 2019-02-05 PROCEDURE — 25000003 PHARM REV CODE 250: Mod: HCNC | Performed by: PHYSICIAN ASSISTANT

## 2019-02-05 PROCEDURE — 27000221 HC OXYGEN, UP TO 24 HOURS: Mod: HCNC

## 2019-02-05 PROCEDURE — 25000003 PHARM REV CODE 250: Mod: HCNC | Performed by: ORTHOPAEDIC SURGERY

## 2019-02-05 PROCEDURE — 64450 NJX AA&/STRD OTHER PN/BRANCH: CPT | Performed by: ANESTHESIOLOGY

## 2019-02-05 PROCEDURE — 64448 NJX AA&/STRD FEM NRV NFS IMG: CPT | Performed by: ANESTHESIOLOGY

## 2019-02-05 PROCEDURE — 63600175 PHARM REV CODE 636 W HCPCS: Performed by: STUDENT IN AN ORGANIZED HEALTH CARE EDUCATION/TRAINING PROGRAM

## 2019-02-05 PROCEDURE — 76942 ECHO GUIDE FOR BIOPSY: CPT | Performed by: STUDENT IN AN ORGANIZED HEALTH CARE EDUCATION/TRAINING PROGRAM

## 2019-02-05 PROCEDURE — 88311 DECALCIFY TISSUE: CPT | Mod: 26,HCNC,, | Performed by: PATHOLOGY

## 2019-02-05 PROCEDURE — 36000710: Performed by: ORTHOPAEDIC SURGERY

## 2019-02-05 PROCEDURE — 76942 IPACK: ICD-10-PCS | Mod: 26,HCNC,, | Performed by: ANESTHESIOLOGY

## 2019-02-05 PROCEDURE — 63600175 PHARM REV CODE 636 W HCPCS: Mod: HCNC | Performed by: PHYSICIAN ASSISTANT

## 2019-02-05 PROCEDURE — 71000033 HC RECOVERY, INTIAL HOUR: Performed by: ORTHOPAEDIC SURGERY

## 2019-02-05 PROCEDURE — 97161 PT EVAL LOW COMPLEX 20 MIN: CPT | Mod: HCNC

## 2019-02-05 PROCEDURE — D9220A PRA ANESTHESIA: Mod: HCNC,,, | Performed by: ANESTHESIOLOGY

## 2019-02-05 PROCEDURE — 27447 PR TOTAL KNEE ARTHROPLASTY: ICD-10-PCS | Mod: LT,,, | Performed by: ORTHOPAEDIC SURGERY

## 2019-02-05 PROCEDURE — 27447 TOTAL KNEE ARTHROPLASTY: CPT | Mod: LT,,, | Performed by: ORTHOPAEDIC SURGERY

## 2019-02-05 PROCEDURE — 76942 ECHO GUIDE FOR BIOPSY: CPT | Mod: 26,HCNC,, | Performed by: ANESTHESIOLOGY

## 2019-02-05 PROCEDURE — C1776 JOINT DEVICE (IMPLANTABLE): HCPCS | Performed by: ORTHOPAEDIC SURGERY

## 2019-02-05 PROCEDURE — 64448 ADDUCTOR CANAL CATHETER: ICD-10-PCS | Mod: 59,HCNC,LT, | Performed by: ANESTHESIOLOGY

## 2019-02-05 PROCEDURE — 97165 OT EVAL LOW COMPLEX 30 MIN: CPT | Mod: HCNC

## 2019-02-05 PROCEDURE — 88305 TISSUE SPECIMEN TO PATHOLOGY - SURGERY: ICD-10-PCS | Mod: 26,HCNC,, | Performed by: PATHOLOGY

## 2019-02-05 PROCEDURE — 12000002 HC ACUTE/MED SURGE SEMI-PRIVATE ROOM: Mod: HCNC

## 2019-02-05 PROCEDURE — 27800517 HC TRAY,EPIDURAL-CONTINUOUS: Mod: HCNC | Performed by: STUDENT IN AN ORGANIZED HEALTH CARE EDUCATION/TRAINING PROGRAM

## 2019-02-05 PROCEDURE — 88305 TISSUE EXAM BY PATHOLOGIST: CPT | Mod: HCNC | Performed by: PATHOLOGY

## 2019-02-05 PROCEDURE — 97116 GAIT TRAINING THERAPY: CPT | Mod: HCNC

## 2019-02-05 PROCEDURE — D9220A PRA ANESTHESIA: ICD-10-PCS | Mod: HCNC,,, | Performed by: ANESTHESIOLOGY

## 2019-02-05 PROCEDURE — 64448 NJX AA&/STRD FEM NRV NFS IMG: CPT | Performed by: STUDENT IN AN ORGANIZED HEALTH CARE EDUCATION/TRAINING PROGRAM

## 2019-02-05 PROCEDURE — 64448 NJX AA&/STRD FEM NRV NFS IMG: CPT | Mod: 59,HCNC,LT, | Performed by: ANESTHESIOLOGY

## 2019-02-05 PROCEDURE — C1713 ANCHOR/SCREW BN/BN,TIS/BN: HCPCS | Performed by: ORTHOPAEDIC SURGERY

## 2019-02-05 PROCEDURE — 88311 TISSUE SPECIMEN TO PATHOLOGY - SURGERY: ICD-10-PCS | Mod: 26,HCNC,, | Performed by: PATHOLOGY

## 2019-02-05 PROCEDURE — 25000003 PHARM REV CODE 250: Mod: HCNC | Performed by: STUDENT IN AN ORGANIZED HEALTH CARE EDUCATION/TRAINING PROGRAM

## 2019-02-05 PROCEDURE — 80048 BASIC METABOLIC PNL TOTAL CA: CPT | Mod: HCNC

## 2019-02-05 PROCEDURE — 36415 COLL VENOUS BLD VENIPUNCTURE: CPT | Mod: HCNC

## 2019-02-05 PROCEDURE — 85610 PROTHROMBIN TIME: CPT | Mod: HCNC

## 2019-02-05 PROCEDURE — 64450 NJX AA&/STRD OTHER PN/BRANCH: CPT | Mod: 59,HCNC,LT, | Performed by: ANESTHESIOLOGY

## 2019-02-05 PROCEDURE — 37000009 HC ANESTHESIA EA ADD 15 MINS: Performed by: ORTHOPAEDIC SURGERY

## 2019-02-05 PROCEDURE — 94761 N-INVAS EAR/PLS OXIMETRY MLT: CPT | Mod: HCNC

## 2019-02-05 PROCEDURE — 71000039 HC RECOVERY, EACH ADD'L HOUR: Performed by: ORTHOPAEDIC SURGERY

## 2019-02-05 PROCEDURE — 64450 IPACK: ICD-10-PCS | Mod: 59,HCNC,LT, | Performed by: ANESTHESIOLOGY

## 2019-02-05 PROCEDURE — 76942 ECHO GUIDE FOR BIOPSY: CPT | Performed by: ANESTHESIOLOGY

## 2019-02-05 PROCEDURE — 37000008 HC ANESTHESIA 1ST 15 MINUTES: Performed by: ORTHOPAEDIC SURGERY

## 2019-02-05 PROCEDURE — 36000711: Performed by: ORTHOPAEDIC SURGERY

## 2019-02-05 PROCEDURE — 25000003 PHARM REV CODE 250: Mod: HCNC | Performed by: UROLOGY

## 2019-02-05 PROCEDURE — 94799 UNLISTED PULMONARY SVC/PX: CPT | Mod: HCNC

## 2019-02-05 PROCEDURE — 27201423 OPTIME MED/SURG SUP & DEVICES STERILE SUPPLY: Performed by: ORTHOPAEDIC SURGERY

## 2019-02-05 PROCEDURE — 97530 THERAPEUTIC ACTIVITIES: CPT | Mod: HCNC

## 2019-02-05 DEVICE — CEMENT BONE SIMPLE P INDIVID: Type: IMPLANTABLE DEVICE | Site: KNEE | Status: FUNCTIONAL

## 2019-02-05 DEVICE — IMPLANTABLE DEVICE: Type: IMPLANTABLE DEVICE | Site: KNEE | Status: FUNCTIONAL

## 2019-02-05 DEVICE — TIBIAL BASEPLATE CEMENTED #2: Type: IMPLANTABLE DEVICE | Site: KNEE | Status: FUNCTIONAL

## 2019-02-05 DEVICE — PATELLA XPE SMALL 29MM: Type: IMPLANTABLE DEVICE | Site: KNEE | Status: FUNCTIONAL

## 2019-02-05 RX ORDER — LIDOCAINE HCL/PF 100 MG/5ML
SYRINGE (ML) INTRAVENOUS
Status: DISCONTINUED | OUTPATIENT
Start: 2019-02-05 | End: 2019-02-05

## 2019-02-05 RX ORDER — CEFAZOLIN SODIUM 1 G/3ML
INJECTION, POWDER, FOR SOLUTION INTRAMUSCULAR; INTRAVENOUS
Status: DISCONTINUED | OUTPATIENT
Start: 2019-02-05 | End: 2019-02-05

## 2019-02-05 RX ORDER — RAMELTEON 8 MG/1
8 TABLET ORAL NIGHTLY PRN
Status: DISCONTINUED | OUTPATIENT
Start: 2019-02-05 | End: 2019-02-05

## 2019-02-05 RX ORDER — FAMOTIDINE 20 MG/1
20 TABLET, FILM COATED ORAL 2 TIMES DAILY
Status: DISCONTINUED | OUTPATIENT
Start: 2019-02-05 | End: 2019-02-06 | Stop reason: HOSPADM

## 2019-02-05 RX ORDER — ANASTROZOLE 1 MG/1
1 TABLET ORAL NIGHTLY
Status: DISCONTINUED | OUTPATIENT
Start: 2019-02-05 | End: 2019-02-06 | Stop reason: HOSPADM

## 2019-02-05 RX ORDER — LIDOCAINE HYDROCHLORIDE 10 MG/ML
1 INJECTION, SOLUTION EPIDURAL; INFILTRATION; INTRACAUDAL; PERINEURAL
Status: DISCONTINUED | OUTPATIENT
Start: 2019-02-05 | End: 2019-02-05 | Stop reason: HOSPADM

## 2019-02-05 RX ORDER — PHENYLEPHRINE HYDROCHLORIDE 10 MG/ML
INJECTION INTRAVENOUS
Status: DISCONTINUED | OUTPATIENT
Start: 2019-02-05 | End: 2019-02-05

## 2019-02-05 RX ORDER — POLYETHYLENE GLYCOL 3350 17 G/17G
17 POWDER, FOR SOLUTION ORAL DAILY
Status: DISCONTINUED | OUTPATIENT
Start: 2019-02-05 | End: 2019-02-06 | Stop reason: HOSPADM

## 2019-02-05 RX ORDER — POLYETHYLENE GLYCOL 3350 17 G/17G
17 POWDER, FOR SOLUTION ORAL DAILY
Qty: 1530 G | Refills: 0 | Status: SHIPPED | OUTPATIENT
Start: 2019-02-05 | End: 2019-05-06

## 2019-02-05 RX ORDER — CEFAZOLIN SODIUM 1 G/3ML
2 INJECTION, POWDER, FOR SOLUTION INTRAMUSCULAR; INTRAVENOUS
Status: COMPLETED | OUTPATIENT
Start: 2019-02-05 | End: 2019-02-06

## 2019-02-05 RX ORDER — ONDANSETRON 2 MG/ML
4 INJECTION INTRAMUSCULAR; INTRAVENOUS EVERY 8 HOURS PRN
Status: DISCONTINUED | OUTPATIENT
Start: 2019-02-05 | End: 2019-02-06 | Stop reason: HOSPADM

## 2019-02-05 RX ORDER — SODIUM CHLORIDE 9 MG/ML
INJECTION, SOLUTION INTRAVENOUS CONTINUOUS
Status: ACTIVE | OUTPATIENT
Start: 2019-02-05 | End: 2019-02-06

## 2019-02-05 RX ORDER — OXYCODONE HYDROCHLORIDE 5 MG/1
15 TABLET ORAL
Status: DISCONTINUED | OUTPATIENT
Start: 2019-02-05 | End: 2019-02-05

## 2019-02-05 RX ORDER — MORPHINE SULFATE 2 MG/ML
2 INJECTION, SOLUTION INTRAMUSCULAR; INTRAVENOUS
Status: DISCONTINUED | OUTPATIENT
Start: 2019-02-05 | End: 2019-02-06 | Stop reason: HOSPADM

## 2019-02-05 RX ORDER — AMOXICILLIN 250 MG
1 CAPSULE ORAL 2 TIMES DAILY
Status: DISCONTINUED | OUTPATIENT
Start: 2019-02-05 | End: 2019-02-06 | Stop reason: HOSPADM

## 2019-02-05 RX ORDER — ONDANSETRON 8 MG/1
8 TABLET, ORALLY DISINTEGRATING ORAL EVERY 8 HOURS PRN
Qty: 42 TABLET | Refills: 0 | Status: SHIPPED | OUTPATIENT
Start: 2019-02-05 | End: 2019-02-12

## 2019-02-05 RX ORDER — MUPIROCIN 20 MG/G
1 OINTMENT TOPICAL 2 TIMES DAILY
Status: DISCONTINUED | OUTPATIENT
Start: 2019-02-05 | End: 2019-02-06 | Stop reason: HOSPADM

## 2019-02-05 RX ORDER — PROPOFOL 10 MG/ML
VIAL (ML) INTRAVENOUS CONTINUOUS PRN
Status: DISCONTINUED | OUTPATIENT
Start: 2019-02-05 | End: 2019-02-05

## 2019-02-05 RX ORDER — ASPIRIN 81 MG/1
81 TABLET ORAL 2 TIMES DAILY
Status: DISCONTINUED | OUTPATIENT
Start: 2019-02-05 | End: 2019-02-06 | Stop reason: HOSPADM

## 2019-02-05 RX ORDER — DOCUSATE SODIUM 100 MG/1
100 CAPSULE, LIQUID FILLED ORAL 3 TIMES DAILY
Qty: 90 CAPSULE | Refills: 0 | Status: SHIPPED | OUTPATIENT
Start: 2019-02-05 | End: 2019-02-19

## 2019-02-05 RX ORDER — CELECOXIB 200 MG/1
400 CAPSULE ORAL
Status: COMPLETED | OUTPATIENT
Start: 2019-02-05 | End: 2019-02-05

## 2019-02-05 RX ORDER — MUPIROCIN 20 MG/G
1 OINTMENT TOPICAL
Status: COMPLETED | OUTPATIENT
Start: 2019-02-05 | End: 2019-02-05

## 2019-02-05 RX ORDER — OXYCODONE HYDROCHLORIDE 5 MG/1
TABLET ORAL
Status: DISPENSED
Start: 2019-02-05 | End: 2019-02-05

## 2019-02-05 RX ORDER — DEXAMETHASONE SODIUM PHOSPHATE 4 MG/ML
INJECTION, SOLUTION INTRA-ARTICULAR; INTRALESIONAL; INTRAMUSCULAR; INTRAVENOUS; SOFT TISSUE
Status: DISCONTINUED | OUTPATIENT
Start: 2019-02-05 | End: 2019-02-05

## 2019-02-05 RX ORDER — KETAMINE HYDROCHLORIDE 10 MG/ML
INJECTION, SOLUTION INTRAMUSCULAR; INTRAVENOUS
Status: DISCONTINUED | OUTPATIENT
Start: 2019-02-05 | End: 2019-02-05

## 2019-02-05 RX ORDER — ACETAMINOPHEN 10 MG/ML
1000 INJECTION, SOLUTION INTRAVENOUS ONCE
Status: COMPLETED | OUTPATIENT
Start: 2019-02-05 | End: 2019-02-05

## 2019-02-05 RX ORDER — TRAZODONE HYDROCHLORIDE 50 MG/1
50 TABLET ORAL NIGHTLY PRN
Status: DISCONTINUED | OUTPATIENT
Start: 2019-02-05 | End: 2019-02-06 | Stop reason: HOSPADM

## 2019-02-05 RX ORDER — MIDAZOLAM HYDROCHLORIDE 1 MG/ML
1 INJECTION INTRAMUSCULAR; INTRAVENOUS EVERY 5 MIN PRN
Status: DISCONTINUED | OUTPATIENT
Start: 2019-02-05 | End: 2019-02-05

## 2019-02-05 RX ORDER — SODIUM CHLORIDE 0.9 % (FLUSH) 0.9 %
3 SYRINGE (ML) INJECTION EVERY 8 HOURS PRN
Status: DISCONTINUED | OUTPATIENT
Start: 2019-02-05 | End: 2019-02-06 | Stop reason: HOSPADM

## 2019-02-05 RX ORDER — NALOXONE HCL 0.4 MG/ML
0.02 VIAL (ML) INJECTION
Status: DISCONTINUED | OUTPATIENT
Start: 2019-02-05 | End: 2019-02-06 | Stop reason: HOSPADM

## 2019-02-05 RX ORDER — SODIUM CHLORIDE 0.9 % (FLUSH) 0.9 %
3 SYRINGE (ML) INJECTION
Status: DISCONTINUED | OUTPATIENT
Start: 2019-02-05 | End: 2019-02-05 | Stop reason: HOSPADM

## 2019-02-05 RX ORDER — ROPIVACAINE HYDROCHLORIDE 2 MG/ML
8 INJECTION, SOLUTION EPIDURAL; INFILTRATION; PERINEURAL CONTINUOUS
Status: DISCONTINUED | OUTPATIENT
Start: 2019-02-05 | End: 2019-02-06 | Stop reason: HOSPADM

## 2019-02-05 RX ORDER — ROPIVACAINE HYDROCHLORIDE 2 MG/ML
INJECTION, SOLUTION EPIDURAL; INFILTRATION; PERINEURAL
Status: DISPENSED
Start: 2019-02-05 | End: 2019-02-05

## 2019-02-05 RX ORDER — WARFARIN 2.5 MG/1
2.5 TABLET ORAL DAILY
Qty: 90 TABLET | Refills: 11 | Status: SHIPPED | OUTPATIENT
Start: 2019-02-05 | End: 2019-02-19

## 2019-02-05 RX ORDER — CEFAZOLIN SODIUM 1 G/3ML
2 INJECTION, POWDER, FOR SOLUTION INTRAMUSCULAR; INTRAVENOUS
Status: DISCONTINUED | OUTPATIENT
Start: 2019-02-05 | End: 2019-02-05 | Stop reason: HOSPADM

## 2019-02-05 RX ORDER — ASPIRIN 81 MG/1
81 TABLET ORAL 2 TIMES DAILY
Qty: 60 TABLET | Refills: 0 | Status: SHIPPED | OUTPATIENT
Start: 2019-02-05 | End: 2019-02-19

## 2019-02-05 RX ORDER — PROPOFOL 10 MG/ML
VIAL (ML) INTRAVENOUS
Status: DISCONTINUED | OUTPATIENT
Start: 2019-02-05 | End: 2019-02-05

## 2019-02-05 RX ORDER — OXYCODONE AND ACETAMINOPHEN 10; 325 MG/1; MG/1
1 TABLET ORAL EVERY 4 HOURS PRN
Qty: 43 TABLET | Refills: 0 | Status: SHIPPED | OUTPATIENT
Start: 2019-02-05 | End: 2019-02-19

## 2019-02-05 RX ORDER — FENTANYL CITRATE 50 UG/ML
25 INJECTION, SOLUTION INTRAMUSCULAR; INTRAVENOUS EVERY 5 MIN PRN
Status: DISCONTINUED | OUTPATIENT
Start: 2019-02-05 | End: 2019-02-05

## 2019-02-05 RX ORDER — PREGABALIN 75 MG/1
75 CAPSULE ORAL
Status: COMPLETED | OUTPATIENT
Start: 2019-02-05 | End: 2019-02-05

## 2019-02-05 RX ORDER — PROPRANOLOL HYDROCHLORIDE 10 MG/1
10 TABLET ORAL 2 TIMES DAILY
Status: DISCONTINUED | OUTPATIENT
Start: 2019-02-05 | End: 2019-02-06 | Stop reason: HOSPADM

## 2019-02-05 RX ORDER — SODIUM CHLORIDE 9 MG/ML
INJECTION, SOLUTION INTRAVENOUS CONTINUOUS PRN
Status: DISCONTINUED | OUTPATIENT
Start: 2019-02-05 | End: 2019-02-05

## 2019-02-05 RX ORDER — FENTANYL CITRATE 50 UG/ML
INJECTION, SOLUTION INTRAMUSCULAR; INTRAVENOUS
Status: DISCONTINUED | OUTPATIENT
Start: 2019-02-05 | End: 2019-02-05

## 2019-02-05 RX ORDER — WARFARIN SODIUM 5 MG/1
5 TABLET ORAL DAILY
Status: DISCONTINUED | OUTPATIENT
Start: 2019-02-06 | End: 2019-02-06 | Stop reason: HOSPADM

## 2019-02-05 RX ORDER — MIDAZOLAM HYDROCHLORIDE 1 MG/ML
INJECTION, SOLUTION INTRAMUSCULAR; INTRAVENOUS
Status: DISCONTINUED | OUTPATIENT
Start: 2019-02-05 | End: 2019-02-05

## 2019-02-05 RX ORDER — VANCOMYCIN HCL IN 5 % DEXTROSE 1.5G/250ML
1500 PLASTIC BAG, INJECTION (ML) INTRAVENOUS
Status: COMPLETED | OUTPATIENT
Start: 2019-02-05 | End: 2019-02-05

## 2019-02-05 RX ORDER — OXYCODONE HYDROCHLORIDE 10 MG/1
10 TABLET ORAL
Status: DISCONTINUED | OUTPATIENT
Start: 2019-02-05 | End: 2019-02-06 | Stop reason: HOSPADM

## 2019-02-05 RX ORDER — CELECOXIB 200 MG/1
200 CAPSULE ORAL DAILY
Status: DISCONTINUED | OUTPATIENT
Start: 2019-02-05 | End: 2019-02-05

## 2019-02-05 RX ORDER — FENOFIBRATE 134 MG/1
134 CAPSULE ORAL NIGHTLY
Status: DISCONTINUED | OUTPATIENT
Start: 2019-02-05 | End: 2019-02-06

## 2019-02-05 RX ORDER — ONDANSETRON 2 MG/ML
INJECTION INTRAMUSCULAR; INTRAVENOUS
Status: DISCONTINUED | OUTPATIENT
Start: 2019-02-05 | End: 2019-02-05

## 2019-02-05 RX ORDER — PREGABALIN 75 MG/1
75 CAPSULE ORAL NIGHTLY
Status: DISCONTINUED | OUTPATIENT
Start: 2019-02-05 | End: 2019-02-06 | Stop reason: HOSPADM

## 2019-02-05 RX ORDER — HYDRALAZINE HYDROCHLORIDE 20 MG/ML
10 INJECTION INTRAMUSCULAR; INTRAVENOUS ONCE
Status: DISCONTINUED | OUTPATIENT
Start: 2019-02-05 | End: 2019-02-05 | Stop reason: HOSPADM

## 2019-02-05 RX ORDER — ACETAMINOPHEN 500 MG
1000 TABLET ORAL EVERY 6 HOURS
Status: DISCONTINUED | OUTPATIENT
Start: 2019-02-05 | End: 2019-02-06 | Stop reason: HOSPADM

## 2019-02-05 RX ORDER — SODIUM CHLORIDE 9 MG/ML
INJECTION, SOLUTION INTRAVENOUS
Status: DISCONTINUED | OUTPATIENT
Start: 2019-02-05 | End: 2019-02-05 | Stop reason: HOSPADM

## 2019-02-05 RX ORDER — OXYCODONE HYDROCHLORIDE 5 MG/1
5 TABLET ORAL
Status: DISCONTINUED | OUTPATIENT
Start: 2019-02-05 | End: 2019-02-06 | Stop reason: HOSPADM

## 2019-02-05 RX ORDER — ACETAMINOPHEN 10 MG/ML
INJECTION, SOLUTION INTRAVENOUS
Status: DISPENSED
Start: 2019-02-05 | End: 2019-02-05

## 2019-02-05 RX ORDER — BISACODYL 10 MG
10 SUPPOSITORY, RECTAL RECTAL EVERY 12 HOURS PRN
Status: DISCONTINUED | OUTPATIENT
Start: 2019-02-05 | End: 2019-02-06 | Stop reason: HOSPADM

## 2019-02-05 RX ORDER — HYDROMORPHONE HYDROCHLORIDE 1 MG/ML
0.2 INJECTION, SOLUTION INTRAMUSCULAR; INTRAVENOUS; SUBCUTANEOUS EVERY 5 MIN PRN
Status: DISCONTINUED | OUTPATIENT
Start: 2019-02-05 | End: 2019-02-05 | Stop reason: HOSPADM

## 2019-02-05 RX ADMIN — SODIUM CHLORIDE: 0.9 INJECTION, SOLUTION INTRAVENOUS at 09:02

## 2019-02-05 RX ADMIN — STANDARDIZED SENNA CONCENTRATE AND DOCUSATE SODIUM 1 TABLET: 8.6; 5 TABLET, FILM COATED ORAL at 09:02

## 2019-02-05 RX ADMIN — MEPIVACAINE HYDROCHLORIDE 3 ML: 15 INJECTION, SOLUTION EPIDURAL; INFILTRATION at 07:02

## 2019-02-05 RX ADMIN — ASPIRIN 81 MG: 81 TABLET, COATED ORAL at 09:02

## 2019-02-05 RX ADMIN — SODIUM CHLORIDE, SODIUM GLUCONATE, SODIUM ACETATE, POTASSIUM CHLORIDE, MAGNESIUM CHLORIDE, SODIUM PHOSPHATE, DIBASIC, AND POTASSIUM PHOSPHATE: .53; .5; .37; .037; .03; .012; .00082 INJECTION, SOLUTION INTRAVENOUS at 08:02

## 2019-02-05 RX ADMIN — FENTANYL CITRATE 50 MCG: 50 INJECTION INTRAMUSCULAR; INTRAVENOUS at 06:02

## 2019-02-05 RX ADMIN — PROPRANOLOL HYDROCHLORIDE 10 MG: 10 TABLET ORAL at 05:02

## 2019-02-05 RX ADMIN — MUPIROCIN 1 G: 20 OINTMENT TOPICAL at 05:02

## 2019-02-05 RX ADMIN — PROPOFOL 10 MG: 10 INJECTION, EMULSION INTRAVENOUS at 07:02

## 2019-02-05 RX ADMIN — MIDAZOLAM HYDROCHLORIDE 1 MG: 1 INJECTION, SOLUTION INTRAMUSCULAR; INTRAVENOUS at 06:02

## 2019-02-05 RX ADMIN — VANCOMYCIN HYDROCHLORIDE 1000 MG: 10 INJECTION, POWDER, LYOPHILIZED, FOR SOLUTION INTRAVENOUS at 07:02

## 2019-02-05 RX ADMIN — MIDAZOLAM HYDROCHLORIDE 0.5 MG: 1 INJECTION, SOLUTION INTRAMUSCULAR; INTRAVENOUS at 07:02

## 2019-02-05 RX ADMIN — ROPIVACAINE HYDROCHLORIDE 8 ML/HR: 2 INJECTION, SOLUTION EPIDURAL; INFILTRATION at 05:02

## 2019-02-05 RX ADMIN — MUPIROCIN 1 G: 20 OINTMENT TOPICAL at 09:02

## 2019-02-05 RX ADMIN — PROPOFOL 10 MG: 10 INJECTION, EMULSION INTRAVENOUS at 08:02

## 2019-02-05 RX ADMIN — ACETAMINOPHEN 1000 MG: 10 INJECTION, SOLUTION INTRAVENOUS at 09:02

## 2019-02-05 RX ADMIN — WARFARIN SODIUM 7.5 MG: 2.5 TABLET ORAL at 05:02

## 2019-02-05 RX ADMIN — CELECOXIB 200 MG: 200 CAPSULE ORAL at 03:02

## 2019-02-05 RX ADMIN — PROPOFOL 100 MCG/KG/MIN: 10 INJECTION, EMULSION INTRAVENOUS at 07:02

## 2019-02-05 RX ADMIN — ROPIVACAINE HYDROCHLORIDE 8 ML/HR: 2 INJECTION, SOLUTION EPIDURAL; INFILTRATION at 09:02

## 2019-02-05 RX ADMIN — CELECOXIB 400 MG: 200 CAPSULE ORAL at 06:02

## 2019-02-05 RX ADMIN — ACETAMINOPHEN 1000 MG: 500 TABLET ORAL at 03:02

## 2019-02-05 RX ADMIN — FAMOTIDINE 20 MG: 20 TABLET ORAL at 09:02

## 2019-02-05 RX ADMIN — CEFAZOLIN 2 G: 330 INJECTION, POWDER, FOR SOLUTION INTRAMUSCULAR; INTRAVENOUS at 05:02

## 2019-02-05 RX ADMIN — TRAZODONE HYDROCHLORIDE 50 MG: 50 TABLET ORAL at 09:02

## 2019-02-05 RX ADMIN — SODIUM CHLORIDE, SODIUM GLUCONATE, SODIUM ACETATE, POTASSIUM CHLORIDE, MAGNESIUM CHLORIDE, SODIUM PHOSPHATE, DIBASIC, AND POTASSIUM PHOSPHATE: .53; .5; .37; .037; .03; .012; .00082 INJECTION, SOLUTION INTRAVENOUS at 07:02

## 2019-02-05 RX ADMIN — ONDANSETRON 4 MG: 2 INJECTION INTRAMUSCULAR; INTRAVENOUS at 07:02

## 2019-02-05 RX ADMIN — ACETAMINOPHEN 1000 MG: 500 TABLET ORAL at 09:02

## 2019-02-05 RX ADMIN — PREGABALIN 75 MG: 75 CAPSULE ORAL at 09:02

## 2019-02-05 RX ADMIN — POLYETHYLENE GLYCOL 3350 17 G: 17 POWDER, FOR SOLUTION ORAL at 09:02

## 2019-02-05 RX ADMIN — VANCOMYCIN HYDROCHLORIDE 1500 MG: 10 INJECTION, POWDER, LYOPHILIZED, FOR SOLUTION INTRAVENOUS at 06:02

## 2019-02-05 RX ADMIN — ANASTROZOLE 1 MG: 1 TABLET, COATED ORAL at 09:02

## 2019-02-05 RX ADMIN — CEFAZOLIN 2 G: 330 INJECTION, POWDER, FOR SOLUTION INTRAMUSCULAR; INTRAVENOUS at 07:02

## 2019-02-05 RX ADMIN — OXYCODONE HYDROCHLORIDE 10 MG: 5 TABLET ORAL at 09:02

## 2019-02-05 RX ADMIN — PREGABALIN 75 MG: 75 CAPSULE ORAL at 06:02

## 2019-02-05 RX ADMIN — FENTANYL CITRATE 25 MCG: 50 INJECTION, SOLUTION INTRAMUSCULAR; INTRAVENOUS at 07:02

## 2019-02-05 RX ADMIN — DEXAMETHASONE SODIUM PHOSPHATE 8 MG: 4 INJECTION, SOLUTION INTRAMUSCULAR; INTRAVENOUS at 07:02

## 2019-02-05 RX ADMIN — SODIUM CHLORIDE: 0.9 INJECTION, SOLUTION INTRAVENOUS at 07:02

## 2019-02-05 RX ADMIN — PHENYLEPHRINE HYDROCHLORIDE 50 MCG: 10 INJECTION INTRAVENOUS at 08:02

## 2019-02-05 RX ADMIN — Medication 2 MG: at 12:02

## 2019-02-05 RX ADMIN — KETAMINE HYDROCHLORIDE 20 MG: 10 INJECTION, SOLUTION INTRAMUSCULAR; INTRAVENOUS at 07:02

## 2019-02-05 RX ADMIN — LIDOCAINE HYDROCHLORIDE 100 MG: 20 INJECTION, SOLUTION INTRAVENOUS at 07:02

## 2019-02-05 NOTE — PLAN OF CARE
Problem: Physical Therapy Goal  Goal: Physical Therapy Goal  Goals to be met by: 19     Patient will increase functional independence with mobility by performin. Supine to sit with Set-up [place]  2. Sit to supine with Set-up [place]  3. Sit to stand transfer with Supervision  4. Bed to chair transfer with Stand-by Assistance using Rolling Walker  5. Gait  x 150 feet with Stand-by Assistance using Rolling Walker.   6. Ascend/descend 4 stair with left Handrails Contact Guard Assistance.   7. Lower extremity exercise program x20-30 reps per handout, with independence      Outcome: Ongoing (interventions implemented as appropriate)  Pt tolerated session well today with no complications and demonstrated appropriate mobility s/p (L) TKA. Pt with no LOB during ambulation using SW for support. Pt able to follow 3-point gait sequencing well with no complications. Pt will cont to benefit from skilled therapy services and is appropriate for HHPT upon d/c.

## 2019-02-05 NOTE — PT/OT/SLP EVAL
Physical Therapy Evaluation    Patient Name:  Nicho Espinosa   MRN:  [id number]    Recommendations:     Discharge Recommendations:  (HHPT)   Discharge Equipment Recommendations: walker, rolling   Barriers to discharge: Inaccessible home    Assessment:     Nicho Espinosa is a 78 y.o. female admitted with a medical diagnosis of Primary osteoarthritis of left knee.  She presents with the following impairments/functional limitations:  weakness, impaired endurance, impaired self care skills, impaired balance, gait instability, impaired functional mobilty, pain, impaired joint extensibility, decreased lower extremity function, decreased ROM, orthopedic precautions, impaired skin.    -Pt tolerated session well today with no complications and demonstrated appropriate mobility s/p (L) TKA. Pt with no LOB during ambulation using SW for support. Pt able to follow 3-point gait sequencing well with no complications. Pt will cont to benefit from skilled therapy services and is appropriate for HHPT upon d/c.      Rehab Prognosis: Good; patient would benefit from acute skilled PT services to address these deficits and reach maximum level of function.    Recent Surgery: Procedure(s) (LRB):  REPLACEMENT-KNEE-TOTAL-SIGHT ASSISTED (Left) Day of Surgery    Plan:     During this hospitalization, patient to be seen BID to address the identified rehab impairments via gait training, therapeutic activities, therapeutic exercises, neuromuscular re-education and progress toward the following goals:    · Plan of Care Expires:  03/05/19    Subjective     Chief Complaint: impaired mobility  Patient/Family Comments/goals: return home to [facility]  Pain/Comfort:  · Pain Rating 1: 4/10  · Location - Side 1: Left  · Location - Orientation 1: anterior  · Location 1: knee  · Pain Addressed 1: Pre-medicate for activity, Cessation of Activity, Reposition    Patients cultural, spiritual, Sabianism conflicts given the current situation:      Living Environment:  -Pt  lives in a 2nd story duplex but will be staying wit her dtr who lives on the first floor. Pt has 6 TERE with wide (B) rails. Pt uses a tub/shower combo at home.  Prior to admission, patients level of function was (I).  Equipment used at home: bedside commode, shower chair, cane, straight, rollator.  DME owned (not currently used): .  Upon discharge, patient will have assistance from family.    Objective:     Communicated with nsg prior to session.  Patient found all lines intact blood pressure cuff, FCD, cryotherapy, peripheral IV, perineural catheter, pulse ox (continuous), telemetry  upon PT entry to room.    General Precautions: Standard, fall   Orthopedic Precautions:LLE weight bearing as tolerated   Braces: N/A     Exams:  · Sensation:    · -       Intact  light/touch (B) LE  · RLE ROM: WFL  · RLE Strength: WFL  · LLE ROM: WFL except limits from bulky dressing  · LLE Strength: Deficits: 4/5 grossly    Functional Mobility:  · Bed Mobility:     · Scooting: contact guard assistance  · Supine to Sit: contact guard assistance  · Sit to Supine: contact guard assistance  · Transfers:     · Sit to Stand:  contact guard assistance with standard walker  · Gait: 3 steps fwd/back, 3 lateral steps using SW with CGA      Therapeutic Activities and Exercises:   -Pt educated on:  A. PT POC and role of PT  B. Importance of OOB activity to improve functional outcomes   C. DME mgmt and t/f sequencing  D. Performing HEP to reduce the risk of developing blood clots  E. Gait sequencing   F. D/c planning      AM-PAC 6 CLICK MOBILITY  Total Score:18     Patient left supine with all lines intact, call button in reach and nsg notified.    GOALS:   Multidisciplinary Problems     Physical Therapy Goals        Problem: Physical Therapy Goal    Goal Priority Disciplines Outcome Goal Variances Interventions   Physical Therapy Goal     PT, PT/OT Ongoing (interventions implemented as appropriate)     Description:  Goals to be met by: 2/12/19      Patient will increase functional independence with mobility by performin. Supine to sit with Set-up [place]  2. Sit to supine with Set-up [place]  3. Sit to stand transfer with Supervision  4. Bed to chair transfer with Stand-by Assistance using Rolling Walker  5. Gait  x 150 feet with Stand-by Assistance using Rolling Walker.   6. Ascend/descend 4 stair with left Handrails Contact Guard Assistance.   7. Lower extremity exercise program x20-30 reps per handout, with independence                        History:     Past Medical History:   Diagnosis Date    Allergy     Arthritis     Blepharitis of both eyes     breast ca 2017    right    Complication of anesthesia     nausea    Degenerative disc disease     GERD (gastroesophageal reflux disease)     patient denies reflux    History of compression fracture of spine 4/10/2014    Hyperlipidemia     Lumbar disc disease     Meibomitis     Obesity 2013    Osteoporosis     Papilloma of eyelid     RUL    Postoperative anemia 2013    Thoracic or lumbosacral neuritis or radiculitis, unspecified 2013    Tremors of nervous system 2015       Past Surgical History:   Procedure Laterality Date    APPENDECTOMY      ARTHROPLASTY, KNEE, TOTAL Right 2013    [facility] at [facility] 2ND FLR    BIOPSY-LYMPH NODE-SENTINEL Right 2017    [facility]le [facility] at [facility] OR 2ND FLR    BREAST BIOPSY Right     COLONOSCOPY N/A 2016    Performed by Candelairo Oviedo MD at [facility] ENDO (4TH FLR)    FOOT SURGERY      FUSION-TRANSLUMINAL LUMBAR INTERBODY (TLIF) CPT:80988-55,06173,[id number],42643 Right 2015    Performed by [facility]ett [facility] at [facility] OR 2ND FLR    INJECTION-NODE-SENTINEL Right 2017    [facility] at [facility] OR 2ND FLR    MASTECTOMY Right 2017    MASTECTOMY RIGHT Right 2017    Performed by [facility] at [facility] OR [address] [place] [address]     SHOULDER ARTHROSCOPY      left    SPINE SURGERY  1-12-15    [facility]    TONSILLECTOMY      TOTAL KNEE ARTHROPLASTY         Clinical Decision Making:     History  Co-morbidities and personal factors that may impact the plan of care Examination  Body Structures and Functions, activity limitations and participation restrictions that may impact the plan of care Clinical Presentation   Decision Making/ Complexity Score   Co-morbidities:   [] Time since onset of injury / illness / exacerbation  [] Status of current condition  []Patient's cognitive status and safety concerns    [] Multiple Medical Problems (see med hx)  Personal Factors:   [] Patient's age  [] Prior Level of function   [] Patient's home situation (environment and family support)  [] Patient's level of motivation  [] Expected progression of patient      HISTORY:(criteria)    [] 25546 - no personal factors/history    [] 53976 - has 1-2 personal factor/comorbidity     [] 27536 - has >3 personal factor/comorbidity     Body Regions:  [] Objective examination findings  [] Head     []  Neck  [] Trunk   [] Upper Extremity  [] Lower Extremity    Body Systems:  [] For communication ability, affect, cognition, language, and learning style: the assessment of the ability to make needs known, consciousness, orientation (person, place, and time), expected emotional /behavioral responses, and learning preferences (eg, learning barriers, education  needs)  [] For the neuromuscular system: a general assessment of gross coordinated movement (eg, balance, gait, locomotion, transfers, and transitions) and motor function  (motor control and motor learning)  [] For the musculoskeletal system: the assessment of gross symmetry, gross range of motion, gross strength, height, and weight  [] For the integumentary system: the assessment of pliability(texture), presence of scar formation, skin color, and skin integrity  [] For cardiovascular/pulmonary system: the assessment of heart  rate, respiratory rate, blood pressure, and edema     Activity limitations:    [] Patient's cognitive status and saf ety concerns          [] Status of current condition      [] Weight bearing restriction  [] Cardiopulmunary Restriction    Participation Restrictions:   [] Goals and goal agreement with the patient     [] Rehab potential (prognosis) and probable outcome      Examination of Body System: (criteria)    [] 07834 - addressing 1-2 elements    [] 18728 - addressing a total of 3 or more elements     [] 76903 -  Addressing a total of 4 or more elements         Clinical Presentation: (criteria)  Choose one     On examination of body system using standardized tests and measures patient presents with (CHOOSE ONE) elements from any of the following: body structures and functions, activity limitations, and/or participation restrictions.  Leading to a clinical presentation that is considered (CHOOSE ONE)                              Clinical Decision Making  (Eval Complexity):  Choose One     Time Tracking:     PT Received On: 02/05/19  PT Start Time: 1140     PT Stop Time: 1200  PT Total Time (min): 20 min     Billable Minutes: Evaluation 12 and Gait Training 8      Aries Chin, PT  02/05/2019

## 2019-02-05 NOTE — ANESTHESIA PROCEDURE NOTES
IPACK    Patient location during procedure: pre-op   Block not for primary anesthetic.  Reason for block: at surgeon's request and post-op pain management   Post-op Pain Location: L Knee pain  Start time: 2/5/2019 6:55 AM  Timeout: 2/5/2019 6:40 AM   End time: 2/5/2019 7:00 AM  Staffing  Anesthesiologist: Michelle Liang MD  Resident/CRNA: Go Cazares MD  Other anesthesia staff: Jackson Gatica MD  Performed: other anesthesia staff   Preanesthetic Checklist  Completed: patient identified, site marked, surgical consent, pre-op evaluation, timeout performed, IV checked, risks and benefits discussed and monitors and equipment checked  Peripheral Block  Patient position: supine  Prep: ChloraPrep  Patient monitoring: heart rate, cardiac monitor, continuous pulse ox, continuous capnometry and frequent blood pressure checks  Block type: I PACK  Laterality: left  Injection technique: single shot  Needle  Needle type: Stimuplex   Needle gauge: 21 G  Needle length: 4 in  Needle localization: anatomical landmarks and ultrasound guidance   -ultrasound image captured on disc.  Assessment  Injection assessment: negative aspiration, negative parasthesia and local visualized surrounding nerve  Paresthesia pain: none  Heart rate change: no  Slow fractionated injection: yes  Additional Notes  0.25% Ropivacaine 20cc administered. VSS.  [facility] RN monitoring vitals throughout procedure.  Patient tolerated procedure well.

## 2019-02-05 NOTE — BRIEF OP NOTE
Comfortable  Vitals Stable  Dressing Dry/Intact  Bilateral lower extremities: Palpable DP, good capillary refill  Motor sensation intact  xrays taken today demonstrate a well fixed and positioned TKA.  Labs reviewed  S/P TKA  Continue current treatment.

## 2019-02-05 NOTE — NURSING
Received patient from PACU, alert and oriented x3. Cell phone at side. Patient instructed on IS and Fall precautions. Communication board up to date.

## 2019-02-05 NOTE — ASSESSMENT & PLAN NOTE
78 y.o. female POD1 s/p L TKA    Pain control  PT/OT: WBAT LLE  DVT PPx: ASA 81 BID until INR therapeutic, warfarin for 7 days; Eliquis starting POD8, FCDs at all times when not ambulating  Abx: postop Ancef  Labs: INR pending  Drain: none  Lorenzo: none    Dispo: f/u PT recs

## 2019-02-05 NOTE — PT/OT/SLP EVAL
Occupational Therapy   Evaluation/ Treatment     Name: Nicho Espinosa  MRN: [id number]  Admitting Diagnosis:  Primary osteoarthritis of left knee Day of Surgery    Recommendations:     Discharge recommendations: Home w/ HH     Barriers to discharge: Inaccessible home environment    Equipment recommendations: rolling walker    History:     Occupational Profile:  Living Environment: Pt lives on the 2nd floor of a duplex. She will d/c'ed to her daughter's home who lives on the 1st floor of patient's duplex. Pt has 6 TERE w/ B HR, not accessible simultaneously. Pt uses a tub/shower  Previous level of function: PTA, pt reports being independent w/ ADLs and mod I w/ functional mobility   Equipment Owned: bedside commode, shower chair and cane and rollator    Assistance Upon Discharge: Pt reports she will have assistance from her daughter upon d/c home.     Past Medical History:   Diagnosis Date    Allergy     Arthritis     Blepharitis of both eyes     breast ca 7/18/2017    right    Complication of anesthesia     nausea    Degenerative disc disease     GERD (gastroesophageal reflux disease)     patient denies reflux    History of compression fracture of spine 4/10/2014    Hyperlipidemia     Lumbar disc disease     Meibomitis     Obesity 9/19/2013    Osteoporosis     Papilloma of eyelid     RUL    Postoperative anemia 11/21/2013    Thoracic or lumbosacral neuritis or radiculitis, unspecified 9/20/2013    Tremors of nervous system 2/2015       Past Surgical History:   Procedure Laterality Date    APPENDECTOMY      ARTHROPLASTY, KNEE, TOTAL Right 11/14/2013    Performed by [facility]burn [facility] at [facility] OR 2ND FLR    BIOPSY-LYMPH NODE-SENTINEL Right 8/9/2017    Per[facility] by [facility]le [facility] at [facility] 2ND FLR    BREAST BIOPSY Right 2017    COLONOSCOPY N/A 11/1/2016    Performed by Candelario Oviedo MD at [facility] ENDO (4TH FLR)    FOOT SURGERY      FUSION-TRANSLUMINAL LUMBAR INTERBODY (TLIF)  CPT:56769-71,50364,[id number] Right 1/12/2015    [facility] 2ND F[facility]    INJECTION-NODE-SENTINEL Right 8/9/2017    [facility] at [facility] 2ND FLR    MASTECTOMY Right 08/2017    MASTECTOMY RIGHT Right 8/9/2017    Per[facility] by [facility] at [facility] OR 2ND FLR    SHOULDER ARTHROSCOPY      left    [facility]12-15    [facility]    TONSILLECTOMY      TOTAL KNEE ARTHROPLASTY         Subjective     Communicated with: RN prior to session.    Pt agreeable to Evaluation.    Pain/Comfort:  Pain level: 4/10 in L knee    Objective:     Pt found supine in bed with blood pressure cuff, parrish catheter, telemetry, PNC, PCEA, pulse ox, Peripheral IV and FCD.    Orthopedic Precautions: LLE weight bearing as tolerated    Occupational Performance:    Bed Mobility:    Supine to sit: min A  Sit to supine: CGA    Transfers:    Sit<>Stand: CGA, SW    Ambulation:    Pt ambulated 3 steps forward, backward, and side stepped to HOB w/ CGA and SW - no signs of LOB or SOB noted.     Activities of Daily Living:  UE dressing: Maximum Assistance to trevin gown around back  LE dressing: Total Assistance to trevin socks  Pt educated on LE dressing techniques and need for AD with LE dressing      Patient left supine in bed with all lines intact and RN notified.    [facility] 6 Click: Self-care  [facility] Total Score: 16    Education:    Assessment:     Nicho Espinosa is a 78 y.o. female with a medical diagnosis of Primary osteoarthritis of left knee.  She presents with decreased function of L LE impeding her ability to perform ADLs and functional mobility and would benefit from OT services to maximize functional (I) and safety.    Performance deficits affecting function are weakness, impaired endurance, impaired self care skills, impaired functional mobilty, gait instability, impaired balance, decreased lower extremity function, decreased safety awareness, pain, impaired skin, decreased ROM,  edema, orthopedic precautions.    Rehab Prognosis:  Good    Plan:     Patient to be seen QD to address the above listed problems via self-care/home management, therapeutic activities, therapeutic exercises    Plan of Care Reviewed with: patient    This Plan of care has been discussed with the patient who was involved in its development and understands and is in agreement with the identified goals and treatment plan    GOALS:   Multidisciplinary Problems     Occupational Therapy Goals        Problem: Occupational Therapy Goal    Goal Priority Disciplines Outcome Interventions   Occupational Therapy Goal     OT, PT/OT Ongoing (interventions implemented as appropriate)    Description:  Goals to be met by: 7 days    Patient will increase functional independence with ADLs by performing:    UE Dressing with Supervision.  LE Dressing with Supervision with AD as needed.  Grooming while standing with Supervision.  Toileting from bedside commode with Supervision for hygiene and clothing management.   Stand pivot transfers with Supervision.  Toilet transfer to bedside commode with Supervision.                         Time Tracking:     OT Received On: 2/5/2019  OT Start Time: 1140   OT Stop Time: 1200   OT Total Time: 20 minutes     Billable Minutes:  Evaluation 12 minutes  Therapeutic Activity 8 minutes    Samreen Mancera OT  2/5/2019

## 2019-02-05 NOTE — TRANSFER OF CARE
"Anesthesia Transfer of Care Note    Patient: Nicho Espinosa    Procedure(s) Performed: Procedure(s) (LRB):  REPLACEMENT-KNEE-TOTAL-SIGHT ASSISTED (Left)    Patient location: PACU    Anesthesia Type: MAC, spinal and regional    Transport from OR: Transported from OR on 6-10 L/min O2 by face mask with adequate spontaneous ventilation    Post pain: adequate analgesia    Post assessment: no apparent anesthetic complications and tolerated procedure well    Post vital signs: stable    Level of consciousness: awake and alert    Nausea/Vomiting: no nausea/vomiting    Complications: none          Last vitals:   Visit Vitals  BP (!) 125/58 (BP Location: Left arm, Patient Position: Lying)   Pulse 64   Temp 36.3 °C (97.3 °F) (Temporal)   Resp 16   Ht 5' 1" (1.549 m)   Wt 79.8 kg (176 lb)   LMP 07/05/1987   SpO2 100%   Breastfeeding? No   BMI 34.37 kg/m²     " Problem: Patient Care Overview  Goal: Plan of Care Review  Outcome: Ongoing (interventions implemented as appropriate)  Pt on bedrest, non-verbal, but responds appropriately to requests. Daughter at bedside, involved in care. Afebrile throughout shift. VSS with no acute events. No complaints of pain. Tolerating diet, no complaints of N/V. Pt is oliguric with little urinary output. Bladder scan shows minimal residual. Small excoriation noted under nasal canula, neosporin administered and dressed with bandaid. No acute events so far this shift. Pt remaining free from falls or injury. Bed in lowest locked position, side rails up x2, call light w/i reach, pt instructed to call for assistance if needed. Skid proof socks on. Care plan explained to patient. No additional complaints at this time. Q 2hr rounding performed. Will continue to monitor.

## 2019-02-05 NOTE — ANESTHESIA PROCEDURE NOTES
Adductor Canal Catheter    Patient location during procedure: pre-op   Block not for primary anesthetic.  Reason for block: at surgeon's request and post-op pain management   Post-op Pain Location: L Knee pain  Start time: 2/5/2019 6:42 AM  Timeout: 2/5/2019 6:40 AM   End time: 2/5/2019 7:00 AM  Staffing  Anesthesiologist: Michelle Liang MD  Resident/CRNA: Go Cazares MD  Other anesthesia staff: Jackson Gatica MD  Performed: resident/CRNA   Preanesthetic Checklist  Completed: patient identified, site marked, surgical consent, pre-op evaluation, timeout performed, IV checked, risks and benefits discussed and monitors and equipment checked  Peripheral Block  Patient position: supine  Prep: ChloraPrep and site prepped and draped  Patient monitoring: heart rate, cardiac monitor, continuous pulse ox, continuous capnometry and frequent blood pressure checks  Block type: adductor canal  Laterality: left  Injection technique: continuous  Needle  Needle type: Tuohy   Needle gauge: 17 G  Needle length: 3.5 in  Needle localization: anatomical landmarks and ultrasound guidance  Catheter type: spring wound  Catheter size: 19 G  Test dose: lidocaine 1.5% with Epi 1-to-200,000 and negative   -ultrasound image captured on disc.  Assessment  Injection assessment: negative aspiration, negative parasthesia and local visualized surrounding nerve  Paresthesia pain: none  Heart rate change: no  Slow fractionated injection: yes  Additional Notes  0.25% Ropivacaine 20cc administered. VSS.  [facility] RN monitoring vitals throughout procedure.  Patient tolerated procedure well.

## 2019-02-05 NOTE — NURSING TRANSFER
Nursing Transfer Note      2/5/2019     Transfer To: [address]    Transfer via stretcher    Transfer with  to O2, pt belongings bag and polar care    Transported by transport    Medicines sent: IV    Chart send with patient: Yes    Notified: family

## 2019-02-05 NOTE — OP NOTE
DATE OF PROCEDURE: 2/5/19  PREOPERATIVE DIAGNOSIS: Arthritis, Left knee.   POSTOPERATIVE DIAGNOSIS: Arthritis, Left knee.   PROCEDURES PERFORMED: Left total knee arthroplasty.   SURGEON: Sami Washburn M.D.   ASSISTANT: chip Leija MD  ANESTHESIA: Regional.   COMPLICATIONS: None.   COUNTS: Correct.   DISPOSITION: Recovery Room, stable.   SPECIMENS: Bone and cartilage.   FINDINGS: Tricompartmental degenerative change.   FLUIDS: 2000 mL.   BLOOD LOSS: Less than 50 mL.   IMPLANTS: [employer], size 2 Left posterior stabilized   cemented femoral component with a 2 cemented tibial tray, a 29 mm 3-peg   patella and a size 2, 13 mm posterior stabilized polyethylene insert.   INDICATIONS FOR PROCEDURE: Nicho Espinosa is a 78-year-old female who has   severe arthritis in the Left knee . She is having continued pain in the   Left knee and did not respond to nonoperative measures, decided to undergo   Left knee replacement. She is aware of reasonable treatment options as   well as risks and benefits. {She did not respond to NSAIDS or injections. She received a course or pre-operative physical therapy.  PROCEDURE IN DETAIL: After appropriate consent was obtained, the patient   Was brought in the Operating Room, anesthesia was administered. She received   antibiotic prophylaxis.  Cast   padding and tourniquet was applied to the proximal Left thigh. Left  lower extremity was prepped and draped in usual sterile fashion. Limb was   elevated, tourniquet was inflated. The knee was flexed. An incision was   made from the tibial tubercle just proximal to the superior pole of the   patella. It was taken down through the skin and retinacular. A medial   parapatellar arthrotomy was performed followed by a standard medial   release. Patellar fat pad was partially excised. ACL was resected.   Femoral punch was used to make the guide hole for the femoral drill. The   distal cutting guide was set at 6 degrees valgus left. A standard distal  femoral cut was made.We were pleased with the alignment and quality of the cut.  The PCL retractor was used to bring   the tibia into the field. Meniscal remnants were resected. The   extramedullary tibial guide was pinned in place using the medial side, as most   defective, 2 mm bone was taken off of this. We checked the alignment in   both planes both before and after making the cut. We were satisfied with the   alignment of the cut and the amount of bone removed.The femur was then  sized, found to be a size 2. A size 2 cutting guide was pinned in 3   degrees of external rotation. This was based off the posterior condyle,   checked the transepicondylar axis. Anterior, posterior and chamfer cuts   were made. The box guide was pinned in position. Femoral box cut was   made. Bone fragments were removed. Lamina spreaders were   then used to open up posteriorly. The PCL was resected and some soft   tissue debris was removed.  A 13 mm spacer block gave excellent flexion-extension gap balance.   I was also satisfied with the medial and lateral stability. At this   point, the femoral trial was placed. The tibia was sized, found to be a   Size 2. A size 2 tibial trial was pinned in appropriate alignment and   rotation. This was based on the medial one third of the tibial tubercle.  A stem extension hole was drilled. A keel hole was punched and a   trial reduction was performed with a size 2, 13 mm insert. She  achieved full   extension. There was no recurvatum. She easily had 130degrees of flexion.   The femoral component ranged symmetrically in the tibial tray. There was   no lift off. There was no instability of full extension, 30 degrees of   flexion, mid flexion and full flexion. Patella was measured and found to   be 24 mm thick, 8 mm of bone removed. The 3-peg holes were drilled 29 mm   3-peg trial was placed. The knee was brought through range of motion. The   patella tracked extremely well. Therefore, at this  point, we were   satisfied with knee range of motion and stability, component position,   sizing and alignment as well as patella tracking. All trial components   were removed. Bone was prepared for cementing by pulsatile lavage and   drying. Components were cemented into place, femur followed by tibia.   Meticulous care was taken to remove excess cement. Tibial insert was   firmly seated in the tibial tray. The knee was inspected. There was no   loose body, foreign body or soft tissue interposition. Knee was then   reduced, brought into extension. Patella button was cemented in place.   Excess cement was removed. The wound was then copiously irrigated with   antibiotic-impregnated solution. Once the cement was dried, tranexamic   acid was applied. The arthrotomy was closed with #1 Vicryl. Once the   arthrotomy was closed, the knee was brought through range of motion, stable   as previously described. Patella tracked very well. Retinacular was   closed with 0 Vicryl, subcutaneous layer with 2-0 Vicryl, skin with   monocryl and dermabond. Sterile dressing was applied. Tourniquet was let down.  She was   transferred to a stretcher, brought to Recovery Room in stable condition,   tolerated the procedure well, no known complications.

## 2019-02-05 NOTE — ANESTHESIA PROCEDURE NOTES
Spinal    Diagnosis: arthritis  Patient location during procedure: OR  Start time: 2/5/2019 7:20 AM  Timeout: 2/5/2019 7:19 AM  End time: 2/5/2019 7:29 AM  Staffing  Anesthesiologist: Carlton Ramírez MD  Resident/CRNA: Kurt Go MD  Performed: resident/CRNA   Preanesthetic Checklist  Completed: patient identified, site marked, surgical consent, pre-op evaluation, timeout performed, IV checked, risks and benefits discussed and monitors and equipment checked  Spinal Block  Patient position: sitting  Prep: ChloraPrep  Patient monitoring: heart rate, cardiac monitor, continuous pulse ox and frequent blood pressure checks  Approach: left paramedian  Location: L2-3  Injection technique: single shot  CSF Fluid: clear free-flowing CSF  Needle  Needle type: pencil-tip   Needle gauge: 22 G  Needle length: 3.5 in  Additional Documentation: incremental injection and no paresthesia on injection  Needle localization: anatomical landmarks  Assessment  Ease of block: moderate  Patient's tolerance of the procedure: comfortable throughout block and no complaints

## 2019-02-05 NOTE — INTERVAL H&P NOTE
Nicho Espinosa was interviewed, examined and the H and P reviewed.  There has been no interval change in her History and Physical.

## 2019-02-05 NOTE — PLAN OF CARE
[facility]    HOME HEALTH ORDERS  FACE TO FACE ENCOUNTER    Patient Name: Nicho Espinosa  YOB: 1940    PCP: Azalea Walker MD   PCP Address: [address] / [place] [address]  PCP Phone Number: [phone]  PCP Fax: [phone]    Encounter Date: 02/05/2019    Admit to Home Health    Diagnoses:  Active Hospital Problems    Diagnosis  POA    *Primary osteoarthritis of left knee [M17.12]  Yes    Malignant neoplasm of upper-outer quadrant of right breast in female, estrogen receptor positive [C50.411, Z17.0]  Not Applicable    History of deep vein thrombosis (DVT) of lower extremity [Z86.718]  Not Applicable    GERD (gastroesophageal reflux disease) [K21.9]  Yes    Pre-diabetes [R73.03]  Yes    History of compression fracture of spine [Z87.81]  Not Applicable    Anemia of chronic disease [D63.8]  Yes    Obesity [E66.9]  Yes    Hypertriglyceridemia [E78.1]  Yes    Insomnia [G47.00]  Yes    Essential tremor [G25.0]  Yes      Resolved Hospital Problems   No resolved problems to display.       Future Appointments   Date Time Provider Department Center   2/19/2019 10:30 AM Cindy Thrasher PA-C [facility] ORTHO [facility]   3/6/2019 10:00 AM [facility], DEXA1 [facility] BMD [facility]     Follow-up Information     Cindy Thrasher PA-C In 2 weeks.    Specialty:  Orthopedic Surgery  Contact information:  [address]  [facility] [address]  [phone]                     I have seen and examined this patient face to face today. My clinical findings that support the need for the home health skilled services and home bound status are the following:  Weakness/numbness causing balance and gait disturbance due to Joint Replacement making it taxing to leave home.    Allergies:  Review of patient's allergies indicates:   Allergen Reactions    Sulfa (sulfonamide antibiotics) Other (See Comments)     Yeast infection and irritation    Adhesive Hives and Itching       Diet: regular  diet    Activities: activity as tolerated    Home Health Admitting Clinician:   SN/PT to complete comprehensive assessment including routine vital signs. Instruct on disease process and s/s of complications to report to MD. Follow specific home health arthoplasty protocol. Review/verify medication list sent home with the patient at time of discharge  and instruct patient/caregiver as needed. If coumadin ordered, coumadin clinic to manage INR with INR draws 2x per week with a goal to maintain INR between 1.8 and 2.2. Frequency may be adjusted depending on start of care date.    Notify MD if SBP > 160 or < 90; DBP > 90 or < 50; HR > 120 or < 50; Temp > 101    Home Medical Equipment:  Walker, 3-1 bedside commode, transfer tub bench    CONSULTS:    Physical Therapy may admit if patient not on coumadin, PT to perform comprehensive assessment if performing admit visit and generate therapy plan of care. Evaluate for home safety and equipment needs; Establish/upgrade home exercise program. Perform/instruct on therapeutic exercises, gait training, transfer training, and Range of Motion.      OTHER: (only select if patient needs other therapy disciplines)  Occupational Therapy to evaluate and treat. Evaluate home environment for safety and equipment needs. Perform/Instruct on transfers, ADL training, ROM, and therapeutic exercises.    MISCELLANEOUS CARE:  Routine Skin for Bedridden Patients: Instruct patient/caregiver to apply moisture barrier cream to all skin folds and wet areas in perineal area daily and after baths and all bowel movements.    WOUND CARE ORDERS:  Assess Surgical Incision/DSRG each TX  Aquacel AG drsg applied post-op leave on 14 days post op. Call MD if any drainage reaches border to border of drsg horizontally, s/s of infection, temp >101, induration, swelling or redness.  If dressing is removed per MD order, then apply island dressing, change/teach caregiver to perform daily dressing change if island  dressing present.    Medications: Review discharge medications with patient and family and provide education.      Current Discharge Medication List      START taking these medications    Details   apixaban (ELIQUIS) 2.5 mg Tab Take 1 tablet (2.5 mg total) by mouth 2 (two) times daily. Start taking postop day 8.  Stop taking warfarin at this time for 23 days  Qty: 46 tablet, Refills: 0      aspirin (ECOTRIN) 81 MG EC tablet Take 1 tablet (81 mg total) by mouth 2 (two) times daily. Take until INR therapeutic (1.8-2.2)  Qty: 60 tablet, Refills: 0      docusate sodium (COLACE) 100 MG capsule Take 1 capsule (100 mg total) by mouth 3 (three) times daily.  Qty: 90 capsule, Refills: 0      !! ondansetron (ZOFRAN-ODT) 8 MG TbDL Take 1 tablet (8 mg total) by mouth every 8 (eight) hours as needed.  Qty: 42 tablet, Refills: 0      oxyCODONE-acetaminophen (PERCOCET)  mg per tablet Take 1 tablet by mouth every 4 (four) hours as needed. Patient is in the immediate postoperative period.  Qty: 43 tablet, Refills: 0      polyethylene glycol (GLYCOLAX) 17 gram/dose powder Take 17 g by mouth once daily.  Qty: 1530 g, Refills: 0       !! - Potential duplicate medications found. Please discuss with provider.      CONTINUE these medications which have CHANGED    Details   warfarin (COUMADIN) 2.5 MG tablet Take 1 tablet (2.5 mg total) by mouth Daily. Take 1-3 tablets daily as directed by coumadin clinic.  Stop taking after 7 days.  Start taking Eliquis the next day.  Qty: 90 tablet, Refills: 11         CONTINUE these medications which have NOT CHANGED    Details   anastrozole (ARIMIDEX) 1 mg Tab Take 1 tablet (1 mg total) by mouth once daily.  Qty: 90 tablet, Refills: 3    Associated Diagnoses: Malignant neoplasm of upper-outer quadrant of right breast in female, estrogen receptor positive      fenofibrate micronized (LOFIBRA) 67 MG capsule Take 2 capsules (134 mg total) by mouth daily with breakfast.  Qty: 180 capsule, Refills: 1     Associated Diagnoses: Hypertriglyceridemia      propranolol (INDERAL) 10 MG tablet Take 1 tablet (10 mg total) by mouth 3 (three) times daily.  Qty: 270 tablet, Refills: 1    Associated Diagnoses: Essential tremor      psyllium 0.52 gram capsule Take 0.52 g by mouth once daily.      traZODone (DESYREL) 50 MG tablet TAKE 1 TABLET EVERY NIGHT AS NEEDED FOR INSOMNIA  Qty: 90 tablet, Refills: 1    Associated Diagnoses: Essential tremor      alendronate (FOSAMAX) 70 MG tablet TAKE 1 TABLET BY MOUTH EVERY 7 DAYS  Qty: 12 tablet, Refills: 3    Associated Diagnoses: Age-related osteoporosis without current pathological fracture      calcium carbonate (OS-DIANDRA) 600 mg calcium (1,500 mg) Tab Take 600 mg by mouth 2 (two) times daily with meals.      cholecalciferol, vitamin D3, 1,000 unit capsule Take 3 capsules daily  Qty: 90 capsule, Refills: 11    Associated Diagnoses: Mild vitamin D deficiency      cranberry 400 mg Cap Take by mouth.      desonide (DESOWEN) 0.05 % lotion Apply topically 2 (two) times daily.      Lactobacillus rhamnosus GG (CULTURELLE) 10 billion cell capsule Take 1 capsule by mouth once daily.      MULTIVIT WITH CALCIUM,IRON,MIN (WOMEN'S DAILY MULTIVITAMIN ORAL) Take by mouth.      !! ondansetron (ZOFRAN-ODT) 4 MG TbDL Take 1 tablet (4 mg total) by mouth every 8 (eight) hours as needed.  Qty: 90 tablet, Refills: 0    Associated Diagnoses: Nausea      vit C/vit E/lutein/min/omega-3 (OCUVITE ORAL) Take by mouth.       !! - Potential duplicate medications found. Please discuss with provider.      STOP taking these medications       acetaminophen (TYLENOL) 650 MG TbSR Comments:   Reason for Stopping:         hydrocodone-acetaminophen 5-325mg (NORCO) 5-325 mg per tablet Comments:   Reason for Stopping:               I certify that this patient is confined to her home and needs intermittent skilled nursing care, physical therapy and occupational therapy.

## 2019-02-06 ENCOUNTER — ANTI-COAG VISIT (OUTPATIENT)
Dept: CARDIOLOGY | Facility: CLINIC | Age: 79
End: 2019-02-06

## 2019-02-06 VITALS
RESPIRATION RATE: 18 BRPM | SYSTOLIC BLOOD PRESSURE: 120 MMHG | BODY MASS INDEX: 34.55 KG/M2 | HEART RATE: 63 BPM | WEIGHT: 176 LBS | OXYGEN SATURATION: 96 % | TEMPERATURE: 98 F | HEIGHT: 60 IN | DIASTOLIC BLOOD PRESSURE: 59 MMHG

## 2019-02-06 DIAGNOSIS — M17.0 PRIMARY OSTEOARTHRITIS OF BOTH KNEES: ICD-10-CM

## 2019-02-06 LAB
INR PPP: 1.1
PROTHROMBIN TIME: 11.2 SEC

## 2019-02-06 PROCEDURE — 25000003 PHARM REV CODE 250: Performed by: PHYSICIAN ASSISTANT

## 2019-02-06 PROCEDURE — 99232 PR SUBSEQUENT HOSPITAL CARE,LEVL II: ICD-10-PCS | Mod: HCNC,,, | Performed by: ANESTHESIOLOGY

## 2019-02-06 PROCEDURE — 97116 GAIT TRAINING THERAPY: CPT

## 2019-02-06 PROCEDURE — 97110 THERAPEUTIC EXERCISES: CPT

## 2019-02-06 PROCEDURE — 99232 SBSQ HOSP IP/OBS MODERATE 35: CPT | Mod: HCNC,,, | Performed by: ANESTHESIOLOGY

## 2019-02-06 PROCEDURE — 97530 THERAPEUTIC ACTIVITIES: CPT

## 2019-02-06 PROCEDURE — 97535 SELF CARE MNGMENT TRAINING: CPT

## 2019-02-06 PROCEDURE — 63600175 PHARM REV CODE 636 W HCPCS: Performed by: PHYSICIAN ASSISTANT

## 2019-02-06 PROCEDURE — 85610 PROTHROMBIN TIME: CPT

## 2019-02-06 PROCEDURE — 25000003 PHARM REV CODE 250: Performed by: UROLOGY

## 2019-02-06 PROCEDURE — 36415 COLL VENOUS BLD VENIPUNCTURE: CPT

## 2019-02-06 RX ORDER — FENOFIBRATE 134 MG/1
134 CAPSULE ORAL DAILY
Status: DISCONTINUED | OUTPATIENT
Start: 2019-02-06 | End: 2019-02-06 | Stop reason: HOSPADM

## 2019-02-06 RX ADMIN — PROPRANOLOL HYDROCHLORIDE 10 MG: 10 TABLET ORAL at 08:02

## 2019-02-06 RX ADMIN — ROPIVACAINE HYDROCHLORIDE 8 ML/HR: 2 INJECTION, SOLUTION EPIDURAL; INFILTRATION at 03:02

## 2019-02-06 RX ADMIN — POLYETHYLENE GLYCOL 3350 17 G: 17 POWDER, FOR SOLUTION ORAL at 08:02

## 2019-02-06 RX ADMIN — ACETAMINOPHEN 1000 MG: 500 TABLET ORAL at 08:02

## 2019-02-06 RX ADMIN — FAMOTIDINE 20 MG: 20 TABLET ORAL at 08:02

## 2019-02-06 RX ADMIN — ASPIRIN 81 MG: 81 TABLET, COATED ORAL at 08:02

## 2019-02-06 RX ADMIN — ACETAMINOPHEN 1000 MG: 500 TABLET ORAL at 03:02

## 2019-02-06 RX ADMIN — OXYCODONE HYDROCHLORIDE 5 MG: 5 TABLET ORAL at 04:02

## 2019-02-06 RX ADMIN — STANDARDIZED SENNA CONCENTRATE AND DOCUSATE SODIUM 1 TABLET: 8.6; 5 TABLET, FILM COATED ORAL at 08:02

## 2019-02-06 RX ADMIN — SODIUM CHLORIDE: 0.9 INJECTION, SOLUTION INTRAVENOUS at 03:02

## 2019-02-06 RX ADMIN — CEFAZOLIN 2 G: 330 INJECTION, POWDER, FOR SOLUTION INTRAMUSCULAR; INTRAVENOUS at 12:02

## 2019-02-06 NOTE — CARE UPDATE
Patient states full understanding of discharge instruction. SL removed and patient left via wheelchair for family car with daiughter.

## 2019-02-06 NOTE — PLAN OF CARE
POD # 1 LTKA.  This CM met with patient at bedside. dme ordered rw. Patient states she has the following dme at home std walker, shower chair, rollator, hip kit. Patient preference is [facility].      [id number] - [place], [place] - [address] AT [facility] [address]  [address]  [place] [address]  Phone: [phone] Fax: [phone]    [id number] - [place], [place] - [address]  [address]  [place] [address]  Phone: [phone] Fax: [phone]    [id number] - [facility] [place] - [address]  [address]  [place] [address]  Phone: [phone] Fax: [phone]    [facility] - [facility] [address]  [address] [facility] [address]  Phone: [phone] Fax: [phone]    Payor: [employer] MEDICARE / Plan: [employer] MEDICARE HMO / Product Type: Capitation /         02/06/19 0907   Discharge Assessment   Assessment Type Discharge Planning Assessment   Confirmed/corrected address and phone number on facesheet? Yes   Assessment information obtained from? Patient   Expected Length of Stay (days) 1   Communicated expected length of stay with patient/caregiver yes   Prior to hospitilization cognitive status: Alert/Oriented   Prior to hospitalization functional status: Assistive Equipment   Current cognitive status: Alert/Oriented   Current Functional Status: Assistive Equipment   Facility Arrived From: (from home)   Lives With child(kenia), adult   Able to Return to Prior Arrangements yes   Is patient able to care for self after discharge? Yes   Who are your caregiver(s) and their phone number(s)? (daughter Doris Espinosa [phone])   Patient's perception of discharge disposition home health   Readmission Within the Last 30 Days no previous admission in last 30 days   Patient currently being followed by outpatient case management? No   Patient currently receives any  other outside agency services? No   Equipment Currently Used at Home walker, standard;shower chair;rollator;hip kit   Do you have any problems affording any of your prescribed medications? No   Is the patient taking medications as prescribed? yes   Does the patient have transportation home? Yes   Transportation Anticipated family or friend will provide   Does the patient receive services at the Coumadin Clinic? No   Discharge Plan A Home;Home Health   Discharge Plan B Home with family   DME Needed Upon Discharge  walker, rolling   Patient/Family in Agreement with Plan yes     Beverley John RN/BSN/NISREEN  [facility]  [phone]  Ortho/IMK/IM

## 2019-02-06 NOTE — PLAN OF CARE
02/06/19 0933   Post-Acute Status   Post-Acute Authorization Home Health/Hospice   Home Health/Hospice Status Authorization Obtained     Pt accepted by [facility] nurse will contact pt to arrange home visit for 2/7/19.    No additional sw needs at this time.    Clotilde Powers, MINA [id number]

## 2019-02-06 NOTE — PROGRESS NOTES
Pt and family present, consent to converting adductor PNC to On Q.  Site CDI.  Educated regarding continued pain management, fall risk, signs of complications, continued monitoring, as well as discontinuing catheter on 2/8.  Two numbers obtained for APS to follow up.  Understanding verbalized.

## 2019-02-06 NOTE — PROGRESS NOTES
[facility]  Orthopedics  Progress Note    Patient Name: Nicho Espinosa  MRN: [id number]  Admission Date: 2/5/2019  Hospital Length of Stay: 1 days  Attending Provider: Sami Washburn MD  Primary Care Provider: Azalea Walker MD  Follow-up For: Procedure(s) (LRB):  REPLACEMENT-KNEE-TOTAL-SIGHT ASSISTED (Left)    Post-Operative Day: 1 Day Post-Op  Subjective:     Principal Problem:Primary osteoarthritis of left knee    Principal Orthopedic Problem: same    Interval History: Patient seen and examined at bedside.  No acute events overnight.  Pain controlled.  Walked with PT yesterday.      Review of patient's allergies indicates:   Allergen Reactions    Sulfa (sulfonamide antibiotics) Other (See Comments)     Yeast infection and irritation    Adhesive Hives and Itching       Current Facility-Administered Medications   Medication    0.9%  NaCl infusion    acetaminophen tablet 1,000 mg    anastrozole tablet 1 mg    aspirin EC tablet 81 mg    bisacodyl suppository 10 mg    famotidine tablet 20 mg    fenofibrate micronized capsule 134 mg    morphine injection 2 mg    morphine injection 2 mg    morphine injection 2 mg    mupirocin 2 % ointment 1 g    naloxone 0.4 mg/mL injection 0.02 mg    ondansetron injection 4 mg    oxyCODONE immediate release tablet 10 mg    oxyCODONE immediate release tablet 5 mg    polyethylene glycol packet 17 g    pregabalin capsule 75 mg    promethazine (PHENERGAN) 6.25 mg in dextrose 5 % 50 mL IVPB    propranolol tablet 10 mg    ropivacaine (PF) 2 mg/ml (0.2%) infusion    ropivacaine 0.2% ON-Q C-BLOC 400 ML (SELECT A FLOW)    senna-docusate 8.6-50 mg per tablet 1 tablet    sodium chloride 0.9% flush 3 mL    traZODone tablet 50 mg    warfarin (COUMADIN) tablet 5 mg     Objective:     Vital Signs (Most Recent):  Temp: 98.9 °F (37.2 °C) (02/06/19 0435)  Pulse: 77 (02/06/19 0435)  Resp: 18 (02/06/19 0435)  BP: (!) 125/57 (02/06/19 0435)  SpO2: 95 %  (02/06/19 3675) Vital Signs (24h Range):  Temp:  [97.3 °F (36.3 °C)-98.9 °F (37.2 °C)] 98.9 °F (37.2 °C)  Pulse:  [58-87] 77  Resp:  [12-20] 18  SpO2:  [88 %-100 %] 95 %  BP: ()/(57-74) 125/57     Weight: 79.8 kg (176 lb)  Height: 5' (152.4 cm)  Body mass index is 34.37 kg/m².      Intake/Output Summary (Last 24 hours) at 2/6/2019 0555  Last data filed at 2/5/2019 1748  Gross per 24 hour   Intake 4000.03 ml   Output 2 ml   Net 3998.03 ml       Ortho/SPM Exam  AAOx4  NAD  RRR  No increased WOB  Aquacel c/d/i  Polar ice in place  SILT T/SP/DP/Alexandre/Sa  Motor intact T/SP/DP  WWP extremities  FCDs in place and functioning    Significant Labs:   CMP:   Recent Labs   Lab 02/05/19  0915      K 3.9      CO2 23      BUN 21   CREATININE 1.0   CALCIUM 8.9   ANIONGAP 7*   EGFRNONAA 54.1*     All pertinent labs within the past 24 hours have been reviewed.    Significant Imaging: I have reviewed all pertinent imaging results/findings.    Assessment/Plan:     * Primary osteoarthritis of left knee    78 y.o. female POD1 s/p L TKA    Pain control  PT/OT: WBAT LLE  DVT PPx: ASA 81 BID until INR therapeutic, warfarin for 7 days; Eliquis starting POD8, FCDs at all times when not ambulating  Abx: postop Ancef  Labs: INR pending  Drain: none  Lorenzo: none    Dispo: f/u PT recs     Malignant neoplasm of upper-outer quadrant of right breast in female, estrogen receptor positive    DVT PPx as above     History of deep vein thrombosis (DVT) of lower extremity    Warfarin with ASA bridge for 7 days  Eliquis starting at POD8     GERD (gastroesophageal reflux disease)    Famotidine      Pre-diabetes    Monitor      History of compression fracture of spine    Continue bisphosphonate as outpatient     Anemia of chronic disease    Monitor      Insomnia    Home meds     Hypertriglyceridemia    Home meds     Obesity    Monitor      Essential tremor    Home meds           Colin Leija MD  Orthopedics  [facility]  [place]

## 2019-02-06 NOTE — PLAN OF CARE
Problem: Adult Inpatient Plan of Care  Goal: Plan of Care Review  Patient is alert and oriented, vital signs are stable, with patient provided instruction in safety awareness and call light usage. No falls with precautions in effect. No s/s of pressure ulcers, with surgical dressing intact. Patient independent with use of adaptive equipment during mobility. Pain managed with PRN medications. Fluids encouraged. Will continue to monitor patient.

## 2019-02-06 NOTE — ANESTHESIA POSTPROCEDURE EVALUATION
Anesthesia Post Evaluation    Patient: Nicho Espinosa    Procedure(s) Performed: Procedure(s) (LRB):  REPLACEMENT-KNEE-TOTAL-SIGHT ASSISTED (Left)    Final Anesthesia Type: spinal  Patient location during evaluation: PACU  Patient participation: Yes- Able to Participate  Level of consciousness: awake and alert and oriented  Post-procedure vital signs: reviewed and stable  Pain management: adequate  Airway patency: patent  PONV status at discharge: No PONV  Anesthetic complications: no      Cardiovascular status: stable  Respiratory status: unassisted  Hydration status: euvolemic  Follow-up not needed.        Visit Vitals  BP (!) 120/59 (Patient Position: Lying)   Pulse 63   Temp 36.8 °C (98.2 °F) (Oral)   Resp 18   Ht 5' (1.524 m)   Wt 79.8 kg (176 lb)   LMP 07/05/1987   SpO2 96%   Breastfeeding? No   BMI 34.37 kg/m²       Pain/Yamile Score: Pain Rating Prior to Med Admin: 3 (2/6/2019  8:23 AM)  Pain Rating Post Med Admin: 0 (2/5/2019 10:03 PM)  Yamile Score: 10 (2/5/2019 11:35 AM)

## 2019-02-06 NOTE — ANESTHESIA POST-OP PAIN MANAGEMENT
Acute Pain Service and Perioperative Surgical Home Progress Note    HPI  Nicho Espinosa is a 78 y.o., female,     Interval history    Surgery:  Procedure(s) (LRB):  REPLACEMENT-KNEE-TOTAL-SIGHT ASSISTED (Left)    Post Op Day #: 1    Catheter type: Perineural Adductor Canal    Infusion type: Ropivacaine 0.2%  8 ml/hr basal    Problem List:    Active Hospital Problems    Diagnosis  POA    *Primary osteoarthritis of left knee [M17.12]  Yes    Malignant neoplasm of upper-outer quadrant of right breast in female, estrogen receptor positive [C50.411, Z17.0]  Not Applicable    History of deep vein thrombosis (DVT) of lower extremity [Z86.718]  Not Applicable    GERD (gastroesophageal reflux disease) [K21.9]  Yes    Pre-diabetes [R73.03]  Yes    History of compression fracture of spine [Z87.81]  Not Applicable    Anemia of chronic disease [D63.8]  Yes    Obesity [E66.9]  Yes    Hypertriglyceridemia [E78.1]  Yes    Insomnia [G47.00]  Yes    Essential tremor [G25.0]  Yes      Resolved Hospital Problems   No resolved problems to display.       Subjective:       General appearance of alert, oriented, no complaints   Pain with rest: 1    Numbers   Pain with movement: 1    Numbers   Side Effects    1. Pruritis No    2. Nausea No    3. Motor Blockade No, 1=Ability to bend knees and ankles    4. Sedation No, 1=awake and alert    Schedule Medications:    acetaminophen  1,000 mg Oral Q6H    anastrozole  1 mg Oral QHS    aspirin  81 mg Oral BID    famotidine  20 mg Oral BID    fenofibrate micronized  134 mg Oral Daily    mupirocin  1 g Nasal BID    polyethylene glycol  17 g Oral Daily    pregabalin  75 mg Oral QHS    propranolol  10 mg Oral BID    senna-docusate 8.6-50 mg  1 tablet Oral BID    warfarin  5 mg Oral Daily        Continuous Infusions:   sodium chloride 0.9% 150 mL/hr at 02/06/19 0320    ropivacaine (PF) 2 mg/ml (0.2%) 8 mL/hr (02/06/19 0320)    ropivacaine          PRN Medications:  bisacodyl,  morphine, morphine, morphine, naloxone, ondansetron, oxyCODONE, oxyCODONE, promethazine (PHENERGAN) IVPB, ropivacaine, sodium chloride 0.9%, traZODone       Antibiotics:  Antibiotics (From admission, onward)    Start     Stop Route Frequency Ordered    02/05/19 0906  mupirocin 2 % ointment 1 g      02/10 0859 Nasl 2 times daily 02/05/19 0906             Objective:     Sitting comfortably in chair.   Awaiting Physical Therapy work out   Catheter site clean, dry, intact        Vital Signs (Most Recent):  Temp: 37.2 °C (98.9 °F) (02/06/19 0435)  Pulse: 77 (02/06/19 0435)  Resp: 18 (02/06/19 0435)  BP: (!) 125/57 (02/06/19 0435)  SpO2: 95 % (02/06/19 0435) Vital Signs Range (Last 24H):  Temp:  [36.3 °C (97.3 °F)-37.2 °C (98.9 °F)]   Pulse:  [59-87]   Resp:  [12-20]   BP: ()/(57-71)   SpO2:  [88 %-100 %]          I & O (Last 24H):    Intake/Output Summary (Last 24 hours) at 2/6/2019 0742  Last data filed at 2/5/2019 1748  Gross per 24 hour   Intake 4000.03 ml   Output 2 ml   Net 3998.03 ml       Physical Exam:    GA: Alert, comfortable, no acute distress.   Pulmonary: Clear to auscultation A/P/L. No wheezing, crackles, or rhonchi.  Cardiac: RRR S1 & S2 w/o rubs/murmurs/gallops.   Abdominal:Bowel sounds present. No tenderness to palpation or distension. No appreciable hepatosplenomegaly.   Skin: No jaundice, rashes, or visible lesions.  MSK: Left knee PNC in place.     Laboratory:  CBC: No results for input(s): WBC, RBC, HGB, HCT, PLT, MCV, MCH, MCHC in the last 72 hours.    BMP:   Recent Labs     02/05/19  0915      K 3.9   CO2 23      BUN 21   CREATININE 1.0      CALCIUM 8.9       Recent Labs     02/05/19  0933 02/06/19  0549   INR 1.0 1.1         Anticoagulant dose ASA at 81 mg.  Warfarin started today.     Assessment:    Pain control adequate via perineural catheter  Oxycodone 5 mg taken this AM  Prior to that only one dose of Oxycodone 10 mg immediately post op.   Will continue to perform PT  today and assess goal requirements    Plan:     1) Adductor canal perineural catheter in place and infusing. Good level.    Multimodal pain regimen with acetaminophen, Lyrica, and PRN Oxycodone given.     Will continue to monitor.    Plan to D/C perineural catheter in AM or home with On-Q if patient discharged today.   2) Warfarin and ASA as per Ortho   3) Tolerating liquids, advance diet as tolerated. No flatus. No BM.   5) Pt working well with PT/OT. Case management and SW for placement.    6) Plan for D/C tomorrow morning or possibly today if patient works well with PT goals.     Will continue to follow    Evaluator Samir Morrow MD        I have seen the patient, reviewed the Resident's assessment and plan. I have personally interviewed and examined the patient at bedside and: agree with the findings.     Pain controlled per patient.  No other significant issues.  Did well with PT.  Plan for d/c home today with Onc-Q    JACI Myers MD  Staff Anesthesiologist  Perioperative Surgical Home and Acute Pain Service

## 2019-02-06 NOTE — SUBJECTIVE & OBJECTIVE
Principal Problem:Primary osteoarthritis of left knee    Principal Orthopedic Problem: same    Interval History: Patient seen and examined at bedside.  No acute events overnight.  Pain controlled.  Walked with PT yesterday.      Review of patient's allergies indicates:   Allergen Reactions    Sulfa (sulfonamide antibiotics) Other (See Comments)     Yeast infection and irritation    Adhesive Hives and Itching       Current Facility-Administered Medications   Medication    0.9%  NaCl infusion    acetaminophen tablet 1,000 mg    anastrozole tablet 1 mg    aspirin EC tablet 81 mg    bisacodyl suppository 10 mg    famotidine tablet 20 mg    fenofibrate micronized capsule 134 mg    morphine injection 2 mg    morphine injection 2 mg    morphine injection 2 mg    mupirocin 2 % ointment 1 g    naloxone 0.4 mg/mL injection 0.02 mg    ondansetron injection 4 mg    oxyCODONE immediate release tablet 10 mg    oxyCODONE immediate release tablet 5 mg    polyethylene glycol packet 17 g    pregabalin capsule 75 mg    promethazine (PHENERGAN) 6.25 mg in dextrose 5 % 50 mL IVPB    propranolol tablet 10 mg    ropivacaine (PF) 2 mg/ml (0.2%) infusion    ropivacaine 0.2% ON-Q C-BLOC 400 ML (SELECT A FLOW)    senna-docusate 8.6-50 mg per tablet 1 tablet    sodium chloride 0.9% flush 3 mL    traZODone tablet 50 mg    warfarin (COUMADIN) tablet 5 mg     Objective:     Vital Signs (Most Recent):  Temp: 98.9 °F (37.2 °C) (02/06/19 0435)  Pulse: 77 (02/06/19 0435)  Resp: 18 (02/06/19 0435)  BP: (!) 125/57 (02/06/19 0435)  SpO2: 95 % (02/06/19 0435) Vital Signs (24h Range):  Temp:  [97.3 °F (36.3 °C)-98.9 °F (37.2 °C)] 98.9 °F (37.2 °C)  Pulse:  [58-87] 77  Resp:  [12-20] 18  SpO2:  [88 %-100 %] 95 %  BP: ()/(57-74) 125/57     Weight: 79.8 kg (176 lb)  Height: 5' (152.4 cm)  Body mass index is 34.37 kg/m².      Intake/Output Summary (Last 24 hours) at 2/6/2019 0555  Last data filed at 2/5/2019 1748  Gross per 24  hour   Intake 4000.03 ml   Output 2 ml   Net 3998.03 ml       Ortho/SPM Exam  AAOx4  NAD  RRR  No increased WOB  Aquacel c/d/i  Polar ice in place  SILT T/SP/DP/Alexandre/Sa  Motor intact T/SP/DP  WWP extremities  FCDs in place and functioning    Significant Labs:   CMP:   Recent Labs   Lab 02/05/19  0915      K 3.9      CO2 23      BUN 21   CREATININE 1.0   CALCIUM 8.9   ANIONGAP 7*   EGFRNONAA 54.1*     All pertinent labs within the past 24 hours have been reviewed.    Significant Imaging: I have reviewed all pertinent imaging results/findings.

## 2019-02-06 NOTE — ADDENDUM NOTE
Addendum  created 02/06/19 1208 by Orly Koenig RN    Sign clinical note, Visit Navigator SmartForm Flowsheet section accepted

## 2019-02-06 NOTE — PT/OT/SLP PROGRESS
Occupational Therapy   Treatment    Name: Nicho Espinosa  MRN: [id number]  Admitting Diagnosis:  Primary osteoarthritis of left knee  1 Day Post-Op    Recommendations:     Discharge Recommendations: ( OT)  Discharge Equipment Recommendations:  walker, rolling  Barriers to discharge:  None    Assessment:     Nicho Espinosa is a 78 y.o. female with a medical diagnosis of Primary osteoarthritis of left knee.  She was able to perform sit/stand, BSC, and shower chair T/F c SBA and RW.  Able to walk to bathroom c SBA and RW.  Able to perform UB/LB dressing c mod I and grooming c mod I while standing at sink.  Pt is progressing well.  Performance deficits affecting function are impaired self care skills, impaired functional mobilty, orthopedic precautions.     Rehab Prognosis:  Good; patient would benefit from acute skilled OT services to address these deficits and reach maximum level of function.       Plan:     Patient to be seen daily to address the above listed problems via self-care/home management, therapeutic activities, therapeutic exercises  · Plan of Care Expires:    · Plan of Care Reviewed with: patient    Subjective     Pain/Comfort:  · Pain Rating 1: 5/10(L knee)    Objective:     Communicated with: RN prior to session.  Patient found up in chair with perineural catheter, cryotherapy upon OT entry to room.    General Precautions: Standard, fall   Orthopedic Precautions:LLE weight bearing as tolerated   Braces: N/A     Occupational Performance:         Functional Mobility/Transfers:  · Patient completed Sit <> Stand Transfer with stand by assistance  with  rolling walker   · Patient completed Toilet Transfer Stand Pivot technique with stand by assistance with  rolling walker and bedside commode  · Patient completed  Shower Transfer Stand Pivot technique with stand by assistance with rolling walker  · Functional Mobility: Pt was able to walk to bathroom c mod I and RW.    Activities of Daily Living:  · Grooming:  modified independence to brush teeth while standing at sink.  · Upper Body Dressing: modified independence to don gown.  Pt was dressed upon arrival.  · Lower Body Dressing: modified independence to doff socks and don shoes.  Pt was able to don underwear I'ly per pt report.      [facility] 6 Click ADL: 24    Treatment & Education:  Educated pt on bathing and car T/F's.    Patient left up in chair with all lines intact, call button in reach and RN notifiedEducation:      GOALS:   Multidisciplinary Problems     Occupational Therapy Goals        Problem: Occupational Therapy Goal    Goal Priority Disciplines Outcome Interventions   Occupational Therapy Goal     OT, PT/OT Ongoing (interventions implemented as appropriate)    Description:  Goals to be met by: 7 days    Patient will increase functional independence with ADLs by performing:    UE Dressing with Supervision.  LE Dressing with Supervision with AD as needed.  Grooming while standing with Supervision.  Toileting from bedside commode with Supervision for hygiene and clothing management.   Stand pivot transfers with Supervision.  Toilet transfer to bedside commode with Supervision.                         Time Tracking:     OT Date of Treatment: 02/06/19  OT Start Time: 0715  OT Stop Time: 0751  OT Total Time (min): 36 min    Billable Minutes:Self Care/Home Management 25  Therapeutic Activity 11    BOYD Alston  2/6/2019

## 2019-02-06 NOTE — PROGRESS NOTES
I left a voice message for the patient to call back to complete enrollment.  I faxed order to intake department [facility] for INR to be drawn on 2/7/19 with their admit HH visit.    2/7/19  I left message for patient to call back to complete enrollment.    I received a call from Sundar the [facility] nurse who stated the patient did not understand her discharge instructions and starting taking her Eliquis and did not take Coumadin yesterday.  I spoke to the patient and instructed her she should be taking Coumadin (2.5 mg) 1 tab daily after 5 pm., she is to start Eliquis on post op day 8 (2/13/19), and that she needs to call me back when the home health nurse finishes their visit so I can complete enrollment which she verbalized understanding.      I called the patient back and she verified her Coumadin as 2.5 mg strength tablets she was instructed to take 1 tab daily after 5 pm  and she verbalized understanding.

## 2019-02-06 NOTE — PT/OT/SLP PROGRESS
Physical Therapy Treatment    Patient Name:  Nicho Espinosa   MRN:  [id number]    Recommendations:     Discharge Recommendations:  (HHPT)   Discharge Equipment Recommendations: walker, rolling   Barriers to discharge: None    Assessment:     Nicho Espinosa is a 78 y.o. female admitted with a medical diagnosis of Primary osteoarthritis of left knee.  She presents with the following impairments/functional limitations:  weakness, impaired endurance, impaired self care skills, impaired balance, gait instability, pain, impaired joint extensibility, decreased lower extremity function, decreased ROM, orthopedic precautions, impaired skin, impaired functional mobilty.    -Pt tolerated session well today with no complications and demonstrated appropriate mobility s/p (L) TKA. Pt with no LOB during ambulation using RW for support. Pt able to follow 3-point gait sequencing well with no complications. Pt able to step up/down 4 stair steps using (L) handrails and lateral approach with no instability noted. Pt reported minimal pain with supine exercises which subsided with rest. Pt verbalized understanding of car t/f technique and is safe to d/c home with HHPT at this time.    Rehab Prognosis: Good; patient would benefit from acute skilled PT services to address these deficits and reach maximum level of function.    Recent Surgery: Procedure(s) (LRB):  REPLACEMENT-KNEE-TOTAL-SIGHT ASSISTED (Left) 1 Day Post-Op    Plan:     During this hospitalization, patient to be seen BID to address the identified rehab impairments via gait training, therapeutic activities, therapeutic exercises, neuromuscular re-education and progress toward the following goals:    · Plan of Care Expires:  03/05/19    Subjective     Chief Complaint: impaired mobility  Patient/Family Comments/goals: return home to [facility]  Pain/Comfort:  · Pain Rating 1: 4/10  · Location - Side 1: Left  · Location - Orientation 1: anterior  · Location 1: knee  · Pain Addressed 1:  Pre-medicate for activity, Cessation of Activity, Reposition      Objective:     Communicated with nsg prior to session.  Patient found all lines intact perineural catheter, cryotherapy  upon PT entry to room.     General Precautions: Standard, fall   Orthopedic Precautions:LLE weight bearing as tolerated   Braces: N/A     Functional Mobility:  · Bed Mobility:     · Scooting: stand by assistance  · Supine to Sit: stand by assistance  · Sit to Supine: stand by assistance  · Transfers:     · Sit to Stand:  stand by assistance with rolling walker  · Bed to Chair: stand by assistance with  rolling walker  using  Stand Pivot  · Gait: 60' x2 trials using RW with SBA  · Stairs:  Pt ascended/descended 4 stair(s) with No Assistive Device with left handrail with Contact Guard Assistance.       AM-PAC 6 CLICK MOBILITY  Turning over in bed (including adjusting bedclothes, sheets and blankets)?: 4  Sitting down on and standing up from a chair with arms (e.g., wheelchair, bedside commode, etc.): 4  Moving from lying on back to sitting on the side of the bed?: 4  Moving to and from a bed to a chair (including a wheelchair)?: 4  Need to walk in hospital room?: 3  Climbing 3-5 steps with a railing?: 3  Basic Mobility Total Score: 22       Therapeutic Activities and Exercises:   -Pt performed TKR protocol exercises x15 reps of:  A. AP  B. GS  C. QS  D. SAQ-AAROM  E. Heel slides-AAROM  F. Hip abd/add-AAROM  G. SLR-AAROM  H. LAQ-AAROM    -Pt educated on:  A. PT POC and role of PT  B. Importance of OOB activity to improve functional outcomes after surgery  C. S/s associated with TKR procedure  D. Importance of re-gaining ROM early in acute phase  E. DME mgmt and gait/transfer sequencing  F. Performing HEP to reduce risk of developing blood clots  G. Car t/f    -Pt's knee ROM measured at 0 deg ext to 88 deg flexion    Patient left up in chair with all lines intact, call button in reach and nsg notified..    GOALS:   Multidisciplinary  Problems     Physical Therapy Goals     Not on file          Multidisciplinary Problems (Resolved)        Problem: Physical Therapy Goal    Goal Priority Disciplines Outcome Goal Variances Interventions   Physical Therapy Goal   (Resolved)     PT, PT/OT Outcome(s) achieved     Description:  Goals to be met by: 19     Patient will increase functional independence with mobility by performin. Supine to sit with Set-up [place]  2. Sit to supine with Set-up [place]  3. Sit to stand transfer with Supervision  4. Bed to chair transfer with Stand-by Assistance using Rolling Walker  5. Gait  x 150 feet with Stand-by Assistance using Rolling Walker.   6. Ascend/descend 4 stair with left Handrails Contact Guard Assistance.   7. Lower extremity exercise program x20-30 reps per handout, with independence                        Time Tracking:     PT Received On: 19  PT Start Time: 941     PT Stop Time: 2  PT Total Time (min): 41 min     Billable Minutes: Gait Training 15, Therapeutic Activity 11 and Therapeutic Exercise 15    Treatment Type: Treatment  PT/PTA: PT           Aries Chin, PT  2019

## 2019-02-06 NOTE — PROGRESS NOTES
Ms. Espinosa is a 79 y/o female who underwent TKA with Dr. Washburn on 2/5. Her other PMH is significant for arthritis, DJD, GERD, HLD, obesity, osteoporosis, radiculitis, tremors, breast CA. Post-op plans are for patient to transition to apixaban at POD 8.    Calendar updated with inpatient warfarin doses. Chart review indicates patient to be d/c home today with [facility].

## 2019-02-06 NOTE — ADDENDUM NOTE
Addendum  created 02/06/19 1157 by Harsha Myers MD    Charge Capture section accepted, Sign clinical note

## 2019-02-06 NOTE — PLAN OF CARE
02/06/19 0923   Post-Acute Status   Post-Acute Authorization Home Health/Hospice   Home Health/Hospice Status Referrals Sent     Pt to d/c today with HH. Per CM, pt would like [facility].Referral sent through [facility].    Clotilde Powers LCSW [phone]

## 2019-02-06 NOTE — PT/OT/SLP DISCHARGE
Physical Therapy Discharge Summary    Name: Nicho Espinosa  MRN: [id number]   Principal Problem: Primary osteoarthritis of left knee     Patient Discharged from acute Physical Therapy on 19.  Please refer to prior PT noted date on 19 for functional status.     Assessment:     Patient has met all goals and is not appropriate for therapy.    Objective:     GOALS:   Multidisciplinary Problems     Physical Therapy Goals     Not on file          Multidisciplinary Problems (Resolved)        Problem: Physical Therapy Goal    Goal Priority Disciplines Outcome Goal Variances Interventions   Physical Therapy Goal   (Resolved)     PT, PT/OT Outcome(s) achieved     Description:  Goals to be met by: 19     Patient will increase functional independence with mobility by performin. Supine to sit with Set-up [place]  2. Sit to supine with Set-up [place]  3. Sit to stand transfer with Supervision  4. Bed to chair transfer with Stand-by Assistance using Rolling Walker  5. Gait  x 150 feet with Stand-by Assistance using Rolling Walker.   6. Ascend/descend 4 stair with left Handrails Contact Guard Assistance.   7. Lower extremity exercise program x20-30 reps per handout, with independence                        Reasons for Discontinuation of Therapy Services  Satisfactory goal achievement.      Plan:     Patient Discharged to: Home with Home Health Service.    Aries Chin, PT  2019

## 2019-02-06 NOTE — PLAN OF CARE
Problem: Physical Therapy Goal  Goal: Physical Therapy Goal  Goals to be met by: 19     Patient will increase functional independence with mobility by performin. Supine to sit with Set-up [place]  2. Sit to supine with Set-up [place]  3. Sit to stand transfer with Supervision  4. Bed to chair transfer with Stand-by Assistance using Rolling Walker  5. Gait  x 150 feet with Stand-by Assistance using Rolling Walker.   6. Ascend/descend 4 stair with left Handrails Contact Guard Assistance.   7. Lower extremity exercise program x20-30 reps per handout, with independence       Outcome: Outcome(s) achieved Date Met: 19  Pt tolerated session well today with no complications and demonstrated appropriate mobility s/p (L) TKA. Pt with no LOB during ambulation using RW for support. Pt able to follow 3-point gait sequencing well with no complications. Pt able to step up/down 4 stair steps using (L) handrails and lateral approach with no instability noted. Pt reported minimal pain with supine exercises which subsided with rest. Pt verbalized understanding of car t/f technique and is safe to d/c home with HHPT at this time.

## 2019-02-07 ENCOUNTER — TELEPHONE (OUTPATIENT)
Dept: ORTHOPEDICS | Facility: CLINIC | Age: 79
End: 2019-02-07

## 2019-02-07 ENCOUNTER — ANTI-COAG VISIT (OUTPATIENT)
Dept: CARDIOLOGY | Facility: CLINIC | Age: 79
End: 2019-02-07

## 2019-02-07 DIAGNOSIS — M17.0 PRIMARY OSTEOARTHRITIS OF BOTH KNEES: ICD-10-CM

## 2019-02-07 LAB — INR PPP: 1.4

## 2019-02-07 NOTE — ADDENDUM NOTE
Addendum  created 02/07/19 0904 by Go Cazares MD    Intraprocedure Blocks edited, Sign clinical note

## 2019-02-07 NOTE — TELEPHONE ENCOUNTER
Spoke to Sundar  nurse, informed that Cindy LEBRON spoke to the pt and understands the instructions. Pt should be holding Eliquis and will resume next week.  Sundar states that he spoke with the pt and the coumadin clinic, the pt will take the coumadin. Understanding verbalized.

## 2019-02-07 NOTE — DISCHARGE SUMMARY
[facility]  Orthopedics  Discharge Summary      Patient Name: Nicho Espinosa  MRN: [id number]  Admission Date: 2/5/2019  Hospital Length of Stay: 1 days  Discharge Date and Time: 2/6/2019  1:11 PM  Attending Physician: No att. providers found   Discharging Provider: Colin Leija MD  Primary Care Provider: Azalea Walker MD    HPI: Presented for elective surgery.    Procedure(s) (LRB):  REPLACEMENT-KNEE-TOTAL-SIGHT ASSISTED (Left)      Hospital Course: Uncomplicated.  DC POD1 after cleared by PT.  WBAT.  Warfarin with ASA bridge for 7 days followed by Eliquis for the rest of the month for DVT PPx.  F/u 2 weeks for wound check.        Final Active Diagnoses:    Diagnosis Date Noted POA    PRINCIPAL PROBLEM:  Primary osteoarthritis of left knee [M17.12] 02/05/2019 Yes    Malignant neoplasm of upper-outer quadrant of right breast in female, estrogen receptor positive [C50.411, Z17.0] 07/18/2017 Not Applicable    History of deep vein thrombosis (DVT) of lower extremity [Z86.718] 06/20/2017 Not Applicable    GERD (gastroesophageal reflux disease) [K21.9] 04/12/2016 Yes    Pre-diabetes [R73.03] 10/17/2015 Yes    History of compression fracture of spine [Z87.81] 04/10/2014 Not Applicable    Anemia of chronic disease [D63.8] 11/21/2013 Yes    Obesity [E66.9] 09/19/2013 Yes    Hypertriglyceridemia [E78.1] 09/19/2013 Yes    Insomnia [G47.00] 09/19/2013 Yes    Essential tremor [G25.0] 12/07/2012 Yes      Problems Resolved During this Admission:      Discharged Condition: stable    Disposition: [place]Health Care [facility]    Follow Up:  Follow-up Information     Cindy Thrasher PA-C In 2 weeks.    Specialty:  Orthopedic Surgery  Contact information:  [address]  [facility] [address]  [phone]             [facility].    Specialty:  Home Health Services  Why:  home health  Contact information:  [address]  [address]  [facility] [id number] [phone]              "    Patient Instructions:      BATH/SHOWER CHAIR FOR HOME USE     Order Specific Question Answer Comments   Height: 5' 1" (1.549 m)    Weight: 79.8 kg (176 lb)    Does patient have medical equipment at home? bedside commode    Does patient have medical equipment at home? shower chair    Does patient have medical equipment at home? cane, straight    Does patient have medical equipment at home? rollator    Length of need (1-99 months): 99    Type: Without back    Vendor: Other (use comments)    Expected Date of Delivery: 2/6/2019      COMMODE FOR HOME USE     Order Specific Question Answer Comments   Type: Standard    Height: 5' 1" (1.549 m)    Weight: 79.8 kg (176 lb)    Does patient have medical equipment at home? bedside commode    Does patient have medical equipment at home? shower chair    Does patient have medical equipment at home? cane, straight    Does patient have medical equipment at home? rollator    Length of need (1-99 months): 1    Vendor: Other (use comments)    Expected Date of Delivery: 2/6/2019      TRANSFER TUB BENCH FOR HOME USE     Order Specific Question Answer Comments   Type of Transfer Tub Bench: Unpadded    Height: 5' 1" (1.549 m)    Weight: 79.8 kg (176 lb)    Does patient have medical equipment at home? bedside commode    Does patient have medical equipment at home? shower chair    Does patient have medical equipment at home? cane, straight    Does patient have medical equipment at home? rollator    Length of need (1-99 months): 1    Vendor: Other (use comments)    Expected Date of Delivery: 2/6/2019      WALKER FOR HOME USE     Order Specific Question Answer Comments   Type of Walker: Adult (5'4"-6'6")    With wheels? Yes    Height: 5' 1" (1.549 m)    Weight: 79.8 kg (176 lb)    Length of need (1-99 months): 1    Does patient have medical equipment at home? bedside commode    Does patient have medical equipment at home? shower chair    Does patient have medical equipment at home? rosemary" "straight    Does patient have medical equipment at home? rollator    Please check all that apply: Walker will be used for gait training.    Vendor: [facility]    Expected Date of Delivery: 2/6/2019      WHEELCHAIR FOR HOME USE     Order Specific Question Answer Comments   Hours in W/C per day: 24    Type of Wheelchair: Standard    Size(Width): 18"(STD adult)    Leg Support: Elevating leg rests    Arm Height: Detachable    Lap Belt: Velcro    Cushion: Foam    Justification for cushion: Prevent pressure ulcers    Height: 5' 1" (1.549 m)    Weight: 79.8 kg (176 lb)    Does patient have medical equipment at home? bedside commode    Does patient have medical equipment at home? shower chair    Does patient have medical equipment at home? cane, straight    Does patient have medical equipment at home? rollator    Length of need (1-99 months): 99    Please check all that apply: Caregiver is capable and willing to operate wheelchair safely.    Vendor: Other (use comments)    Expected Date of Delivery: 2/6/2019      3 IN 1 COMMODE FOR HOME USE     Order Specific Question Answer Comments   Type: Standard    Height: 5' 1" (1.549 m)    Weight: 82.6 kg (182 lb 1.6 oz)    Does patient have medical equipment at home? bedside commode    Does patient have medical equipment at home? shower chair    Does patient have medical equipment at home? cane, straight    Does patient have medical equipment at home? rollator    Length of need (1-99 months): 99    Vendor: Other (use comments)    Expected Date of Delivery: 2/6/2019      TRANSFER TUB BENCH FOR HOME USE     Order Specific Question Answer Comments   Type of Transfer Tub Bench: Unpadded    Height: 5' 1" (1.549 m)    Weight: 82.6 kg (182 lb 1.6 oz)    Does patient have medical equipment at home? bedside commode    Does patient have medical equipment at home? shower chair    Does patient have medical equipment at home? cane, straight    Does patient have medical equipment at home? " "rollator    Length of need (1-99 months): 99    Vendor: Other (use comments)    Expected Date of Delivery: 2/6/2019      WALKER FOR HOME USE     Order Specific Question Answer Comments   Type of Walker: Adult (5'4"-6'6")    With wheels? Yes    Height: 5' 1" (1.549 m)    Weight: 82.6 kg (182 lb 1.6 oz)    Length of need (1-99 months): 99    Does patient have medical equipment at home? bedside commode    Does patient have medical equipment at home? shower chair    Does patient have medical equipment at home? cane, straight    Does patient have medical equipment at home? rollator    Please check all that apply: Walker will be used for gait training.    Vendor: Other (use comments)    Expected Date of Delivery: 2/6/2019      Ambulatory Referral to Anticoagulation Monitoring   Referral Priority: Routine Referral Type: Consultation   Referral Reason: Specialty Services Required   Requested Specialty: Cardiology   Number of Visits Requested: 1     Activity as tolerated     Call MD for:  difficulty breathing, headache or visual disturbances     Call MD for:  extreme fatigue     Call MD for:  hives     Call MD for:  persistent dizziness or light-headedness     Call MD for:  persistent nausea and vomiting     Call MD for:  redness, tenderness, or signs of infection (pain, swelling, redness, odor or green/yellow discharge around incision site)     Call MD for:  severe uncontrolled pain     Call MD for:  temperature >100.4     Keep surgical extremity elevated     Leave dressing on - Keep it clean, dry, and intact until clinic visit   Order Comments: Do not remove surgical dressing for 2 weeks post-op. This will be done only by MD at initial post-op visit. If dressing is completely saturated, replace with identical dressing - silver-impregnated hydrocolloid dressing.     Do not get dressings wet. Do not shower.     If dressing continues to be saturated or there are signs of infection, please call [facility] [phone] for " further instructions and to make appt to be seen.     Lifting restrictions   Order Comments: No strenuous exercise or lifting of > 10 lbs     No driving, operating heavy equipment or signing legal documents while taking pain medication     Sponge bath only until clinic visit     Weight bearing as tolerated     Medications:  Reconciled Home Medications:      Medication List      START taking these medications    aspirin 81 MG EC tablet  Commonly known as:  ECOTRIN  Take 1 tablet (81 mg total) by mouth 2 (two) times daily. Take until INR therapeutic (1.8-2.2)     ELIQUIS 2.5 mg Tab  Generic drug:  apixaban  Take 1 tablet (2.5 mg total) by mouth 2 (two) times daily. Start taking postop day 8.  Stop taking warfarin at this time for 23 days     oxyCODONE-acetaminophen  mg per tablet  Commonly known as:  PERCOCET  Take 1 tablet by mouth every 4 (four) hours as needed. Patient is in the immediate postoperative period.     polyethylene glycol 17 gram/dose powder  Commonly known as:  GLYCOLAX  Mix 1 capful (17 grams total) with a liquid and take by mouth once daily.     STOOL SOFTENER 100 MG capsule  Generic drug:  docusate sodium  Take 1 capsule (100 mg total) by mouth 3 (three) times daily.        CHANGE how you take these medications    cholecalciferol (vitamin D3) 1,000 unit capsule  Commonly known as:  VITAMIN D3  Take 3 capsules daily  What changed:    · how much to take  · how to take this  · when to take this  · additional instructions     * ondansetron 4 MG Tbdl  Commonly known as:  ZOFRAN-ODT  Take 1 tablet (4 mg total) by mouth every 8 (eight) hours as needed.  What changed:  Another medication with the same name was added. Make sure you understand how and when to take each.     * ondansetron 8 MG Tbdl  Commonly known as:  ZOFRAN-ODT  Dissolve 1 tablet (8 mg total) by mouth every 8 (eight) hours as needed.  What changed:  You were already taking a medication with the same name, and this prescription was  added. Make sure you understand how and when to take each.     propranolol 10 MG tablet  Commonly known as:  INDERAL  Take 1 tablet (10 mg total) by mouth 3 (three) times daily.  What changed:  when to take this     warfarin 2.5 MG tablet  Commonly known as:  COUMADIN  Take 1 to 3 tablets daily as directed by coumadin clinic.  Stop taking after 7 days.  Start taking Eliquis the next day.  What changed:    · medication strength  · how much to take  · additional instructions         * This list has 2 medication(s) that are the same as other medications prescribed for you. Read the directions carefully, and ask your doctor or other care provider to review them with you.            CONTINUE taking these medications    alendronate 70 MG tablet  Commonly known as:  FOSAMAX  TAKE 1 TABLET BY MOUTH EVERY 7 DAYS     anastrozole 1 mg Tab  Commonly known as:  ARIMIDEX  Take 1 tablet (1 mg total) by mouth once daily.     calcium carbonate 600 mg calcium (1,500 mg) Tab  Commonly known as:  OS-DIANDRA  Take 600 mg by mouth 2 (two) times daily with meals.     cranberry 400 mg Cap  Take by mouth.     desonide 0.05 % lotion  Commonly known as:  DESOWEN  Apply topically 2 (two) times daily.     fenofibrate micronized 67 MG capsule  Commonly known as:  LOFIBRA  Take 2 capsules (134 mg total) by mouth daily with breakfast.     Lactobacillus rhamnosus GG 10 billion cell capsule  Commonly known as:  CULTURELLE  Take 1 capsule by mouth once daily.     OCUVITE ORAL  Take by mouth.     psyllium 0.52 gram capsule  Take 0.52 g by mouth once daily.     traZODone 50 MG tablet  Commonly known as:  DESYREL  TAKE 1 TABLET EVERY NIGHT AS NEEDED FOR INSOMNIA     WOMEN'S DAILY MULTIVITAMIN ORAL  Take by mouth.        STOP taking these medications    acetaminophen 650 MG Tbsr  Commonly known as:  TYLENOL     HYDROcodone-acetaminophen 5-325 mg per tablet  Commonly known as:  DAVIDE Leija MD  Orthopedics  [facility]

## 2019-02-07 NOTE — TELEPHONE ENCOUNTER
Spoke to patient to confirm VTE prophylaxis instructions. Pt verbalizes understanding. Will resume Eliquis one week PO. Coumadin with ASA bridge for now.

## 2019-02-07 NOTE — TELEPHONE ENCOUNTER
----- Message from Cindy Thrasher PA-C sent at 2/7/2019  1:16 PM CST -----  Contact: ROBERTA Kwok - [facility]  I called and talked to patient and she understands her instructions. Pls call  nurse back. Pt should be holding Eliquis and will resume next week.     ----- Message -----  From: Doris Gaitan RN  Sent: 2/7/2019  10:39 AM  To: Cindy Thrasher PA-C    Spoke to Sundar RIVERA, he states the pt states that she was instructed to only take Eliquis, not warfarin. But he has orders that the pt is to take Warfarin and to draw INR. I asked Darline and she said to send to you to advise. Let me know and I will call the nurse back.    Thanks!  Doris  ----- Message -----  From: Justo Pérez  Sent: 2/7/2019  10:21 AM  To: Doris Gaitan RN    Needs Advice    Reason for call: warfarin (COUMADIN) 2.5 MG tablet  /  apixaban (ELIQUIS) 2.5 mg Tab        Communication Preference: Phone     Additional Information: Sundar states that the pt was discharged on yesterday w/orders to check her Coumadin levels however the pt is not taking the Coumadin, Pt is taking Eliquis

## 2019-02-07 NOTE — ANESTHESIA POST-OP PAIN MANAGEMENT
Spoke with patient today.   Reports inadequate pain control with On-Q ball due to that it was clamped off.  Home health nurse noticed it was clamped during today's visit and opened it.  Now pain control more tolerable.  Reiterated the plan to remove PNC tomorrow.   Questions answered and concerns addressed. Will follow up tomorrow.    Gustavo A Diaz-Mercado [facility]  2:24 PM

## 2019-02-08 ENCOUNTER — TELEPHONE (OUTPATIENT)
Dept: ORTHOPEDICS | Facility: CLINIC | Age: 79
End: 2019-02-08

## 2019-02-08 NOTE — TELEPHONE ENCOUNTER
----- Message from Shameka Ramirez MA sent at 2/8/2019  1:30 PM CST -----  Contact: Home Health- [facility]      ----- Message -----  From: Casandra Pike  Sent: 2/8/2019   1:22 PM  To: [facility] Staff        Home health was told by patient that anaesthesia called patient to remove catheter.  [facility] says patient still has medication in the catheter and home health has no order to remove it.  Will be putting it on hold until an order comes in, requesting a call to let them know to remove/not remove catheter.    [phone]

## 2019-02-08 NOTE — ANESTHESIA POST-OP PAIN MANAGEMENT
Spoke with patient today. Reports adequate pain control.  PNC has not been removed removed yet. Awaiting for daughter to come home to assist with catheter removal.     Will follow up.     Gustavo A Diaz-Mercado [facility]  12:22 PM

## 2019-02-08 NOTE — TELEPHONE ENCOUNTER
Spoke to patient and HH nurse about removing On-Q catheter. HH will remove, will make sure blue tip intact. Both verbalized understanding and they have phone number to call with questions and concerns.

## 2019-02-09 NOTE — ANESTHESIA POST-OP PAIN MANAGEMENT
"Followed up with PNC removal from earlier this afternoon. Spoke initially with patient who stated that her nurse will take the catheter out tomorrow. Reviewed risks of leaving the catheter past the advised 48hrs (I.e. Infection). Daughter then proceeded to take the phone and was verbally hostile stating that "she will take the catheter out when she good and ready. Do not call her. Do not bother her again." Again reinforced need to remove catheter and risks associated.    Bart Mota MD  CA-1/PGY-2  "

## 2019-02-11 NOTE — ADDENDUM NOTE
Addendum  created 02/11/19 0914 by Samir Morrow MD    Intraprocedure Event edited, Sign clinical note

## 2019-02-11 NOTE — ANESTHESIA POST-OP PAIN MANAGEMENT
Spoke with patient today. Reports adequate pain control.   PNC removed with no complications; blue tip intact.   Questions answered and concerns addressed.     Gustavo A Diaz-Mercado [facility]  9:14 AM

## 2019-02-12 DIAGNOSIS — M17.12 PRIMARY OSTEOARTHRITIS OF LEFT KNEE: Primary | ICD-10-CM

## 2019-02-12 RX ORDER — PROMETHAZINE HYDROCHLORIDE 12.5 MG/1
12.5 TABLET ORAL EVERY 8 HOURS PRN
Qty: 15 TABLET | Refills: 0 | Status: SHIPPED | OUTPATIENT
Start: 2019-02-12 | End: 2019-06-14

## 2019-02-12 NOTE — PROGRESS NOTES
Zofran not helping. Pt has taken phenergan in the past with relief of symptoms. 15 tablets called in.

## 2019-02-13 ENCOUNTER — ANTI-COAG VISIT (OUTPATIENT)
Dept: CARDIOLOGY | Facility: CLINIC | Age: 79
End: 2019-02-13

## 2019-02-13 DIAGNOSIS — M17.0 PRIMARY OSTEOARTHRITIS OF BOTH KNEES: ICD-10-CM

## 2019-02-13 LAB — INR PPP: 1.7

## 2019-02-13 NOTE — PROGRESS NOTES
Patient has completed duration of therapy after orthopedic surgery.  Will discontinue coumadin and discharge from [facility]. She will start Eliquis per MD.

## 2019-02-15 ENCOUNTER — TELEPHONE (OUTPATIENT)
Dept: ADMINISTRATIVE | Facility: CLINIC | Age: 79
End: 2019-02-15

## 2019-02-15 NOTE — TELEPHONE ENCOUNTER
Home Health SOC with [facility] NO-Dr. Sami Washburn. Pt received SN, PT and OT services.

## 2019-02-19 ENCOUNTER — OFFICE VISIT (OUTPATIENT)
Dept: ORTHOPEDICS | Facility: CLINIC | Age: 79
End: 2019-02-19
Payer: MEDICARE

## 2019-02-19 VITALS
SYSTOLIC BLOOD PRESSURE: 142 MMHG | WEIGHT: 167 LBS | BODY MASS INDEX: 32.79 KG/M2 | DIASTOLIC BLOOD PRESSURE: 68 MMHG | HEART RATE: 65 BPM | HEIGHT: 60 IN

## 2019-02-19 DIAGNOSIS — Z96.652 STATUS POST TOTAL LEFT KNEE REPLACEMENT: Primary | ICD-10-CM

## 2019-02-19 PROCEDURE — 99024 PR POST-OP FOLLOW-UP VISIT: ICD-10-PCS | Mod: HCNC,S$GLB,, | Performed by: PHYSICIAN ASSISTANT

## 2019-02-19 PROCEDURE — 99024 POSTOP FOLLOW-UP VISIT: CPT | Mod: HCNC,S$GLB,, | Performed by: PHYSICIAN ASSISTANT

## 2019-02-19 PROCEDURE — 99999 PR PBB SHADOW E&M-EST. PATIENT-LVL IV: CPT | Mod: PBBFAC,HCNC,, | Performed by: PHYSICIAN ASSISTANT

## 2019-02-19 PROCEDURE — 99999 PR PBB SHADOW E&M-EST. PATIENT-LVL IV: ICD-10-PCS | Mod: PBBFAC,HCNC,, | Performed by: PHYSICIAN ASSISTANT

## 2019-02-19 NOTE — PROGRESS NOTES
Nicho Espinosa presents for initial post-operative visit following a left total knee arthroplasty performed by Dr. Washburn on 2/5/2019. Tolerating pain medication well. Taking Norco 5. Back on preoperative dose of Eliquis.     Exam:   Blood pressure (!) 142/68, pulse 65, height 5' (1.524 m), weight 75.8 kg (166 lb 16 oz), last menstrual period 07/05/1987.   Ambulating well with assistive device.  Incision is clean and dry without drainage or erythema. ROM:0-115    Initial post-operative radiographs reviewed today revealing a well fixed and aligned prosthesis.    A/P:  2 weeks s/p left total knee replacement    - The patient was advised to keep the incision clean and dry for the next 24 hours after which she may wash the area with antibacterial soap in the shower. Will not submerge until the incision is completely healed.   - Outpatient PT ordered [facility]  - Stop aspirin. Using phenergan for nausea. If it does not improve after stopping pain medication, pt will call PCP.   - Follow up in 4 weeks with new x-rays. Pt will call clinic with problems/concerns.

## 2019-02-27 ENCOUNTER — CLINICAL SUPPORT (OUTPATIENT)
Dept: REHABILITATION | Facility: OTHER | Age: 79
End: 2019-02-27
Payer: MEDICARE

## 2019-02-27 DIAGNOSIS — R53.1 DECREASED RANGE OF MOTION WITH DECREASED STRENGTH: ICD-10-CM

## 2019-02-27 DIAGNOSIS — M25.60 DECREASED RANGE OF MOTION WITH DECREASED STRENGTH: ICD-10-CM

## 2019-02-27 DIAGNOSIS — Z74.09 IMPAIRED FUNCTIONAL MOBILITY, BALANCE, GAIT, AND ENDURANCE: ICD-10-CM

## 2019-02-27 DIAGNOSIS — M25.562 PAIN AND SWELLING OF LEFT KNEE: ICD-10-CM

## 2019-02-27 DIAGNOSIS — Z79.811 USE OF AROMATASE INHIBITORS: Primary | ICD-10-CM

## 2019-02-27 DIAGNOSIS — M25.462 PAIN AND SWELLING OF LEFT KNEE: ICD-10-CM

## 2019-02-27 PROCEDURE — 97110 THERAPEUTIC EXERCISES: CPT | Mod: HCNC,PN | Performed by: PHYSICAL THERAPIST

## 2019-02-27 PROCEDURE — G8978 MOBILITY CURRENT STATUS: HCPCS | Mod: CK,HCNC,PN | Performed by: PHYSICAL THERAPIST

## 2019-02-27 PROCEDURE — 97164 PT RE-EVAL EST PLAN CARE: CPT | Mod: HCNC,PN,59 | Performed by: PHYSICAL THERAPIST

## 2019-02-27 PROCEDURE — G8979 MOBILITY GOAL STATUS: HCPCS | Mod: CK,HCNC,PN | Performed by: PHYSICAL THERAPIST

## 2019-02-27 RX ORDER — ANASTROZOLE 1 MG/1
TABLET ORAL
Qty: 90 TABLET | Refills: 3 | Status: SHIPPED | OUTPATIENT
Start: 2019-02-27 | End: 2020-02-18

## 2019-02-27 NOTE — PLAN OF CARE
[facility]  Physical Therapy Initial Evaluation    Name: Nicho Espinosa  Clinic Number: [id number]    Therapy Diagnosis:   Encounter Diagnoses   Name Primary?    Pain and swelling of left knee     Decreased range of motion with decreased strength     Impaired functional mobility, balance, gait, and endurance      Physician: Cindy Thrasher, BRADY    Physician Orders: PT Eval and Treat , Status post TKA 2/5. Needs initial eval Feb 25th-28th  Medical Diagnosis from Referral: Z96.652 (ICD-10-CM) - Status post total left knee replacement  Evaluation Date: 2/27/2019  Authorization Period Expiration: 12/31/2019  Plan of Care Expiration: 4/26/2019  Visit # / Visits authorized: 1/ 20    Time In: 12:00pm  Time Out: 12:55pm  Total Billable Time: 55 minutes    Precautions: Standard    Subjective   Date of onset: 2/5/2019 L TKA  History of current condition - Nicho reports: Exacerbation of lower back pain since left total knee surgery. She has chronic low back pain L SI, but her pain is now a little higher. She can walk without he walker but feels better on her back when using the Rolator. Denies any fever, chills, SOB. She c/o warmth and swelling L knee. HHPT ending on Friday. Hx of R TKA in 2013     Medical History:   Past Medical History:   Diagnosis Date    Allergy     Arthritis     Blepharitis of both eyes     breast ca 7/18/2017    right    Complication of anesthesia     nausea    Degenerative disc disease     GERD (gastroesophageal reflux disease)     patient denies reflux    History of compression fracture of spine 4/10/2014    Hyperlipidemia     Lumbar disc disease     Meibomitis     Obesity 9/19/2013    Osteoporosis     Papilloma of eyelid     RUL    Postoperative anemia 11/21/2013    Thoracic or lumbosacral neuritis or radiculitis, unspecified 9/20/2013    Tremors of nervous system 2/2015       Surgical History:   Nicho Espinosa  has a past surgical history that includes  Shoulder arthroscopy; Tonsillectomy; Appendectomy; Total knee arthroplasty; Spine surgery (1-12-15); Foot surgery; Colonoscopy (N/A, 11/1/2016); Breast biopsy (Right, 2017); and Mastectomy (Right, 08/2017).    Medications:   Nicho has a current medication list which includes the following prescription(s): alendronate, apixaban, calcium carbonate, cholecalciferol (vitamin d3), cranberry, desonide, fenofibrate micronized, lactobacillus rhamnosus gg, multivit with calcium,iron,min, polyethylene glycol, promethazine, propranolol, psyllium, trazodone, and beta-carotene(a)-vits c,e/mins.    Allergies:   Review of patient's allergies indicates:   Allergen Reactions    Sulfa (sulfonamide antibiotics) Other (See Comments)     Yeast infection and irritation    Adhesive Hives and Itching        Imaging, bone scan films: There is a left TKA in place good alignment no complication.    Prior Therapy: yes, pre-hab, acute, and HHPT  Social History: lives with daughter in 2 story home  Occupation: retired  Prior Level of Function: water aerobics at [facility], [employer] machine  Current Level of Function: walking with walker, difficulty with prolonged standing, climbing steps one leg at a time    Pain:  Current 5/10, worst 9/10, best 5/10   Location: left back , buttocks  and knee   Description: Aching  Aggravating Factors: walking without the walker  Easing Factors: pain medication and ice, walking bent over rollator     Pts goals: To return to water aerobics at the gym    Objective     *Observation: incision clean and dry    Posture: forward flexed posture with L knee flexion    Range of Motion:   Knee Left active Left Passive Right Active R passive   Flexion 100 110 120 125   Extension -10 -8 0 0       Lower Extremity Strength  Right LE  Left LE    Knee extension: 4+/5 Knee extension: 3+/5   Knee flexion: 4+/5 Knee flexion: 3+/5   Hip flexion: 4/5 Hip flexion: 3+/5   Hip abduction: 4/5 Hip abduction: 4-/5   Ankle dorsiflexion: 4+/5  "Ankle dorsiflexion: 4/5     Function:    - Sit <--> Stand:mod I   - Bed Mobility: mod I  -Gait: ambulating Mod I with rolator and forward flexed posture, decreased heel strike and knee flexion L  -TU sec  -Sit to stands: 30 sec    Joint Mobility: hypomobility L  Patellar all planes    Palpation: TTP medial joint line, distal incision, L QL, glute min    Sensation: BLE light touch grossly intact    Flexibility:    Ely's test: R = 110 degrees ; L = 95 degrees    Edema: edema L knee      CMS Impairment/Limitation/Restriction for FOTO Knee Survey    Therapist reviewed FOTO scores for Nicho Espinosa on 2019.   FOTO documents entered into EPIC - see Media section.    Limitation Score: 50%  Category: Mobility    Current : CK = at least 40% but < 60% impaired, limited or restricted  Goal: CK = at least 40% but < 60% impaired, limited or restricted         TREATMENT   Treatment Time In: 12:30pm  Treatment Time Out: 1:00pm  Total Treatment time separate from Evaluation: 30 minutes    Nicho received therapeutic exercises to develop strength, endurance, ROM and flexibility for 30 minutes including:  Heel slides x 10  Quad sets 5" x 10  SLR x 10  S/L hip abduction x 10  gastroc str 20" x 3  LAQ x 10    Home Exercises and Patient Education Provided    Education provided:   - HEP    Written Home Exercises Provided: yes.  Exercises were reviewed and Nicho was able to demonstrate them prior to the end of the session.  Nicho demonstrated good  understanding of the education provided.     See EMR under Patient Instructions for exercises provided 2019.    Assessment   Nicho is a 78 y.o. female referred to outpatient Physical Therapy with a medical diagnosis of s/p L total knee replacement. Pt seen pre-operatively and re-evaluated at start of session due to change in status. Pt presents with decreased ROM L knee, decreased LE flexibility, LE weakness, edema, and gait abnormality. Pt's decreased L terminal knee extension and " weakness likely contributing to recent exacerbation of L SI joint and gluteal pain.     Pt prognosis is Good.   Pt will benefit from skilled outpatient Physical Therapy to address the deficits stated above and in the chart below, provide pt/family education, and to maximize pt's level of independence.     Plan of care discussed with patient: Yes  Pt's spiritual, cultural and educational needs considered and patient is agreeable to the plan of care and goals as stated below:     Anticipated Barriers for therapy: none    Goals:  Short Term Goals (6 weeks)  1. Pt will demonstrate improvements in left knee ROM to 0-120 for ease with ambulation  2. Pt will demonstrate improvements in left knee strength to 4+/5 for ease with getting out of a chair.  3. Pt will be able to ambulate 200 ft with LRAD and without the presence of antalgic gait pattern  4. Pt will report <5/10 pain within the left knee for ease with ADL's  5. Pt presents with improved functional mobility with FOTO limitation <=40% disability.    Long Term Goals (12 weeks)  1. Pt will demonstrate improvements in left knee ROM to 0-125 for ease with ambulation  2. Pt will demonstrate 5/5 strength within the left knee for ease with house hold chores and climbing stairs  3. Pt will report being independent with his/her HEP for maintenance of improvements gained during therapy sessions  4. Pt will report <3/10 pain within the left knee for ease with ADLs  5. Pt will demonstrate ambulation x 300 ft without AD or antalgic gait.   6. Pt presents with improved functional mobility with FOTO limitation <=30% disability.      Plan   Updated Plan of care Certification: 2/27/2019 to 4/26/2019.    Outpatient Physical Therapy 2 times weekly for 8 weeks to include the following interventions: Gait Training, Manual Therapy, Moist Heat/ Ice, Neuromuscular Re-ed, Patient Education, Therapeutic Activites, Therapeutic Exercise and modalities PRN.     Paola Sauceda, PT

## 2019-03-06 ENCOUNTER — TELEPHONE (OUTPATIENT)
Dept: ORTHOPEDICS | Facility: CLINIC | Age: 79
End: 2019-03-06

## 2019-03-06 ENCOUNTER — HOSPITAL ENCOUNTER (OUTPATIENT)
Dept: RADIOLOGY | Facility: CLINIC | Age: 79
Discharge: HOME | End: 2019-03-06
Attending: INTERNAL MEDICINE
Payer: MEDICARE

## 2019-03-06 DIAGNOSIS — M81.8 OSTEOPOROSIS DUE TO AROMATASE INHIBITOR: ICD-10-CM

## 2019-03-06 DIAGNOSIS — T38.6X5A OSTEOPOROSIS DUE TO AROMATASE INHIBITOR: ICD-10-CM

## 2019-03-06 PROCEDURE — 77080 DEXA BONE DENSITY SPINE HIP: ICD-10-PCS | Mod: 26,HCNC,, | Performed by: INTERNAL MEDICINE

## 2019-03-06 PROCEDURE — 77080 DXA BONE DENSITY AXIAL: CPT | Mod: 26,HCNC,, | Performed by: INTERNAL MEDICINE

## 2019-03-06 PROCEDURE — 77080 DXA BONE DENSITY AXIAL: CPT | Mod: TC,HCNC

## 2019-03-06 NOTE — TELEPHONE ENCOUNTER
Spoke with pt, notified her it was ok to stop the eliquis and start the aspirin. Pt verbalized understanding and had no further questions.    ----- Message from Sami Washburn MD sent at 3/6/2019  4:18 PM CST -----  Contact: PT  The patient may stop the eliquis and restart ASA  ----- Message -----  From: Angelica Edwards MA  Sent: 3/6/2019   3:42 PM  To: Sami Washburn MD        ----- Message -----  From: Bev Noel  Sent: 3/6/2019   3:32 PM  To: [facility] Staff    Type:  Needs Medical Advice    Who Called: PT  Symptoms (please be specific): change of medication  Would the patient rather a call back or a response via [facility]? callback  Best Call Back Number: [phone]  Additional Information: pt wants to change from eliquis 2.5mg to asprin. (eliquis not found on pt's chart)

## 2019-03-07 ENCOUNTER — CLINICAL SUPPORT (OUTPATIENT)
Dept: REHABILITATION | Facility: OTHER | Age: 79
End: 2019-03-07
Payer: MEDICARE

## 2019-03-07 DIAGNOSIS — M25.462 PAIN AND SWELLING OF LEFT KNEE: Primary | ICD-10-CM

## 2019-03-07 DIAGNOSIS — M25.562 PAIN AND SWELLING OF LEFT KNEE: Primary | ICD-10-CM

## 2019-03-07 DIAGNOSIS — R53.1 DECREASED RANGE OF MOTION WITH DECREASED STRENGTH: ICD-10-CM

## 2019-03-07 DIAGNOSIS — M25.60 DECREASED RANGE OF MOTION WITH DECREASED STRENGTH: ICD-10-CM

## 2019-03-07 DIAGNOSIS — Z74.09 IMPAIRED FUNCTIONAL MOBILITY, BALANCE, GAIT, AND ENDURANCE: ICD-10-CM

## 2019-03-07 PROCEDURE — 97110 THERAPEUTIC EXERCISES: CPT | Mod: HCNC,PN

## 2019-03-07 NOTE — PROGRESS NOTES
"  Physical Therapy Daily Treatment Note     Name: Nicho SCHAEFER [facility] Number: [id number]    Therapy Diagnosis:   Encounter Diagnoses   Name Primary?    Pain and swelling of left knee Yes    Decreased range of motion with decreased strength     Impaired functional mobility, balance, gait, and endurance      Physician: Cindy Thrasher PA-C    Visit Date: 3/7/2019    Physician Orders: PT Eval and Treat , Status post TKA 2/5. Needs initial eval Feb 25th-28th  Medical Diagnosis from Referral: Z96.652 (ICD-10-CM) - Status post total left knee replacement  Evaluation Date: 2/27/2019  Authorization Period Expiration: 12/31/2019  Plan of Care Expiration: 4/26/2019  Visit # / Visits authorized: 2/ 20     Time In: 2:00pm  Time Out: 3:00pm  Total Billable Time: 30 minutes     Precautions: Standard, low back pain    Subjective     Pt reports: increased low back pain today. She notes pain along tibial condyles in the L knee. She notes that she does some of her exercise but not all of them.  She was semi compliant with home exercise program.  Response to previous treatment: poor  Functional change: independence with ambulation without AD    Pain: 5/10  Location: left knee      Objective     AROM: flex = 120 deg    Nicho received therapeutic exercises to develop strength, endurance, ROM, flexibility, posture and core stabilization for 40 minutes including:    Heel slides x 3 min x 5"  Quad sets 5" x 20  SLR 2 x 10  S/L hip abduction x 15  gastroc str 30" x 3  Supine HS stretch with strap 3 x 20"  Heel prop 3'  LAQ x 15    Nicho received the following manual therapy techniques: Joint mobilizations were applied to the: L patella (all directions) for 5 minutes.    Nicho received cold pack for 10 minutes to Lknee.      Home Exercises Provided and Patient Education Provided     Education provided:   - none    Written Home Exercises Provided: Patient instructed to cont prior HEP.  Exercises were reviewed and Nicho was able to " demonstrate them prior to the end of the session.  Nicho demonstrated good  understanding of the education provided.     See EMR under Patient Instructions for exercises provided prior visit.    Assessment     Pt tolerated overall session well today. Pt demo's fair return technique of HEP, indicating fair compliance at home. Good training effect noted with leg raises despite pt being UA to maintain TKE.  Nicho is progressing well towards her goals.   Pt prognosis is Good.     Pt will continue to benefit from skilled outpatient physical therapy to address the deficits listed in the problem list box on initial evaluation, provide pt/family education and to maximize pt's level of independence in the home and community environment.     Pt's spiritual, cultural and educational needs considered and pt agreeable to plan of care and goals.     Anticipated barriers to physical therapy: none    Goals:   Short Term Goals (6 weeks)  1. Pt will demonstrate improvements in left knee ROM to 0-120 for ease with ambulation - Not met, in progress  2. Pt will demonstrate improvements in left knee strength to 4+/5 for ease with getting out of a chair. - Not met, in progress  3. Pt will be able to ambulate 200 ft with LRAD and without the presence of antalgic gait pattern - Not met, in progress  4. Pt will report <5/10 pain within the left knee for ease with ADL's - Not met, in progress  5. Pt presents with improved functional mobility with FOTO limitation <=40% disability. - Not met, in progress     Long Term Goals (12 weeks)  1. Pt will demonstrate improvements in left knee ROM to 0-125 for ease with ambulation - Not met, in progress  2. Pt will demonstrate 5/5 strength within the left knee for ease with house hold chores and climbing stairs - Not met, in progress  3. Pt will report being independent with his/her HEP for maintenance of improvements gained during therapy sessions - Not met, in progress  4. Pt will report <3/10 pain  within the left knee for ease with ADLs - Not met, in progress  5. Pt will demonstrate ambulation x 300 ft without AD or antalgic gait.  - Not met, in progress  6. Pt presents with improved functional mobility with FOTO limitation <=30% disability. - Not met, in progress    Plan     Continue with POC.     Bianca Gibson, PT

## 2019-03-12 ENCOUNTER — CLINICAL SUPPORT (OUTPATIENT)
Dept: REHABILITATION | Facility: OTHER | Age: 79
End: 2019-03-12
Payer: MEDICARE

## 2019-03-12 PROCEDURE — 97110 THERAPEUTIC EXERCISES: CPT | Mod: HCNC,PN | Performed by: PHYSICAL THERAPIST

## 2019-03-12 NOTE — PROGRESS NOTES
"  Physical Therapy Daily Treatment Note     Name: Nicho SCHAEFER [facility] Number: [id number]    Therapy Diagnosis:   No diagnosis found.  Physician: Cindy Thrasher PA-C    Visit Date: 3/12/2019    Physician Orders: PT Eval and Treat , Status post TKA 2/5. Needs initial eval Feb 25th-28th  Medical Diagnosis from Referral: Z96.652 (ICD-10-CM) - Status post total left knee replacement  Evaluation Date: 2/27/2019  Authorization Period Expiration: 12/31/2019  Plan of Care Expiration: 4/26/2019  Visit # / Visits authorized: 3/ 20     Time In: 3:00pm  Time Out: 4:00pm  Total Billable Time: 30 minutes     Precautions: Standard, low back pain    Subjective     Pt reports: Back pain improved when she does not take her cancer medication. Her knee pain is mild today.  She was semi compliant with home exercise program.  Response to previous treatment: poor  Functional change: independence with ambulation without AD    Pain: 2/10  Location: left knee      Objective     AROM: flex = 120 deg    Nicho received therapeutic exercises to develop strength, endurance, ROM, flexibility, posture and core stabilization for 40 minutes including:    Heel slides x 3 min x 5"  Quad sets 5" x 20  SLR 2 x 10  S/L hip abduction x 15  gastroc str 30" x 3  Supine HS stretch with strap 3 x 20"  Heel prop 3'  LAQ x 15    Nicho received the following manual therapy techniques: Joint mobilizations were applied to the: L patella (all directions) for 5 minutes.    Nicho received cold pack for 0 minutes to Lknee.    Home Exercises Provided and Patient Education Provided     Education provided:   - none    Written Home Exercises Provided: Patient instructed to cont prior HEP.  Exercises were reviewed and Nihco was able to demonstrate them prior to the end of the session.  Nicho demonstrated good  understanding of the education provided.     See EMR under Patient Instructions for exercises provided prior visit.    Assessment     Pt tolerated treatment session well " with minimal complaint of medial knee pain with therapeutic exercise. Progressing with improved active knee extension this visit.   Nicho is progressing well towards her goals.   Pt prognosis is Good.     Pt will continue to benefit from skilled outpatient physical therapy to address the deficits listed in the problem list box on initial evaluation, provide pt/family education and to maximize pt's level of independence in the home and community environment.     Pt's spiritual, cultural and educational needs considered and pt agreeable to plan of care and goals.     Anticipated barriers to physical therapy: none    Goals:   Short Term Goals (6 weeks)  1. Pt will demonstrate improvements in left knee ROM to 0-120 for ease with ambulation - Not met, in progress  2. Pt will demonstrate improvements in left knee strength to 4+/5 for ease with getting out of a chair. - Not met, in progress  3. Pt will be able to ambulate 200 ft with LRAD and without the presence of antalgic gait pattern - Not met, in progress  4. Pt will report <5/10 pain within the left knee for ease with ADL's - Not met, in progress  5. Pt presents with improved functional mobility with FOTO limitation <=40% disability. - Not met, in progress     Long Term Goals (12 weeks)  1. Pt will demonstrate improvements in left knee ROM to 0-125 for ease with ambulation - Not met, in progress  2. Pt will demonstrate 5/5 strength within the left knee for ease with house hold chores and climbing stairs - Not met, in progress  3. Pt will report being independent with his/her HEP for maintenance of improvements gained during therapy sessions - Not met, in progress  4. Pt will report <3/10 pain within the left knee for ease with ADLs - Not met, in progress  5. Pt will demonstrate ambulation x 300 ft without AD or antalgic gait.  - Not met, in progress  6. Pt presents with improved functional mobility with FOTO limitation <=30% disability. - Not met, in progress    Plan      Continue with POC.     Paola Sauceda, PT

## 2019-03-14 ENCOUNTER — CLINICAL SUPPORT (OUTPATIENT)
Dept: REHABILITATION | Facility: OTHER | Age: 79
End: 2019-03-14
Payer: MEDICARE

## 2019-03-14 DIAGNOSIS — M25.562 PAIN AND SWELLING OF LEFT KNEE: ICD-10-CM

## 2019-03-14 DIAGNOSIS — R53.1 DECREASED RANGE OF MOTION WITH DECREASED STRENGTH: ICD-10-CM

## 2019-03-14 DIAGNOSIS — M25.462 PAIN AND SWELLING OF LEFT KNEE: ICD-10-CM

## 2019-03-14 DIAGNOSIS — Z74.09 IMPAIRED FUNCTIONAL MOBILITY, BALANCE, GAIT, AND ENDURANCE: Primary | ICD-10-CM

## 2019-03-14 DIAGNOSIS — M25.60 DECREASED RANGE OF MOTION WITH DECREASED STRENGTH: ICD-10-CM

## 2019-03-14 PROCEDURE — 97110 THERAPEUTIC EXERCISES: CPT | Mod: HCNC,PN | Performed by: PHYSICAL THERAPIST

## 2019-03-14 NOTE — PROGRESS NOTES
"  Physical Therapy Daily Treatment Note     Name: Nicho SCHAEFER [facility] Number: [id number]    Therapy Diagnosis:   Encounter Diagnoses   Name Primary?    Impaired functional mobility, balance, gait, and endurance Yes    Decreased range of motion with decreased strength     Pain and swelling of left knee      Physician: Cindy Thrasher PA-C    Visit Date: 3/14/2019    Physician Orders: PT Eval and Treat , Status post TKA 2/5. Needs initial eval Feb 25th-28th  Medical Diagnosis from Referral: Z96.652 (ICD-10-CM) - Status post total left knee replacement  Evaluation Date: 2/27/2019  Authorization Period Expiration: 12/31/2019  Plan of Care Expiration: 4/26/2019  Visit # / Visits authorized: 4/ 20     Time In: 3:00pm  Time Out: 4:00pm  Total Billable Time: 30 minutes     Precautions: Standard, low back pain    Subjective     Pt reports: Her knee is really doing better.   She was semi compliant with home exercise program.  Response to previous treatment: poor  Functional change: independence with ambulation without AD    Pain: 2/10  Location: left knee      Objective     AROM: 0- 120 deg    Nicho received therapeutic exercises to develop strength, endurance, ROM, flexibility, posture and core stabilization for 45 minutes including:    Bike 6 min    Heel slides x 3 min x 5"  Quad sets 5" x 20  SLR 2 x 10  S/L hip abduction x 15  gastroc str 20" x 4  Supine HS stretch with strap 3 x 20"  LAQ x 15  +clams x 10  +bridges 2 x 10  +HL hip add with ball 1" x 10    Nicho received the following manual therapy techniques: Joint mobilizations were applied to the: L patella (all directions) for 5 minutes.    Nicho received cold pack for 0 minutes to Lknee.    Home Exercises Provided and Patient Education Provided     Education provided:   - none    Written Home Exercises Provided: Patient instructed to cont prior HEP.  Exercises were reviewed and Nicho was able to demonstrate them prior to the end of the session.  Nicho demonstrated " good  understanding of the education provided.     See EMR under Patient Instructions for exercises provided prior visit.    Assessment     Pt tolerated treatment session well. Progressing with B hip strengthening without exacerbation of pain.   Nicho is progressing well towards her goals.   Pt prognosis is Good.     Pt will continue to benefit from skilled outpatient physical therapy to address the deficits listed in the problem list box on initial evaluation, provide pt/family education and to maximize pt's level of independence in the home and community environment.     Pt's spiritual, cultural and educational needs considered and pt agreeable to plan of care and goals.     Anticipated barriers to physical therapy: none    Goals:   Short Term Goals (6 weeks)  1. Pt will demonstrate improvements in left knee ROM to 0-120 for ease with ambulation - Not met, in progress  2. Pt will demonstrate improvements in left knee strength to 4+/5 for ease with getting out of a chair. - Not met, in progress  3. Pt will be able to ambulate 200 ft with LRAD and without the presence of antalgic gait pattern - Not met, in progress  4. Pt will report <5/10 pain within the left knee for ease with ADL's - Not met, in progress  5. Pt presents with improved functional mobility with FOTO limitation <=40% disability. - Not met, in progress     Long Term Goals (12 weeks)  1. Pt will demonstrate improvements in left knee ROM to 0-125 for ease with ambulation - Not met, in progress  2. Pt will demonstrate 5/5 strength within the left knee for ease with house hold chores and climbing stairs - Not met, in progress  3. Pt will report being independent with his/her HEP for maintenance of improvements gained during therapy sessions - Not met, in progress  4. Pt will report <3/10 pain within the left knee for ease with ADLs - Not met, in progress  5. Pt will demonstrate ambulation x 300 ft without AD or antalgic gait.  - Not met, in progress  6. Pt  presents with improved functional mobility with FOTO limitation <=30% disability. - Not met, in progress    Plan     Continue with POC.     Paola Sauceda, PT

## 2019-03-16 ENCOUNTER — OFFICE VISIT (OUTPATIENT)
Dept: URGENT CARE | Facility: CLINIC | Age: 79
End: 2019-03-16
Payer: MEDICARE

## 2019-03-16 VITALS
HEART RATE: 70 BPM | HEIGHT: 61 IN | TEMPERATURE: 98 F | SYSTOLIC BLOOD PRESSURE: 144 MMHG | RESPIRATION RATE: 18 BRPM | WEIGHT: 170 LBS | BODY MASS INDEX: 32.1 KG/M2 | DIASTOLIC BLOOD PRESSURE: 59 MMHG | OXYGEN SATURATION: 96 %

## 2019-03-16 DIAGNOSIS — M79.644 PAIN OF RIGHT THUMB: Primary | ICD-10-CM

## 2019-03-16 PROCEDURE — 73140 X-RAY EXAM OF FINGER(S): CPT | Mod: RT,S$GLB,, | Performed by: RADIOLOGY

## 2019-03-16 PROCEDURE — 99214 OFFICE O/P EST MOD 30 MIN: CPT | Mod: S$GLB,,, | Performed by: NURSE PRACTITIONER

## 2019-03-16 PROCEDURE — 73140 XR FINGER 2 OR MORE VIEWS: ICD-10-PCS | Mod: RT,S$GLB,, | Performed by: RADIOLOGY

## 2019-03-16 PROCEDURE — 1101F PT FALLS ASSESS-DOCD LE1/YR: CPT | Mod: CPTII,S$GLB,, | Performed by: NURSE PRACTITIONER

## 2019-03-16 PROCEDURE — 99214 PR OFFICE/OUTPT VISIT, EST, LEVL IV, 30-39 MIN: ICD-10-PCS | Mod: S$GLB,,, | Performed by: NURSE PRACTITIONER

## 2019-03-16 PROCEDURE — 1101F PR PT FALLS ASSESS DOC 0-1 FALLS W/OUT INJ PAST YR: ICD-10-PCS | Mod: CPTII,S$GLB,, | Performed by: NURSE PRACTITIONER

## 2019-03-16 RX ORDER — HYDROCODONE BITARTRATE AND ACETAMINOPHEN 10; 325 MG/1; MG/1
1 TABLET ORAL EVERY 6 HOURS PRN
COMMUNITY
End: 2020-02-03

## 2019-03-16 NOTE — PROGRESS NOTES
"Subjective:       Patient ID: Nicho Espinosa is a 78 y.o. female.    Vitals:    03/16/19 1338   BP: (!) 144/59   Pulse: 70   Resp: 18   Temp: 97.9 °F (36.6 °C)   SpO2: 96%   Weight: 77.1 kg (170 lb)   Height: 5' 1" (1.549 m)       Chief Complaint: Hand Injury    Patient reports jamming her right thumb against the wall 7 days ago. She reports that the finger did bruise, but the bruising is no longer there. She reports that she experiences pain when pressure is applied to the finger. She describes the pain as throbbing.       Hand Pain    The injury mechanism is unknown. The pain is present in the right fingers. The quality of the pain is described as aching. The pain is at a severity of 4/10. Pertinent negatives include no chest pain or numbness. Nothing aggravates the symptoms. She has tried acetaminophen for the symptoms. The treatment provided no relief.     Review of Systems   Constitution: Negative for weakness and malaise/fatigue.   HENT: Negative for nosebleeds.    Cardiovascular: Negative for chest pain and syncope.   Respiratory: Negative for shortness of breath.    Musculoskeletal: Positive for joint pain. Negative for back pain and neck pain.   Gastrointestinal: Negative for abdominal pain.   Genitourinary: Negative for hematuria.   Neurological: Negative for dizziness and numbness.       Objective:      Physical Exam   Constitutional: She is oriented to person, place, and time. She appears well-developed and well-nourished.   HENT:   Head: Normocephalic.   Eyes: Pupils are equal, round, and reactive to light.   Neck: Normal range of motion. Neck supple.   Pulmonary/Chest: Effort normal. No respiratory distress.   Musculoskeletal:        Right hand: She exhibits decreased range of motion, tenderness, bony tenderness and swelling (Mild). She exhibits normal two-point discrimination, normal capillary refill, no deformity and no laceration.        Hands:  Neurological: She is alert and oriented to person, " "place, and time.   Skin: Skin is warm and dry.   Psychiatric: She has a normal mood and affect. Her behavior is normal. Judgment and thought content normal.       Assessment:       1. Pain of right thumb        Plan:       Nicho was seen today for hand injury.    Diagnoses and all orders for this visit:    Pain of right thumb  -     X-Ray Finger 2 or More Views; Future     checked, norco given 3/4/19  She is currently seen pain management follow-up scheduled for Monday and Orthopedics  Patient Instructions   X-ray Finger 2 Or More Views    Result Date: 3/16/2019  EXAMINATION: XR FINGER 2 OR MORE VIEWS CLINICAL HISTORY: right thumb "jammed" also feels like arthritis;  Pain in right finger(s) COMPARISON: None FINDINGS: No definite acute fracture is seen.  There are degenerative changes with joint space narrowing, subchondral geodes, and marginal spur formation at the interphalangeal joint of the thumb, the 1st carpometacarpal joint, and, to a lesser extent, the 1st metacarpal phalangeal joint.     As above. Electronically signed by: Angeli Cuellar MD Date:    03/16/2019 Time:    14:28                "

## 2019-03-16 NOTE — PATIENT INSTRUCTIONS
"X-ray Finger 2 Or More Views    Result Date: 3/16/2019  EXAMINATION: XR FINGER 2 OR MORE VIEWS CLINICAL HISTORY: right thumb "jammed" also feels like arthritis;  Pain in right finger(s) COMPARISON: None FINDINGS: No definite acute fracture is seen.  There are degenerative changes with joint space narrowing, subchondral geodes, and marginal spur formation at the interphalangeal joint of the thumb, the 1st carpometacarpal joint, and, to a lesser extent, the 1st metacarpal phalangeal joint.     As above. Electronically signed by: Angeli Cuellar MD Date:    03/16/2019 Time:    14:28      "

## 2019-03-19 ENCOUNTER — CLINICAL SUPPORT (OUTPATIENT)
Dept: REHABILITATION | Facility: OTHER | Age: 79
End: 2019-03-19
Payer: MEDICARE

## 2019-03-19 DIAGNOSIS — R53.1 DECREASED RANGE OF MOTION WITH DECREASED STRENGTH: ICD-10-CM

## 2019-03-19 DIAGNOSIS — M25.60 DECREASED RANGE OF MOTION WITH DECREASED STRENGTH: ICD-10-CM

## 2019-03-19 DIAGNOSIS — Z74.09 IMPAIRED FUNCTIONAL MOBILITY, BALANCE, GAIT, AND ENDURANCE: Primary | ICD-10-CM

## 2019-03-19 DIAGNOSIS — R26.89 BALANCE PROBLEMS: ICD-10-CM

## 2019-03-19 DIAGNOSIS — M25.462 PAIN AND SWELLING OF LEFT KNEE: ICD-10-CM

## 2019-03-19 DIAGNOSIS — M25.562 PAIN AND SWELLING OF LEFT KNEE: ICD-10-CM

## 2019-03-19 DIAGNOSIS — Z91.81 AT HIGH RISK FOR FALLS: ICD-10-CM

## 2019-03-19 PROCEDURE — 97110 THERAPEUTIC EXERCISES: CPT | Mod: HCNC,PN

## 2019-03-19 NOTE — PROGRESS NOTES
"  Physical Therapy Daily Treatment Note     Name: Nicho SCHAEFER [facility]  Clinic Number: [id number]    Therapy Diagnosis:   Encounter Diagnoses   Name Primary?    Impaired functional mobility, balance, gait, and endurance Yes    Decreased range of motion with decreased strength     Pain and swelling of left knee     Balance problems     At high risk for falls      Physician: Cindy Thrasher PA-C    Visit Date: 3/19/2019    Physician Orders: PT Eval and Treat , Status post TKA 2/5. Needs initial eval Feb 25th-28th  Medical Diagnosis from Referral: Z96.652 (ICD-10-CM) - Status post total left knee replacement  Evaluation Date: 2/27/2019  Authorization Period Expiration: 12/31/2019  Plan of Care Expiration: 4/26/2019  Visit # / Visits authorized: 5/ 20     Time In: 11:00am  Time Out: 12:00pm  Total Billable Time: 30 minutes     Precautions: Standard, low back pain    Subjective     Pt reports: that her knee is better but her back still occasionally bothers her.    She was semi compliant with home exercise program.  Response to previous treatment: poor  Functional change: independence with ambulation without AD    Pain: 2/10  Location: left knee      Objective     AROM: 0- 120 deg    Nicho received therapeutic exercises to develop strength, endurance, ROM, flexibility, posture and core stabilization for 60 minutes including:    Bike 6 min    Heel slides x 3 min x 5" - NP  Quad sets 5" x 20  SLR 2 x 10  S/L hip abduction x 15 x 2  gastroc str 20" x 4  Supine HS stretch with strap 3 x 20"  LAQ x 15 x 2  clams x 10 x 2  bridges 2 x 10 x 2  HL hip add with ball 1" x 10 x 2    +standing mini squats: 2 x 10  +standing step ups (fwd) 1 x 15 x 4"  +standing step ups (lat) 1 x 15 x 4"    Nicho received the following manual therapy techniques: Joint mobilizations were applied to the: L patella (all directions) for 0 minutes.    Nicho received cold pack for 0 minutes to Lknee.    Home Exercises Provided and Patient Education Provided "     Education provided:   - none    Written Home Exercises Provided: Patient instructed to cont prior HEP.  Exercises were reviewed and Nicho was able to demonstrate them prior to the end of the session.  Nicho demonstrated good  understanding of the education provided.     See EMR under Patient Instructions for exercises provided prior visit.    Assessment     Pt tolerated treatment session well. Progressed exercises to increase repetitions to promote muscular endurance and motor control. New exercises added to promote functional strength with stair climbing. Progressing with B hip strengthening without exacerbation of pain.   Nicho is progressing well towards her goals.   Pt prognosis is Good.     Pt will continue to benefit from skilled outpatient physical therapy to address the deficits listed in the problem list box on initial evaluation, provide pt/family education and to maximize pt's level of independence in the home and community environment.     Pt's spiritual, cultural and educational needs considered and pt agreeable to plan of care and goals.     Anticipated barriers to physical therapy: none    Goals:   Short Term Goals (6 weeks)  1. Pt will demonstrate improvements in left knee ROM to 0-120 for ease with ambulation - Not met, in progress  2. Pt will demonstrate improvements in left knee strength to 4+/5 for ease with getting out of a chair. - Not met, in progress  3. Pt will be able to ambulate 200 ft with LRAD and without the presence of antalgic gait pattern - Not met, in progress  4. Pt will report <5/10 pain within the left knee for ease with ADL's - Not met, in progress  5. Pt presents with improved functional mobility with FOTO limitation <=40% disability. - Not met, in progress     Long Term Goals (12 weeks)  1. Pt will demonstrate improvements in left knee ROM to 0-125 for ease with ambulation - Not met, in progress  2. Pt will demonstrate 5/5 strength within the left knee for ease with house  hold chores and climbing stairs - Not met, in progress  3. Pt will report being independent with his/her HEP for maintenance of improvements gained during therapy sessions - Not met, in progress  4. Pt will report <3/10 pain within the left knee for ease with ADLs - Not met, in progress  5. Pt will demonstrate ambulation x 300 ft without AD or antalgic gait.  - Not met, in progress  6. Pt presents with improved functional mobility with FOTO limitation <=30% disability. - Not met, in progress    Plan     Continue with POC.     Bianca Gibson, PT

## 2019-03-21 ENCOUNTER — CLINICAL SUPPORT (OUTPATIENT)
Dept: REHABILITATION | Facility: OTHER | Age: 79
End: 2019-03-21
Payer: MEDICARE

## 2019-03-21 DIAGNOSIS — M25.60 DECREASED RANGE OF MOTION WITH DECREASED STRENGTH: Primary | ICD-10-CM

## 2019-03-21 DIAGNOSIS — R53.1 DECREASED RANGE OF MOTION WITH DECREASED STRENGTH: Primary | ICD-10-CM

## 2019-03-21 DIAGNOSIS — M25.562 PAIN AND SWELLING OF LEFT KNEE: ICD-10-CM

## 2019-03-21 DIAGNOSIS — M25.562 LEFT KNEE PAIN, UNSPECIFIED CHRONICITY: Primary | ICD-10-CM

## 2019-03-21 DIAGNOSIS — M25.462 PAIN AND SWELLING OF LEFT KNEE: ICD-10-CM

## 2019-03-21 DIAGNOSIS — Z74.09 IMPAIRED FUNCTIONAL MOBILITY, BALANCE, GAIT, AND ENDURANCE: ICD-10-CM

## 2019-03-21 PROCEDURE — 97110 THERAPEUTIC EXERCISES: CPT | Mod: HCNC,PN

## 2019-03-21 NOTE — PROGRESS NOTES
"  Physical Therapy Daily Treatment Note     Name: Nicho SCHAEFER [facility] Number: [id number]    Therapy Diagnosis:   Encounter Diagnoses   Name Primary?    Decreased range of motion with decreased strength Yes    Impaired functional mobility, balance, gait, and endurance     Pain and swelling of left knee      Physician: Cindy Thrasher PA-C    Visit Date: 3/21/2019    Physician Orders: PT Eval and Treat , Status post TKA 2/5. Needs initial eval Feb 25th-28th  Medical Diagnosis from Referral: Z96.652 (ICD-10-CM) - Status post total left knee replacement  Evaluation Date: 2/27/2019  Authorization Period Expiration: 12/31/2019  Plan of Care Expiration: 4/26/2019  Visit # / Visits authorized: 5/ 20     Time In: 1:00am  Time Out: 1:50pm  Total Billable Time: 30 minutes     Precautions: Standard, low back pain    Subjective     Pt reports: she had increased medial knee pain that increased over night. "I had to use a pain patch and medication to relieve it." She denies use of compression sleeve to help. "I have to leave 10 minutes early for another appointment." Pt notes that she has been more active than she used to, stating that she gardened yesterday.     that her knee is better but her back still occasionally bothers her.    She was semi compliant with home exercise program.  Response to previous treatment: poor  Functional change: independence with ambulation without AD    Pain: 5/10  Location: left knee      Objective     AROM: 0- 105/113* deg *indicates pain in anterior knee    Palpation: increased warmth, swelling in medial>lateral L knee    Nicho received therapeutic exercises to develop strength, endurance, ROM, flexibility, posture and core stabilization for 40 minutes including:    Bike 6 min - NP today    Heel slides x 3 min x 5"   Quad sets 5" x 20  SLR 2 x 10  S/L hip abduction x 15 x 2  gastroc str 20" x 4 - NP  Supine HS stretch with strap 3 x 20" - NP  LAQ x 15 x 2 - NP  clams x 10 x 2  bridges 2 x 10 x " "2  HL hip add with ball 1" x 10 x 2 - NP    standing mini squats: 2 x 10  standing step ups (fwd) 1 x 15 x 4"  standing step ups (lat) 1 x 15 x 4"    Nicho received the following manual therapy techniques:   10 min x preparation and application of kinesiotape (2 x jellyfish strips) for fluid reduction, improved ROM and circulation, pain reduction. Patient received education regarding appropriate care and removal of Kinesiotape. Patient instructed in proper removal techniques if skin irritation occurs.   0 min x Joint mobilizations were applied to the: L patella (all directions) for 0 minutes.    Nicho received cold pack for 0 minutes to Lknee.    Home Exercises Provided and Patient Education Provided     Education provided:   - none    Written Home Exercises Provided: Patient instructed to cont prior HEP.  Exercises were reviewed and Nicho was able to demonstrate them prior to the end of the session.  Nicho demonstrated good  understanding of the education provided.     See EMR under Patient Instructions for exercises provided prior visit.    Assessment     Limited progression and inability to complete therex program today due to c/o increased knee pain and pt needing to leave early for a secondary appointment. Notable increase in swelling and pain today as well as marked reduction in ROM. educated on HEP and use of compression sleve and ice to reduce pain and swelling with increased activities at home.  .Nicho is progressing well towards her goals.   Pt prognosis is Good.     Pt will continue to benefit from skilled outpatient physical therapy to address the deficits listed in the problem list box on initial evaluation, provide pt/family education and to maximize pt's level of independence in the home and community environment.     Pt's spiritual, cultural and educational needs considered and pt agreeable to plan of care and goals.     Anticipated barriers to physical therapy: none    Goals:   Short Term Goals (6 " weeks)  1. Pt will demonstrate improvements in left knee ROM to 0-120 for ease with ambulation - Not met, in progress  2. Pt will demonstrate improvements in left knee strength to 4+/5 for ease with getting out of a chair. - Not met, in progress  3. Pt will be able to ambulate 200 ft with LRAD and without the presence of antalgic gait pattern - Not met, in progress  4. Pt will report <5/10 pain within the left knee for ease with ADL's - Not met, in progress  5. Pt presents with improved functional mobility with FOTO limitation <=40% disability. - Not met, in progress     Long Term Goals (12 weeks)  1. Pt will demonstrate improvements in left knee ROM to 0-125 for ease with ambulation - Not met, in progress  2. Pt will demonstrate 5/5 strength within the left knee for ease with house hold chores and climbing stairs - Not met, in progress  3. Pt will report being independent with his/her HEP for maintenance of improvements gained during therapy sessions - Not met, in progress  4. Pt will report <3/10 pain within the left knee for ease with ADLs - Not met, in progress  5. Pt will demonstrate ambulation x 300 ft without AD or antalgic gait.  - Not met, in progress  6. Pt presents with improved functional mobility with FOTO limitation <=30% disability. - Not met, in progress    Plan     Continue with POC.     Bianca Gibson, PT

## 2019-03-25 ENCOUNTER — HOSPITAL ENCOUNTER (OUTPATIENT)
Dept: RADIOLOGY | Facility: HOSPITAL | Age: 79
Discharge: HOME | End: 2019-03-25
Attending: PHYSICIAN ASSISTANT
Payer: MEDICARE

## 2019-03-25 ENCOUNTER — CLINICAL SUPPORT (OUTPATIENT)
Dept: REHABILITATION | Facility: OTHER | Age: 79
End: 2019-03-25
Payer: MEDICARE

## 2019-03-25 ENCOUNTER — OFFICE VISIT (OUTPATIENT)
Dept: ORTHOPEDICS | Facility: CLINIC | Age: 79
End: 2019-03-25
Payer: MEDICARE

## 2019-03-25 VITALS — WEIGHT: 170 LBS | HEIGHT: 61 IN | BODY MASS INDEX: 32.1 KG/M2

## 2019-03-25 DIAGNOSIS — Z96.652 STATUS POST TOTAL LEFT KNEE REPLACEMENT: Primary | ICD-10-CM

## 2019-03-25 DIAGNOSIS — M25.562 LEFT KNEE PAIN, UNSPECIFIED CHRONICITY: ICD-10-CM

## 2019-03-25 PROCEDURE — 73560 XR KNEE ORTHO LEFT: ICD-10-PCS | Mod: 26,HCNC,59,LT | Performed by: RADIOLOGY

## 2019-03-25 PROCEDURE — 73560 X-RAY EXAM OF KNEE 1 OR 2: CPT | Mod: 26,HCNC,59,LT | Performed by: RADIOLOGY

## 2019-03-25 PROCEDURE — 99024 POSTOP FOLLOW-UP VISIT: CPT | Mod: HCNC,S$GLB,, | Performed by: PHYSICIAN ASSISTANT

## 2019-03-25 PROCEDURE — 99024 PR POST-OP FOLLOW-UP VISIT: ICD-10-PCS | Mod: HCNC,S$GLB,, | Performed by: PHYSICIAN ASSISTANT

## 2019-03-25 PROCEDURE — 97110 THERAPEUTIC EXERCISES: CPT | Mod: HCNC,PN | Performed by: PHYSICAL THERAPIST

## 2019-03-25 PROCEDURE — 73562 XR KNEE ORTHO LEFT: ICD-10-PCS | Mod: 26,HCNC,LT, | Performed by: RADIOLOGY

## 2019-03-25 PROCEDURE — 73562 X-RAY EXAM OF KNEE 3: CPT | Mod: 26,HCNC,LT, | Performed by: RADIOLOGY

## 2019-03-25 PROCEDURE — 99999 PR PBB SHADOW E&M-EST. PATIENT-LVL III: CPT | Mod: PBBFAC,HCNC,, | Performed by: PHYSICIAN ASSISTANT

## 2019-03-25 PROCEDURE — 73560 X-RAY EXAM OF KNEE 1 OR 2: CPT | Mod: TC,HCNC,LT

## 2019-03-25 PROCEDURE — 99999 PR PBB SHADOW E&M-EST. PATIENT-LVL III: ICD-10-PCS | Mod: PBBFAC,HCNC,, | Performed by: PHYSICIAN ASSISTANT

## 2019-03-25 NOTE — PROGRESS NOTES
"Nicho Espinosa presents for 6 week post-operative visit following a left total knee arthroplasty performed by Dr. Washburn on 2/5/2019. Taking pain medication primarily for her back. On Eliquis.     Exam:   Height 5' 1" (1.549 m), weight 77.1 kg (169 lb 15.6 oz), last menstrual period 07/05/1987.   Ambulating well without assistive device.  Incision is clean and dry without drainage or erythema. ROM:0-120    New xrays obtained and reviewed today revealing a well fixed and aligned prosthesis.    A/P:  6 weeks s/p left total knee replacement  - Continue PT until satisfied  - Reviewed antibiotic prophylaxis  - Appt with Dr. Hubbard  - F/u with Dr. Washburn for 3 month PO. Call sooner as needed.  "

## 2019-03-25 NOTE — PROGRESS NOTES
"  Physical Therapy Daily Treatment Note     Name: Nicho SCHAEFER [facility] Number: [id number]    Therapy Diagnosis:   No diagnosis found.  Physician: Cindy Thrasher PA-C    Visit Date: 3/25/2019    Physician Orders: PT Eval and Treat , Status post TKA 2/5. Needs initial eval Feb 25th-28th  Medical Diagnosis from Referral: Z96.652 (ICD-10-CM) - Status post total left knee replacement  Evaluation Date: 2/27/2019  Authorization Period Expiration: 12/31/2019  Plan of Care Expiration: 4/26/2019  Visit # / Visits authorized: 6/ 20     Time In: 1:00am  Time Out: 1:50pm  Total Billable Time: 40 minutes     Precautions: Standard, low back pain    Subjective     Pt reports: Her pain and swelling has improved L knee. She is now able to go up and down the stairs in her house alternating feet. She saw orthopedist today and was told she can get back into the pool. She thinks she is ready to discharge from therapy on wed and continue her exercises in the pool. Her main complaint at this time is her low back and was referred to Dr. Hubbard     that her knee is better but her back still occasionally bothers her.    She was semi compliant with home exercise program.  Response to previous treatment: poor  Functional change: independence with ambulation without AD    Pain: 5/10  Location: left knee      Objective     AROM: 0- 110/120 deg    Nicho received therapeutic exercises to develop strength, endurance, ROM, flexibility, posture and core stabilization for 40 minutes including:    Bike 6 min    Heel slides x 3 min x 5"   Quad sets 5" x 20  SLR 2 x 10  S/L hip abduction x 15 x 2  gastroc str 20" x 4   Supine HS stretch with strap 3 x 20"   LAQ x 15 x 2 - NP  clams x 10 x 2  bridges 2 x 10 x 2  HL hip add with ball 1" x 10 x 2 - NP    standing mini squats: 2 x 10  standing step ups (fwd) 1 x 15 x 4"  standing step ups (lat) 1 x 15 x 4"    Nicho received the following manual therapy techniques:   0 min x preparation and application of " kinesiotape (2 x jellyfish strips) for fluid reduction, improved ROM and circulation, pain reduction. Patient received education regarding appropriate care and removal of Kinesiotape. Patient instructed in proper removal techniques if skin irritation occurs.   5 min x Joint mobilizations were applied to the: L patella (all directions) and scar mobilization     Nicho received cold pack for 0 minutes to Lknee.    Home Exercises Provided and Patient Education Provided     Education provided:   - none    Written Home Exercises Provided: Patient instructed to cont prior HEP.  Exercises were reviewed and Nicho was able to demonstrate them prior to the end of the session.  Nicho demonstrated good  understanding of the education provided.     See EMR under Patient Instructions for exercises provided prior visit.    Assessment     Nicho presents to clinic 6 wks s/p L TKA. Pt demonstrating improvements in L knee AROM, pain, and edema. Pt ambulating without AD with non-antalgic gait.     .Nicho is progressing well towards her goals.   Pt prognosis is Good.     Pt will continue to benefit from skilled outpatient physical therapy to address the deficits listed in the problem list box on initial evaluation, provide pt/family education and to maximize pt's level of independence in the home and community environment.     Pt's spiritual, cultural and educational needs considered and pt agreeable to plan of care and goals.     Anticipated barriers to physical therapy: none    Goals:   Short Term Goals (6 weeks)  1. Pt will demonstrate improvements in left knee ROM to 0-120 for ease with ambulation - met 3/25/2019  2. Pt will demonstrate improvements in left knee strength to 4+/5 for ease with getting out of a chair. - met 3/25/2019  3. Pt will be able to ambulate 200 ft with LRAD and without the presence of antalgic gait pattern - met 3/25/2019  4. Pt will report <5/10 pain within the left knee for ease with ADL's - met 3/25/2019  5. Pt  presents with improved functional mobility with FOTO limitation <=40% disability. - Not met, in progress     Long Term Goals (12 weeks)  1. Pt will demonstrate improvements in left knee ROM to 0-125 for ease with ambulation - Not met, in progress  2. Pt will demonstrate 5/5 strength within the left knee for ease with house hold chores and climbing stairs - Not met, in progress  3. Pt will report being independent with his/her HEP for maintenance of improvements gained during therapy sessions - Not met, in progress  4. Pt will report <3/10 pain within the left knee for ease with ADLs - Not met, in progress  5. Pt will demonstrate ambulation x 300 ft without AD or antalgic gait.  - Not met, in progress  6. Pt presents with improved functional mobility with FOTO limitation <=30% disability. - Not met, in progress    Plan     Transitioning to discharge to home program when patient comfortable     Paola Sauceda, PT

## 2019-03-26 NOTE — PROGRESS NOTES
"  Physical Therapy Daily Treatment Note     Name: Nicho SCHAEFER [facility] Number: [id number]    Therapy Diagnosis:   Encounter Diagnoses   Name Primary?    Decreased range of motion with decreased strength Yes    Impaired functional mobility, balance, gait, and endurance     Pain and swelling of left knee     Balance problems      Physician: Cindy Thrasher PA-C    Visit Date: 3/27/2019    Physician Orders: PT Eval and Treat , Status post TKA 2/5. Needs initial eval Feb 25th-28th  Medical Diagnosis from Referral: Z96.652 (ICD-10-CM) - Status post total left knee replacement  Evaluation Date: 2/27/2019  Authorization Period Expiration: 12/31/2019  Plan of Care Expiration: 4/26/2019  Visit # / Visits authorized: 6/ 20     Time In: 4:00 pm  Time Out: 4:50 pm  Total Billable Time: 30 minutes     Precautions: Standard, low back pain    Subjective     Pt reports: stiffness in knee but denies pain. She is able to resume normal activities at home, including stair climbing, and recreational activities, such as aquatic therapy at [facility], without pain or difficulty. She notes residual unsteadiness with SL balance and occasional swelling "if I do too much on my feet." She says that she is ready to be done with therapy.     that her knee is better but her back still occasionally bothers her.    She was semi compliant with home exercise program.  Response to previous treatment: poor  Functional change: independence with ambulation without AD    Pain: 5/10  Location: left knee      Objective     AROM: 0- 110/120 deg  Strength 4+/5    Nicho received therapeutic exercises to develop strength, endurance, ROM, flexibility, posture and core stabilization for 55 minutes including:    Bike 6 min    Heel slides x 3 min x 5"   Quad sets 5" x 20  SLR 2 x 10  S/L hip abduction x 15 x 2  gastroc str 20" x 4   Supine HS stretch with strap 3 x 20"   LAQ x 15 x 2 - NP  clams x 10 x 2  bridges 2 x 10 x 2  HL hip add with " "ball 1" x 10 x 2 - NP    standing mini squats: 2 x 10  standing step ups (fwd) 1 x 15 x 4"  standing step ups (lat) 1 x 15 x 4"    Nicho received the following manual therapy techniques:   0 min x preparation and application of kinesiotape (2 x jellyfish strips) for fluid reduction, improved ROM and circulation, pain reduction. Patient received education regarding appropriate care and removal of Kinesiotape. Patient instructed in proper removal techniques if skin irritation occurs.   5 min x Joint mobilizations were applied to the: L patella (all directions) and scar mobilization     Nicho received cold pack for 0 minutes to Lknee.    Home Exercises Provided and Patient Education Provided     Education provided:   - none    Written Home Exercises Provided: Patient instructed to cont prior HEP.  Exercises were reviewed and Nicho was able to demonstrate them prior to the end of the session.  Nicho demonstrated good  understanding of the education provided.     See EMR under Patient Instructions for exercises provided prior visit.    Assessment     Nicho presents to clinic 6 wks s/p L TKA. Pt demonstrating improvements in L knee AROM and edema. Pt ambulating without AD with non-antalgic gait. ROM and strength are WFL to allow for independence with ADLs and HHCs. Pt independent with HEP and demo's good return technique. Despite meeting <50% of her therapeutic goals and heavy edu on benefits of continued skilled care pt remains adamant to be done with therapy, insisting she will be compliant with HEP and pool therapy.     .Nicho is progressing well towards her goals.   Pt prognosis is Good.     Pt will continue to benefit from skilled outpatient physical therapy to address the deficits listed in the problem list box on initial evaluation, provide pt/family education and to maximize pt's level of independence in the home and community environment.     Pt's spiritual, cultural and educational needs considered and pt agreeable to " plan of care and goals.     Anticipated barriers to physical therapy: none    Goals:   Short Term Goals (6 weeks)  1. Pt will demonstrate improvements in left knee ROM to 0-120 for ease with ambulation - met 3/25/2019  2. Pt will demonstrate improvements in left knee strength to 4+/5 for ease with getting out of a chair. - met 3/25/2019  3. Pt will be able to ambulate 200 ft with LRAD and without the presence of antalgic gait pattern - met 3/25/2019  4. Pt will report <5/10 pain within the left knee for ease with ADL's - met 3/25/2019  5. Pt presents with improved functional mobility with FOTO limitation <=40% disability. - Not met, in progress     Long Term Goals (12 weeks)  1. Pt will demonstrate improvements in left knee ROM to 0-125 for ease with ambulation - Not met, in progress  2. Pt will demonstrate 5/5 strength within the left knee for ease with house hold chores and climbing stairs - Not met, in progress  3. Pt will report being independent with his/her HEP for maintenance of improvements gained during therapy sessions - Not met, in progress  4. Pt will report <3/10 pain within the left knee for ease with ADLs - Not met, in progress  5. Pt will demonstrate ambulation x 300 ft without AD or antalgic gait.  - Not met, in progress  6. Pt presents with improved functional mobility with FOTO limitation <=30% disability. - Not met, in progress    Plan     Transitioning to discharge to home program when patient comfortable   Defer PT x 4 weeks pending pt progression. If pt does not contact clinic in 4 weeks auto- discharge from services. Reassess and continue care as needed if pt returns to clinic within those 4 weeks.    Bianca Gibson, PT

## 2019-03-27 ENCOUNTER — CLINICAL SUPPORT (OUTPATIENT)
Dept: REHABILITATION | Facility: OTHER | Age: 79
End: 2019-03-27
Payer: MEDICARE

## 2019-03-27 DIAGNOSIS — R26.89 BALANCE PROBLEMS: ICD-10-CM

## 2019-03-27 DIAGNOSIS — R53.1 DECREASED RANGE OF MOTION WITH DECREASED STRENGTH: Primary | ICD-10-CM

## 2019-03-27 DIAGNOSIS — M25.60 DECREASED RANGE OF MOTION WITH DECREASED STRENGTH: Primary | ICD-10-CM

## 2019-03-27 DIAGNOSIS — M25.562 PAIN AND SWELLING OF LEFT KNEE: ICD-10-CM

## 2019-03-27 DIAGNOSIS — M25.462 PAIN AND SWELLING OF LEFT KNEE: ICD-10-CM

## 2019-03-27 DIAGNOSIS — Z74.09 IMPAIRED FUNCTIONAL MOBILITY, BALANCE, GAIT, AND ENDURANCE: ICD-10-CM

## 2019-03-27 PROCEDURE — 97110 THERAPEUTIC EXERCISES: CPT | Mod: HCNC,PN

## 2019-05-02 ENCOUNTER — TELEPHONE (OUTPATIENT)
Dept: ORTHOPEDICS | Facility: CLINIC | Age: 79
End: 2019-05-02

## 2019-05-02 DIAGNOSIS — M51.36 DDD (DEGENERATIVE DISC DISEASE), LUMBAR: Primary | ICD-10-CM

## 2019-05-06 ENCOUNTER — OFFICE VISIT (OUTPATIENT)
Dept: ORTHOPEDICS | Facility: CLINIC | Age: 79
End: 2019-05-06
Payer: MEDICARE

## 2019-05-06 VITALS — HEIGHT: 61 IN | BODY MASS INDEX: 33.14 KG/M2 | WEIGHT: 175.5 LBS

## 2019-05-06 DIAGNOSIS — Z96.652 STATUS POST TOTAL LEFT KNEE REPLACEMENT: Primary | ICD-10-CM

## 2019-05-06 PROCEDURE — 99999 PR PBB SHADOW E&M-EST. PATIENT-LVL III: CPT | Mod: PBBFAC,HCNC,, | Performed by: ORTHOPAEDIC SURGERY

## 2019-05-06 PROCEDURE — 99024 POSTOP FOLLOW-UP VISIT: CPT | Mod: HCNC,S$GLB,, | Performed by: ORTHOPAEDIC SURGERY

## 2019-05-06 PROCEDURE — 99999 PR PBB SHADOW E&M-EST. PATIENT-LVL III: ICD-10-PCS | Mod: PBBFAC,HCNC,, | Performed by: ORTHOPAEDIC SURGERY

## 2019-05-06 PROCEDURE — 99024 PR POST-OP FOLLOW-UP VISIT: ICD-10-PCS | Mod: HCNC,S$GLB,, | Performed by: ORTHOPAEDIC SURGERY

## 2019-05-06 NOTE — PROGRESS NOTES
Nicho Espinosa is in for 3 month follow up for a  Left TKA.  She is doing very well.  Minimal pain in the knee.  She has resumed activities of daily living. She has completed PT  Exam demonstrates  A well developed female in no distress.  Alert and oriented.  Mood and affect are appropriate.    Knee incision is well healed.  ROM is 0-130.  The patella tracks well and there is no instability. The extremity is neurovascularly intact.    Xrays demonstrate a well fixed and positioned prosthesis.      Imp:Doing well    F/u in 9 months with xrays and PROMS

## 2019-05-17 ENCOUNTER — HOSPITAL ENCOUNTER (OUTPATIENT)
Dept: RADIOLOGY | Facility: HOSPITAL | Age: 79
Discharge: HOME | End: 2019-05-17
Attending: ORTHOPAEDIC SURGERY
Payer: MEDICARE

## 2019-05-17 ENCOUNTER — OFFICE VISIT (OUTPATIENT)
Dept: HEMATOLOGY/ONCOLOGY | Facility: CLINIC | Age: 79
End: 2019-05-17
Payer: MEDICARE

## 2019-05-17 ENCOUNTER — OFFICE VISIT (OUTPATIENT)
Dept: ORTHOPEDICS | Facility: CLINIC | Age: 79
End: 2019-05-17
Payer: MEDICARE

## 2019-05-17 ENCOUNTER — TELEPHONE (OUTPATIENT)
Dept: HEMATOLOGY/ONCOLOGY | Facility: CLINIC | Age: 79
End: 2019-05-17

## 2019-05-17 ENCOUNTER — TELEPHONE (OUTPATIENT)
Dept: ORTHOPEDICS | Facility: CLINIC | Age: 79
End: 2019-05-17

## 2019-05-17 VITALS
SYSTOLIC BLOOD PRESSURE: 145 MMHG | HEART RATE: 63 BPM | BODY MASS INDEX: 33.68 KG/M2 | WEIGHT: 178.38 LBS | RESPIRATION RATE: 16 BRPM | OXYGEN SATURATION: 95 % | TEMPERATURE: 99 F | DIASTOLIC BLOOD PRESSURE: 72 MMHG | HEIGHT: 61 IN

## 2019-05-17 VITALS — HEIGHT: 61 IN | WEIGHT: 178.38 LBS | BODY MASS INDEX: 33.68 KG/M2

## 2019-05-17 DIAGNOSIS — M51.36 DDD (DEGENERATIVE DISC DISEASE), LUMBAR: ICD-10-CM

## 2019-05-17 DIAGNOSIS — C50.411 MALIGNANT NEOPLASM OF UPPER-OUTER QUADRANT OF RIGHT BREAST IN FEMALE, ESTROGEN RECEPTOR POSITIVE: Primary | ICD-10-CM

## 2019-05-17 DIAGNOSIS — Z79.811 AROMATASE INHIBITOR USE: ICD-10-CM

## 2019-05-17 DIAGNOSIS — M41.9 SCOLIOSIS, UNSPECIFIED SCOLIOSIS TYPE, UNSPECIFIED SPINAL REGION: Primary | ICD-10-CM

## 2019-05-17 DIAGNOSIS — S32.009K LUMBAR PSEUDOARTHROSIS: ICD-10-CM

## 2019-05-17 DIAGNOSIS — Z17.0 MALIGNANT NEOPLASM OF UPPER-OUTER QUADRANT OF RIGHT BREAST IN FEMALE, ESTROGEN RECEPTOR POSITIVE: Primary | ICD-10-CM

## 2019-05-17 DIAGNOSIS — M40.209 KYPHOSIS, UNSPECIFIED KYPHOSIS TYPE, UNSPECIFIED SPINAL REGION: Primary | ICD-10-CM

## 2019-05-17 PROCEDURE — 99214 OFFICE O/P EST MOD 30 MIN: CPT | Mod: HCNC,S$GLB,, | Performed by: INTERNAL MEDICINE

## 2019-05-17 PROCEDURE — 99999 PR PBB SHADOW E&M-EST. PATIENT-LVL III: CPT | Mod: PBBFAC,HCNC,, | Performed by: ORTHOPAEDIC SURGERY

## 2019-05-17 PROCEDURE — 72120 X-RAY BEND ONLY L-S SPINE: CPT | Mod: 26,HCNC,, | Performed by: RADIOLOGY

## 2019-05-17 PROCEDURE — 99999 PR PBB SHADOW E&M-EST. PATIENT-LVL IV: ICD-10-PCS | Mod: PBBFAC,HCNC,, | Performed by: INTERNAL MEDICINE

## 2019-05-17 PROCEDURE — 1101F PR PT FALLS ASSESS DOC 0-1 FALLS W/OUT INJ PAST YR: ICD-10-PCS | Mod: HCNC,CPTII,S$GLB, | Performed by: ORTHOPAEDIC SURGERY

## 2019-05-17 PROCEDURE — 99214 PR OFFICE/OUTPT VISIT, EST, LEVL IV, 30-39 MIN: ICD-10-PCS | Mod: HCNC,S$GLB,, | Performed by: INTERNAL MEDICINE

## 2019-05-17 PROCEDURE — 72120 XR LUMBAR SPINE AP AND LAT WITH FLEX/EXT: ICD-10-PCS | Mod: 26,HCNC,, | Performed by: RADIOLOGY

## 2019-05-17 PROCEDURE — 1101F PT FALLS ASSESS-DOCD LE1/YR: CPT | Mod: HCNC,CPTII,S$GLB, | Performed by: INTERNAL MEDICINE

## 2019-05-17 PROCEDURE — 72100 XR LUMBAR SPINE AP AND LAT WITH FLEX/EXT: ICD-10-PCS | Mod: 26,HCNC,, | Performed by: RADIOLOGY

## 2019-05-17 PROCEDURE — 72100 X-RAY EXAM L-S SPINE 2/3 VWS: CPT | Mod: 26,HCNC,, | Performed by: RADIOLOGY

## 2019-05-17 PROCEDURE — 99214 OFFICE O/P EST MOD 30 MIN: CPT | Mod: HCNC,S$GLB,, | Performed by: ORTHOPAEDIC SURGERY

## 2019-05-17 PROCEDURE — 99214 PR OFFICE/OUTPT VISIT, EST, LEVL IV, 30-39 MIN: ICD-10-PCS | Mod: HCNC,S$GLB,, | Performed by: ORTHOPAEDIC SURGERY

## 2019-05-17 PROCEDURE — 1101F PT FALLS ASSESS-DOCD LE1/YR: CPT | Mod: HCNC,CPTII,S$GLB, | Performed by: ORTHOPAEDIC SURGERY

## 2019-05-17 PROCEDURE — 72100 X-RAY EXAM L-S SPINE 2/3 VWS: CPT | Mod: TC,HCNC

## 2019-05-17 PROCEDURE — 1101F PR PT FALLS ASSESS DOC 0-1 FALLS W/OUT INJ PAST YR: ICD-10-PCS | Mod: HCNC,CPTII,S$GLB, | Performed by: INTERNAL MEDICINE

## 2019-05-17 PROCEDURE — 99999 PR PBB SHADOW E&M-EST. PATIENT-LVL III: ICD-10-PCS | Mod: PBBFAC,HCNC,, | Performed by: ORTHOPAEDIC SURGERY

## 2019-05-17 PROCEDURE — 99999 PR PBB SHADOW E&M-EST. PATIENT-LVL IV: CPT | Mod: PBBFAC,HCNC,, | Performed by: INTERNAL MEDICINE

## 2019-05-17 RX ORDER — MELOXICAM 15 MG/1
15 TABLET ORAL DAILY
Qty: 30 TABLET | Refills: 1 | Status: SHIPPED | OUTPATIENT
Start: 2019-05-17 | End: 2019-07-02 | Stop reason: ALTCHOICE

## 2019-05-17 NOTE — TELEPHONE ENCOUNTER
----- Message from Santiago Smith MD sent at 5/17/2019  9:40 AM CDT -----  Bone scan this coming Wednesday ; she would prefer morning please

## 2019-05-17 NOTE — PROGRESS NOTES
Subjective:       Patient ID: Nicho Espinosa is a 78 y.o. female.    Chief Complaint: Malignant neoplasm of upper-outer quadrant of right breast i    HPI   Mrs. Espinosa returns today for follow up.  She has been on anastrazole since August 2017, and has tolerated it well so far.  Of note, her Oncotype score was 7, suggesting that she has a 6 % chance of recurrence at ten years with tamoxifen alone.  Briefly, she is a 78-year-old  female who, on 08/09/2017, underwent a right modified radical mastectomy and sentinel lymph node biopsy.  The pathology report from the procedure indicates that she had a 2 cm lobular carcinoma that was ER strongly positive, W[place] positive and HER-2 negative; resection margins were clear.  On the right axillary sentinel lymph nodes isolated tumor cells were seen.    A recent DXA scan in March 2019 had shown low normal bone density.    Review of Systems    Overall she feels well, however, she states that for the last 3-4 weeks she has been experiencing pain in the right lateral and anterior chest wall.  The pain is sharp but intermittent.   She has not experienced any hot flashes from the anastrazole.  However, she again mentions her chronic back pain, and her bilateral knee pains which predated the onset of her cancer.  She has a history of prior fusion surgery.   She  denies any anxiety, depression, easy bruising, fevers, chills, night  sweats, weight loss, nausea, vomiting, diarrhea, constipation, diplopia,   blurred vision, headache, chest pain, palpitations, shortness of breath, breast pain, abdominal pain, extremity pain, or difficulty ambulating.  The remainder of the ten-point ROS, including general, skin, lymph, H/N, cardiorespiratory, GI, , Neuro, Endocrine, and psychiatric is negative.       Objective:      Physical Exam    She is alert, oriented to time, place, person, pleasant, well      nourished, in no acute physical distress.                                    VITAL  SIGNS:  Reviewed                                      HEENT:  Normal.  There are no nasal, oral, lip, gingival, auricular, lid,    or conjunctival lesions.  Mucosae are moist and pink, and there is no        thrush.  Pupils are equal, reactive to light and accommodation.              Extraocular muscle movements are intact.  Dentition is good.                                   NECK:  Supple without JVD, adenopathy, or thyromegaly.                       LUNGS:  Clear to auscultation without wheezing, rales, or rhonchi.           CARDIOVASCULAR:  Reveals an S1, S2, a grade I systolic ejection murmur at the LSB, no rubs, no gallops.         ABDOMEN:  Soft, nontender, without organomegaly.  Bowel sounds are    present.                                                                     EXTREMITIES:  No cyanosis, clubbing, or edema.            BREASTS:  She is status post right mastectomy with a well-healed incision.    There are no masses in the left breast.                                        LYMPHATIC:  There is no cervical, axillary, or supraclavicular adenopathy.   SKIN:  Warm and moist, without petechiae, rashes, induration, or ecchymoses.           NEUROLOGIC:  DTRs are 0-1+ bilaterally, symmetrical, motor function is 5/5,  and cranial nerves are  within normal limits.      Assessment:       1. Malignant neoplasm of upper-outer quadrant of right breast in female, estrogen receptor positive    2. Aromatase inhibitor use      3.   Extensive DJD, s/p fusion of lumbar spine.  4.     New onset right rib pain.  Plan:         In regards to her new onset rib rib pain, out of abundance of caution we chino obtain a bone scan.  If it is negative, we will call her with the results.  I have asked her to remain on anastrazole, which she will take through the end of August 2022.  I will see her again in 4 months.  Her mammogram will be repeated at the end of June.    Her questions were answered to her satisfaction.

## 2019-05-17 NOTE — LETTER
May 20, 2019      Cindy Thrasher PA-C  [address] [facility] [address]           [facility]  [address]  [place] [address]  Phone: [phone]          Patient: Nicho Espinosa   MR Number: [id number]   YOB: 1940   Date of Visit: 5/17/2019       Dear Cindy Thrasher:    Thank you for referring Nicho Espinosa to me for evaluation. Attached you will find relevant portions of my assessment and plan of care.    If you have questions, please do not hesitate to call me. I look forward to following Nicho Espinosa along with you.    Sincerely,    Nicole Kaur  CC:  No Recipients    If you would like to receive this communication electronically, please contact [email or web address] or [phone] to request more information on [employer] Link access.    For providers and/or their staff who would like to refer a patient to [facility], please contact us through our one-stop-shop provider referral line, Mariela Welch, at [phone].    If you feel you have received this communication in error or would no longer like to receive these types of communications, please e-mail [email or web address]

## 2019-05-17 NOTE — PROGRESS NOTES
DATE: 5/17/2019  PATIENT: Nicho Espinosa    Attending Physician: Sanjeev Hubbard M.D.    CHIEF COMPLAINT: low back pain    HISTORY:  Nicho Espinosa is a 78 y.o. female with h/o L5/S1 TLIF 2015 by Dr. Beckett complicated by nonunion,  here for initial evaluation of low back pain (Back - 8). The pain has been present for years and is worsening.  Denies injury or other precipitating factor. The patient describes the pain as burning, stabbing.  The pain is worse with standing and improved by lying down, cold therapy. There is no associated numbness and tingling. There is no subjective weakness. Prior treatments have included Norco, NSAIDs, tylenol, PT, SIJ injections, surgery.    The Patient denies myelopathic symptoms such as handwriting changes or difficulty with buttons/coins/keys.  She does report occasionally dropping objects. Denies perineal paresthesias, bowel/bladder dysfunction.    PAST MEDICAL/SURGICAL HISTORY:  Past Medical History:   Diagnosis Date    Allergy     Arthritis     Blepharitis of both eyes     breast ca 7/18/2017    right    Complication of anesthesia     nausea    Degenerative disc disease     GERD (gastroesophageal reflux disease)     patient denies reflux    History of compression fracture of spine 4/10/2014    Hyperlipidemia     Lumbar disc disease     Meibomitis     Obesity 9/19/2013    Osteoporosis     Papilloma of eyelid     RUL    Postoperative anemia 11/21/2013    Thoracic or lumbosacral neuritis or radiculitis, unspecified 9/20/2013    Tremors of nervous system 2/2015     Past Surgical History:   Procedure Laterality Date    APPENDECTOMY      ARTHROPLASTY, KNEE, TOTAL Right 11/14/2013    [facility] 2ND FLR    BIOPSY-LYMPH NODE-SENTINEL Right 8/9/2017    [facility] at [facility] 2ND FLR    BREAST BIOPSY Right 2017    COLONOSCOPY N/A 11/1/2016    Performed by Candelario Oviedo MD at [facility] ENDO (4TH FLR)    FOOT  SURGERY      FUSION-TRANSLUMINAL LUMBAR INTERBODY (TLIF) CPT:06682-15,74212,27156,68637 Right 1/12/2015    [facility] 2ND FLR    INJECTION-NODE-SENTINEL Right 8/9/2017    Performed by [facility]le [facility] at [facility] OR 2ND FLR    MASTECTOMY Right 08/2017    MASTECTOMY RIGHT Right 8/9/2017    Performed by [facility] Rolle [facility] at [facility] OR 2ND FLR    REPLACEMENT-KNEE-TOTAL-SIGHT ASSISTED Left 2/5/2019    Per[facility] at [facility] OR 2ND FLR    SHOULDER ARTHROSCOPY      left    SPINE SURGERY  1-12-15    [facility]    TONSILLECTOMY      TOTAL KNEE ARTHROPLASTY         Current Medications:   Current Outpatient Medications:     alendronate (FOSAMAX) 70 MG tablet, TAKE 1 TABLET BY MOUTH EVERY 7 DAYS, Disp: 12 tablet, Rfl: 3    anastrozole (ARIMIDEX) 1 mg Tab, TAKE 1 TABLET EVERY DAY, Disp: 90 tablet, Rfl: 3    calcium carbonate (OS-DIANDRA) 600 mg calcium (1,500 mg) Tab, Take 600 mg by mouth 2 (two) times daily with meals., Disp: , Rfl:     cholecalciferol, vitamin D3, 1,000 unit capsule, Take 3 capsules daily (Patient taking differently: Take 1,000 Units by mouth 2 (two) times daily. Take 3 capsules daily), Disp: 90 capsule, Rfl: 11    desonide (DESOWEN) 0.05 % lotion, Apply topically 2 (two) times daily., Disp: , Rfl:     fenofibrate micronized (LOFIBRA) 67 MG capsule, Take 2 capsules (134 mg total) by mouth daily with breakfast., Disp: 180 capsule, Rfl: 1    HYDROcodone-acetaminophen (NORCO)  mg per tablet, Take 1 tablet by mouth., Disp: , Rfl:     MULTIVIT WITH CALCIUM,IRON,MIN (WOMEN'S DAILY MULTIVITAMIN ORAL), Take by mouth., Disp: , Rfl:     promethazine (PHENERGAN) 12.5 MG Tab, Take 1 tablet (12.5 mg total) by mouth every 8 (eight) hours as needed., Disp: 15 tablet, Rfl: 0    propranolol (INDERAL) 10 MG tablet, Take 1 tablet (10 mg total) by mouth 3 (three) times daily. (Patient taking differently: Take 10 mg by mouth 2 (two) times daily. ), Disp:  270 tablet, Rfl: 1    traZODone (DESYREL) 50 MG tablet, TAKE 1 TABLET EVERY NIGHT AS NEEDED FOR INSOMNIA, Disp: 90 tablet, Rfl: 1    vit C/vit E/lutein/min/omega-3 (OCUVITE ORAL), Take by mouth., Disp: , Rfl:     meloxicam (MOBIC) 15 MG tablet, Take 1 tablet (15 mg total) by mouth once daily., Disp: 30 tablet, Rfl: 1    Social History:   Social History     Socioeconomic History    Marital status: Single     Spouse name: Not on file    Number of children: Not on file    Years of education: Not on file    Highest education level: Not on file   Occupational History    Not on file   Social Needs    Financial resource strain: Not on file    Food insecurity:     Worry: Not on file     Inability: Not on file    Transportation needs:     Medical: Not on file     Non-medical: Not on file   Tobacco Use    Smoking status: Former Smoker     Packs/day: 0.25     Years: 40.00     Pack years: 10.00     Types: Cigarettes     Last attempt to quit: 2011     Years since quittin.3    Smokeless tobacco: Never Used   Substance and Sexual Activity    Alcohol use: No     Comment: 1 glass wine 2x/year    Drug use: No    Sexual activity: Not Currently     Birth control/protection: Post-menopausal   Lifestyle    Physical activity:     Days per week: Not on file     Minutes per session: Not on file    Stress: Not on file   Relationships    Social connections:     Talks on phone: Not on file     Gets together: Not on file     Attends Yazdanism service: Not on file     Active member of club or organization: Not on file     Attends meetings of clubs or organizations: Not on file     Relationship status: Not on file   Other Topics Concern    Not on file   Social History Narrative    Lives in a duplex, daughter lives downstairs       REVIEW OF SYSTEMS:  Constitution: Negative. Negative for chills, fever and night sweats.   Cardiovascular: Negative for chest pain and syncope.   Respiratory: Negative for cough and shortness  "of breath.   Gastrointestinal: See HPI. Negative for nausea/vomiting. Negative for abdominal pain.  Genitourinary: See HPI. Negative for discoloration or dysuria.  Skin: Negative for dry skin, itching and rash.   Hematologic/Lymphatic: negative for bleeding/clotting disorders.   Musculoskeletal: Negative for falls and muscle weakness.   Neurological: See HPI. No history of seizures. No history of cranial surgery or shunts.  Endocrine: Negative for polydipsia, polyphagia and polyuria.   Allergic/Immunologic: Negative for hives and persistent infections.    PHYSICAL EXAMINATION:    Ht 5' 1" (1.549 m)   Wt 80.9 kg (178 lb 5.6 oz)   LMP 07/05/1987   BMI 33.70 kg/m²     General: The patient is a  78 y.o. female in no apparent distress, the patient is orientatied to person, place and time.   Psych: Normal mood and affect  HEENT: Vision grossly intact, hearing intact to the spoken word.  Lungs: Respirations unlabored.  Gait: Unsteady gait.  Difficulty with toe and heel walk.  Skin: Dorsal lumbar skin negative for rashes, lesions, hairy patches.  Surgical incisions well healed.  Range of motion: Lumbar range of motion is acceptable. There is mild lumbar tenderness to palpation.  Spinal Balance: Significant elevation of thoracic kyphosis and lumbar lordosis.  Musculoskeletal: No pain with the range of motion of the bilateral hips. No trochanteric tenderness to palpation.  Vascular: Bilateral lower extremities warm and well perfused, Dorsalis pedis pulses 2+ bilaterally.  Neurological: Normal strength and tone in all major motor groups in the bilateral lower extremities. Normal sensation to light touch in the L2-S1 dermatomes bilaterally.  Deep tendon reflexes symmetric in the bilateral lower extremities.  Negative Babinski bilaterally.    IMAGING:   Today I personally reviewed AP, Lat and Flex/Ex upright L-spine films that demonstrate L5/S1 TLIF with significant pelvic incidence and lumbar lordosis.  CT lspine 2017 " showing pseudoarthrosis.    Body mass index is 33.7 kg/m².  Hemoglobin A1C   Date Value Ref Range Status   01/09/2019 5.6 4.0 - 5.6 % Final     Comment:     ADA Screening Guidelines:  5.7-6.4%  Consistent with prediabetes  >or=6.5%  Consistent with diabetes  High levels of fetal hemoglobin interfere with the HbA1C  assay. Heterozygous hemoglobin variants (HbS, HgC, etc)do  not significantly interfere with this assay.   However, presence of multiple variants may affect accuracy.     02/19/2018 5.5 4.0 - 5.6 % Final     Comment:     According to ADA guidelines, hemoglobin A1c <7.0% represents  optimal control in non-pregnant diabetic patients. Different  metrics may apply to specific patient populations.   Standards of Medical Care in Diabetes-2016.  For the purpose of screening for the presence of diabetes:  <5.7%     Consistent with the absence of diabetes  5.7-6.4%  Consistent with increasing risk for diabetes   (prediabetes)  >or=6.5%  Consistent with diabetes  Currently, no consensus exists for use of hemoglobin A1c  for diagnosis of diabetes for children.  This Hemoglobin A1c assay has significant interference with fetal   hemoglobin   (HbF). The results are invalid for patients with abnormal amounts of   HbF,   including those with known Hereditary Persistence   of Fetal Hemoglobin. Heterozygous hemoglobin variants (HbAS, HbAC,   HbAD, HbAE, HbA2) do not significantly interfere with this assay;   however, presence of multiple variants in a sample may impact the %   interference.     01/30/2017 5.7 4.5 - 6.2 % Final     Comment:     According to ADA guidelines, hemoglobin A1C <7.0% represents  optimal control in non-pregnant diabetic patients.  Different  metrics may apply to specific populations.   Standards of Medical Care in Diabetes - 2016.  For the purpose of screening for the presence of diabetes:  <5.7%     Consistent with the absence of diabetes  5.7-6.4%  Consistent with increasing risk for diabetes    (prediabetes)  >or=6.5%  Consistent with diabetes  Currently no consensus exists for use of hemoglobin A1C  for diagnosis of diabetes for children.           ASSESSMENT/PLAN:    Diagnoses and all orders for this visit:    Kyphosis, unspecified kyphosis type, unspecified spinal region  -     MRI Lumbar Spine W WO Contrast; Future  -     Creatinine, serum; Future    Lumbar pseudoarthrosis  -     MRI Lumbar Spine W WO Contrast; Future  -     Creatinine, serum; Future    Other orders  -     meloxicam (MOBIC) 15 MG tablet; Take 1 tablet (15 mg total) by mouth once daily.      No follow-ups on file.    78F with lumbar pseudoarthrosis, thoracic kyphosis.  Discussed treatment options including activity modification, PT, NSAIDs, injections, surgery.  Recommend scoli films, MRI L spne and standing scoliosis radiographs to further evaluate.  F/u to discuss results.  Will consider CT scan at that time.  Will also consider referral for SIJ fusion pending results of imaging.

## 2019-05-22 ENCOUNTER — HOSPITAL ENCOUNTER (OUTPATIENT)
Dept: RADIOLOGY | Facility: HOSPITAL | Age: 79
Discharge: HOME | End: 2019-05-22
Attending: INTERNAL MEDICINE
Payer: MEDICARE

## 2019-05-22 ENCOUNTER — PATIENT MESSAGE (OUTPATIENT)
Dept: ADMINISTRATIVE | Facility: OTHER | Age: 79
End: 2019-05-22

## 2019-05-22 DIAGNOSIS — C50.411 MALIGNANT NEOPLASM OF UPPER-OUTER QUADRANT OF RIGHT BREAST IN FEMALE, ESTROGEN RECEPTOR POSITIVE: ICD-10-CM

## 2019-05-22 DIAGNOSIS — Z17.0 MALIGNANT NEOPLASM OF UPPER-OUTER QUADRANT OF RIGHT BREAST IN FEMALE, ESTROGEN RECEPTOR POSITIVE: ICD-10-CM

## 2019-05-22 PROCEDURE — 78306 NM BONE SCAN WHOLE BODY: ICD-10-PCS | Mod: 26,HCNC,, | Performed by: RADIOLOGY

## 2019-05-22 PROCEDURE — 78306 BONE IMAGING WHOLE BODY: CPT | Mod: 26,HCNC,, | Performed by: RADIOLOGY

## 2019-05-22 PROCEDURE — A9503 TC99M MEDRONATE: HCPCS | Mod: HCNC

## 2019-05-23 ENCOUNTER — HOSPITAL ENCOUNTER (OUTPATIENT)
Dept: RADIOLOGY | Facility: HOSPITAL | Age: 79
Discharge: HOME | End: 2019-05-23
Attending: ORTHOPAEDIC SURGERY
Payer: MEDICARE

## 2019-05-23 DIAGNOSIS — S32.009K LUMBAR PSEUDOARTHROSIS: ICD-10-CM

## 2019-05-23 DIAGNOSIS — M40.209 KYPHOSIS, UNSPECIFIED KYPHOSIS TYPE, UNSPECIFIED SPINAL REGION: ICD-10-CM

## 2019-05-23 DIAGNOSIS — M41.9 SCOLIOSIS, UNSPECIFIED SCOLIOSIS TYPE, UNSPECIFIED SPINAL REGION: ICD-10-CM

## 2019-05-23 LAB
CREAT SERPL-MCNC: 1 MG/DL (ref 0.5–1.4)
SAMPLE: NORMAL

## 2019-05-23 PROCEDURE — 72158 MRI LUMBAR SPINE W WO CONTRAST: ICD-10-PCS | Mod: 26,HCNC,, | Performed by: RADIOLOGY

## 2019-05-23 PROCEDURE — 72158 MRI LUMBAR SPINE W/O & W/DYE: CPT | Mod: TC,HCNC

## 2019-05-23 PROCEDURE — 72082 XR SCOLIOSIS COMPLETE: ICD-10-PCS | Mod: 26,HCNC,, | Performed by: RADIOLOGY

## 2019-05-23 PROCEDURE — 72082 X-RAY EXAM ENTIRE SPI 2/3 VW: CPT | Mod: TC,HCNC

## 2019-05-23 PROCEDURE — 25500020 PHARM REV CODE 255: Mod: HCNC | Performed by: ORTHOPAEDIC SURGERY

## 2019-05-23 PROCEDURE — 72158 MRI LUMBAR SPINE W/O & W/DYE: CPT | Mod: 26,HCNC,, | Performed by: RADIOLOGY

## 2019-05-23 PROCEDURE — A9585 GADOBUTROL INJECTION: HCPCS | Mod: HCNC | Performed by: ORTHOPAEDIC SURGERY

## 2019-05-23 PROCEDURE — 72082 X-RAY EXAM ENTIRE SPI 2/3 VW: CPT | Mod: 26,HCNC,, | Performed by: RADIOLOGY

## 2019-05-23 RX ORDER — GADOBUTROL 604.72 MG/ML
8 INJECTION INTRAVENOUS
Status: COMPLETED | OUTPATIENT
Start: 2019-05-23 | End: 2019-05-23

## 2019-05-23 RX ADMIN — GADOBUTROL 8 ML: 604.72 INJECTION INTRAVENOUS at 09:05

## 2019-05-27 ENCOUNTER — OFFICE VISIT (OUTPATIENT)
Dept: ORTHOPEDICS | Facility: CLINIC | Age: 79
End: 2019-05-27
Payer: MEDICARE

## 2019-05-27 VITALS — BODY MASS INDEX: 33.44 KG/M2 | HEIGHT: 61 IN | WEIGHT: 177.13 LBS

## 2019-05-27 DIAGNOSIS — M41.9 SCOLIOSIS, UNSPECIFIED SCOLIOSIS TYPE, UNSPECIFIED SPINAL REGION: Primary | ICD-10-CM

## 2019-05-27 PROCEDURE — 99213 PR OFFICE/OUTPT VISIT, EST, LEVL III, 20-29 MIN: ICD-10-PCS | Mod: HCNC,S$GLB,, | Performed by: ORTHOPAEDIC SURGERY

## 2019-05-27 PROCEDURE — 1101F PR PT FALLS ASSESS DOC 0-1 FALLS W/OUT INJ PAST YR: ICD-10-PCS | Mod: HCNC,CPTII,S$GLB, | Performed by: ORTHOPAEDIC SURGERY

## 2019-05-27 PROCEDURE — 99213 OFFICE O/P EST LOW 20 MIN: CPT | Mod: HCNC,S$GLB,, | Performed by: ORTHOPAEDIC SURGERY

## 2019-05-27 PROCEDURE — 1101F PT FALLS ASSESS-DOCD LE1/YR: CPT | Mod: HCNC,CPTII,S$GLB, | Performed by: ORTHOPAEDIC SURGERY

## 2019-05-27 PROCEDURE — 99999 PR PBB SHADOW E&M-EST. PATIENT-LVL III: CPT | Mod: PBBFAC,HCNC,, | Performed by: ORTHOPAEDIC SURGERY

## 2019-05-27 PROCEDURE — 99999 PR PBB SHADOW E&M-EST. PATIENT-LVL III: ICD-10-PCS | Mod: PBBFAC,HCNC,, | Performed by: ORTHOPAEDIC SURGERY

## 2019-05-28 ENCOUNTER — TELEPHONE (OUTPATIENT)
Dept: NEUROSURGERY | Facility: CLINIC | Age: 79
End: 2019-05-28

## 2019-05-28 NOTE — TELEPHONE ENCOUNTER
----- Message from April THOMAS Ndiaye sent at 5/28/2019  3:29 PM CDT -----  Regarding: RE: Referral from Dr. Hubbard  Thank you so much!    April  ----- Message -----  From: Giorgio Simpson MA  Sent: 5/28/2019   3:26 PM  To: An Ndiaye LPN  Subject: RE: Referral from Dr. Hubbard                   Appointment has been scheduled.     Thanks,  Giorgio  ----- Message -----  From: April THOMAS Ndiaye  Sent: 5/28/2019   8:28 AM  To: Alberto Kwok Staff  Subject: Referral from Dr. Hubbard                       Good morning,  Dr. Hubbard asked that I send a message to your office and see if we can get the above patient scheduled to see Dr. Dominguez. It is a referral for an SIJ Fusion. Please let me know if you are able to get her scheduled.    Thank you!!    April

## 2019-05-28 NOTE — TELEPHONE ENCOUNTER
----- Message from An Ndiaye LPN sent at 5/28/2019  8:28 AM CDT -----  Regarding: Referral from Dr. Hubbard  Good morning,  Dr. Hubbard asked that I send a message to your office and see if we can get the above patient scheduled to see Dr. Dominguez. It is a referral for an SIJ Fusion. Please let me know if you are able to get her scheduled.    Thank you!!    April

## 2019-05-29 NOTE — PROGRESS NOTES
The patient returns for follow-up.  She has a history of a lumbar spinal fusion with low back pain, degenerative scoliosis, and exaggerated thoracolumbar kyphosis.    We obtained a recent MRI that demonstrates no significant lumbar stenosis.    In April she had injections of her sacroiliac joint which were particularly helpful for her low back pain.    Today we discussed options it seems that most her pain is mediated by her SI joint.    I have referred her to Dr. Dominguez for evaluation for sacroiliac joint fusion.  In the future she may need physical therapy for her scoliosis.    I spent 15 minutes with the patient of which greater than 1/2 the time was devoted to counciling the patient regarding treatment options.

## 2019-06-14 ENCOUNTER — PATIENT MESSAGE (OUTPATIENT)
Dept: SURGERY | Facility: CLINIC | Age: 79
End: 2019-06-14

## 2019-06-14 ENCOUNTER — OFFICE VISIT (OUTPATIENT)
Dept: SURGERY | Facility: CLINIC | Age: 79
End: 2019-06-14
Payer: MEDICARE

## 2019-06-14 VITALS
WEIGHT: 177 LBS | SYSTOLIC BLOOD PRESSURE: 126 MMHG | HEART RATE: 63 BPM | BODY MASS INDEX: 33.42 KG/M2 | DIASTOLIC BLOOD PRESSURE: 61 MMHG | TEMPERATURE: 99 F | HEIGHT: 61 IN

## 2019-06-14 DIAGNOSIS — Z80.3 FAMILY HISTORY OF BREAST CANCER: ICD-10-CM

## 2019-06-14 DIAGNOSIS — N64.59 NIPPLE PROBLEM: ICD-10-CM

## 2019-06-14 DIAGNOSIS — Z85.3 PERSONAL HISTORY OF BREAST CANCER: Primary | ICD-10-CM

## 2019-06-14 DIAGNOSIS — Z12.39 BREAST CANCER SCREENING: ICD-10-CM

## 2019-06-14 DIAGNOSIS — M79.629 PAIN IN AXILLA, UNSPECIFIED LATERALITY: ICD-10-CM

## 2019-06-14 DIAGNOSIS — N63.0 BREAST LUMP: ICD-10-CM

## 2019-06-14 PROCEDURE — 1101F PT FALLS ASSESS-DOCD LE1/YR: CPT | Mod: HCNC,CPTII,S$GLB, | Performed by: NURSE PRACTITIONER

## 2019-06-14 PROCEDURE — 99214 PR OFFICE/OUTPT VISIT, EST, LEVL IV, 30-39 MIN: ICD-10-PCS | Mod: HCNC,S$GLB,, | Performed by: NURSE PRACTITIONER

## 2019-06-14 PROCEDURE — 99214 OFFICE O/P EST MOD 30 MIN: CPT | Mod: HCNC,S$GLB,, | Performed by: NURSE PRACTITIONER

## 2019-06-14 PROCEDURE — 99999 PR PBB SHADOW E&M-EST. PATIENT-LVL III: ICD-10-PCS | Mod: PBBFAC,HCNC,, | Performed by: NURSE PRACTITIONER

## 2019-06-14 PROCEDURE — 99999 PR PBB SHADOW E&M-EST. PATIENT-LVL III: CPT | Mod: PBBFAC,HCNC,, | Performed by: NURSE PRACTITIONER

## 2019-06-14 PROCEDURE — 1101F PR PT FALLS ASSESS DOC 0-1 FALLS W/OUT INJ PAST YR: ICD-10-PCS | Mod: HCNC,CPTII,S$GLB, | Performed by: NURSE PRACTITIONER

## 2019-06-14 NOTE — PROGRESS NOTES
Patient ID: Nicho Espinosa is a 78 y.o. female.    Chief Complaint: Follow-up (F/U CBE )        HPI:  Established patient of the Department of Breast Surgery, previously seen by Dr. Rolle and Fara Lesile, MARIBELL, and new to me, presents today for breast cancer surveillance.    Breast History:    7/13/17 right core needle biopsy with invasive mammary carcinoma, ER/MN+, HER2-negative  8/9/17 right mastectomy with 2cm ILC.  Right deep axillary sentinel lymph node biopsy of right level 1 axillary lymph nodes with one SN with isolated tumor cells. Declined reconstruction. Adjuvant endocrine therapy, currently on anastrazole (till end of 8/2022 per Dr. Smith). Followed by Dr. Smith in [facility], whom she last saw in clinic on 5/17/19.    Age of breast cancer diagnosis:  ~77 y/o    Imaging    5/22/19 whole-body bone scan report reviewed:  Impression     1.There is no scintigraphic evidence of metastatic disease or other abnormality to explain this patient's chest wall pain.  2.  Radiotracer uptake around the right sternoclavicular joint, favored to represent degenerative change.     5/23/19 MRI lumbar spine report reviewed:  Impression     1. Postsurgical changes of L5-S1 posterior spinal fusion an L5 laminectomy.  2. Lumbar degenerative changes contributing to mild spinal canal stenosis at L2-L3 and mild-to-moderate neural foraminal narrowing from T12-L1 through L5-S1 as detailed above.     5/23/19 X-ray scoliosis report reviewed:  Impression     Thoracolumbar scoliosis.  Multilevel degenerative spine changes, worse at lower thoracic and upper lumbar interspaces.  Instrumented fusion L5-S1.         6/12/18 L diag mmg & L breast limited US report reviewed:  The breast has scattered areas of fibroglandular density.   In the left breast lateral region 3 o'clock axis middle depth, there is an oval low-density mass with circumscribed margins measuring 0.4 cm, new since prior exams.    Limited left breast  "ultrasound:   In the left breast 3 o'clock axis 2 cm from the nipple, there is an oval complicated cyst measuring 0.4 cm. This corresponds to the mammographic finding, benign.    No suspicious finding.    No left axillary adenopathy.    Impression:  There is no mammographic or sonographic evidence of malignancy.   BI-RADS Category:   Left: 2 - Benign  Overall: 2 - Benign   Recommendation:  Diagnostic Mammogram in 1 year is recommended.     Interval Breast History    Right mastectomy bed:  Pt reports feeling lump vs scar tissue, unsure if has been present since mastectomy ("probably has").    Pt reports diffuse pain in R mastectomy bed, onset a few months ago, comes and goes, not worsening.  Patient denies breast-related skin changes.    Left breast:  Patient denies palpable breast mass, breast pain, breast-related skin changes, nipple discharge and nipple retraction.    GYN History:  HRT usage:  never    Other Oncologic History:  Personal history of other cancer not abovementioned:  no  Personal history of genetic testing:  no    Family Oncologic History:    [employer] ancestry:  no  History of genetic testing in relatives:  no    Family History   Problem Relation Age of Onset    Cancer Father         bone    Breast cancer Mother         never had biopsy    Cancer Paternal Grandmother         skin cancer, unknown type    Cancer Paternal Uncle         stomach    Cancer Other         possibly other cancers in other relatives    Ovarian cancer Neg Hx    Daughter with skin ca of unk type        Review of Systems - See HPI for breast ROS.  Constitutional:  Negative for unexplained weight loss, unexplained fatigue, any recurrent infections, or any fever.  Respiratory:  Negative for cough, hemoptysis, or new, worsening, or changing dyspnea.  Gets a little winded (can still talk) with (light) weightlifting, which she only started doing (weightlifting) a couple weeks ago.  Was a smoker for numerous " "years.  Musculoskeletal:    Pt reports R chest wall/upper ribcage pain and wraps around to R aspect of back, even with the chest wall.  Onset within the "last few months."  The pain comes and goes.  Not progressively worsening.  Not becoming more frequent.  Not otherwise changing in any way.  Had bone scan on 5/22/19 (see above).  Pt reports lumbar back pain, onset 20 yrs ago, constant, s/p fusion, worsened with activity, lumbar degenerative changes seen on recent imaging.  Pt reports upper back pain, unknown onset (not recently), comes and goes, "tolerable," related to scoliosis per pt.  Pt reports bilateral SI joint pain, onset ~4 yrs ago, L>R, constant, steroid injections provide relief.  Pt reports R sciatica, onset ~6 yrs ago, comes and goes.  Pt sees an orthopedist, Dr. Hubbard.  Will be seeing surgeon Dr. Dominguez in near future.  Negative for other new, worsening, or changing bone pain.    Negative for decreased range of motion to right upper extremity.  Neurological:  Negative for new, worsening, or changing headaches.  Gastrointestinal:  Negative for new, worsening, or changing abdominal pain.    Hematologic:  Negative for right upper extremity lymphedema.  Cardiovascular:  Negative for chest pain.    No SOB at present.  No other concerns today.    Objective:   Physical Exam   Cardiovascular: Normal heart sounds.    Pulmonary/Chest: Effort normal and breath sounds normal. No respiratory distress. She exhibits tenderness. She exhibits no mass, no edema and no deformity. Left breast exhibits tenderness. Left breast exhibits no mass, no nipple discharge and no skin change. No breast swelling.   S/p R mastectomy without reconstruction and with no mass or skin change appreciated to R chest wall.  Pt reports lump vs scar tissue inferior to medial end of well healed scar, with no abnormality appreciated by me.  Marked for imaging.  Low clinical suspicion.  R mastectomy bed TTP in various locations, non-focal.    R " axilla with TTP and with 1 LN-type finding appreciated, subcentimeter, oval, mobile, smooth, and this particular finding is nontender.    L nipple with slight upward deviation with arms extended overhead.  L NAC TTP.  No associated mass or skin change appreciated.  Low clinical suspicion.   Genitourinary: No breast swelling.   Musculoskeletal:   Negative for spinal column point-tenderness or bilateral low back point-tenderness.   Lymphadenopathy:     She has no cervical adenopathy.     She has no axillary adenopathy.        Right: No supraclavicular adenopathy present.        Left: No supraclavicular adenopathy present.     Assessment:       1. Personal history of breast cancer    2. Breast cancer screening    3. Nipple problem    4. Breast lump    5. Pain in axilla, unspecified laterality    6. Family history of breast cancer          Plan:     Annual L mmg today (annual, L nipple).    Pt reports lump vs scar tissue inferior to medial end of well healed scar, with no abnormality appreciated by me.  Marked for imaging.  Low clinical suspicion.  R axilla with TTP and with 1 LN-type finding appreciated, subcentimeter, oval, mobile, smooth, and this particular finding is nontender.  L nipple with slight upward deviation with arms extended overhead.  L NAC TTP.  No associated mass or skin change appreciated.  Low clinical suspicion.  Discussed recommendation for breast MRI given these symptoms/findings, but pt declined, electing close clinical f/u instead.  Bilat breast limited US now (R pt-palpated lump, R axilla, L nipple).      Pt reports diffuse pain in R mastectomy bed, onset a few months ago, comes and goes, not worsening.  R mastectomy bed TTP in various locations, non-focal.    Not suggestive of pathology given abovementioned characteristics.    Gets a little winded (can still talk) with (light) weightlifting, which she only started doing (weightlifting) a couple weeks ago.  Was a smoker for numerous  "years.  Lung and heart sounds normal today.  Not suggestive of pathology given abovementioned characteristics.  Recommend close clinical f/u in 3 mos.  Advised Dr. Smith of symptoms, findings, and plan.  Pt should f/u with PCP regarding this.  Messaged PCP.    Pt reports R chest wall/upper ribcage pain and wraps around to R aspect of back, even with the chest wall.  Onset within the "last few months."  The pain comes and goes.  Had bone scan on 5/22/19 (see above).  Not progressively worsening.  Not becoming more frequent.  Not otherwise changing in any way.    Pt reports lumbar back pain, onset 20 yrs ago, constant, s/p fusion, worsened with activity, lumbar degenerative changes seen on recent imaging.  Pt reports upper back pain, unknown onset (not recently), comes and goes, "tolerable," related to scoliosis per pt.  Pt reports bilateral SI joint pain, onset ~4 yrs ago, L>R, constant, steroid injections provide relief.  Pt reports R sciatica, onset ~6 yrs ago, comes and goes.  Pt sees an orthopedist, Dr. Hubbard.  Will be seeing surgeon Dr. Dominguez in near future.  Negative for spinal column point-tenderness or bilateral low back point-tenderness.  Pt to f/u with Dr. Hubbard and Dr. Dominguez.  Aforementioned characteristics of pain and findings on recent imaging suggest that this pain is not secondary to any breast cancer metastasis.  Breast cancer mets to bones not suspected at this time.  Close clinical f/u in 3 months.  Advised Dr. Smith of symptoms, findings, and plan.    Pt to f/u with Dr. Hubbard and Dr. Dominguez.    RTC here in 3 months for close clinical f/u.    Offered pt referral to [facility] for genetic counseling.  Pt desires to proceed.  Paperwork initiated.    Advised pt to call 911 with any chest pain.  Encouraged breast awareness, including monthly breast self-exams.  Advised patient to RTC with any interval changes or concerns.  Questions were encouraged and answered to patient's " satisfaction, and patient verbalized understanding of information and agreement with the plan.     Time in counselin mins  Total time:  49 mins  >50% of visit was spent in face-to-face counseling.

## 2019-06-14 NOTE — Clinical Note
Dr. Flores,Please see pt's ROS regarding her musculoskeletal symptoms.  I understand she just underwent a whole-body bone scan, so that paired with the characteristics of her pains did not lead me to suspect breast cancer metastasis.  I am seeing her back in 3 months for close clinical follow-up.  Did not order any body imaging at this time.  Additionally, she reported that she gets a little winded with weightlifting which is a new activity for her.  I plan to see her back in 3 months for this.  She only started light weightlifting 2 weeks ago and reported that she is a former smoker.  I will recommend she see her PCP regarding this, and I will see her back for this in 3 months.  No imaging ordered at this time.No spinal column/low back point-tenderness.  Normal lung sounds.Please let me know if you have any differing recommendations.Thanks,Brett

## 2019-06-17 ENCOUNTER — HOSPITAL ENCOUNTER (OUTPATIENT)
Dept: RADIOLOGY | Facility: HOSPITAL | Age: 79
Discharge: HOME | End: 2019-06-17
Attending: NURSE PRACTITIONER
Payer: MEDICARE

## 2019-06-17 ENCOUNTER — TELEPHONE (OUTPATIENT)
Dept: INTERNAL MEDICINE | Facility: CLINIC | Age: 79
End: 2019-06-17

## 2019-06-17 DIAGNOSIS — Z85.3 PERSONAL HISTORY OF BREAST CANCER: ICD-10-CM

## 2019-06-17 DIAGNOSIS — N64.59 NIPPLE PROBLEM: ICD-10-CM

## 2019-06-17 DIAGNOSIS — Z12.39 BREAST CANCER SCREENING: ICD-10-CM

## 2019-06-17 DIAGNOSIS — N63.0 BREAST LUMP: ICD-10-CM

## 2019-06-17 PROCEDURE — 77061 BREAST TOMOSYNTHESIS UNI: CPT | Mod: 26,HCNC,LT, | Performed by: RADIOLOGY

## 2019-06-17 PROCEDURE — 77061 BREAST TOMOSYNTHESIS UNI: CPT | Mod: TC,HCNC,PO,LT

## 2019-06-17 PROCEDURE — 77065 DX MAMMO INCL CAD UNI: CPT | Mod: 26,HCNC,LT, | Performed by: RADIOLOGY

## 2019-06-17 PROCEDURE — 76642 US BREAST BILATERAL LIMITED: ICD-10-PCS | Mod: 26,50,HCNC, | Performed by: RADIOLOGY

## 2019-06-17 PROCEDURE — 76642 ULTRASOUND BREAST LIMITED: CPT | Mod: TC,50,HCNC,PO

## 2019-06-17 PROCEDURE — 77061 MAMMO DIGITAL DIAGNOSTIC LEFT WITH TOMOSYNTHESIS_CAD: ICD-10-PCS | Mod: 26,HCNC,LT, | Performed by: RADIOLOGY

## 2019-06-17 PROCEDURE — 77065 DX MAMMO INCL CAD UNI: CPT | Mod: TC,HCNC,PO,LT

## 2019-06-17 PROCEDURE — 76642 ULTRASOUND BREAST LIMITED: CPT | Mod: 26,50,HCNC, | Performed by: RADIOLOGY

## 2019-06-17 PROCEDURE — 77065 MAMMO DIGITAL DIAGNOSTIC LEFT WITH TOMOSYNTHESIS_CAD: ICD-10-PCS | Mod: 26,HCNC,LT, | Performed by: RADIOLOGY

## 2019-06-17 NOTE — PROGRESS NOTES
Discussed results on phone with pt.  We re-discussed recommendation for breast MRI for L nipple, but pt confirmed we did discuss this during her clinic visit and continues to decline, preferring close clinical f/u.  Pt to RTC in 3 mos.  Advised pt to f/u with Dr. Hubbard and Dr. Dominguez.  Advised pt to f/u with PCP regarding her reported respiratory symptoms.  Pt verbalized understanding of all.  Questions were answered to pt's satisfaction.

## 2019-06-17 NOTE — TELEPHONE ENCOUNTER
Spoke with pt and she states she feels fine. Only have sob when going up stairs . Pt is she has an cardiology md  But think its just old age.     hillary

## 2019-06-17 NOTE — TELEPHONE ENCOUNTER
----- Message from Azalea Walker MD sent at 6/17/2019  7:56 AM CDT -----   Please assist c scheduling a f/u c me or Yani for SOB.  This week is preferred.    ----- Message -----  From: Brett White DNP  Sent: 6/14/2019  11:48 AM  To: Azalea Walker MD    Hi,    Our mutual pt reported the following to me today during her breast visit:    Gets a little winded (can still talk) with (light) weightlifting, which she only started doing (weightlifting) a couple weeks ago.  Was a smoker for numerous years.    I would like for her to follow up with you regarding this.  If you could have your staff set her up for a visit with you, I would very much appreciate it.  Let me know if I can be of any assistance.    Thanks,  AMRIBELL Lisa  [facility]

## 2019-07-02 ENCOUNTER — OFFICE VISIT (OUTPATIENT)
Dept: NEUROSURGERY | Facility: CLINIC | Age: 79
End: 2019-07-02
Payer: MEDICARE

## 2019-07-02 VITALS
HEIGHT: 61 IN | WEIGHT: 176 LBS | HEART RATE: 59 BPM | BODY MASS INDEX: 33.23 KG/M2 | DIASTOLIC BLOOD PRESSURE: 68 MMHG | SYSTOLIC BLOOD PRESSURE: 128 MMHG | TEMPERATURE: 99 F

## 2019-07-02 DIAGNOSIS — M53.3 SACROILIAC JOINT DYSFUNCTION OF LEFT SIDE: ICD-10-CM

## 2019-07-02 DIAGNOSIS — S32.009K PSEUDOARTHROSIS OF LUMBAR SPINE: Primary | ICD-10-CM

## 2019-07-02 PROCEDURE — 99999 PR PBB SHADOW E&M-EST. PATIENT-LVL III: ICD-10-PCS | Mod: PBBFAC,HCNC,, | Performed by: NEUROLOGICAL SURGERY

## 2019-07-02 PROCEDURE — 1101F PT FALLS ASSESS-DOCD LE1/YR: CPT | Mod: HCNC,CPTII,S$GLB, | Performed by: NEUROLOGICAL SURGERY

## 2019-07-02 PROCEDURE — 1101F PR PT FALLS ASSESS DOC 0-1 FALLS W/OUT INJ PAST YR: ICD-10-PCS | Mod: HCNC,CPTII,S$GLB, | Performed by: NEUROLOGICAL SURGERY

## 2019-07-02 PROCEDURE — 99999 PR PBB SHADOW E&M-EST. PATIENT-LVL III: CPT | Mod: PBBFAC,HCNC,, | Performed by: NEUROLOGICAL SURGERY

## 2019-07-02 PROCEDURE — 99213 PR OFFICE/OUTPT VISIT, EST, LEVL III, 20-29 MIN: ICD-10-PCS | Mod: HCNC,S$GLB,, | Performed by: NEUROLOGICAL SURGERY

## 2019-07-02 PROCEDURE — 99213 OFFICE O/P EST LOW 20 MIN: CPT | Mod: HCNC,S$GLB,, | Performed by: NEUROLOGICAL SURGERY

## 2019-07-02 RX ORDER — CELECOXIB 200 MG/1
200 CAPSULE ORAL 2 TIMES DAILY
Qty: 60 CAPSULE | Refills: 0 | Status: SHIPPED | OUTPATIENT
Start: 2019-07-02 | End: 2019-07-02 | Stop reason: SDUPTHER

## 2019-07-02 RX ORDER — CELECOXIB 200 MG/1
CAPSULE ORAL
Qty: 180 CAPSULE | Refills: 0 | Status: SHIPPED | OUTPATIENT
Start: 2019-07-02 | End: 2019-09-20

## 2019-07-02 NOTE — PROGRESS NOTES
NEUROSURGICAL OUTPATIENT CONSULTATION NOTE    DATE OF SERVICE:  07/02/2019    ATTENDING PHYSICIAN:  Sundar Dominguez MD    CONSULT REQUESTED BY:  Dr Hubbard    REASON FOR CONSULT:   low back pain    SUBJECTIVE:    HISTORY OF PRESENT ILLNESS:  This is a very pleasant 78 y.o. female who has chronic low back.  She has idiopathic thoracolumbar scoliosis.  She had a L5-S1 invasive transforaminal interbody fusion 2015 with Dr Beckett.  She has developed pseudoarthrosis, facet arthropathy. It is unclear if the surgery her any pain relief.  She takes hydrocodone every day.  She reports that her back pain is worse on the left side L5-S1.  Pain does not travel legs.  The pain is intermittent and today in the office she does not have any pain.  Pain can be triggered by activity such as prolonged walking but is also triggered by prolonged sitting. She denies having pain when using a recliner or lying down.  Using a grocery cart helps her back pain.  She is able to garden without significant pain.  She can have a social life without significant pain.  Pain is more  noticeable when she is alone at home.  She does not consider herself miserable and is not interested in having any surgery at this time.  She tried spinal and sacroiliac joint injections in the past with partial pain relief.  She had radiofrequency ablation without significant pain relief.  She is taking meloxicam every day and is not sure if it is helping.               PAST MEDICAL HISTORY:  Active Ambulatory Problems     Diagnosis Date Noted    Essential tremor 12/07/2012    Primary osteoarthritis of both knees 09/19/2013    DDD (degenerative disc disease), cervical 09/19/2013    DDD (degenerative disc disease), thoracolumbar 09/19/2013    Mild vitamin D deficiency 09/19/2013    Obesity 09/19/2013    History of colonic polyps 09/19/2013    Hypertriglyceridemia 09/19/2013    Insomnia 09/19/2013    Anemia of chronic disease 11/21/2013    Status post total  knee replacement 12/26/2013    History of compression fracture of spine 04/10/2014    Spondylolisthesis at L5-S1 level 07/22/2014    Pre-diabetes 10/17/2015    S/P lumbar spinal fusion 03/22/2016    GERD (gastroesophageal reflux disease) 04/12/2016    Age-related osteoporosis without current pathological fracture 06/20/2017    History of deep vein thrombosis (DVT) of lower extremity 06/20/2017    Malignant neoplasm of upper-outer quadrant of right breast in female, estrogen receptor positive 07/18/2017    Breast cancer in female 08/09/2017    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 08/26/2017    Prophylactic use of anastrozole (Arimidex) 10/16/2017    History of right breast cancer 02/28/2018    Aromatase inhibitor use 01/10/2019    Pain and swelling of left knee 01/17/2019    Decreased range of motion with decreased strength 01/17/2019    Impaired functional mobility, balance, gait, and endurance 01/17/2019    Primary osteoarthritis of left knee 02/05/2019    Status post total left knee replacement 2/5/2019 02/19/2019     Resolved Ambulatory Problems     Diagnosis Date Noted    Lumbar disc disease 12/07/2012    Osteoarthritis 12/07/2012    Venous insufficiency 12/07/2012    Low back pain 09/18/2013    Range of motion deficit 09/18/2013    Decreased strength 09/18/2013    Antalgic gait 09/18/2013    DDD (degenerative disc disease), lumbar 09/19/2013    Family history of diabetes mellitus 09/19/2013    Osteoporosis 09/19/2013    Allergic conjunctivitis and rhinitis 09/19/2013    GERD (gastroesophageal reflux disease) 09/19/2013    Tremor 09/19/2013    Primary localized osteoarthrosis, pelvic region and thigh 09/20/2013    Sciatica 09/20/2013    Thoracic or lumbosacral neuritis or radiculitis, unspecified 09/20/2013    Low back pain 09/20/2013    Range of motion deficit 09/20/2013    Decreased strength 09/20/2013    Antalgic gait 09/20/2013    Right knee DJD 11/14/2013     Hyponatremia 11/15/2013    Pain 11/15/2013    Debility 11/17/2013    DVT prophylaxis 11/18/2013    Nausea & vomiting 11/18/2013    Constipation 11/18/2013    Deep vein thrombosis (DVT) 11/18/2013    S/P knee replacement 11/21/2013    Long term (current) use of anticoagulants 11/27/2013    Right knee pain 12/26/2013    Decreased range of motion of trunk and back 12/26/2013    Lower back pain 12/26/2013    Status post total prosthetic replacement of knee joint using cement 02/20/2014    History of DVT of lower extremity 03/19/2014    Hyperglycemia 04/10/2014    Scoliosis of lumbosacral spine 07/22/2014    Spondylolisthesis of lumbar region 10/09/2014    Possible Lichen sclerosus 12/04/2014    Post-menopausal 12/04/2014    Postmenopausal atrophic vaginitis 12/04/2014    Encounter for postoperative wound check 02/20/2015    S/P lumbar fusion 02/20/2015    Screening 11/01/2016     Past Medical History:   Diagnosis Date    Allergy     Arthritis     Blepharitis of both eyes     breast ca 7/18/2017    Complication of anesthesia     Degenerative disc disease     GERD (gastroesophageal reflux disease)     History of compression fracture of spine 4/10/2014    Hyperlipidemia     Lumbar disc disease     Meibomitis     Obesity 9/19/2013    Osteoporosis     Papilloma of eyelid     Postoperative anemia 11/21/2013    Thoracic or lumbosacral neuritis or radiculitis, unspecified 9/20/2013    Tremors of nervous system 2/2015       PAST SURGICAL HISTORY:  Past Surgical History:   Procedure Laterality Date    APPENDECTOMY      ARTHROPLASTY, KNEE, TOTAL Right 11/14/2013    [facility] 2ND FLR    BIOPSY-LYMPH NODE-SENTINEL Right 8/9/2017    [facility] Rolle [facility] at [facility] OR 2ND FLR    BREAST BIOPSY Right 2017    COLONOSCOPY N/A 11/1/2016    Performed by Candelario Oviedo MD at [facility] ENDO (4TH FLR)    FOOT SURGERY      FUSION-TRANSLUMINAL LUMBAR  INTERBODY (TLIF) CPT:63714-83,[id number],34524 Right 2015    [facility] 2ND [facility]    INJECTION-NODE-SENTINEL Right 2017    Performed [facility] at [facility] 2ND FLR    MASTECTOMY Right 2017    MASTECTOMY RIGHT Right 2017    [facility] OR 2ND FLR    REPLACEMENT-KNEE-TOTAL-SIGHT ASSISTED Left 2019    [facility] at [facility] OR 2ND FLR    SHOULDER ARTHROSCOPY      left    SPINE SURGERY  1-12-15    [facility]    TONSILLECTOMY      TOTAL KNEE ARTHROPLASTY         SOCIAL HISTORY:   Social History     Socioeconomic History    Marital status: Single     Spouse name: Not on file    Number of children: Not on file    Years of education: Not on file    Highest education level: Not on file   Occupational History    Not on file   Social Needs    Financial resource strain: Not on file    Food insecurity:     Worry: Not on file     Inability: Not on file    Transportation needs:     Medical: Not on file     Non-medical: Not on file   Tobacco Use    Smoking status: Former Smoker     Packs/day: 0.25     Years: 40.00     Pack years: 10.00     Types: Cigarettes     Last attempt to quit: 2011     Years since quittin.5    Smokeless tobacco: Never Used   Substance and Sexual Activity    Alcohol use: No     Comment: 1 glass wine 2x/year    Drug use: No    Sexual activity: Not Currently     Birth control/protection: Post-menopausal   Lifestyle    Physical activity:     Days per week: Not on file     Minutes per session: Not on file    Stress: Not on file   Relationships    Social connections:     Talks on phone: Not on file     Gets together: Not on file     Attends Gnosticism service: Not on file     Active member of club or organization: Not on file     Attends meetings of clubs or organizations: Not on file     Relationship status: Not on file   Other Topics Concern    Not on file   Social  History Narrative    Lives in a duplex, daughter lives downstairs       FAMILY HISTORY:  Family History   Problem Relation Age of Onset    Cancer Father         bone    Breast cancer Mother         never had biopsy    Stroke Daughter     Diabetes Daughter         was obese    Cancer Paternal Grandmother         skin cancer, unknown type    Cancer Paternal Uncle         stomach    Cancer Other         possibly other cancers in other relatives    Cancer Daughter         skin cancer, unknown type    Hypertension Neg Hx     Ovarian cancer Neg Hx        CURRENTS MEDICATIONS:  Current Outpatient Medications on File Prior to Visit   Medication Sig Dispense Refill    alendronate (FOSAMAX) 70 MG tablet TAKE 1 TABLET BY MOUTH EVERY 7 DAYS 12 tablet 3    anastrozole (ARIMIDEX) 1 mg Tab TAKE 1 TABLET EVERY DAY 90 tablet 3    calcium carbonate (OS-DIANDRA) 600 mg calcium (1,500 mg) Tab Take 600 mg by mouth 2 (two) times daily with meals.      cholecalciferol, vitamin D3, 1,000 unit capsule Take 3 capsules daily (Patient taking differently: Take 1,000 Units by mouth 2 (two) times daily. Take 3 capsules daily) 90 capsule 11    desonide (DESOWEN) 0.05 % lotion Apply topically 2 (two) times daily.      fenofibrate micronized (LOFIBRA) 67 MG capsule Take 2 capsules (134 mg total) by mouth daily with breakfast. 180 capsule 1    HYDROcodone-acetaminophen (NORCO)  mg per tablet Take 1 tablet by mouth.      MULTIVIT WITH CALCIUM,IRON,MIN (WOMEN'S DAILY MULTIVITAMIN ORAL) Take by mouth.      propranolol (INDERAL) 10 MG tablet Take 1 tablet (10 mg total) by mouth 3 (three) times daily. (Patient taking differently: Take 10 mg by mouth 2 (two) times daily. ) 270 tablet 1    traZODone (DESYREL) 50 MG tablet TAKE 1 TABLET EVERY NIGHT AS NEEDED FOR INSOMNIA 90 tablet 1    vit C/vit E/lutein/min/omega-3 (OCUVITE ORAL) Take by mouth.      [DISCONTINUED] meloxicam (MOBIC) 15 MG tablet Take 1 tablet (15 mg total) by mouth  once daily. 30 tablet 1     No current facility-administered medications on file prior to visit.        ALLERGIES:  Review of patient's allergies indicates:   Allergen Reactions    Sulfa (sulfonamide antibiotics) Other (See Comments)     Yeast infection and irritation    Adhesive Hives and Itching       REVIEW OF SYSTEMS:  Review of Systems   Constitutional: Negative for diaphoresis, fever and weight loss.   Respiratory: Negative for shortness of breath.    Cardiovascular: Negative for chest pain.   Gastrointestinal: Negative for blood in stool.   Genitourinary: Negative for hematuria.   Endo/Heme/Allergies: Does not bruise/bleed easily.   All other systems reviewed and are negative.      OBJECTIVE:    PHYSICAL EXAMINATION:   Vitals:    07/02/19 0900   BP: 128/68   Pulse: (!) 59   Temp: 98.7 °F (37.1 °C)       Physical Exam:  Vitals reviewed.    Constitutional: She appears well-developed and well-nourished.     Eyes: Pupils are equal, round, and reactive to light. Conjunctivae and EOM are normal.     Cardiovascular: Normal distal pulses and no edema.     Abdominal: Soft.     Skin: Skin displays no rash on trunk and no rash on extremities. Skin displays no lesions on trunk and no lesions on extremities.     Psych/Behavior: She is alert. She is oriented to person, place, and time. She has a normal mood and affect.     Musculoskeletal:        Neck: Range of motion is full.     Neurological:        DTRs: Tricep reflexes are 2+ on the right side and 2+ on the left side. Bicep reflexes are 2+ on the right side and 2+ on the left side. Brachioradialis reflexes are 2+ on the right side and 2+ on the left side. Patellar reflexes are 2+ on the right side and 2+ on the left side. Achilles reflexes are 2+ on the right side and 2+ on the left side.       Back Exam     Tenderness   The patient is experiencing tenderness in the lumbar and sacroiliac.    Range of Motion   Extension: normal   Flexion: normal   Lateral bend right:  normal   Lateral bend left: normal   Rotation right: normal   Rotation left: normal     Muscle Strength   Right Quadriceps:  5/5   Left Quadriceps:  5/5   Right Hamstrings:  5/5   Left Hamstrings:  5/5     Tests   Straight leg raise right: negative  Straight leg raise left: negative    Other   Toe walk: normal  Heel walk: normal            SI joint:   Palpation at the right and left SI joints is painful  CHRISTIN test is negative bilaterally  Gaenslen test is negative bilaterally  Thigh thrust test is negative bilaterally    Neurologic Exam     Mental Status   Oriented to person, place, and time.   Speech: speech is normal   Level of consciousness: alert    Cranial Nerves   Cranial nerves II through XII intact.     CN III, IV, VI   Pupils are equal, round, and reactive to light.  Extraocular motions are normal.     Motor Exam   Muscle bulk: normal  Overall muscle tone: normal    Strength   Right deltoid: 5/5  Left deltoid: 5/5  Right biceps: 5/5  Left biceps: 5/5  Right triceps: 5/5  Left triceps: 5/5  Right wrist flexion: 5/5  Left wrist flexion: 5/5  Right wrist extension: 5/5  Left wrist extension: 5/5  Right interossei: 5/5  Left interossei: 5/5  Right iliopsoas: 5/5  Left iliopsoas: 5/5  Right quadriceps: 5/5  Left quadriceps: 5/5  Right hamstrin/5  Left hamstrin/5  Right anterior tibial: 5/5  Left anterior tibial: 5/5  Right posterior tibial: 5/5  Left posterior tibial: 5/5  Right peroneal: 5/5  Left peroneal: 5/5  Right gastroc: 5/5  Left gastroc: 5/5    Sensory Exam   Light touch normal.   Pinprick normal.     Gait, Coordination, and Reflexes     Gait  Gait: normal    Coordination   Finger to nose coordination: normal  Tandem walking coordination: normal    Reflexes   Right brachioradialis: 2+  Left brachioradialis: 2+  Right biceps: 2+  Left biceps: 2+  Right triceps: 2+  Left triceps: 2+  Right patellar: 2+  Left patellar: 2+  Right achilles: 2+  Left achilles: 2+  Right plantar: normal  Left plantar:  normal  Right Malcolm: absent  Left Malcolm: absent  Right ankle clonus: absent  Left ankle clonus: absent        DIAGNOSTIC DATA:  I personally interpreted the following imaging:    Lumbar spine MRI 2019 shows thoracolumbar scoliosis without significant canal, lateral recess or foraminal stenosis  CT scan lumbar spine 2017 shows pseudoarthrosis at L5-S1 with non fusion in the interbody space and loosening of hardware at S1    ASSESMENT:  This is a 78 y.o. female with     Problem List Items Addressed This Visit     None      Visit Diagnoses     Pseudoarthrosis of lumbar spine    -  Primary    Sacroiliac joint dysfunction of left side              PLAN:  No surgical intervention indicated at this time because severity of the pain is found to be tolerable  Unclear if the pain is coming from pseudoarthrosis, left facet arthropathy at L5-S1, SI joint dysfunction  Patient would be amenable to L5-S1 of posterolateral fusion revision with sacral pelvic fixation if pain intensity is increasing and more debilitating over time  All questions answered          Sundar Dominguez MD  Cell:[phone]

## 2019-07-19 ENCOUNTER — DOCUMENTATION ONLY (OUTPATIENT)
Dept: SURGERY | Facility: CLINIC | Age: 79
End: 2019-07-19

## 2019-07-19 ENCOUNTER — PATIENT MESSAGE (OUTPATIENT)
Dept: SURGERY | Facility: CLINIC | Age: 79
End: 2019-07-19

## 2019-07-19 NOTE — PROGRESS NOTES
Received genetic counseling report from [facility] genetic counselor MINNIE Finn.  See media for full report.  Summary below.    Pattern of cancer in personal and family hx not strongly suggestive of a known hereditary cancer syndrome, and no genetic testing is recommended in pt at this time.  Pt does not meet NCCN guidelines, clinical recommendations, or insurance criteria.

## 2019-07-29 DIAGNOSIS — G25.0 ESSENTIAL TREMOR: ICD-10-CM

## 2019-07-29 RX ORDER — TRAZODONE HYDROCHLORIDE 50 MG/1
TABLET ORAL
Qty: 90 TABLET | Refills: 1 | Status: SHIPPED | OUTPATIENT
Start: 2019-07-29 | End: 2020-01-20

## 2019-08-09 ENCOUNTER — TELEPHONE (OUTPATIENT)
Dept: INTERNAL MEDICINE | Facility: CLINIC | Age: 79
End: 2019-08-09

## 2019-08-12 ENCOUNTER — OFFICE VISIT (OUTPATIENT)
Dept: INTERNAL MEDICINE | Facility: CLINIC | Age: 79
End: 2019-08-12
Payer: MEDICARE

## 2019-08-12 VITALS
DIASTOLIC BLOOD PRESSURE: 66 MMHG | HEART RATE: 61 BPM | SYSTOLIC BLOOD PRESSURE: 134 MMHG | WEIGHT: 178.56 LBS | HEIGHT: 61 IN | BODY MASS INDEX: 33.71 KG/M2

## 2019-08-12 DIAGNOSIS — E78.1 HYPERTRIGLYCERIDEMIA: ICD-10-CM

## 2019-08-12 DIAGNOSIS — M50.30 DDD (DEGENERATIVE DISC DISEASE), CERVICAL: ICD-10-CM

## 2019-08-12 DIAGNOSIS — Z98.1 S/P LUMBAR SPINAL FUSION: ICD-10-CM

## 2019-08-12 DIAGNOSIS — M43.17 SPONDYLOLISTHESIS AT L5-S1 LEVEL: ICD-10-CM

## 2019-08-12 DIAGNOSIS — M81.0 AGE-RELATED OSTEOPOROSIS WITHOUT CURRENT PATHOLOGICAL FRACTURE: ICD-10-CM

## 2019-08-12 DIAGNOSIS — M17.0 PRIMARY OSTEOARTHRITIS OF BOTH KNEES: ICD-10-CM

## 2019-08-12 DIAGNOSIS — Z87.81 HISTORY OF COMPRESSION FRACTURE OF SPINE: ICD-10-CM

## 2019-08-12 DIAGNOSIS — G25.0 ESSENTIAL TREMOR: ICD-10-CM

## 2019-08-12 DIAGNOSIS — Z00.00 ENCOUNTER FOR PREVENTIVE HEALTH EXAMINATION: Primary | ICD-10-CM

## 2019-08-12 DIAGNOSIS — D63.8 ANEMIA OF CHRONIC DISEASE: ICD-10-CM

## 2019-08-12 DIAGNOSIS — Z79.811 PROPHYLACTIC USE OF ANASTROZOLE (ARIMIDEX): ICD-10-CM

## 2019-08-12 DIAGNOSIS — R73.03 PRE-DIABETES: ICD-10-CM

## 2019-08-12 DIAGNOSIS — Z86.010 HISTORY OF COLONIC POLYPS: ICD-10-CM

## 2019-08-12 DIAGNOSIS — Z17.0 MALIGNANT NEOPLASM OF UPPER-OUTER QUADRANT OF RIGHT BREAST IN FEMALE, ESTROGEN RECEPTOR POSITIVE: ICD-10-CM

## 2019-08-12 DIAGNOSIS — C50.411 MALIGNANT NEOPLASM OF UPPER-OUTER QUADRANT OF RIGHT BREAST IN FEMALE, ESTROGEN RECEPTOR POSITIVE: ICD-10-CM

## 2019-08-12 DIAGNOSIS — I70.0 AORTIC ATHEROSCLEROSIS: ICD-10-CM

## 2019-08-12 DIAGNOSIS — N18.30 STAGE 3 CHRONIC KIDNEY DISEASE: ICD-10-CM

## 2019-08-12 DIAGNOSIS — K21.9 GASTROESOPHAGEAL REFLUX DISEASE, ESOPHAGITIS PRESENCE NOT SPECIFIED: ICD-10-CM

## 2019-08-12 DIAGNOSIS — Z86.718 HISTORY OF DEEP VEIN THROMBOSIS (DVT) OF LOWER EXTREMITY: ICD-10-CM

## 2019-08-12 DIAGNOSIS — E55.9 MILD VITAMIN D DEFICIENCY: ICD-10-CM

## 2019-08-12 DIAGNOSIS — M51.35 DDD (DEGENERATIVE DISC DISEASE), THORACOLUMBAR: ICD-10-CM

## 2019-08-12 DIAGNOSIS — E66.09 CLASS 1 OBESITY DUE TO EXCESS CALORIES WITH SERIOUS COMORBIDITY IN ADULT, UNSPECIFIED BMI: ICD-10-CM

## 2019-08-12 PROBLEM — R53.1 DECREASED RANGE OF MOTION WITH DECREASED STRENGTH: Status: RESOLVED | Noted: 2019-01-17 | Resolved: 2019-08-12

## 2019-08-12 PROBLEM — M25.562 PAIN AND SWELLING OF LEFT KNEE: Status: RESOLVED | Noted: 2019-01-17 | Resolved: 2019-08-12

## 2019-08-12 PROBLEM — M25.462 PAIN AND SWELLING OF LEFT KNEE: Status: RESOLVED | Noted: 2019-01-17 | Resolved: 2019-08-12

## 2019-08-12 PROBLEM — Z74.09 IMPAIRED FUNCTIONAL MOBILITY, BALANCE, GAIT, AND ENDURANCE: Status: RESOLVED | Noted: 2019-01-17 | Resolved: 2019-08-12

## 2019-08-12 PROBLEM — M25.60 DECREASED RANGE OF MOTION WITH DECREASED STRENGTH: Status: RESOLVED | Noted: 2019-01-17 | Resolved: 2019-08-12

## 2019-08-12 PROCEDURE — G0439 PPPS, SUBSEQ VISIT: HCPCS | Mod: HCNC,S$GLB,, | Performed by: NURSE PRACTITIONER

## 2019-08-12 PROCEDURE — G0439 PR MEDICARE ANNUAL WELLNESS SUBSEQUENT VISIT: ICD-10-PCS | Mod: HCNC,S$GLB,, | Performed by: NURSE PRACTITIONER

## 2019-08-12 PROCEDURE — 99999 PR PBB SHADOW E&M-EST. PATIENT-LVL III: ICD-10-PCS | Mod: PBBFAC,HCNC,, | Performed by: NURSE PRACTITIONER

## 2019-08-12 PROCEDURE — 99999 PR PBB SHADOW E&M-EST. PATIENT-LVL III: CPT | Mod: PBBFAC,HCNC,, | Performed by: NURSE PRACTITIONER

## 2019-08-12 RX ORDER — FERROUS SULFATE, DRIED 160(50) MG
1 TABLET, EXTENDED RELEASE ORAL
COMMUNITY

## 2019-08-12 NOTE — PROGRESS NOTES
"Nicho Espinosa presented for a  Medicare AWV and comprehensive Health Risk Assessment today. The following components were reviewed and updated:    · Medical history  · Family History  · Social history  · Allergies and Current Medications  · Health Risk Assessment  · Health Maintenance  · Care Team     ** See Completed Assessments for Annual Wellness Visit within the encounter summary.**       The following assessments were completed:  · Living Situation  · CAGE  · Depression Screening  · Timed Get Up and Go  · Whisper Test  · Cognitive Function Screening  ·   ·   ·   · Nutrition Screening  · ADL Screening  · PAQ Screening    Vitals:    08/12/19 0902   BP: 134/66   BP Location: Right arm   Pulse: 61   Weight: 81 kg (178 lb 9.2 oz)   Height: 5' 1" (1.549 m)     Body mass index is 33.74 kg/m².  Physical Exam   Constitutional: She is oriented to person, place, and time.   Obese   HENT:   Head: Normocephalic.   Cardiovascular: Normal rate and regular rhythm.   Pulmonary/Chest: Effort normal and breath sounds normal.   Abdominal: Soft. Bowel sounds are normal.   Musculoskeletal: Normal range of motion. She exhibits no edema.   Neurological: She is alert and oriented to person, place, and time.   Skin: Skin is warm and dry.   Psychiatric: She has a normal mood and affect.   Nursing note and vitals reviewed.        Diagnoses and health risks identified today and associated recommendations/orders:    1. Encounter for preventive health examination  Here for Health Risk Assessment/Annual Wellness Visit.  Health maintenance reviewed and updated. Follow up in one year.    2. Aortic atherosclerosis  Chronic, stable on current medications. Noted CT Lumbar Spine 8/04/15. Followed by PCP.    3. Hypertriglyceridemia  Chronic, stable on current medication. Followed by PCP.    4. History of deep vein thrombosis (DVT) of lower extremity  Stable. Followed by PCP.    5. Essential tremor  Chronic, stable on current medication. Followed by " PCP.    6. Malignant neoplasm of upper-outer quadrant of right breast in female, estrogen receptor positive  Chronic, stable on current medication. Followed by Oncology.    7. Prophylactic use of anastrozole (Arimidex)  Stable. Followed by Oncology.    8. Anemia of chronic disease  Chronic, stable. Followed by PCP.    9. Pre-diabetes  Chronic, stable with diet. Last A1c 5.6.  Followed by PCP.    10. Class 1 obesity due to excess calories with serious comorbidity in adult, unspecified BMI  Chronic, weight stable. Followed by PCP.    11. Gastroesophageal reflux disease, esophagitis presence not specified  Chronic, stable on current medication. Followed by PCP.    12. History of colonic polyps  Stable. Followed by PCP.    13. Age-related osteoporosis without current pathological fracture  Chronic, stable on current medications. Followed by PCP.    14. Mild vitamin D deficiency  Chronic, stable on current medications. Followed by PCP.    15. History of compression fracture of spine  Stable. Followed by PCP.    16. Primary osteoarthritis of both knees  Chronic, stable. Followed by PCP, Orthopedics    17. DDD (degenerative disc disease), thoracolumbar  Chronic, reporting persistent back pain. Followed by outside Neurosurgery, Pain Management.    18. DDD (degenerative disc disease), cervical  Chronic, stable. Followed by outside Pain Management.    19. Spondylolisthesis at L5-S1 level  Chronic, reporting persistent back pain. Followed by outside Neurosurgery, Pain Management.    20. S/P lumbar spinal fusion  Chronic, reporting persistent back pain. Followed by outside Neurosurgery, Pain Management.    21. Stage 3 chronic kidney disease  Chronic, stable. Followed by PCP      Provided Nicho with a 5-10 year written screening schedule and personal prevention plan. Recommendations were developed using the USPSTF age appropriate recommendations. Education, counseling, and referrals were provided as needed. After Visit Summary  printed and given to patient which includes a list of additional screenings\tests needed.    Follow up in about 3 months (around 11/12/2019).with PCP    Mel Camargo NP

## 2019-08-12 NOTE — PATIENT INSTRUCTIONS
Counseling and Referral of Other Preventative  (Italic type indicates deductible and co-insurance are waived)    Patient Name: Nicho Espinosa  Today's Date: 8/12/2019    Health Maintenance       Date Due Completion Date    Shingles Vaccine (2 of 3) 08/04/2011 6/9/2011 - Obtain new shingles vaccine - SHINGRIX - when available    Influenza Vaccine (1) 08/01/2019 9/24/2018    DEXA SCAN 03/06/2021 3/6/2019    Colonoscopy 11/01/2021 11/1/2016    TETANUS VACCINE 09/19/2023 9/19/2013    Lipid Panel 01/09/2024 1/9/2019        No orders of the defined types were placed in this encounter.    The following information is provided to all patients.  This information is to help you find resources for any of the problems found today that may be affecting your health:                Living healthy guide: www.[facility].[place].gov      Understanding Diabetes: www.diabetes.org      Eating healthy: www.cdc.gov/healthyweight      CDC home safety checklist: www.cdc.gov/steadi/patient.html      Agency on Aging: www.goea.[place].[facility]      Alcoholics anonymous (AA): www.aa.org      Physical Activity: www.[place].nih.gov/jl6lscy      Tobacco use: www.quitwithusla.org

## 2019-08-12 NOTE — PROGRESS NOTES
I offered to discuss end of life issues, including information on how to make advance directives that the patient could use to name someone who would make medical decisions on their behalf if they became too ill to make themselves.    ___Patient declined  _X_Patient reports that she has Advanced Directives

## 2019-09-16 DIAGNOSIS — E78.1 HYPERTRIGLYCERIDEMIA: ICD-10-CM

## 2019-09-16 RX ORDER — FENOFIBRATE 67 MG/1
134 CAPSULE ORAL
Qty: 180 CAPSULE | Refills: 1 | Status: SHIPPED | OUTPATIENT
Start: 2019-09-16 | End: 2020-04-16

## 2019-09-16 RX ORDER — FENOFIBRATE 67 MG/1
134 CAPSULE ORAL
Qty: 180 CAPSULE | Refills: 1 | Status: SHIPPED | OUTPATIENT
Start: 2019-09-16 | End: 2019-09-16 | Stop reason: SDUPTHER

## 2019-09-20 ENCOUNTER — OFFICE VISIT (OUTPATIENT)
Dept: HEMATOLOGY/ONCOLOGY | Facility: CLINIC | Age: 79
End: 2019-09-20
Payer: MEDICARE

## 2019-09-20 VITALS
SYSTOLIC BLOOD PRESSURE: 187 MMHG | HEART RATE: 68 BPM | OXYGEN SATURATION: 98 % | BODY MASS INDEX: 33.88 KG/M2 | HEIGHT: 61 IN | DIASTOLIC BLOOD PRESSURE: 77 MMHG | RESPIRATION RATE: 20 BRPM | WEIGHT: 179.44 LBS

## 2019-09-20 DIAGNOSIS — C50.411 MALIGNANT NEOPLASM OF UPPER-OUTER QUADRANT OF RIGHT BREAST IN FEMALE, ESTROGEN RECEPTOR POSITIVE: Primary | ICD-10-CM

## 2019-09-20 DIAGNOSIS — Z79.811 AROMATASE INHIBITOR USE: ICD-10-CM

## 2019-09-20 DIAGNOSIS — Z17.0 MALIGNANT NEOPLASM OF UPPER-OUTER QUADRANT OF RIGHT BREAST IN FEMALE, ESTROGEN RECEPTOR POSITIVE: Primary | ICD-10-CM

## 2019-09-20 PROCEDURE — 99999 PR PBB SHADOW E&M-EST. PATIENT-LVL III: ICD-10-PCS | Mod: PBBFAC,HCNC,, | Performed by: INTERNAL MEDICINE

## 2019-09-20 PROCEDURE — 99499 RISK ADDL DX/OHS AUDIT: ICD-10-PCS | Mod: HCNC,S$GLB,, | Performed by: INTERNAL MEDICINE

## 2019-09-20 PROCEDURE — 99213 PR OFFICE/OUTPT VISIT, EST, LEVL III, 20-29 MIN: ICD-10-PCS | Mod: HCNC,S$GLB,, | Performed by: INTERNAL MEDICINE

## 2019-09-20 PROCEDURE — 99213 OFFICE O/P EST LOW 20 MIN: CPT | Mod: HCNC,S$GLB,, | Performed by: INTERNAL MEDICINE

## 2019-09-20 PROCEDURE — 1101F PR PT FALLS ASSESS DOC 0-1 FALLS W/OUT INJ PAST YR: ICD-10-PCS | Mod: HCNC,CPTII,S$GLB, | Performed by: INTERNAL MEDICINE

## 2019-09-20 PROCEDURE — 99999 PR PBB SHADOW E&M-EST. PATIENT-LVL III: CPT | Mod: PBBFAC,HCNC,, | Performed by: INTERNAL MEDICINE

## 2019-09-20 PROCEDURE — 1101F PT FALLS ASSESS-DOCD LE1/YR: CPT | Mod: HCNC,CPTII,S$GLB, | Performed by: INTERNAL MEDICINE

## 2019-09-20 PROCEDURE — 99499 UNLISTED E&M SERVICE: CPT | Mod: HCNC,S$GLB,, | Performed by: INTERNAL MEDICINE

## 2019-09-20 RX ORDER — MELOXICAM 15 MG/1
TABLET ORAL
Refills: 0 | COMMUNITY
Start: 2019-09-09 | End: 2020-03-18 | Stop reason: SDUPTHER

## 2019-09-20 NOTE — PROGRESS NOTES
Subjective:       Patient ID: Nicho Espinosa is a 78 y.o. female.    Chief Complaint: Malignant neoplasm of upper-outer quadrant of right breast i    HPI   Mrs. Espinosa returns today for follow up.  She has been on anastrazole since August 2017, and has tolerated it well so far.  Of note, her Oncotype score was 7, suggesting that she has a 6 % chance of recurrence at ten years with tamoxifen alone.  Briefly, she is a 78-year-old  female who, on 08/09/2017, underwent a right modified radical mastectomy and sentinel lymph node biopsy.  The pathology report from the procedure indicates that she had a 2 cm lobular carcinoma that was ER strongly positive, ME positive and HER-2 negative; resection margins were clear.  On the right axillary sentinel lymph nodes isolated tumor cells were seen.    A recent DXA scan in March 2019 had shown low normal bone density.  A mammogram last June was read as BIRADS I, and a one year follow up was recommended.      Review of Systems    Overall she feels well.  She again mentions her chronic back pain, and her bilateral knee pains which predated the onset of her cancer.  She has a history of prior fusion surgery.   She  denies any anxiety, depression, easy bruising, fevers, chills, night  sweats, weight loss, nausea, vomiting, diarrhea, constipation, diplopia,   blurred vision, headache, chest pain, palpitations, shortness of breath, breast pain, abdominal pain, extremity pain, or difficulty ambulating.  The remainder of the ten-point ROS, including general, skin, lymph, H/N, cardiorespiratory, GI, , Neuro, Endocrine, and psychiatric is negative.       Objective:      Physical Exam    She is alert, oriented to time, place, person, pleasant, well      nourished, in no acute physical distress.                                    VITAL SIGNS:  Reviewed                                      HEENT:  Normal.  There are no nasal, oral, lip, gingival, auricular, lid,    or conjunctival  lesions.  Mucosae are moist and pink, and there is no        thrush.  Pupils are equal, reactive to light and accommodation.              Extraocular muscle movements are intact.  Dentition is good.                                   NECK:  Supple without JVD, adenopathy, or thyromegaly.                       LUNGS:  Clear to auscultation without wheezing, rales, or rhonchi.           CARDIOVASCULAR:  Reveals an S1, S2, a grade I systolic ejection murmur at the LSB, no rubs, no gallops.         ABDOMEN:  Soft, nontender, without organomegaly.  Bowel sounds are    present.                                                                     EXTREMITIES:  No cyanosis, clubbing, or edema.            BREASTS:  She is status post right mastectomy with a well-healed incision.    There are no masses in the left breast.                                        LYMPHATIC:  There is no cervical, axillary, or supraclavicular adenopathy.   SKIN:  Warm and moist, without petechiae, rashes, induration, or ecchymoses.           NEUROLOGIC:  DTRs are 0-1+ bilaterally, symmetrical, motor function is 5/5,  and cranial nerves are  within normal limits.      Assessment:       1. Malignant neoplasm of upper-outer quadrant of right breast in female, estrogen receptor positive    2. Aromatase inhibitor use      3.   Extensive DJD, s/p fusion of lumbar spine.    Plan:         Mrs Espinosa is doing well and remains LUZ ELENA.  I have asked her to remain on anastrazole, which she will take through the end of August 2022.  I will see her again in 4 months.  Her mammogram will be repeated at the end of June.    Her questions were answered to her satisfaction.

## 2019-10-24 ENCOUNTER — PATIENT OUTREACH (OUTPATIENT)
Dept: ADMINISTRATIVE | Facility: OTHER | Age: 79
End: 2019-10-24

## 2019-10-29 ENCOUNTER — OFFICE VISIT (OUTPATIENT)
Dept: OBSTETRICS AND GYNECOLOGY | Facility: CLINIC | Age: 79
End: 2019-10-29
Attending: OBSTETRICS & GYNECOLOGY
Payer: MEDICARE

## 2019-10-29 VITALS
HEIGHT: 61 IN | SYSTOLIC BLOOD PRESSURE: 126 MMHG | DIASTOLIC BLOOD PRESSURE: 62 MMHG | BODY MASS INDEX: 34.09 KG/M2 | WEIGHT: 180.56 LBS

## 2019-10-29 DIAGNOSIS — Z01.419 WELL WOMAN EXAM WITH ROUTINE GYNECOLOGICAL EXAM: Primary | ICD-10-CM

## 2019-10-29 PROCEDURE — G0101 CA SCREEN;PELVIC/BREAST EXAM: HCPCS | Mod: S$GLB,,, | Performed by: OBSTETRICS & GYNECOLOGY

## 2019-10-29 PROCEDURE — G0101 PR CA SCREEN;PELVIC/BREAST EXAM: ICD-10-PCS | Mod: S$GLB,,, | Performed by: OBSTETRICS & GYNECOLOGY

## 2019-10-29 NOTE — PROGRESS NOTES
Subjective:       Patient ID: Nicho Espinosa is a 78 y.o. female.    Chief Complaint:  Well Woman      History of Present Illness  HPI   This 78 yr old P2 female is here for WWE.  She has history of breast ca .  She is being followed by the breast center and last imaging was in .  She has no complaints but for some on and off itching after water aerobics if she does not get in bath quickly.  Is in groin area and not in vaginal area.  We discussed and she may use lotions or oils or benadryl cream.  GYN & OB History  Patient's last menstrual period was 1987.   Date of Last Pap: 2017    OB History    Para Term  AB Living   2 2 2         SAB TAB Ectopic Multiple Live Births                  # Outcome Date GA Lbr Eladio/2nd Weight Sex Delivery Anes PTL Lv   2 Term            1 Term                Review of Systems  Review of Systems   Constitutional: Negative for chills and fever.   Respiratory: Negative for shortness of breath.    Cardiovascular: Negative for chest pain.   Gastrointestinal: Negative for abdominal pain, nausea and vomiting.   Genitourinary: Negative for difficulty urinating, dyspareunia, genital sores, menstrual problem, pelvic pain, vaginal bleeding, vaginal discharge and vaginal pain.   Skin: Negative for wound.   Hematological: Negative for adenopathy.           Objective:   Physical Exam   Breast exam deferred  Normal external genitalia and no changes in the area that she is discussing.  Normal vaginal vault and cervix. No masses or lesions Some prolapse but good cuff support.  BME WNL no masses or tenderness.     Assessment:        1. Well woman exam with routine gynecological exam               Plan:      Pt may follow up with primary care or in yr or two with me.  She will continue her breast monitoring with the breast center.

## 2019-11-13 ENCOUNTER — IMMUNIZATION (OUTPATIENT)
Dept: PHARMACY | Facility: CLINIC | Age: 79
End: 2019-11-13
Payer: MEDICARE

## 2019-11-20 ENCOUNTER — OFFICE VISIT (OUTPATIENT)
Dept: DERMATOLOGY | Facility: CLINIC | Age: 79
End: 2019-11-20
Payer: MEDICARE

## 2019-11-20 DIAGNOSIS — D18.01 CHERRY ANGIOMA: ICD-10-CM

## 2019-11-20 DIAGNOSIS — L82.1 SK (SEBORRHEIC KERATOSIS): ICD-10-CM

## 2019-11-20 DIAGNOSIS — D48.5 NEOPLASM OF UNCERTAIN BEHAVIOR OF SKIN: Primary | ICD-10-CM

## 2019-11-20 DIAGNOSIS — L91.8 SKIN TAG: ICD-10-CM

## 2019-11-20 DIAGNOSIS — D22.9 NEVUS: ICD-10-CM

## 2019-11-20 DIAGNOSIS — L21.9 SEBORRHEIC DERMATITIS, UNSPECIFIED: ICD-10-CM

## 2019-11-20 PROCEDURE — 11102 TANGNTL BX SKIN SINGLE LES: CPT | Mod: HCNC,S$GLB,, | Performed by: DERMATOLOGY

## 2019-11-20 PROCEDURE — 11102 PR TANGENTIAL BIOPSY, SKIN, SINGLE LESION: ICD-10-PCS | Mod: HCNC,S$GLB,, | Performed by: DERMATOLOGY

## 2019-11-20 PROCEDURE — 1125F AMNT PAIN NOTED PAIN PRSNT: CPT | Mod: HCNC,S$GLB,, | Performed by: DERMATOLOGY

## 2019-11-20 PROCEDURE — 99999 PR PBB SHADOW E&M-EST. PATIENT-LVL II: CPT | Mod: PBBFAC,HCNC,, | Performed by: DERMATOLOGY

## 2019-11-20 PROCEDURE — 1159F PR MEDICATION LIST DOCUMENTED IN MEDICAL RECORD: ICD-10-PCS | Mod: HCNC,S$GLB,, | Performed by: DERMATOLOGY

## 2019-11-20 PROCEDURE — 1101F PR PT FALLS ASSESS DOC 0-1 FALLS W/OUT INJ PAST YR: ICD-10-PCS | Mod: HCNC,CPTII,S$GLB, | Performed by: DERMATOLOGY

## 2019-11-20 PROCEDURE — 88305 TISSUE EXAM BY PATHOLOGIST: ICD-10-PCS | Mod: 26,HCNC,, | Performed by: PATHOLOGY

## 2019-11-20 PROCEDURE — 99999 PR PBB SHADOW E&M-EST. PATIENT-LVL II: ICD-10-PCS | Mod: PBBFAC,HCNC,, | Performed by: DERMATOLOGY

## 2019-11-20 PROCEDURE — 99203 OFFICE O/P NEW LOW 30 MIN: CPT | Mod: 25,HCNC,S$GLB, | Performed by: DERMATOLOGY

## 2019-11-20 PROCEDURE — 88305 TISSUE EXAM BY PATHOLOGIST: CPT | Mod: 26,HCNC,, | Performed by: PATHOLOGY

## 2019-11-20 PROCEDURE — 99203 PR OFFICE/OUTPT VISIT, NEW, LEVL III, 30-44 MIN: ICD-10-PCS | Mod: 25,HCNC,S$GLB, | Performed by: DERMATOLOGY

## 2019-11-20 PROCEDURE — 88305 TISSUE EXAM BY PATHOLOGIST: CPT | Mod: HCNC | Performed by: PATHOLOGY

## 2019-11-20 PROCEDURE — 1101F PT FALLS ASSESS-DOCD LE1/YR: CPT | Mod: HCNC,CPTII,S$GLB, | Performed by: DERMATOLOGY

## 2019-11-20 PROCEDURE — 1125F PR PAIN SEVERITY QUANTIFIED, PAIN PRESENT: ICD-10-PCS | Mod: HCNC,S$GLB,, | Performed by: DERMATOLOGY

## 2019-11-20 PROCEDURE — 1159F MED LIST DOCD IN RCRD: CPT | Mod: HCNC,S$GLB,, | Performed by: DERMATOLOGY

## 2019-11-20 RX ORDER — KETOCONAZOLE 20 MG/G
CREAM TOPICAL
Qty: 45 G | Refills: 3 | Status: SHIPPED | OUTPATIENT
Start: 2019-11-20 | End: 2021-02-06

## 2019-11-20 RX ORDER — HYDROCORTISONE 25 MG/ML
LOTION TOPICAL
Qty: 118 ML | Refills: 3 | Status: SHIPPED | OUTPATIENT
Start: 2019-11-20 | End: 2021-02-01

## 2019-11-20 NOTE — PROGRESS NOTES
"  Subjective:       Patient ID:  Nicho Espinosa is a 79 y.o. female who presents for   Chief Complaint   Patient presents with    Skin Check    Spot     Patient is here today for a "mole" check.   Pt has a history of  moderate sun exposure in the past.   Pt recalls several blistering sunburns in the past- no  Pt has history of tanning bed use- no  Pt has  had moles removed in the past- yes, benign per pt  Pt has history of melanoma in first degree relatives-  No    Pt c/o flaking on  face, yrs, dry and scaly, tx desonide lotion; this helps but it recurs.  Now medication is very expensive.    C/o irritated lesions on left neck for months. Not bleeding. No tx        Review of Systems   Constitutional: Negative for fever, chills, fatigue and malaise.   Skin: Positive for rash. Negative for daily sunscreen use and recent sunburn.   Hematologic/Lymphatic: Bruises/bleeds easily.        Objective:    Physical Exam   Constitutional: She appears well-developed and well-nourished. No distress.   Neurological: She is alert and oriented to person, place, and time. She is not disoriented.   Psychiatric: She has a normal mood and affect.   Skin:   Areas Examined (abnormalities noted in diagram):   Scalp / Hair Palpated and Inspected  Head / Face Inspection Performed  Neck Inspection Performed  Chest / Axilla Inspection Performed  Abdomen Inspection Performed  Genitals / Buttocks / Groin Inspection Performed  Back Inspection Performed  RUE Inspected  LUE Inspection Performed  RLE Inspected  LLE Inspection Performed  Nails and Digits Inspection Performed                   Diagram Legend     Erythematous scaling macule/papule c/w actinic keratosis       Vascular papule c/w angioma      Pigmented verrucoid papule/plaque c/w seborrheic keratosis      Yellow umbilicated papule c/w sebaceous hyperplasia      Irregularly shaped tan macule c/w lentigo     1-2 mm smooth white papules consistent with Milia      Movable subcutaneous cyst " with punctum c/w epidermal inclusion cyst      Subcutaneous movable cyst c/w pilar cyst      Firm pink to brown papule c/w dermatofibroma      Pedunculated fleshy papule(s) c/w skin tag(s)      Evenly pigmented macule c/w junctional nevus     Mildly variegated pigmented, slightly irregular-bordered macule c/w mildly atypical nevus      Flesh colored to evenly pigmented papule c/w intradermal nevus       Pink pearly papule/plaque c/w basal cell carcinoma      Erythematous hyperkeratotic cursted plaque c/w SCC      Surgical scar with no sign of skin cancer recurrence      Open and closed comedones      Inflammatory papules and pustules      Verrucoid papule consistent consistent with wart     Erythematous eczematous patches and plaques     Dystrophic onycholytic nail with subungual debris c/w onychomycosis     Umbilicated papule    Erythematous-base heme-crusted tan verrucoid plaque consistent with inflamed seborrheic keratosis     Erythematous Silvery Scaling Plaque c/w Psoriasis     See annotation      Assessment / Plan:      Pathology Orders:     Normal Orders This Visit    Specimen to Pathology, Dermatology     Questions:    Procedure Type:  Dermatology and skin neoplasms    Number of Specimens:  1    ------------------------:  -------------------------    Spec 1 Procedure:  Biopsy    Spec 1 Clinical Impression:  r/o BLK v BCC    Spec 1 Source:  right upper lateral back        Neoplasm of uncertain behavior of skin  Shave biopsy procedure note:    Shave biopsy performed after verbal consent including risk of infection, scar, recurrence, need for additional treatment of site. Area prepped with alcohol, anesthetized with approximately 1.0cc of 1% lidocaine with epinephrine. Lesional tissue shaved with razor blade. Hemostasis achieved with application of aluminum chloride followed by hyfrecation. No complications. Dressing applied. Wound care explained.      -     Specimen to Pathology, Dermatology    Seborrheic  dermatitis, unspecified  -     ketoconazole (NIZORAL) 2 % cream; AAA qd- bid to face for maintenance  Dispense: 45 g; Refill: 3  -     hydrocortisone 2.5 % lotion; aaa on face qd prn flare  Dispense: 118 mL; Refill: 3    Cherry angioma  These are benign vascular lesions that are inherited.  Treatment is not necessary.    Skin tag  Reassurance given to patient. No treatment is necessary.   Treatment of benign, asymptomatic lesions may be considered cosmetic.    Nevus  Discussed ABCDE's of nevi.  Monitor for new mole or moles that are becoming bigger, darker, irritated, or developing irregular borders. Brochure provided.    SK (seborrheic keratosis)    These are benign inherited growths without a malignant potential. Reassurance given to patient. No treatment is necessary.     Total body skin examination performed today including at least 12 points as noted in physical examination. No lesions suspicious for malignancy noted.           Follow up for pprn bx report.

## 2019-12-03 LAB
FINAL PATHOLOGIC DIAGNOSIS: NORMAL
GROSS: NORMAL

## 2019-12-04 ENCOUNTER — PATIENT MESSAGE (OUTPATIENT)
Dept: DERMATOLOGY | Facility: CLINIC | Age: 79
End: 2019-12-04

## 2020-01-20 DIAGNOSIS — G25.0 ESSENTIAL TREMOR: ICD-10-CM

## 2020-01-20 RX ORDER — TRAZODONE HYDROCHLORIDE 50 MG/1
TABLET ORAL
Qty: 90 TABLET | Refills: 1 | Status: SHIPPED | OUTPATIENT
Start: 2020-01-20 | End: 2020-06-15 | Stop reason: SDUPTHER

## 2020-02-03 ENCOUNTER — OFFICE VISIT (OUTPATIENT)
Dept: HEMATOLOGY/ONCOLOGY | Facility: CLINIC | Age: 80
End: 2020-02-03
Payer: MEDICARE

## 2020-02-03 VITALS
TEMPERATURE: 98 F | SYSTOLIC BLOOD PRESSURE: 152 MMHG | OXYGEN SATURATION: 94 % | HEIGHT: 61 IN | HEART RATE: 72 BPM | WEIGHT: 169.56 LBS | DIASTOLIC BLOOD PRESSURE: 75 MMHG | BODY MASS INDEX: 32.01 KG/M2 | RESPIRATION RATE: 16 BRPM

## 2020-02-03 DIAGNOSIS — C50.411 MALIGNANT NEOPLASM OF UPPER-OUTER QUADRANT OF RIGHT BREAST IN FEMALE, ESTROGEN RECEPTOR POSITIVE: Primary | ICD-10-CM

## 2020-02-03 DIAGNOSIS — Z17.0 MALIGNANT NEOPLASM OF UPPER-OUTER QUADRANT OF RIGHT BREAST IN FEMALE, ESTROGEN RECEPTOR POSITIVE: Primary | ICD-10-CM

## 2020-02-03 DIAGNOSIS — G25.0 ESSENTIAL TREMOR: ICD-10-CM

## 2020-02-03 DIAGNOSIS — Z79.811 PROPHYLACTIC USE OF ANASTROZOLE (ARIMIDEX): ICD-10-CM

## 2020-02-03 PROCEDURE — 99999 PR PBB SHADOW E&M-EST. PATIENT-LVL V: CPT | Mod: PBBFAC,HCNC,, | Performed by: INTERNAL MEDICINE

## 2020-02-03 PROCEDURE — 1125F AMNT PAIN NOTED PAIN PRSNT: CPT | Mod: HCNC,S$GLB,, | Performed by: INTERNAL MEDICINE

## 2020-02-03 PROCEDURE — 99999 PR PBB SHADOW E&M-EST. PATIENT-LVL V: ICD-10-PCS | Mod: PBBFAC,HCNC,, | Performed by: INTERNAL MEDICINE

## 2020-02-03 PROCEDURE — 99213 PR OFFICE/OUTPT VISIT, EST, LEVL III, 20-29 MIN: ICD-10-PCS | Mod: HCNC,S$GLB,, | Performed by: INTERNAL MEDICINE

## 2020-02-03 PROCEDURE — 1101F PT FALLS ASSESS-DOCD LE1/YR: CPT | Mod: HCNC,CPTII,S$GLB, | Performed by: INTERNAL MEDICINE

## 2020-02-03 PROCEDURE — 1159F PR MEDICATION LIST DOCUMENTED IN MEDICAL RECORD: ICD-10-PCS | Mod: HCNC,S$GLB,, | Performed by: INTERNAL MEDICINE

## 2020-02-03 PROCEDURE — 1159F MED LIST DOCD IN RCRD: CPT | Mod: HCNC,S$GLB,, | Performed by: INTERNAL MEDICINE

## 2020-02-03 PROCEDURE — 99499 UNLISTED E&M SERVICE: CPT | Mod: HCNC,S$GLB,, | Performed by: INTERNAL MEDICINE

## 2020-02-03 PROCEDURE — 1101F PR PT FALLS ASSESS DOC 0-1 FALLS W/OUT INJ PAST YR: ICD-10-PCS | Mod: HCNC,CPTII,S$GLB, | Performed by: INTERNAL MEDICINE

## 2020-02-03 PROCEDURE — 99499 RISK ADDL DX/OHS AUDIT: ICD-10-PCS | Mod: HCNC,S$GLB,, | Performed by: INTERNAL MEDICINE

## 2020-02-03 PROCEDURE — 99213 OFFICE O/P EST LOW 20 MIN: CPT | Mod: HCNC,S$GLB,, | Performed by: INTERNAL MEDICINE

## 2020-02-03 PROCEDURE — 1125F PR PAIN SEVERITY QUANTIFIED, PAIN PRESENT: ICD-10-PCS | Mod: HCNC,S$GLB,, | Performed by: INTERNAL MEDICINE

## 2020-02-03 RX ORDER — HYDROCODONE BITARTRATE AND ACETAMINOPHEN 5; 325 MG/1; MG/1
TABLET ORAL
COMMUNITY
Start: 2020-01-07

## 2020-02-03 RX ORDER — PROPRANOLOL HYDROCHLORIDE 10 MG/1
10 TABLET ORAL 3 TIMES DAILY
Qty: 270 TABLET | Refills: 1 | Status: SHIPPED | OUTPATIENT
Start: 2020-02-03 | End: 2020-06-15 | Stop reason: SDUPTHER

## 2020-02-03 NOTE — PROGRESS NOTES
Subjective:       Patient ID: Nicho Espinosa is a 79 y.o. female.    Chief Complaint: No chief complaint on file.    HPI   Mrs. Espinosa returns today for follow up.  She has been on anastrazole since August 2017, and has tolerated it well so far.  Of note, her Oncotype score was 7, suggesting that she has a 6 % chance of recurrence at ten years with tamoxifen alone.  Briefly, she is a 79-year-old  female who, on 08/09/2017, underwent a right modified radical mastectomy and sentinel lymph node biopsy.  The pathology report from the procedure indicates that she had a 2 cm lobular carcinoma that was ER strongly positive, AL positive and HER-2 negative; resection margins were clear.  On the right axillary sentinel lymph nodes isolated tumor cells were seen.    A recent DXA scan in March 2019 had shown low normal bone density.  A mammogram last June was read as BIRADS I, and a one year follow up was recommended.      Review of Systems    Overall she feels well.  She again mentions her chronic back pain, which predated the onset of her cancer.  She has a history of prior fusion surgery.   She  denies any anxiety, depression, easy bruising, fevers, chills, night  sweats, weight loss, nausea, vomiting, diarrhea, constipation, diplopia,   blurred vision, headache, chest pain, palpitations, shortness of breath, breast pain, abdominal pain, extremity pain, or difficulty ambulating.  The remainder of the ten-point ROS, including general, skin, lymph, H/N, cardiorespiratory, GI, , Neuro, Endocrine, and psychiatric is negative.       Objective:      Physical Exam    She is alert, oriented to time, place, person, pleasant, well      nourished, in no acute physical distress.                                    VITAL SIGNS:  Reviewed                                      HEENT:  Normal.  There are no nasal, oral, lip, gingival, auricular, lid,    or conjunctival lesions.  Mucosae are moist and pink, and there is no         thrush.  Pupils are equal, reactive to light and accommodation.              Extraocular muscle movements are intact.  Dentition is good.                                   NECK:  Supple without JVD, adenopathy, or thyromegaly.                       LUNGS:  Clear to auscultation without wheezing, rales, or rhonchi.           CARDIOVASCULAR:  Reveals an S1, S2, a grade I systolic ejection murmur at the LSB, no rubs, no gallops.         ABDOMEN:  Soft, nontender, without organomegaly.  Bowel sounds are    present.                                                                     EXTREMITIES:  No cyanosis, clubbing, or edema.            BREASTS:  She is status post right mastectomy with a well-healed incision.    There are no masses in the left breast.                                        LYMPHATIC:  There is no cervical, axillary, or supraclavicular adenopathy.   SKIN:  Warm and moist, without petechiae, rashes, induration, or ecchymoses.           NEUROLOGIC:  DTRs are 0-1+ bilaterally, symmetrical, motor function is 5/5,  and cranial nerves are  within normal limits.      Assessment:       1. Malignant neoplasm of upper-outer quadrant of right breast in female, estrogen receptor positive    2. Prophylactic use of anastrozole (Arimidex)      3.   Extensive DJD, s/p fusion of lumbar spine.    Plan:         Mrs Espinosa is doing well and remains LUZ ELENA.  I have asked her to remain on anastrazole through August 2022.  I will see her again in June with her annual mammogram.    Her questions were answered to her satisfaction.

## 2020-02-14 DIAGNOSIS — M25.562 LEFT KNEE PAIN, UNSPECIFIED CHRONICITY: Primary | ICD-10-CM

## 2020-02-18 DIAGNOSIS — Z79.811 USE OF AROMATASE INHIBITORS: ICD-10-CM

## 2020-02-18 RX ORDER — ANASTROZOLE 1 MG/1
TABLET ORAL
Qty: 90 TABLET | Refills: 2 | Status: SHIPPED | OUTPATIENT
Start: 2020-02-18 | End: 2020-12-03 | Stop reason: SDUPTHER

## 2020-03-03 DIAGNOSIS — M81.0 AGE-RELATED OSTEOPOROSIS WITHOUT CURRENT PATHOLOGICAL FRACTURE: ICD-10-CM

## 2020-03-03 RX ORDER — ALENDRONATE SODIUM 70 MG/1
TABLET ORAL
Qty: 12 TABLET | Refills: 3 | Status: SHIPPED | OUTPATIENT
Start: 2020-03-03 | End: 2021-01-04

## 2020-03-18 ENCOUNTER — TELEPHONE (OUTPATIENT)
Dept: INTERNAL MEDICINE | Facility: CLINIC | Age: 80
End: 2020-03-18

## 2020-03-18 DIAGNOSIS — M43.17 SPONDYLOLISTHESIS AT L5-S1 LEVEL: ICD-10-CM

## 2020-03-18 DIAGNOSIS — M43.17 SPONDYLOLISTHESIS AT L5-S1 LEVEL: Primary | ICD-10-CM

## 2020-03-18 RX ORDER — MELOXICAM 15 MG/1
TABLET ORAL
Qty: 14 TABLET | Refills: 0 | Status: SHIPPED | OUTPATIENT
Start: 2020-03-18 | End: 2021-02-01

## 2020-03-18 RX ORDER — METHOCARBAMOL 500 MG/1
500 TABLET, FILM COATED ORAL 3 TIMES DAILY
Qty: 30 TABLET | Refills: 0 | Status: SHIPPED | OUTPATIENT
Start: 2020-03-18 | End: 2020-03-28

## 2020-03-18 RX ORDER — MELOXICAM 15 MG/1
TABLET ORAL
Qty: 90 TABLET | OUTPATIENT
Start: 2020-03-18

## 2020-03-18 NOTE — TELEPHONE ENCOUNTER
Spoke with pt and she states her pain md is not answering the phone, she think they are closed, pt suppose to have a back injection next week but dont think that will happen, pt wants a refill on mobic and a muscle relaxer

## 2020-03-18 NOTE — TELEPHONE ENCOUNTER
"----- Message from Oral Tucker sent at 3/18/2020 10:25 AM CDT -----  Contact: Patient [phone]  RX request - refill or new RX.  Is this a refill or new RX:  New   RX name and strength: Sciatic nerve pain Rx      Pharmacy name and phone #[id number] - [place] [address] AT [facility] [address] [phone] (Phone)  [phone] (Fax)     Comments:  Patient is requesting a muscle relaxer or something for the "Sciatic nerve pain" call to discuss with patient, has been in the bed now for 10 days due to pain, stating pain Dr was suppose to send something over, never was sent, a lot of pain requesting call back ASAP    Please call an advise  Thank you    "

## 2020-03-24 ENCOUNTER — TELEPHONE (OUTPATIENT)
Dept: ORTHOPEDICS | Facility: CLINIC | Age: 80
End: 2020-03-24

## 2020-03-24 NOTE — TELEPHONE ENCOUNTER
Spoke to patient. She has had sciatica for the past 2 weeks. She was seen by pain management yesterday. Pain extends into anterior knee. She is worried about knee replacement. Symptoms in knee started with sciatic symptoms on left. L TKA 2/5/2019. She denies injury. Denies knee swelling, recent infection. She will call back if sciatica resolves and she continues to have knee pain or if the knee begins to swell.

## 2020-03-24 NOTE — TELEPHONE ENCOUNTER
----- Message from Earlene Simms MA sent at 3/24/2020 10:39 AM CDT -----  Contact: PT - Nicho Espinosa  MRN#[id number]   40  @# [phone]      ----- Message -----  From: Fauzia Pierre  Sent: 3/24/2020  10:08 AM CDT  To: [facility] Staff, Anna Marie Hollis        PT states has a serious question she needs to ask, Dr. Washburn or Cindy Thrasher      PT states has a pinched nerve/sciatica, left leg.  States also had knee replaced last February  States she is in a lot of pain with knee throbbing  Knee hurts more than her back, so she is concerned.  States she wants to speak to one of staff. States she doesn't want to come in but would like to speak to either Dr. Washburn or Cindy Thrasher  Wants to make sure the pain is from the Sciatica and not the knee replacement    Please contact back as soon as you can please to PT - Nicho Espinosa  MRN#[id number]   40  @# [phone]

## 2020-04-16 DIAGNOSIS — E78.1 HYPERTRIGLYCERIDEMIA: ICD-10-CM

## 2020-04-16 RX ORDER — FENOFIBRATE 67 MG/1
134 CAPSULE ORAL
Qty: 180 CAPSULE | Refills: 3 | Status: SHIPPED | OUTPATIENT
Start: 2020-04-16 | End: 2021-03-25

## 2020-05-26 ENCOUNTER — PES CALL (OUTPATIENT)
Dept: ADMINISTRATIVE | Facility: CLINIC | Age: 80
End: 2020-05-26

## 2020-06-15 ENCOUNTER — IMMUNIZATION (OUTPATIENT)
Dept: PHARMACY | Facility: CLINIC | Age: 80
End: 2020-06-15
Payer: MEDICARE

## 2020-06-15 ENCOUNTER — OFFICE VISIT (OUTPATIENT)
Dept: INTERNAL MEDICINE | Facility: CLINIC | Age: 80
End: 2020-06-15
Payer: MEDICARE

## 2020-06-15 VITALS
HEART RATE: 56 BPM | DIASTOLIC BLOOD PRESSURE: 68 MMHG | SYSTOLIC BLOOD PRESSURE: 134 MMHG | OXYGEN SATURATION: 97 % | WEIGHT: 168 LBS | HEIGHT: 61 IN | BODY MASS INDEX: 31.72 KG/M2

## 2020-06-15 DIAGNOSIS — Z98.1 S/P LUMBAR SPINAL FUSION: ICD-10-CM

## 2020-06-15 DIAGNOSIS — Z79.811 PROPHYLACTIC USE OF ANASTROZOLE (ARIMIDEX): ICD-10-CM

## 2020-06-15 DIAGNOSIS — M46.1 SACROILIITIS, NOT ELSEWHERE CLASSIFIED: ICD-10-CM

## 2020-06-15 DIAGNOSIS — Z00.00 VISIT FOR ANNUAL HEALTH EXAMINATION: Primary | ICD-10-CM

## 2020-06-15 DIAGNOSIS — I70.0 AORTIC ATHEROSCLEROSIS: ICD-10-CM

## 2020-06-15 DIAGNOSIS — Z79.811 AROMATASE INHIBITOR USE: ICD-10-CM

## 2020-06-15 DIAGNOSIS — M43.17 SPONDYLOLISTHESIS AT L5-S1 LEVEL: ICD-10-CM

## 2020-06-15 DIAGNOSIS — R73.03 PRE-DIABETES: ICD-10-CM

## 2020-06-15 DIAGNOSIS — D63.8 ANEMIA OF CHRONIC DISEASE: ICD-10-CM

## 2020-06-15 DIAGNOSIS — F51.01 PRIMARY INSOMNIA: ICD-10-CM

## 2020-06-15 DIAGNOSIS — C50.411 MALIGNANT NEOPLASM OF UPPER-OUTER QUADRANT OF RIGHT BREAST IN FEMALE, ESTROGEN RECEPTOR POSITIVE: ICD-10-CM

## 2020-06-15 DIAGNOSIS — E78.1 HYPERTRIGLYCERIDEMIA: ICD-10-CM

## 2020-06-15 DIAGNOSIS — M81.0 AGE-RELATED OSTEOPOROSIS WITHOUT CURRENT PATHOLOGICAL FRACTURE: ICD-10-CM

## 2020-06-15 DIAGNOSIS — G25.0 ESSENTIAL TREMOR: ICD-10-CM

## 2020-06-15 DIAGNOSIS — Z17.0 MALIGNANT NEOPLASM OF UPPER-OUTER QUADRANT OF RIGHT BREAST IN FEMALE, ESTROGEN RECEPTOR POSITIVE: ICD-10-CM

## 2020-06-15 DIAGNOSIS — N18.30 STAGE 3 CHRONIC KIDNEY DISEASE: ICD-10-CM

## 2020-06-15 DIAGNOSIS — K21.9 GASTROESOPHAGEAL REFLUX DISEASE, ESOPHAGITIS PRESENCE NOT SPECIFIED: ICD-10-CM

## 2020-06-15 PROCEDURE — 99397 PER PM REEVAL EST PAT 65+ YR: CPT | Mod: HCNC,S$GLB,, | Performed by: INTERNAL MEDICINE

## 2020-06-15 PROCEDURE — 99397 PR PREVENTIVE VISIT,EST,65 & OVER: ICD-10-PCS | Mod: HCNC,S$GLB,, | Performed by: INTERNAL MEDICINE

## 2020-06-15 PROCEDURE — 99499 UNLISTED E&M SERVICE: CPT | Mod: HCNC,S$GLB,, | Performed by: INTERNAL MEDICINE

## 2020-06-15 PROCEDURE — 99999 PR PBB SHADOW E&M-EST. PATIENT-LVL IV: CPT | Mod: PBBFAC,HCNC,, | Performed by: INTERNAL MEDICINE

## 2020-06-15 PROCEDURE — 99999 PR PBB SHADOW E&M-EST. PATIENT-LVL IV: ICD-10-PCS | Mod: PBBFAC,HCNC,, | Performed by: INTERNAL MEDICINE

## 2020-06-15 PROCEDURE — 99499 RISK ADDL DX/OHS AUDIT: ICD-10-PCS | Mod: HCNC,S$GLB,, | Performed by: INTERNAL MEDICINE

## 2020-06-15 RX ORDER — PROPRANOLOL HYDROCHLORIDE 10 MG/1
10 TABLET ORAL 3 TIMES DAILY
Qty: 270 TABLET | Refills: 3 | Status: SHIPPED | OUTPATIENT
Start: 2020-06-15 | End: 2021-06-28

## 2020-06-15 RX ORDER — PREGABALIN 50 MG/1
50 CAPSULE ORAL 2 TIMES DAILY
COMMUNITY
Start: 2020-05-08 | End: 2021-07-22

## 2020-06-15 RX ORDER — TRAZODONE HYDROCHLORIDE 50 MG/1
50 TABLET ORAL NIGHTLY PRN
Qty: 90 TABLET | Refills: 3 | Status: SHIPPED | OUTPATIENT
Start: 2020-06-15 | End: 2021-05-26

## 2020-06-15 NOTE — PROGRESS NOTES
INTERNAL MEDICINE ESTABLISHED PATIENT VISIT NOTE    Subjective:     Chief Complaint: Annual Exam       Patient ID: Nicho Espinosa is a 79 y.o. female with  PMHx of R sided breast ca s/p R modified radical mastectomy 8/2017and on arimidex, DDD c hx compression fx of spine and spndylolisthesis s/p lumbar fusion 1/2015, preDM, OA B knees, insomnia, essential tremor, osteoporosis c Vit D def, hx tob use but quit in the past, hx DVT 11/2013 remigio-operative prev on coumadin but only for a few weeks, GERD, anemia of chronic disease, elevated triglycerides, last seen by me 1/2019, here today for annual exam and f/u.    Since last appt, had L TKA by  completed 2/2019 s issues.  Still sees Dr. Leslie and Dr. Flores for hx breast CA, seen by Dr. Flores 2/2020 c plan to stay on Anastrazole until 8/2022.    Still having issues c her back and sciatica.  Has been on Norco, mobic, and lyrica and has f/u pending c Dr. Mayo.  States PT helped a lot in the past and currently doing exercises at home.    Past Medical History:  Past Medical History:   Diagnosis Date    Allergy     Arthritis     Blepharitis of both eyes     breast ca 7/18/2017    right    Complication of anesthesia     nausea    Degenerative disc disease     GERD (gastroesophageal reflux disease)     patient denies reflux    History of compression fracture of spine 4/10/2014    Hyperlipidemia     Lumbar disc disease     Meibomitis     Obesity 9/19/2013    Osteoporosis     Papilloma of eyelid     RUL    Postoperative anemia 11/21/2013    Thoracic or lumbosacral neuritis or radiculitis, unspecified 9/20/2013    Tremors of nervous system 2/2015       Home Medications:  Prior to Admission medications    Medication Sig Start Date End Date Taking? Authorizing Provider   alendronate (FOSAMAX) 70 MG tablet TAKE 1 TABLET BY MOUTH EVERY 7 DAYS 3/3/20  Yes Azalea Walker MD   anastrozole (ARIMIDEX) 1 mg Tab TAKE 1 TABLET EVERY DAY 2/18/20  Yes Santiago  MD Luis   calcium-vitamin D3 (CALCIUM 500 + D) 500 mg(1,250mg) -200 unit per tablet Take 1 tablet by mouth 2 (two) times daily with meals.   Yes Historical Provider, MD   cholecalciferol, vitamin D3, 1,000 unit capsule Take 3 capsules daily  Patient taking differently: Take 1,000 Units by mouth 2 (two) times daily. Take 3 capsules daily - Taking one capsule 2 times daily 17  Yes Arleen Lara MD   fenofibrate micronized (LOFIBRA) 67 MG capsule TAKE 2 CAPSULES (134 MG TOTAL) BY MOUTH DAILY WITH BREAKFAST. 20 Yes Azaela Walker MD   HYDROcodone-acetaminophen (NORCO) 5-325 mg per tablet Take 1/2 to 1 tablet per day prn 20  Yes Historical Provider, MD   hydrocortisone 2.5 % lotion aaa on face qd prn flare 19  Yes Amber Fallon MD   ketoconazole (NIZORAL) 2 % cream AAA qd- bid to face for maintenance 19  Yes Amber Fallon MD   meloxicam (MOBIC) 15 MG tablet TK 1 T PO QD 3/18/20  Yes Azalea Walker MD   MULTIVIT WITH CALCIUM,IRON,MIN (WOMEN'S DAILY MULTIVITAMIN ORAL) Take by mouth.   Yes Historical Provider, MD   pregabalin (LYRICA) 50 MG capsule Take 50 mg by mouth 2 (two) times a day. 20  Yes Historical Provider, MD   propranolol (INDERAL) 10 MG tablet TAKE 1 TABLET (10 MG TOTAL) BY MOUTH 3 (THREE) TIMES DAILY. 2/3/20  Yes Azalea Walker MD   traZODone (DESYREL) 50 MG tablet TAKE 1 TABLET EVERY NIGHT AS NEEDED FOR INSOMNIA 20  Yes Azalea Walker MD   vit C/vit E/lutein/min/omega-3 (OCUVITE ORAL) Take by mouth.   Yes Historical Provider, MD       Allergies:  Review of patient's allergies indicates:   Allergen Reactions    Sulfa (sulfonamide antibiotics) Other (See Comments)     Yeast infection and irritation    Adhesive Hives and Itching       Social History:  Social History     Tobacco Use    Smoking status: Former Smoker     Packs/day: 0.25     Years: 40.00     Pack years: 10.00     Types: Cigarettes     Quit date: 2011     Years since quittin.4    Smokeless  "tobacco: Never Used   Substance Use Topics    Alcohol use: No     Comment: 1 glass wine 2x/year    Drug use: No        Review of Systems   Constitutional: Negative for appetite change, chills, fatigue, fever and unexpected weight change.   HENT: Negative for congestion, hearing loss and rhinorrhea.    Eyes: Negative for pain and visual disturbance.   Respiratory: Negative for cough, chest tightness, shortness of breath and wheezing.    Cardiovascular: Negative for chest pain, palpitations and leg swelling.   Gastrointestinal: Negative for abdominal distention and abdominal pain.   Endocrine: Negative for polydipsia and polyuria.   Genitourinary: Negative for decreased urine volume, difficulty urinating, dysuria, hematuria and vaginal discharge.   Musculoskeletal: Positive for arthralgias and back pain.   Neurological: Negative for weakness, numbness and headaches.   Psychiatric/Behavioral: Negative for behavioral problems and confusion.         Health Maintenance:     Immunizations:   Influenza is up to date 11/2019  TDap is up to date 9/2013  Pneumovax is up to date. 9/2013, 12/2015  Zostavax is UTD.     2011  Shingrix rec today     Cancer Screening:  PAP: is up to date. 6/2017 wnl  Mammogram: is up to date. 6/2019 on L benign, repeat ordered by Dr. Flores pending for this week.  Colonoscopy: is up to date. 11/2016, diverticula; otherwise normal, rec repeat csc in 5 years  DEXA:  is up to date. 3/2019 normal BMD       Objective:   /68 (BP Location: Right arm, Patient Position: Sitting, BP Method: Large (Manual))   Pulse (!) 56   Ht 5' 1" (1.549 m)   Wt 76.2 kg (167 lb 15.9 oz)   LMP 07/05/1987   SpO2 97%   BMI 31.74 kg/m²        General: AAO x3, no apparent distress  HEENT: PERRL, OP clear  CV: RRR, no m/r/g  Pulm: Lungs CTAB, no crackles, no wheezes  Abd: s/NT/ND +BS  Extremities: no c/c/e    Labs:     Lab Results   Component Value Date    WBC 4.66 01/09/2019    HGB 11.2 (L) 01/09/2019    HCT 36.3 (L) " 01/09/2019    MCV 99 (H) 01/09/2019     01/09/2019     Lab Results   Component Value Date    IRON 101 02/19/2018    TIBC 377 02/19/2018    FERRITIN 70 02/19/2018       Sodium   Date Value Ref Range Status   02/05/2019 136 136 - 145 mmol/L Final     Potassium   Date Value Ref Range Status   02/05/2019 3.9 3.5 - 5.1 mmol/L Final     Chloride   Date Value Ref Range Status   02/05/2019 106 95 - 110 mmol/L Final     CO2   Date Value Ref Range Status   02/05/2019 23 23 - 29 mmol/L Final     Glucose   Date Value Ref Range Status   02/05/2019 110 70 - 110 mg/dL Final     BUN, Bld   Date Value Ref Range Status   02/05/2019 21 8 - 23 mg/dL Final     Creatinine   Date Value Ref Range Status   02/05/2019 1.0 0.5 - 1.4 mg/dL Final     Calcium   Date Value Ref Range Status   02/05/2019 8.9 8.7 - 10.5 mg/dL Final     Total Protein   Date Value Ref Range Status   01/09/2019 7.3 6.0 - 8.4 g/dL Final     Albumin   Date Value Ref Range Status   01/09/2019 3.8 3.5 - 5.2 g/dL Final     Total Bilirubin   Date Value Ref Range Status   01/09/2019 0.4 0.1 - 1.0 mg/dL Final     Comment:     For infants and newborns, interpretation of results should be based  on gestational age, weight and in agreement with clinical  observations.  Premature Infant recommended reference ranges:  Up to 24 hours.............<8.0 mg/dL  Up to 48 hours............<12.0 mg/dL  3-5 days..................<15.0 mg/dL  6-29 days.................<15.0 mg/dL       Alkaline Phosphatase   Date Value Ref Range Status   01/09/2019 43 (L) 55 - 135 U/L Final     AST   Date Value Ref Range Status   01/09/2019 21 10 - 40 U/L Final     ALT   Date Value Ref Range Status   01/09/2019 16 10 - 44 U/L Final     Anion Gap   Date Value Ref Range Status   02/05/2019 7 (L) 8 - 16 mmol/L Final     eGFR if    Date Value Ref Range Status   02/05/2019 >60.0 >60 mL/min/1.73 m^2 Final     eGFR if non    Date Value Ref Range Status   02/05/2019 54.1 (A)  >60 mL/min/1.73 m^2 Final     Comment:     Calculation used to obtain the estimated glomerular filtration  rate (eGFR) is the CKD-EPI equation.        Lab Results   Component Value Date    HGBA1C 5.6 01/09/2019     Lab Results   Component Value Date    LDLCALC 104.8 01/09/2019     Lab Results   Component Value Date    TSH 1.402 01/30/2017         Assessment/Plan     Nicho was seen today for annual exam.    Diagnoses and all orders for this visit:    Visit for annual health examination  History and physical exam completed.  Health maintenance reviewed as above.    Pre-diabetes  Lab Results   Component Value Date    HGBA1C 5.6 01/09/2019     wnl on last check  Cont lifestyle mod  Repeat labs now.  -     Comprehensive metabolic panel; Future  -     Hemoglobin A1C; Future  -     Urinalysis; Future  -     TSH; Future    Essential tremor  Stable on BB, no issues  -     propranoloL (INDERAL) 10 MG tablet; Take 1 tablet (10 mg total) by mouth 3 (three) times daily.    Primary insomnia  Doing well on Trazodone, ok to cont prn  -     traZODone (DESYREL) 50 MG tablet; Take 1 tablet (50 mg total) by mouth nightly as needed for Insomnia.    Anemia of chronic disease  Due for f/u labs  Csc utd  -     CBC auto differential; Future    Hypertriglyceridemia  On fenofibrate, will repeat labs now.  -     Lipid Panel; Future  -     TSH; Future    Spondylolisthesis at L5-S1 level  S/P lumbar spinal fusion  Sacroiliitis, not elsewhere classified  Sees pain mgmt, cont f/u    Stage 3 chronic kidney disease  BMP  Lab Results   Component Value Date     02/05/2019    K 3.9 02/05/2019     02/05/2019    CO2 23 02/05/2019    BUN 21 02/05/2019    CREATININE 1.0 02/05/2019    CALCIUM 8.9 02/05/2019    ANIONGAP 7 (L) 02/05/2019    ESTGFRAFRICA >60.0 02/05/2019    EGFRNONAA 54.1 (A) 02/05/2019     Stable on last check, will repeat labs now.    Prophylactic use of anastrozole (Arimidex)  Malignant neoplasm of upper-outer quadrant of right  breast in female, estrogen receptor positive  Aromatase inhibitor use  Followed by Dr. Flores, mmg and f/u pending this month.    Gastroesophageal reflux disease, esophagitis presence not specified  Stable, takes tums prn    Aortic atherosclerosis  No acute issues, bp at goal, repeat lipids    Age-related osteoporosis without current pathological fracture  On alendronate for several years, repeat dexa next year and consider drug holiday if stable.      HM as above  RTC in 6 to 12 mos for f/u, sooner if needed.  Fasting labs this week.    Azalea Walker MD  Department of Internal Medicine - [facility]  06/15/2020

## 2020-06-17 RX ORDER — MELOXICAM 15 MG/1
TABLET ORAL
Qty: 30 TABLET | Refills: 0 | OUTPATIENT
Start: 2020-06-17

## 2020-06-18 ENCOUNTER — OFFICE VISIT (OUTPATIENT)
Dept: HEMATOLOGY/ONCOLOGY | Facility: CLINIC | Age: 80
End: 2020-06-18
Payer: MEDICARE

## 2020-06-18 ENCOUNTER — HOSPITAL ENCOUNTER (OUTPATIENT)
Dept: RADIOLOGY | Facility: HOSPITAL | Age: 80
Discharge: HOME | End: 2020-06-18
Attending: INTERNAL MEDICINE
Payer: MEDICARE

## 2020-06-18 VITALS
OXYGEN SATURATION: 95 % | RESPIRATION RATE: 18 BRPM | DIASTOLIC BLOOD PRESSURE: 63 MMHG | HEART RATE: 66 BPM | HEIGHT: 61 IN | BODY MASS INDEX: 32.38 KG/M2 | TEMPERATURE: 98 F | WEIGHT: 171.5 LBS | SYSTOLIC BLOOD PRESSURE: 136 MMHG

## 2020-06-18 DIAGNOSIS — Z17.0 MALIGNANT NEOPLASM OF UPPER-OUTER QUADRANT OF RIGHT BREAST IN FEMALE, ESTROGEN RECEPTOR POSITIVE: Primary | ICD-10-CM

## 2020-06-18 DIAGNOSIS — C50.411 MALIGNANT NEOPLASM OF UPPER-OUTER QUADRANT OF RIGHT BREAST IN FEMALE, ESTROGEN RECEPTOR POSITIVE: Primary | ICD-10-CM

## 2020-06-18 DIAGNOSIS — C50.411 MALIGNANT NEOPLASM OF UPPER-OUTER QUADRANT OF RIGHT BREAST IN FEMALE, ESTROGEN RECEPTOR POSITIVE: ICD-10-CM

## 2020-06-18 DIAGNOSIS — Z79.811 PROPHYLACTIC USE OF ANASTROZOLE (ARIMIDEX): ICD-10-CM

## 2020-06-18 DIAGNOSIS — Z17.0 MALIGNANT NEOPLASM OF UPPER-OUTER QUADRANT OF RIGHT BREAST IN FEMALE, ESTROGEN RECEPTOR POSITIVE: ICD-10-CM

## 2020-06-18 PROCEDURE — 77061 BREAST TOMOSYNTHESIS UNI: CPT | Mod: TC,HCNC,PO,LT

## 2020-06-18 PROCEDURE — 99499 RISK ADDL DX/OHS AUDIT: ICD-10-PCS | Mod: HCNC,S$GLB,, | Performed by: INTERNAL MEDICINE

## 2020-06-18 PROCEDURE — 1126F AMNT PAIN NOTED NONE PRSNT: CPT | Mod: HCNC,S$GLB,, | Performed by: INTERNAL MEDICINE

## 2020-06-18 PROCEDURE — 99999 PR PBB SHADOW E&M-EST. PATIENT-LVL IV: CPT | Mod: PBBFAC,HCNC,, | Performed by: INTERNAL MEDICINE

## 2020-06-18 PROCEDURE — 1159F PR MEDICATION LIST DOCUMENTED IN MEDICAL RECORD: ICD-10-PCS | Mod: HCNC,S$GLB,, | Performed by: INTERNAL MEDICINE

## 2020-06-18 PROCEDURE — 99499 UNLISTED E&M SERVICE: CPT | Mod: HCNC,S$GLB,, | Performed by: INTERNAL MEDICINE

## 2020-06-18 PROCEDURE — 1101F PT FALLS ASSESS-DOCD LE1/YR: CPT | Mod: HCNC,CPTII,S$GLB, | Performed by: INTERNAL MEDICINE

## 2020-06-18 PROCEDURE — 77065 MAMMO DIGITAL DIAGNOSTIC LEFT WITH TOMOSYNTHESIS_CAD: ICD-10-PCS | Mod: 26,HCNC,LT, | Performed by: RADIOLOGY

## 2020-06-18 PROCEDURE — 1159F MED LIST DOCD IN RCRD: CPT | Mod: HCNC,S$GLB,, | Performed by: INTERNAL MEDICINE

## 2020-06-18 PROCEDURE — 1101F PR PT FALLS ASSESS DOC 0-1 FALLS W/OUT INJ PAST YR: ICD-10-PCS | Mod: HCNC,CPTII,S$GLB, | Performed by: INTERNAL MEDICINE

## 2020-06-18 PROCEDURE — 77061 BREAST TOMOSYNTHESIS UNI: CPT | Mod: 26,HCNC,LT, | Performed by: RADIOLOGY

## 2020-06-18 PROCEDURE — 99213 OFFICE O/P EST LOW 20 MIN: CPT | Mod: HCNC,S$GLB,, | Performed by: INTERNAL MEDICINE

## 2020-06-18 PROCEDURE — 99213 PR OFFICE/OUTPT VISIT, EST, LEVL III, 20-29 MIN: ICD-10-PCS | Mod: HCNC,S$GLB,, | Performed by: INTERNAL MEDICINE

## 2020-06-18 PROCEDURE — 77061 MAMMO DIGITAL DIAGNOSTIC LEFT WITH TOMOSYNTHESIS_CAD: ICD-10-PCS | Mod: 26,HCNC,LT, | Performed by: RADIOLOGY

## 2020-06-18 PROCEDURE — 99999 PR PBB SHADOW E&M-EST. PATIENT-LVL IV: ICD-10-PCS | Mod: PBBFAC,HCNC,, | Performed by: INTERNAL MEDICINE

## 2020-06-18 PROCEDURE — 1126F PR PAIN SEVERITY QUANTIFIED, NO PAIN PRESENT: ICD-10-PCS | Mod: HCNC,S$GLB,, | Performed by: INTERNAL MEDICINE

## 2020-06-18 PROCEDURE — 77065 DX MAMMO INCL CAD UNI: CPT | Mod: TC,HCNC,PO,LT

## 2020-06-18 PROCEDURE — 77065 DX MAMMO INCL CAD UNI: CPT | Mod: 26,HCNC,LT, | Performed by: RADIOLOGY

## 2020-06-18 NOTE — PROGRESS NOTES
Subjective:       Patient ID: Nicho Espinosa is a 79 y.o. female.    Chief Complaint: No chief complaint on file.    HPI   Mrs. Espinosa returns today for follow up.  She has been on anastrazole since August 2017, and has tolerated it well so far.  Of note, her Oncotype score was 7, suggesting that she has a 6 % chance of recurrence at ten years with tamoxifen alone.  Briefly, she is a 79-year-old  female who, on 08/09/2017, underwent a right modified radical mastectomy and sentinel lymph node biopsy.  The pathology report from the procedure indicates that she had a 2 cm lobular carcinoma that was ER strongly positive, ME positive and HER-2 negative; resection margins were clear.  On the right axillary sentinel lymph nodes isolated tumor cells were seen.    A recent DXA scan in March 2019 had shown low normal bone density.  A mammogram today was read as BIRADS I, and a one year follow up was recommended.      Review of Systems    Overall she feels well.  She again mentions her chronic back pain, which predated the onset of her cancer.     She  denies any anxiety, depression, easy bruising, fevers, chills, night  sweats, weight loss, nausea, vomiting, diarrhea, constipation, diplopia,   blurred vision, headache, chest pain, palpitations, shortness of breath, breast pain, abdominal pain, extremity pain, or difficulty ambulating.  The remainder of the ten-point ROS, including general, skin, lymph, H/N, cardiorespiratory, GI, , Neuro, Endocrine, and psychiatric is negative.       Objective:      Physical Exam    She is alert, oriented to time, place, person, pleasant, well      nourished, in no acute physical distress.                                    VITAL SIGNS:  Reviewed                                      HEENT:  Normal.  There are no nasal, oral, lip, gingival, auricular, lid,    or conjunctival lesions.  Mucosae are moist and pink, and there is no        thrush.  Pupils are equal, reactive to light and  accommodation.              Extraocular muscle movements are intact.  Dentition is good.                                   NECK:  Supple without JVD, adenopathy, or thyromegaly.                       LUNGS:  Clear to auscultation without wheezing, rales, or rhonchi.           CARDIOVASCULAR:  Reveals an S1, S2, a grade I systolic ejection murmur at the LSB, no rubs, no gallops.         ABDOMEN:  Soft, nontender, without organomegaly.  Bowel sounds are    present.                                                                     EXTREMITIES:  No cyanosis, clubbing, or edema.  Scars of bilateral knee replacement surgeries are noted.          BREASTS:  She is status post right mastectomy with a well-healed incision.    There are no masses in the left breast.                                        LYMPHATIC:  There is no cervical, axillary, or supraclavicular adenopathy.   SKIN:  Warm and moist, without petechiae, rashes, induration, or ecchymoses.           NEUROLOGIC:  DTRs are 0-1+ bilaterally, symmetrical, motor function is 5/5,  and cranial nerves are  within normal limits.      Assessment:       1. Malignant neoplasm of upper-outer quadrant of right breast in female, estrogen receptor positive    2. Prophylactic use of anastrozole (Arimidex)      3.   Extensive DJD, s/p fusion of lumbar spine.    Plan:         Mrs Espinosa is doing well and remains LUZ ELENA.  I have asked her to remain on anastrazole through August 2022.  I will see her again in 4 months.  Her mammogram will be repeated a year from now.    Her questions were answered to her satisfaction.

## 2020-06-19 DIAGNOSIS — Z90.11 ACQUIRED ABSENCE OF RIGHT BREAST AND NIPPLE: ICD-10-CM

## 2020-06-19 DIAGNOSIS — Z85.3 PERSONAL HISTORY OF BREAST CANCER: Primary | ICD-10-CM

## 2020-07-08 ENCOUNTER — OFFICE VISIT (OUTPATIENT)
Dept: ORTHOPEDICS | Facility: CLINIC | Age: 80
End: 2020-07-08
Payer: MEDICARE

## 2020-07-08 ENCOUNTER — HOSPITAL ENCOUNTER (OUTPATIENT)
Dept: RADIOLOGY | Facility: HOSPITAL | Age: 80
Discharge: HOME | End: 2020-07-08
Attending: ORTHOPAEDIC SURGERY
Payer: MEDICARE

## 2020-07-08 VITALS — WEIGHT: 170.94 LBS | BODY MASS INDEX: 32.27 KG/M2 | HEIGHT: 61 IN

## 2020-07-08 DIAGNOSIS — G89.29 CHRONIC PAIN OF LEFT KNEE: Primary | ICD-10-CM

## 2020-07-08 DIAGNOSIS — M25.562 CHRONIC PAIN OF LEFT KNEE: Primary | ICD-10-CM

## 2020-07-08 DIAGNOSIS — M25.562 LEFT KNEE PAIN, UNSPECIFIED CHRONICITY: ICD-10-CM

## 2020-07-08 DIAGNOSIS — Z96.652 STATUS POST TOTAL LEFT KNEE REPLACEMENT: ICD-10-CM

## 2020-07-08 PROCEDURE — 73562 XR KNEE ORTHO LEFT: ICD-10-PCS | Mod: 26,HCNC,LT, | Performed by: RADIOLOGY

## 2020-07-08 PROCEDURE — 1159F PR MEDICATION LIST DOCUMENTED IN MEDICAL RECORD: ICD-10-PCS | Mod: HCNC,S$GLB,, | Performed by: ORTHOPAEDIC SURGERY

## 2020-07-08 PROCEDURE — 73560 X-RAY EXAM OF KNEE 1 OR 2: CPT | Mod: 26,59,HCNC,RT | Performed by: RADIOLOGY

## 2020-07-08 PROCEDURE — 1159F MED LIST DOCD IN RCRD: CPT | Mod: HCNC,S$GLB,, | Performed by: ORTHOPAEDIC SURGERY

## 2020-07-08 PROCEDURE — 1126F AMNT PAIN NOTED NONE PRSNT: CPT | Mod: HCNC,S$GLB,, | Performed by: ORTHOPAEDIC SURGERY

## 2020-07-08 PROCEDURE — 73560 X-RAY EXAM OF KNEE 1 OR 2: CPT | Mod: TC,HCNC,RT

## 2020-07-08 PROCEDURE — 1101F PR PT FALLS ASSESS DOC 0-1 FALLS W/OUT INJ PAST YR: ICD-10-PCS | Mod: HCNC,CPTII,S$GLB, | Performed by: ORTHOPAEDIC SURGERY

## 2020-07-08 PROCEDURE — 1126F PR PAIN SEVERITY QUANTIFIED, NO PAIN PRESENT: ICD-10-PCS | Mod: HCNC,S$GLB,, | Performed by: ORTHOPAEDIC SURGERY

## 2020-07-08 PROCEDURE — 99213 PR OFFICE/OUTPT VISIT, EST, LEVL III, 20-29 MIN: ICD-10-PCS | Mod: HCNC,S$GLB,, | Performed by: ORTHOPAEDIC SURGERY

## 2020-07-08 PROCEDURE — 99213 OFFICE O/P EST LOW 20 MIN: CPT | Mod: HCNC,S$GLB,, | Performed by: ORTHOPAEDIC SURGERY

## 2020-07-08 PROCEDURE — 73562 X-RAY EXAM OF KNEE 3: CPT | Mod: 26,HCNC,LT, | Performed by: RADIOLOGY

## 2020-07-08 PROCEDURE — 1101F PT FALLS ASSESS-DOCD LE1/YR: CPT | Mod: HCNC,CPTII,S$GLB, | Performed by: ORTHOPAEDIC SURGERY

## 2020-07-08 PROCEDURE — 99999 PR PBB SHADOW E&M-EST. PATIENT-LVL III: ICD-10-PCS | Mod: PBBFAC,HCNC,, | Performed by: ORTHOPAEDIC SURGERY

## 2020-07-08 PROCEDURE — 99999 PR PBB SHADOW E&M-EST. PATIENT-LVL III: CPT | Mod: PBBFAC,HCNC,, | Performed by: ORTHOPAEDIC SURGERY

## 2020-07-08 PROCEDURE — 73562 X-RAY EXAM OF KNEE 3: CPT | Mod: TC,HCNC,LT

## 2020-07-08 PROCEDURE — 73560 XR KNEE ORTHO LEFT: ICD-10-PCS | Mod: 26,59,HCNC,RT | Performed by: RADIOLOGY

## 2020-07-08 NOTE — PROGRESS NOTES
"Subjective:      Patient ID: Nicho Espinosa is a 79 y.o. female.    Chief Complaint: Pain of the Left Knee    HPI  Nicho Espinosa has left knee pain.  The pain was bothersome, but has improved.  She has degenerative scoliosis and was having left sided radicular pain.. The pain is located in the lateral aspect of the knee.  There  is not radiation.   There is not associated stiffness.   There is not catching and locking. The pain is described as sharp. The pain is aggravated by activity.  It is alleviated by rest.  There is associated back pain.  Her history, medications and problem list were reviewed.    Review of Systems   Constitution: Negative for chills, fever and night sweats.   HENT: Negative for hearing loss.    Eyes: Negative for blurred vision and double vision.   Cardiovascular: Negative for chest pain, claudication and leg swelling.   Respiratory: Negative for shortness of breath.    Endocrine: Negative for polydipsia, polyphagia and polyuria.   Hematologic/Lymphatic: Negative for adenopathy and bleeding problem. Does not bruise/bleed easily.   Skin: Negative for poor wound healing.   Musculoskeletal: Positive for back pain and joint pain.   Gastrointestinal: Negative for diarrhea and heartburn.   Genitourinary: Negative for bladder incontinence.   Neurological: Negative for focal weakness, headaches, numbness, paresthesias and sensory change.   Psychiatric/Behavioral: The patient is not nervous/anxious.    Allergic/Immunologic: Negative for persistent infections.         Objective:      Body mass index is 32.3 kg/m².  Vitals:    07/08/20 1340   Weight: 77.6 kg (170 lb 15.5 oz)   Height: 5' 1" (1.549 m)           General    Constitutional: She is oriented to person, place, and time. She appears well-developed and well-nourished.   HENT:   Head: Normocephalic and atraumatic.   Eyes: EOM are normal.   Cardiovascular: Normal rate.    Pulmonary/Chest: Effort normal.   Neurological: She is alert and oriented to " person, place, and time.   Psychiatric: She has a normal mood and affect. Her behavior is normal.     General Musculoskeletal Exam   Gait: normal       Right Knee Exam     Inspection   Erythema: absent  Scars: present  Swelling: absent  Effusion: absent  Deformity: absent  Bruising: absent    Tenderness   The patient is experiencing no tenderness.     Range of Motion   Extension: 0   Flexion: 130     Tests   Ligament Examination Lachman: normal (-1 to 2mm)   MCL - Valgus: normal (0 to 2mm)  LCL - Varus: normal  Patella   Passive Patellar Tilt: neutral    Other   Sensation: normal    Left Knee Exam     Inspection   Erythema: absent  Scars: present  Swelling: absent  Effusion: absent  Deformity: absent  Bruising: absent    Tenderness   The patient is experiencing no tenderness.     Range of Motion   Extension: 0   Flexion: 130     Tests   Stability Lachman: normal (-1 to 2mm)   MCL - Valgus: normal (0 to 2mm)  LCL - Varus: normal (0 to 2mm)  Patella   Passive Patellar Tilt: neutral    Other   Sensation: normal    Muscle Strength   Right Lower Extremity   Hip Abduction: 5/5   Quadriceps:  5/5   Hamstrin/5   Left Lower Extremity   Hip Abduction: 5/5   Quadriceps:  5/5   Hamstrin/5     Reflexes     Left Side  Quadriceps:  2+    Right Side   Quadriceps:  2+    Vascular Exam     Right Pulses  Dorsalis Pedis:      2+          Left Pulses  Dorsalis Pedis:      2+          Edema  Right Lower Leg: absent  Left Lower Leg: absent      Radiographs taken today were reviewed by me.  There is a prosthetic replacement of the bilateral knee(s).  The prostheses are well positioned.  There is not evidence of bone loss, osteolysis, or loosening. There is not a fracture.              Assessment:       Encounter Diagnoses   Name Primary?    Chronic pain of left knee Yes    Status post total left knee replacement 2019           Plan:       Nicho was seen today for pain.    Diagnoses and all orders for this visit:    Chronic  pain of left knee    Status post total left knee replacement 2/5/2019        Doing well.    F/U in one year with xrays and PROMS

## 2020-07-13 ENCOUNTER — PES CALL (OUTPATIENT)
Dept: ADMINISTRATIVE | Facility: CLINIC | Age: 80
End: 2020-07-13

## 2020-09-09 ENCOUNTER — IMMUNIZATION (OUTPATIENT)
Dept: PHARMACY | Facility: CLINIC | Age: 80
End: 2020-09-09
Payer: MEDICARE

## 2020-09-18 ENCOUNTER — PATIENT OUTREACH (OUTPATIENT)
Dept: ADMINISTRATIVE | Facility: OTHER | Age: 80
End: 2020-09-18

## 2020-09-21 ENCOUNTER — OFFICE VISIT (OUTPATIENT)
Dept: DERMATOLOGY | Facility: CLINIC | Age: 80
End: 2020-09-21
Payer: MEDICARE

## 2020-09-21 DIAGNOSIS — D22.9 NEVUS: ICD-10-CM

## 2020-09-21 DIAGNOSIS — L21.9 SEBORRHEIC DERMATITIS, UNSPECIFIED: ICD-10-CM

## 2020-09-21 DIAGNOSIS — D18.01 CHERRY ANGIOMA: ICD-10-CM

## 2020-09-21 DIAGNOSIS — L72.0 MILIA: ICD-10-CM

## 2020-09-21 DIAGNOSIS — L81.4 LENTIGO: ICD-10-CM

## 2020-09-21 DIAGNOSIS — L82.1 SK (SEBORRHEIC KERATOSIS): ICD-10-CM

## 2020-09-21 DIAGNOSIS — L91.8 SKIN TAG: ICD-10-CM

## 2020-09-21 DIAGNOSIS — D48.5 NEOPLASM OF UNCERTAIN BEHAVIOR OF SKIN: Primary | ICD-10-CM

## 2020-09-21 DIAGNOSIS — L30.9 DERMATITIS: ICD-10-CM

## 2020-09-21 DIAGNOSIS — L82.0 INFLAMED SEBORRHEIC KERATOSIS: ICD-10-CM

## 2020-09-21 PROCEDURE — 11102 PR TANGENTIAL BIOPSY, SKIN, SINGLE LESION: ICD-10-PCS | Mod: 59,HCNC,S$GLB, | Performed by: DERMATOLOGY

## 2020-09-21 PROCEDURE — 11102 TANGNTL BX SKIN SINGLE LES: CPT | Mod: 59,HCNC,S$GLB, | Performed by: DERMATOLOGY

## 2020-09-21 PROCEDURE — 1159F MED LIST DOCD IN RCRD: CPT | Mod: HCNC,S$GLB,, | Performed by: DERMATOLOGY

## 2020-09-21 PROCEDURE — 17110 PR DESTRUCTION BENIGN LESIONS UP TO 14: ICD-10-PCS | Mod: HCNC,S$GLB,, | Performed by: DERMATOLOGY

## 2020-09-21 PROCEDURE — 99999 PR PBB SHADOW E&M-EST. PATIENT-LVL III: CPT | Mod: PBBFAC,HCNC,, | Performed by: DERMATOLOGY

## 2020-09-21 PROCEDURE — 1126F AMNT PAIN NOTED NONE PRSNT: CPT | Mod: HCNC,S$GLB,, | Performed by: DERMATOLOGY

## 2020-09-21 PROCEDURE — 1126F PR PAIN SEVERITY QUANTIFIED, NO PAIN PRESENT: ICD-10-PCS | Mod: HCNC,S$GLB,, | Performed by: DERMATOLOGY

## 2020-09-21 PROCEDURE — 1101F PR PT FALLS ASSESS DOC 0-1 FALLS W/OUT INJ PAST YR: ICD-10-PCS | Mod: HCNC,CPTII,S$GLB, | Performed by: DERMATOLOGY

## 2020-09-21 PROCEDURE — 99214 PR OFFICE/OUTPT VISIT, EST, LEVL IV, 30-39 MIN: ICD-10-PCS | Mod: 25,HCNC,S$GLB, | Performed by: DERMATOLOGY

## 2020-09-21 PROCEDURE — 99999 PR PBB SHADOW E&M-EST. PATIENT-LVL III: ICD-10-PCS | Mod: PBBFAC,HCNC,, | Performed by: DERMATOLOGY

## 2020-09-21 PROCEDURE — 88305 TISSUE EXAM BY PATHOLOGIST: ICD-10-PCS | Mod: 26,HCNC,, | Performed by: PATHOLOGY

## 2020-09-21 PROCEDURE — 1101F PT FALLS ASSESS-DOCD LE1/YR: CPT | Mod: HCNC,CPTII,S$GLB, | Performed by: DERMATOLOGY

## 2020-09-21 PROCEDURE — 88305 TISSUE EXAM BY PATHOLOGIST: CPT | Mod: HCNC | Performed by: PATHOLOGY

## 2020-09-21 PROCEDURE — 17110 DESTRUCTION B9 LES UP TO 14: CPT | Mod: HCNC,S$GLB,, | Performed by: DERMATOLOGY

## 2020-09-21 PROCEDURE — 1159F PR MEDICATION LIST DOCUMENTED IN MEDICAL RECORD: ICD-10-PCS | Mod: HCNC,S$GLB,, | Performed by: DERMATOLOGY

## 2020-09-21 PROCEDURE — 88305 TISSUE EXAM BY PATHOLOGIST: CPT | Mod: 26,HCNC,, | Performed by: PATHOLOGY

## 2020-09-21 PROCEDURE — 99214 OFFICE O/P EST MOD 30 MIN: CPT | Mod: 25,HCNC,S$GLB, | Performed by: DERMATOLOGY

## 2020-09-21 RX ORDER — BETAMETHASONE DIPROPIONATE 0.5 MG/G
CREAM TOPICAL
Qty: 60 G | Refills: 1 | Status: SHIPPED | OUTPATIENT
Start: 2020-09-21 | End: 2021-02-01

## 2020-09-21 RX ORDER — KETOCONAZOLE 20 MG/G
CREAM TOPICAL
Qty: 60 G | Refills: 3 | Status: SHIPPED | OUTPATIENT
Start: 2020-09-21 | End: 2021-02-06

## 2020-09-21 NOTE — PROGRESS NOTES
"  Subjective:       Patient ID:  Nicho Espinosa is a 79 y.o. female who presents for   Chief Complaint   Patient presents with    Skin Check     Pt here today for a TBSE  Pt c/o painful sores to all over body x 2 mos. Tender. No prev tx; pt stated that she got the shingles shot over 2 months ago and then this started.  Then she got the second one 1 week ago.  No new foods. No nrew oral medications.  No new illness in past 2 months. Lesions dont really itch.   Pt c/o growth to back x few months. Itching. irrittated by bra. No prev tx    Patient is here today for a "mole" check.   Pt has a history of  moderate sun exposure in the past.   Pt recalls several blistering sunburns in the past- no  Pt has history of tanning bed use- no  Pt has  had moles removed in the past- yes, benign per pt  Pt has history of melanoma in first degree relatives-  No      Review of Systems   Constitutional: Negative for fever.   HENT: Negative for mouth sores.    Skin: Positive for itching, rash, dry skin and activity-related sunscreen use. Negative for tendency to form keloidal scars.   Hematologic/Lymphatic: Does not bruise/bleed easily.        Objective:    Physical Exam   Constitutional: She appears well-developed and well-nourished. No distress.   Neurological: She is alert and oriented to person, place, and time. She is not disoriented.   Psychiatric: She has a normal mood and affect.   Skin:   Areas Examined (abnormalities noted in diagram):   Scalp / Hair Palpated and Inspected  Head / Face Inspection Performed  Neck Inspection Performed  Chest / Axilla Inspection Performed  Abdomen Inspection Performed  Genitals / Buttocks / Groin Inspection Performed  Back Inspection Performed  RUE Inspected  LUE Inspection Performed  RLE Inspected  LLE Inspection Performed  Nails and Digits Inspection Performed                       Diagram Legend     Erythematous scaling macule/papule c/w actinic keratosis       Vascular papule c/w angioma      " Pigmented verrucoid papule/plaque c/w seborrheic keratosis      Yellow umbilicated papule c/w sebaceous hyperplasia      Irregularly shaped tan macule c/w lentigo     1-2 mm smooth white papules consistent with Milia      Movable subcutaneous cyst with punctum c/w epidermal inclusion cyst      Subcutaneous movable cyst c/w pilar cyst      Firm pink to brown papule c/w dermatofibroma      Pedunculated fleshy papule(s) c/w skin tag(s)      Evenly pigmented macule c/w junctional nevus     Mildly variegated pigmented, slightly irregular-bordered macule c/w mildly atypical nevus      Flesh colored to evenly pigmented papule c/w intradermal nevus       Pink pearly papule/plaque c/w basal cell carcinoma      Erythematous hyperkeratotic cursted plaque c/w SCC      Surgical scar with no sign of skin cancer recurrence      Open and closed comedones      Inflammatory papules and pustules      Verrucoid papule consistent consistent with wart     Erythematous eczematous patches and plaques     Dystrophic onycholytic nail with subungual debris c/w onychomycosis     Umbilicated papule    Erythematous-base heme-crusted tan verrucoid plaque consistent with inflamed seborrheic keratosis     Erythematous Silvery Scaling Plaque c/w Psoriasis     See annotation      Assessment / Plan:      Pathology Orders:     Normal Orders This Visit    Specimen to Pathology, Dermatology     Questions:    Procedure Type: Dermatology and skin neoplasms    Number of Specimens: 1    ------------------------: -------------------------    Spec 1 Procedure: Biopsy    Spec 1 Clinical Impression: r/o inflamed keratosis    Spec 1 Source: left mid back        Neoplasm of uncertain behavior of skin  Shave biopsy procedure note:    Shave biopsy performed after verbal consent including risk of infection, scar, recurrence, need for additional treatment of site. Area prepped with alcohol, anesthetized with approximately 1.0cc of 1% lidocaine with epinephrine.  Lesional tissue shaved with razor blade. Hemostasis achieved with application of aluminum chloride followed by hyfrecation. No complications. Dressing applied. Wound care explained.      -     Specimen to Pathology, Dermatology    Seborrheic dermatitis, unspecified  -     ketoconazole (NIZORAL) 2 % cream; AAA qd- bid to face  Dispense: 60 g; Refill: 3    Dermatitis- suspected LP  -     betamethasone dipropionate (DIPROLENE) 0.05 % cream; AAA qd- bid to rash on legs and back ; not more than 2 weeks in same location. Avoid use on face and groin  Dispense: 60 g; Refill: 1  Consider flagyl if not improved    Milia  Reassurance given to patient. No treatment is necessary.   Treatment of benign, asymptomatic lesions may be considered cosmetic.  otc retinol     Nevus  Discussed ABCDE's of nevi.  Monitor for new mole or moles that are becoming bigger, darker, irritated, or developing irregular borders. Brochure provided.    Lentigo  This is a benign hyperpigmented sun induced lesion. Daily sun protection will reduce the number of new lesions. Treatment of these benign lesions are considered cosmetic.  The nature of sun-induced photo-aging and skin cancers is discussed.  Sun avoidance, protective clothing, and the use of 30-SPF sunscreens is advised. Observe closely for skin damage/changes, and call if such occurs.    Skin tag  Reassurance given to patient. No treatment is necessary.   Treatment of benign, asymptomatic lesions may be considered cosmetic.    SK (seborrheic keratosis)  These are benign inherited growths without a malignant potential. Reassurance given to patient. No treatment is necessary.     Cherry angioma  These are benign vascular lesions that are inherited.  Treatment is not necessary.    Inflamed seborrheic keratosis  Cryosurgery procedure note:    Verbal consent from the patient is obtained including, but not limited to, risk of hypopigmentation/hyperpigmentation, scar, recurrence of lesion. Liquid  nitrogen cryosurgery is applied to 1 lesions to produce a freeze injury. The patient is aware that blisters may form and is instructed on wound care with gentle cleansing and use of vaseline ointment to keep moist until healed. The patient is supplied a handout on cryosurgery and is instructed to call if lesions do not completely resolve.    Area of previous melanoma examined. Site well healed with no signs of recurrence.    Total body skin examination performed today including at least 12 points as noted in physical examination. Suspicious lesions noted.           Follow up in about 4 weeks (around 10/19/2020).

## 2020-09-23 LAB
FINAL PATHOLOGIC DIAGNOSIS: NORMAL
GROSS: NORMAL

## 2020-09-25 ENCOUNTER — PATIENT MESSAGE (OUTPATIENT)
Dept: DERMATOLOGY | Facility: CLINIC | Age: 80
End: 2020-09-25

## 2020-10-08 ENCOUNTER — IMMUNIZATION (OUTPATIENT)
Dept: PHARMACY | Facility: CLINIC | Age: 80
End: 2020-10-08
Payer: MEDICARE

## 2020-10-11 NOTE — INTERVAL H&P NOTE
The patient has been examined and the H&P has been reviewed:    No acute interval changes.    Anesthesia/Surgery risks, benefits and alternative options discussed and understood by patient/family.          There are no hospital problems to display for this patient.    
---

## 2020-11-02 ENCOUNTER — OFFICE VISIT (OUTPATIENT)
Dept: DERMATOLOGY | Facility: CLINIC | Age: 80
End: 2020-11-02
Payer: MEDICARE

## 2020-11-02 ENCOUNTER — OFFICE VISIT (OUTPATIENT)
Dept: OBSTETRICS AND GYNECOLOGY | Facility: CLINIC | Age: 80
End: 2020-11-02
Attending: OBSTETRICS & GYNECOLOGY
Payer: MEDICARE

## 2020-11-02 VITALS
WEIGHT: 178.13 LBS | DIASTOLIC BLOOD PRESSURE: 62 MMHG | BODY MASS INDEX: 33.63 KG/M2 | SYSTOLIC BLOOD PRESSURE: 132 MMHG | HEIGHT: 61 IN

## 2020-11-02 DIAGNOSIS — L72.0 EPIDERMAL CYST: ICD-10-CM

## 2020-11-02 DIAGNOSIS — L30.9 DERMATITIS: Primary | ICD-10-CM

## 2020-11-02 DIAGNOSIS — Z01.419 ENCOUNTER FOR GYNECOLOGICAL EXAMINATION WITHOUT ABNORMAL FINDING: Primary | ICD-10-CM

## 2020-11-02 PROCEDURE — 1126F PR PAIN SEVERITY QUANTIFIED, NO PAIN PRESENT: ICD-10-PCS | Mod: HCNC,S$GLB,, | Performed by: DERMATOLOGY

## 2020-11-02 PROCEDURE — 1159F MED LIST DOCD IN RCRD: CPT | Mod: HCNC,S$GLB,, | Performed by: DERMATOLOGY

## 2020-11-02 PROCEDURE — 11104 PR PUNCH BIOPSY, SKIN, SINGLE LESION: ICD-10-PCS | Mod: HCNC,S$GLB,, | Performed by: DERMATOLOGY

## 2020-11-02 PROCEDURE — 11104 PUNCH BX SKIN SINGLE LESION: CPT | Mod: HCNC,S$GLB,, | Performed by: DERMATOLOGY

## 2020-11-02 PROCEDURE — 99999 PR PBB SHADOW E&M-EST. PATIENT-LVL III: CPT | Mod: PBBFAC,HCNC,, | Performed by: DERMATOLOGY

## 2020-11-02 PROCEDURE — 88305 TISSUE EXAM BY PATHOLOGIST: CPT | Mod: HCNC | Performed by: PATHOLOGY

## 2020-11-02 PROCEDURE — 99999 PR PBB SHADOW E&M-EST. PATIENT-LVL III: ICD-10-PCS | Mod: PBBFAC,HCNC,, | Performed by: DERMATOLOGY

## 2020-11-02 PROCEDURE — G0101 PR CA SCREEN;PELVIC/BREAST EXAM: ICD-10-PCS | Mod: S$GLB,,, | Performed by: OBSTETRICS & GYNECOLOGY

## 2020-11-02 PROCEDURE — 88305 TISSUE EXAM BY PATHOLOGIST: ICD-10-PCS | Mod: 26,HCNC,, | Performed by: PATHOLOGY

## 2020-11-02 PROCEDURE — 1101F PT FALLS ASSESS-DOCD LE1/YR: CPT | Mod: HCNC,CPTII,S$GLB, | Performed by: DERMATOLOGY

## 2020-11-02 PROCEDURE — 99213 PR OFFICE/OUTPT VISIT, EST, LEVL III, 20-29 MIN: ICD-10-PCS | Mod: 25,HCNC,S$GLB, | Performed by: DERMATOLOGY

## 2020-11-02 PROCEDURE — 99213 OFFICE O/P EST LOW 20 MIN: CPT | Mod: 25,HCNC,S$GLB, | Performed by: DERMATOLOGY

## 2020-11-02 PROCEDURE — 1126F AMNT PAIN NOTED NONE PRSNT: CPT | Mod: HCNC,S$GLB,, | Performed by: DERMATOLOGY

## 2020-11-02 PROCEDURE — 1101F PR PT FALLS ASSESS DOC 0-1 FALLS W/OUT INJ PAST YR: ICD-10-PCS | Mod: HCNC,CPTII,S$GLB, | Performed by: DERMATOLOGY

## 2020-11-02 PROCEDURE — G0101 CA SCREEN;PELVIC/BREAST EXAM: HCPCS | Mod: S$GLB,,, | Performed by: OBSTETRICS & GYNECOLOGY

## 2020-11-02 PROCEDURE — 88305 TISSUE EXAM BY PATHOLOGIST: CPT | Mod: 26,HCNC,, | Performed by: PATHOLOGY

## 2020-11-02 PROCEDURE — 1159F PR MEDICATION LIST DOCUMENTED IN MEDICAL RECORD: ICD-10-PCS | Mod: HCNC,S$GLB,, | Performed by: DERMATOLOGY

## 2020-11-02 RX ORDER — TACROLIMUS 1 MG/G
OINTMENT TOPICAL
Qty: 60 G | Refills: 3 | Status: SHIPPED | OUTPATIENT
Start: 2020-11-02 | End: 2021-02-01

## 2020-11-02 NOTE — PROGRESS NOTES
Subjective:       Patient ID: Nicho Espinosa is a 79 y.o. female.    Chief Complaint:  Well Woman      History of Present Illness  HPI    Nicho Espinosa is a 79 y.o. female  (new to me, patient of Dr. Quick) here for her annual GYN exam.    She describes her periods as stopped with menopause many years ago. She is on aromatase therapy for Estrogen receptor positive breast cancer, and underwent Right Mastectomy in 2017.    denies break through bleeding.   denies vaginal itching or irritation.  Denies vaginal discharge.  She is not sexually active.    History of abnormal pap: No  Last Pap: approximate date  and was normal  Last MMG: normal-(LEFT , 2020-routine follow-up in 12 months  Last Colonoscopy:  colonoscopy a few years ago with abnormalities.  denies domestic violence. She does feel safe at home.     Past Medical History:   Diagnosis Date    Allergy     Arthritis     Blepharitis of both eyes     breast ca 2017    right    Complication of anesthesia     nausea    Degenerative disc disease     GERD (gastroesophageal reflux disease)     patient denies reflux    History of compression fracture of spine 4/10/2014    Hyperlipidemia     Lumbar disc disease     Meibomitis     Obesity 2013    Osteoporosis     Papilloma of eyelid     RUL    Postoperative anemia 2013    Thoracic or lumbosacral neuritis or radiculitis, unspecified 2013    Tremors of nervous system 2015     Past Surgical History:   Procedure Laterality Date    APPENDECTOMY      BREAST BIOPSY Right 2017    BREAST SURGERY      COLONOSCOPY N/A 2016    Procedure: COLONOSCOPY;  Surgeon: Candelario Oviedo MD;  Location: [address];  Service: Endoscopy;  Laterality: N/A;  may use Prepopik or other low volume prep    FOOT SURGERY      JOINT REPLACEMENT Bilateral     ,     MASTECTOMY Right 2017    SHOULDER ARTHROSCOPY      left    SPINE SURGERY  1-12-15    [facility]     TONSILLECTOMY      TOTAL KNEE ARTHROPLASTY       Social History     Socioeconomic History    Marital status: Single     Spouse name: Not on file    Number of children: Not on file    Years of education: Not on file    Highest education level: Not on file   Occupational History    Not on file   Social Needs    Financial resource strain: Not on file    Food insecurity     Worry: Not on file     Inability: Not on file    Transportation needs     Medical: Not on file     Non-medical: Not on file   Tobacco Use    Smoking status: Former Smoker     Packs/day: 0.25     Years: 40.00     Pack years: 10.00     Types: Cigarettes     Quit date: 2011     Years since quittin.8    Smokeless tobacco: Never Used   Substance and Sexual Activity    Alcohol use: No     Comment: 1 glass wine 2x/year    Drug use: No    Sexual activity: Not Currently     Birth control/protection: Post-menopausal   Lifestyle    Physical activity     Days per week: Not on file     Minutes per session: Not on file    Stress: Not on file   Relationships    Social connections     Talks on phone: Not on file     Gets together: Not on file     Attends Anabaptism service: Not on file     Active member of club or organization: Not on file     Attends meetings of clubs or organizations: Not on file     Relationship status: Not on file   Other Topics Concern    Are you pregnant or think you may be? Not Asked    Breast-feeding Not Asked   Social History Narrative    Lives in a duplex, daughter lives downstairs     Family History   Problem Relation Age of Onset    Cancer Father         bone    Breast cancer Mother         never had biopsy    Dementia Mother         vascular    No Known Problems Brother     No Known Problems Brother     Stroke Daughter     Diabetes Daughter         was obese    Cancer Paternal Grandmother         skin cancer, unknown type    Cancer Paternal Uncle         stomach    Cancer Other         possibly other  "cancers in other relatives    Cancer Daughter         skin cancer, unknown type    Dementia Maternal Grandmother     Hypertension Neg Hx     Ovarian cancer Neg Hx     Melanoma Neg Hx      OB History        2    Para   2    Term   2            AB        Living   2       SAB        TAB        Ectopic        Multiple        Live Births   2                 /62   Ht 5' 1" (1.549 m)   Wt 80.8 kg (178 lb 2.1 oz)   LMP 1987   BMI 33.66 kg/m²         GYN & OB History  Patient's last menstrual period was 1987.   Date of Last Pap: No result found    OB History    Para Term  AB Living   2 2 2     2   SAB TAB Ectopic Multiple Live Births           2      # Outcome Date GA Lbr Eladio/2nd Weight Sex Delivery Anes PTL Lv   2 Term      Vag-Spont   DIDI   1 Term      Vag-Spont   DIDI       Review of Systems  Review of Systems   Constitutional: Negative for activity change, appetite change, fatigue and unexpected weight change.   HENT: Negative.    Eyes: Negative for visual disturbance.   Respiratory: Negative for shortness of breath and wheezing.    Cardiovascular: Negative for chest pain, palpitations and leg swelling.   Gastrointestinal: Negative for abdominal pain, bloating and blood in stool.   Endocrine: Negative for diabetes, hair loss and hot flashes.   Genitourinary: Positive for bladder incontinence and vaginal dryness. Negative for decreased libido, dyspareunia and hot flashes.   Musculoskeletal: Positive for arthralgias, back pain, joint swelling, leg pain and myalgias.   Integumentary:  Negative for acne, hair changes and nipple discharge.   Neurological: Negative for headaches.   Hematological: Does not bruise/bleed easily.   Psychiatric/Behavioral: Negative for depression and sleep disturbance. The patient is not nervous/anxious.    Breast: Negative for mastodynia and nipple discharge          Objective:      Physical Exam:   Constitutional: She is oriented to person, " place, and time. She appears well-developed and well-nourished.    HENT:   Head: Normocephalic and atraumatic.    Eyes: Pupils are equal, round, and reactive to light. EOM are normal.    Neck: Normal range of motion. Neck supple.    Cardiovascular: Normal rate and regular rhythm.     Pulmonary/Chest: Effort normal and breath sounds normal.   Deferred        Abdominal: Soft. Bowel sounds are normal.     Genitourinary:    Pelvic exam was performed with patient supine.      Genitourinary Comments: PELVIC: Normal external genitalia without lesions.  Normal hair distribution.  Adequate perineal body, normal urethral meatus.  Vagina  Dry and poorly rugated, atrophic, without lesions or discharge.  Cervix pink, without lesions, discharge or tenderness.  No significant cystocele or rectocele.  Bimanual exam shows uterus and adnexae NOT PALPABLE SECONDARY TO HABITUS,  RECTAL:Deferred               Musculoskeletal: Normal range of motion and moves all extremeties.       Neurological: She is alert and oriented to person, place, and time.    Skin: Skin is warm and dry.    Psychiatric: She has a normal mood and affect.              Assessment:        1. Encounter for gynecological examination without abnormal finding                  Plan:        1. Encounter for gynecological examination without abnormal finding           Follow up in about 1 year (around 11/2/2021).

## 2020-11-02 NOTE — PROGRESS NOTES
Subjective:       Patient ID:  Nicho Espinosa is a 79 y.o. female who presents for   Chief Complaint   Patient presents with    Follow-up     Pt here today for a follow up on Dermatitis- suspected LP tx with betamethasone dipropionate (DIPROLENE) 0.05 % cream with minimal improvement. Rash is on her legs and her lower back ; still getting new lesions. mildly itchy. No oral lesions    Also has lesion on left groin. Raised. Not tender or changing. Present for years. No tx.       Review of Systems   HENT: Negative for mouth sores.    Skin: Positive for itching, rash and dry skin.        Objective:    Physical Exam   Constitutional: She appears well-developed and well-nourished. No distress.   Neurological: She is alert and oriented to person, place, and time. She is not disoriented.   Psychiatric: She has a normal mood and affect.   Skin:   Areas Examined (abnormalities noted in diagram):   Back Inspection Performed  RUE Inspected  LUE Inspection Performed  RLE Inspected  LLE Inspection Performed                  Diagram Legend     Erythematous scaling macule/papule c/w actinic keratosis       Vascular papule c/w angioma      Pigmented verrucoid papule/plaque c/w seborrheic keratosis      Yellow umbilicated papule c/w sebaceous hyperplasia      Irregularly shaped tan macule c/w lentigo     1-2 mm smooth white papules consistent with Milia      Movable subcutaneous cyst with punctum c/w epidermal inclusion cyst      Subcutaneous movable cyst c/w pilar cyst      Firm pink to brown papule c/w dermatofibroma      Pedunculated fleshy papule(s) c/w skin tag(s)      Evenly pigmented macule c/w junctional nevus     Mildly variegated pigmented, slightly irregular-bordered macule c/w mildly atypical nevus      Flesh colored to evenly pigmented papule c/w intradermal nevus       Pink pearly papule/plaque c/w basal cell carcinoma      Erythematous hyperkeratotic cursted plaque c/w SCC      Surgical scar with no sign of skin  cancer recurrence      Open and closed comedones      Inflammatory papules and pustules      Verrucoid papule consistent consistent with wart     Erythematous eczematous patches and plaques     Dystrophic onycholytic nail with subungual debris c/w onychomycosis     Umbilicated papule    Erythematous-base heme-crusted tan verrucoid plaque consistent with inflamed seborrheic keratosis     Erythematous Silvery Scaling Plaque c/w Psoriasis     See annotation      Assessment / Plan:      Pathology Orders:     Normal Orders This Visit    Specimen to Pathology, Dermatology     Questions:    Procedure Type: Dermatology and skin neoplasms    Number of Specimens: 1    ------------------------: -------------------------    Spec 1 Procedure: Biopsy    Spec 1 Clinical Impression: r/o lichen planus    Spec 1 Source: left thigh        Dermatitis  Punch biopsy procedure note:  Punch biopsy performed after verbal consent obtained. Area marked and prepped with alcohol. Approximately 1cc of 1% lidocaine with epinephrine injected. 4 mm disposable punch used to remove lesion. Hemostasis obtained and biopsy site closed with 1 - 2 Prolene sutures. Wound care instructions reviewed with patient and handout given.    -     Specimen to Pathology, Dermatology  -     tacrolimus (PROTOPIC) 0.1 % ointment; aaa bid  Dispense: 60 g; Refill: 3  Diprolene for itching areas    Epidermal cyst    Reassurance given to patient. No treatment is necessary.   Treatment of benign, asymptomatic lesions may be considered cosmetic.           Follow up in about 2 weeks (around 11/16/2020) for po.

## 2020-11-05 LAB
FINAL PATHOLOGIC DIAGNOSIS: NORMAL
GROSS: NORMAL
MICROSCOPIC EXAM: NORMAL

## 2020-11-16 ENCOUNTER — CLINICAL SUPPORT (OUTPATIENT)
Dept: DERMATOLOGY | Facility: CLINIC | Age: 80
End: 2020-11-16
Payer: MEDICARE

## 2020-12-03 DIAGNOSIS — Z79.811 USE OF AROMATASE INHIBITORS: ICD-10-CM

## 2020-12-03 NOTE — TELEPHONE ENCOUNTER
"----- Message from Sadya Urban sent at 12/3/2020 12:20 PM CST -----   Consult/Advisory:    Name Of Caller: Nicho   Contact Preference?: [phone]    Does patient feel the need to be seen today? No    What is the nature of the call?:  -pt request a callback to confirm if it is time to schedule follow up appointment.  She states she has not received anything in mail and typically seen every 4mths    Additional Notes:  "Thank you for all that you do for our patients'"    "

## 2020-12-04 RX ORDER — ANASTROZOLE 1 MG/1
1 TABLET ORAL DAILY
Qty: 90 TABLET | Refills: 2 | Status: SHIPPED | OUTPATIENT
Start: 2020-12-04 | End: 2021-09-07 | Stop reason: SDUPTHER

## 2020-12-14 ENCOUNTER — PES CALL (OUTPATIENT)
Dept: ADMINISTRATIVE | Facility: CLINIC | Age: 80
End: 2020-12-14

## 2020-12-15 NOTE — PROGRESS NOTES
Subjective:       Patient ID:  Nicho Espinosa is a 80 y.o. female who presents for No chief complaint on file.    HPI    Review of Systems     Objective:    Physical Exam       Diagram Legend     Erythematous scaling macule/papule c/w actinic keratosis       Vascular papule c/w angioma      Pigmented verrucoid papule/plaque c/w seborrheic keratosis      Yellow umbilicated papule c/w sebaceous hyperplasia      Irregularly shaped tan macule c/w lentigo     1-2 mm smooth white papules consistent with Milia      Movable subcutaneous cyst with punctum c/w epidermal inclusion cyst      Subcutaneous movable cyst c/w pilar cyst      Firm pink to brown papule c/w dermatofibroma      Pedunculated fleshy papule(s) c/w skin tag(s)      Evenly pigmented macule c/w junctional nevus     Mildly variegated pigmented, slightly irregular-bordered macule c/w mildly atypical nevus      Flesh colored to evenly pigmented papule c/w intradermal nevus       Pink pearly papule/plaque c/w basal cell carcinoma      Erythematous hyperkeratotic cursted plaque c/w SCC      Surgical scar with no sign of skin cancer recurrence      Open and closed comedones      Inflammatory papules and pustules      Verrucoid papule consistent consistent with wart     Erythematous eczematous patches and plaques     Dystrophic onycholytic nail with subungual debris c/w onychomycosis     Umbilicated papule    Erythematous-base heme-crusted tan verrucoid plaque consistent with inflamed seborrheic keratosis     Erythematous Silvery Scaling Plaque c/w Psoriasis     See annotation      Assessment / Plan:        There are no diagnoses linked to this encounter.         No follow-ups on file.

## 2020-12-30 ENCOUNTER — TELEPHONE (OUTPATIENT)
Dept: HEMATOLOGY/ONCOLOGY | Facility: CLINIC | Age: 80
End: 2020-12-30

## 2021-01-10 ENCOUNTER — IMMUNIZATION (OUTPATIENT)
Dept: INTERNAL MEDICINE | Facility: CLINIC | Age: 81
End: 2021-01-10
Payer: MEDICARE

## 2021-01-10 DIAGNOSIS — Z23 NEED FOR VACCINATION: ICD-10-CM

## 2021-01-10 PROCEDURE — 91300 COVID-19, MRNA, LNP-S, PF, 30 MCG/0.3 ML DOSE VACCINE: CPT | Mod: PBBFAC | Performed by: INTERNAL MEDICINE

## 2021-01-28 ENCOUNTER — PATIENT MESSAGE (OUTPATIENT)
Dept: INTERNAL MEDICINE | Facility: CLINIC | Age: 81
End: 2021-01-28

## 2021-01-29 ENCOUNTER — TELEPHONE (OUTPATIENT)
Dept: INTERNAL MEDICINE | Facility: CLINIC | Age: 81
End: 2021-01-29

## 2021-01-31 ENCOUNTER — IMMUNIZATION (OUTPATIENT)
Dept: INTERNAL MEDICINE | Facility: CLINIC | Age: 81
End: 2021-01-31
Payer: MEDICARE

## 2021-01-31 DIAGNOSIS — Z23 NEED FOR VACCINATION: Primary | ICD-10-CM

## 2021-01-31 PROCEDURE — 91300 PR SARS-COV- 2 COVID-19 VACCINE, NO PRSV, 30MCG/0.3ML, IM: CPT | Mod: PBBFAC | Performed by: INTERNAL MEDICINE

## 2021-01-31 PROCEDURE — 0002A PR IMMUNIZ ADMIN, SARS-COV-2 COVID-19 VACC, 30MCG/0.3ML, 2ND DOSE: CPT | Mod: PBBFAC | Performed by: INTERNAL MEDICINE

## 2021-01-31 RX ADMIN — RNA INGREDIENT BNT-162B2 0.3 ML: 0.23 INJECTION, SUSPENSION INTRAMUSCULAR at 12:01

## 2021-02-01 ENCOUNTER — OFFICE VISIT (OUTPATIENT)
Dept: INTERNAL MEDICINE | Facility: CLINIC | Age: 81
End: 2021-02-01
Payer: MEDICARE

## 2021-02-01 VITALS
SYSTOLIC BLOOD PRESSURE: 126 MMHG | BODY MASS INDEX: 34.63 KG/M2 | HEART RATE: 73 BPM | WEIGHT: 183.44 LBS | HEIGHT: 61 IN | OXYGEN SATURATION: 98 % | DIASTOLIC BLOOD PRESSURE: 60 MMHG

## 2021-02-01 DIAGNOSIS — D53.9 NUTRITIONAL ANEMIA, UNSPECIFIED: ICD-10-CM

## 2021-02-01 DIAGNOSIS — R30.0 DYSURIA: ICD-10-CM

## 2021-02-01 DIAGNOSIS — Z98.1 S/P LUMBAR SPINAL FUSION: ICD-10-CM

## 2021-02-01 DIAGNOSIS — Z79.811 PROPHYLACTIC USE OF ANASTROZOLE (ARIMIDEX): ICD-10-CM

## 2021-02-01 DIAGNOSIS — I70.0 AORTIC ATHEROSCLEROSIS: ICD-10-CM

## 2021-02-01 DIAGNOSIS — Z17.0 MALIGNANT NEOPLASM OF UPPER-OUTER QUADRANT OF RIGHT BREAST IN FEMALE, ESTROGEN RECEPTOR POSITIVE: ICD-10-CM

## 2021-02-01 DIAGNOSIS — Z12.11 SCREEN FOR COLON CANCER: ICD-10-CM

## 2021-02-01 DIAGNOSIS — D63.8 ANEMIA OF CHRONIC DISEASE: ICD-10-CM

## 2021-02-01 DIAGNOSIS — R09.82 POST-NASAL DRIP: ICD-10-CM

## 2021-02-01 DIAGNOSIS — M81.0 AGE-RELATED OSTEOPOROSIS WITHOUT CURRENT PATHOLOGICAL FRACTURE: ICD-10-CM

## 2021-02-01 DIAGNOSIS — M43.17 SPONDYLOLISTHESIS AT L5-S1 LEVEL: ICD-10-CM

## 2021-02-01 DIAGNOSIS — C50.411 MALIGNANT NEOPLASM OF UPPER-OUTER QUADRANT OF RIGHT BREAST IN FEMALE, ESTROGEN RECEPTOR POSITIVE: ICD-10-CM

## 2021-02-01 DIAGNOSIS — N18.31 STAGE 3A CHRONIC KIDNEY DISEASE: Primary | ICD-10-CM

## 2021-02-01 DIAGNOSIS — E78.1 HYPERTRIGLYCERIDEMIA: ICD-10-CM

## 2021-02-01 DIAGNOSIS — M46.1 SACROILIITIS, NOT ELSEWHERE CLASSIFIED: ICD-10-CM

## 2021-02-01 LAB
BACTERIA #/AREA URNS AUTO: ABNORMAL /HPF
BILIRUB UR QL STRIP: NEGATIVE
CLARITY UR REFRACT.AUTO: ABNORMAL
COLOR UR AUTO: YELLOW
GLUCOSE UR QL STRIP: NEGATIVE
HGB UR QL STRIP: NEGATIVE
HYALINE CASTS UR QL AUTO: 0 /LPF
KETONES UR QL STRIP: NEGATIVE
LEUKOCYTE ESTERASE UR QL STRIP: ABNORMAL
MICROSCOPIC COMMENT: ABNORMAL
NITRITE UR QL STRIP: NEGATIVE
PH UR STRIP: 5 [PH] (ref 5–8)
PROT UR QL STRIP: ABNORMAL
RBC #/AREA URNS AUTO: 6 /HPF (ref 0–4)
SP GR UR STRIP: 1.02 (ref 1–1.03)
URN SPEC COLLECT METH UR: ABNORMAL
WBC #/AREA URNS AUTO: >100 /HPF (ref 0–5)

## 2021-02-01 PROCEDURE — 99215 PR OFFICE/OUTPT VISIT, EST, LEVL V, 40-54 MIN: ICD-10-PCS | Mod: S$GLB,,, | Performed by: INTERNAL MEDICINE

## 2021-02-01 PROCEDURE — 87088 URINE BACTERIA CULTURE: CPT

## 2021-02-01 PROCEDURE — 87077 CULTURE AEROBIC IDENTIFY: CPT

## 2021-02-01 PROCEDURE — 99999 PR PBB SHADOW E&M-EST. PATIENT-LVL V: ICD-10-PCS | Mod: PBBFAC,,, | Performed by: INTERNAL MEDICINE

## 2021-02-01 PROCEDURE — 81001 URINALYSIS AUTO W/SCOPE: CPT

## 2021-02-01 PROCEDURE — 1101F PT FALLS ASSESS-DOCD LE1/YR: CPT | Mod: CPTII,S$GLB,, | Performed by: INTERNAL MEDICINE

## 2021-02-01 PROCEDURE — 99999 PR PBB SHADOW E&M-EST. PATIENT-LVL V: CPT | Mod: PBBFAC,,, | Performed by: INTERNAL MEDICINE

## 2021-02-01 PROCEDURE — 1159F PR MEDICATION LIST DOCUMENTED IN MEDICAL RECORD: ICD-10-PCS | Mod: S$GLB,,, | Performed by: INTERNAL MEDICINE

## 2021-02-01 PROCEDURE — 3288F FALL RISK ASSESSMENT DOCD: CPT | Mod: CPTII,S$GLB,, | Performed by: INTERNAL MEDICINE

## 2021-02-01 PROCEDURE — 1101F PR PT FALLS ASSESS DOC 0-1 FALLS W/OUT INJ PAST YR: ICD-10-PCS | Mod: CPTII,S$GLB,, | Performed by: INTERNAL MEDICINE

## 2021-02-01 PROCEDURE — 1125F AMNT PAIN NOTED PAIN PRSNT: CPT | Mod: S$GLB,,, | Performed by: INTERNAL MEDICINE

## 2021-02-01 PROCEDURE — 87086 URINE CULTURE/COLONY COUNT: CPT

## 2021-02-01 PROCEDURE — 99499 UNLISTED E&M SERVICE: CPT | Mod: S$GLB,,, | Performed by: INTERNAL MEDICINE

## 2021-02-01 PROCEDURE — 3288F PR FALLS RISK ASSESSMENT DOCUMENTED: ICD-10-PCS | Mod: CPTII,S$GLB,, | Performed by: INTERNAL MEDICINE

## 2021-02-01 PROCEDURE — 87186 SC STD MICRODIL/AGAR DIL: CPT

## 2021-02-01 PROCEDURE — 1125F PR PAIN SEVERITY QUANTIFIED, PAIN PRESENT: ICD-10-PCS | Mod: S$GLB,,, | Performed by: INTERNAL MEDICINE

## 2021-02-01 PROCEDURE — 99499 RISK ADDL DX/OHS AUDIT: ICD-10-PCS | Mod: S$GLB,,, | Performed by: INTERNAL MEDICINE

## 2021-02-01 PROCEDURE — 99215 OFFICE O/P EST HI 40 MIN: CPT | Mod: S$GLB,,, | Performed by: INTERNAL MEDICINE

## 2021-02-01 PROCEDURE — 1159F MED LIST DOCD IN RCRD: CPT | Mod: S$GLB,,, | Performed by: INTERNAL MEDICINE

## 2021-02-01 RX ORDER — CIPROFLOXACIN 250 MG/1
250 TABLET, FILM COATED ORAL 2 TIMES DAILY
Qty: 6 TABLET | Refills: 0 | Status: SHIPPED | OUTPATIENT
Start: 2021-02-01 | End: 2021-02-04 | Stop reason: ALTCHOICE

## 2021-02-01 RX ORDER — MONTELUKAST SODIUM 10 MG/1
10 TABLET ORAL NIGHTLY
Qty: 90 TABLET | Refills: 0 | Status: SHIPPED | OUTPATIENT
Start: 2021-02-01 | End: 2021-03-03

## 2021-02-03 ENCOUNTER — PATIENT MESSAGE (OUTPATIENT)
Dept: INTERNAL MEDICINE | Facility: CLINIC | Age: 81
End: 2021-02-03

## 2021-02-04 ENCOUNTER — TELEPHONE (OUTPATIENT)
Dept: INTERNAL MEDICINE | Facility: CLINIC | Age: 81
End: 2021-02-04

## 2021-02-04 ENCOUNTER — OFFICE VISIT (OUTPATIENT)
Dept: URGENT CARE | Facility: CLINIC | Age: 81
End: 2021-02-04
Payer: MEDICARE

## 2021-02-04 VITALS
TEMPERATURE: 98 F | RESPIRATION RATE: 18 BRPM | WEIGHT: 175 LBS | SYSTOLIC BLOOD PRESSURE: 150 MMHG | HEIGHT: 61 IN | DIASTOLIC BLOOD PRESSURE: 77 MMHG | OXYGEN SATURATION: 97 % | HEART RATE: 94 BPM | BODY MASS INDEX: 33.04 KG/M2

## 2021-02-04 DIAGNOSIS — N30.00 ACUTE CYSTITIS WITHOUT HEMATURIA: Primary | ICD-10-CM

## 2021-02-04 DIAGNOSIS — T78.40XA ALLERGIC REACTION TO DRUG, INITIAL ENCOUNTER: Primary | ICD-10-CM

## 2021-02-04 DIAGNOSIS — T78.3XXA GIANT HIVES, INITIAL ENCOUNTER: ICD-10-CM

## 2021-02-04 LAB — BACTERIA UR CULT: ABNORMAL

## 2021-02-04 PROCEDURE — 99213 PR OFFICE/OUTPT VISIT, EST, LEVL III, 20-29 MIN: ICD-10-PCS | Mod: S$GLB,,, | Performed by: NURSE PRACTITIONER

## 2021-02-04 PROCEDURE — 99213 OFFICE O/P EST LOW 20 MIN: CPT | Mod: S$GLB,,, | Performed by: NURSE PRACTITIONER

## 2021-02-04 RX ORDER — EPINEPHRINE 0.3 MG/.3ML
1 INJECTION SUBCUTANEOUS ONCE AS NEEDED
Qty: 2 EACH | Refills: 0 | Status: SHIPPED | OUTPATIENT
Start: 2021-02-04 | End: 2022-11-03 | Stop reason: ALTCHOICE

## 2021-02-04 RX ORDER — AMOXICILLIN AND CLAVULANATE POTASSIUM 500; 125 MG/1; MG/1
1 TABLET, FILM COATED ORAL 2 TIMES DAILY
Qty: 10 TABLET | Refills: 0 | Status: SHIPPED | OUTPATIENT
Start: 2021-02-04 | End: 2021-02-09

## 2021-02-04 RX ORDER — PREDNISONE 20 MG/1
20 TABLET ORAL 2 TIMES DAILY
Qty: 6 TABLET | Refills: 0 | Status: SHIPPED | OUTPATIENT
Start: 2021-02-04 | End: 2021-02-07

## 2021-02-18 ENCOUNTER — OFFICE VISIT (OUTPATIENT)
Dept: HEMATOLOGY/ONCOLOGY | Facility: CLINIC | Age: 81
End: 2021-02-18
Payer: MEDICARE

## 2021-02-18 VITALS
SYSTOLIC BLOOD PRESSURE: 154 MMHG | RESPIRATION RATE: 16 BRPM | DIASTOLIC BLOOD PRESSURE: 74 MMHG | TEMPERATURE: 98 F | HEART RATE: 75 BPM | HEIGHT: 61 IN | BODY MASS INDEX: 34.63 KG/M2 | WEIGHT: 183.44 LBS | OXYGEN SATURATION: 97 %

## 2021-02-18 DIAGNOSIS — Z17.0 MALIGNANT NEOPLASM OF UPPER-OUTER QUADRANT OF RIGHT BREAST IN FEMALE, ESTROGEN RECEPTOR POSITIVE: Primary | ICD-10-CM

## 2021-02-18 DIAGNOSIS — C50.411 MALIGNANT NEOPLASM OF UPPER-OUTER QUADRANT OF RIGHT BREAST IN FEMALE, ESTROGEN RECEPTOR POSITIVE: Primary | ICD-10-CM

## 2021-02-18 DIAGNOSIS — Z79.811 PROPHYLACTIC USE OF ANASTROZOLE (ARIMIDEX): ICD-10-CM

## 2021-02-18 PROCEDURE — 99999 PR PBB SHADOW E&M-EST. PATIENT-LVL IV: ICD-10-PCS | Mod: PBBFAC,,, | Performed by: INTERNAL MEDICINE

## 2021-02-18 PROCEDURE — 1101F PR PT FALLS ASSESS DOC 0-1 FALLS W/OUT INJ PAST YR: ICD-10-PCS | Mod: CPTII,S$GLB,, | Performed by: INTERNAL MEDICINE

## 2021-02-18 PROCEDURE — 99213 OFFICE O/P EST LOW 20 MIN: CPT | Mod: S$GLB,,, | Performed by: INTERNAL MEDICINE

## 2021-02-18 PROCEDURE — 1125F PR PAIN SEVERITY QUANTIFIED, PAIN PRESENT: ICD-10-PCS | Mod: S$GLB,,, | Performed by: INTERNAL MEDICINE

## 2021-02-18 PROCEDURE — 1159F MED LIST DOCD IN RCRD: CPT | Mod: S$GLB,,, | Performed by: INTERNAL MEDICINE

## 2021-02-18 PROCEDURE — 99499 UNLISTED E&M SERVICE: CPT | Mod: S$GLB,,, | Performed by: INTERNAL MEDICINE

## 2021-02-18 PROCEDURE — 1159F PR MEDICATION LIST DOCUMENTED IN MEDICAL RECORD: ICD-10-PCS | Mod: S$GLB,,, | Performed by: INTERNAL MEDICINE

## 2021-02-18 PROCEDURE — 3288F PR FALLS RISK ASSESSMENT DOCUMENTED: ICD-10-PCS | Mod: CPTII,S$GLB,, | Performed by: INTERNAL MEDICINE

## 2021-02-18 PROCEDURE — 1101F PT FALLS ASSESS-DOCD LE1/YR: CPT | Mod: CPTII,S$GLB,, | Performed by: INTERNAL MEDICINE

## 2021-02-18 PROCEDURE — 99499 RISK ADDL DX/OHS AUDIT: ICD-10-PCS | Mod: S$GLB,,, | Performed by: INTERNAL MEDICINE

## 2021-02-18 PROCEDURE — 99999 PR PBB SHADOW E&M-EST. PATIENT-LVL IV: CPT | Mod: PBBFAC,,, | Performed by: INTERNAL MEDICINE

## 2021-02-18 PROCEDURE — 3288F FALL RISK ASSESSMENT DOCD: CPT | Mod: CPTII,S$GLB,, | Performed by: INTERNAL MEDICINE

## 2021-02-18 PROCEDURE — 99213 PR OFFICE/OUTPT VISIT, EST, LEVL III, 20-29 MIN: ICD-10-PCS | Mod: S$GLB,,, | Performed by: INTERNAL MEDICINE

## 2021-02-18 PROCEDURE — 1125F AMNT PAIN NOTED PAIN PRSNT: CPT | Mod: S$GLB,,, | Performed by: INTERNAL MEDICINE

## 2021-02-25 ENCOUNTER — LAB VISIT (OUTPATIENT)
Dept: LAB | Facility: HOSPITAL | Age: 81
End: 2021-02-25
Attending: INTERNAL MEDICINE
Payer: MEDICARE

## 2021-02-25 DIAGNOSIS — E78.1 HYPERTRIGLYCERIDEMIA: ICD-10-CM

## 2021-02-25 DIAGNOSIS — D53.9 NUTRITIONAL ANEMIA, UNSPECIFIED: ICD-10-CM

## 2021-02-25 DIAGNOSIS — N18.31 STAGE 3A CHRONIC KIDNEY DISEASE: ICD-10-CM

## 2021-02-25 DIAGNOSIS — D63.8 ANEMIA OF CHRONIC DISEASE: ICD-10-CM

## 2021-02-25 LAB
ALBUMIN SERPL BCP-MCNC: 3.8 G/DL (ref 3.5–5.2)
ALP SERPL-CCNC: 39 U/L (ref 55–135)
ALT SERPL W/O P-5'-P-CCNC: 11 U/L (ref 10–44)
ANION GAP SERPL CALC-SCNC: 9 MMOL/L (ref 8–16)
AST SERPL-CCNC: 19 U/L (ref 10–40)
BASOPHILS # BLD AUTO: 0.04 K/UL (ref 0–0.2)
BASOPHILS NFR BLD: 0.8 % (ref 0–1.9)
BILIRUB SERPL-MCNC: 0.5 MG/DL (ref 0.1–1)
BUN SERPL-MCNC: 15 MG/DL (ref 8–23)
CALCIUM SERPL-MCNC: 9.3 MG/DL (ref 8.7–10.5)
CHLORIDE SERPL-SCNC: 103 MMOL/L (ref 95–110)
CHOLEST SERPL-MCNC: 170 MG/DL (ref 120–199)
CHOLEST/HDLC SERPL: 3.6 {RATIO} (ref 2–5)
CO2 SERPL-SCNC: 26 MMOL/L (ref 23–29)
CREAT SERPL-MCNC: 1 MG/DL (ref 0.5–1.4)
DIFFERENTIAL METHOD: ABNORMAL
EOSINOPHIL # BLD AUTO: 0.1 K/UL (ref 0–0.5)
EOSINOPHIL NFR BLD: 2.5 % (ref 0–8)
ERYTHROCYTE [DISTWIDTH] IN BLOOD BY AUTOMATED COUNT: 13.1 % (ref 11.5–14.5)
EST. GFR  (AFRICAN AMERICAN): >60 ML/MIN/1.73 M^2
EST. GFR  (NON AFRICAN AMERICAN): 53.3 ML/MIN/1.73 M^2
FERRITIN SERPL-MCNC: 65 NG/ML (ref 20–300)
GLUCOSE SERPL-MCNC: 98 MG/DL (ref 70–110)
HCT VFR BLD AUTO: 34.3 % (ref 37–48.5)
HDLC SERPL-MCNC: 47 MG/DL (ref 40–75)
HDLC SERPL: 27.6 % (ref 20–50)
HGB BLD-MCNC: 10.9 G/DL (ref 12–16)
IMM GRANULOCYTES # BLD AUTO: 0.02 K/UL (ref 0–0.04)
IMM GRANULOCYTES NFR BLD AUTO: 0.4 % (ref 0–0.5)
IRON SERPL-MCNC: 90 UG/DL (ref 30–160)
LDLC SERPL CALC-MCNC: 99.2 MG/DL (ref 63–159)
LYMPHOCYTES # BLD AUTO: 2.2 K/UL (ref 1–4.8)
LYMPHOCYTES NFR BLD: 41.7 % (ref 18–48)
MCH RBC QN AUTO: 31 PG (ref 27–31)
MCHC RBC AUTO-ENTMCNC: 31.8 G/DL (ref 32–36)
MCV RBC AUTO: 97 FL (ref 82–98)
MONOCYTES # BLD AUTO: 0.6 K/UL (ref 0.3–1)
MONOCYTES NFR BLD: 11.4 % (ref 4–15)
NEUTROPHILS # BLD AUTO: 2.3 K/UL (ref 1.8–7.7)
NEUTROPHILS NFR BLD: 43.2 % (ref 38–73)
NONHDLC SERPL-MCNC: 123 MG/DL
NRBC BLD-RTO: 0 /100 WBC
PLATELET # BLD AUTO: 310 K/UL (ref 150–350)
PMV BLD AUTO: 9.3 FL (ref 9.2–12.9)
POTASSIUM SERPL-SCNC: 4.1 MMOL/L (ref 3.5–5.1)
PROT SERPL-MCNC: 6.8 G/DL (ref 6–8.4)
RBC # BLD AUTO: 3.52 M/UL (ref 4–5.4)
SATURATED IRON: 21 % (ref 20–50)
SODIUM SERPL-SCNC: 138 MMOL/L (ref 136–145)
TOTAL IRON BINDING CAPACITY: 425 UG/DL (ref 250–450)
TRANSFERRIN SERPL-MCNC: 287 MG/DL (ref 200–375)
TRIGL SERPL-MCNC: 119 MG/DL (ref 30–150)
WBC # BLD AUTO: 5.28 K/UL (ref 3.9–12.7)

## 2021-02-25 PROCEDURE — 36415 COLL VENOUS BLD VENIPUNCTURE: CPT

## 2021-02-25 PROCEDURE — 82607 VITAMIN B-12: CPT

## 2021-02-25 PROCEDURE — 82728 ASSAY OF FERRITIN: CPT

## 2021-02-25 PROCEDURE — 80061 LIPID PANEL: CPT

## 2021-02-25 PROCEDURE — 80053 COMPREHEN METABOLIC PANEL: CPT

## 2021-02-25 PROCEDURE — 82746 ASSAY OF FOLIC ACID SERUM: CPT

## 2021-02-25 PROCEDURE — 83540 ASSAY OF IRON: CPT

## 2021-02-25 PROCEDURE — 85025 COMPLETE CBC W/AUTO DIFF WBC: CPT

## 2021-02-26 LAB
FOLATE SERPL-MCNC: 16 NG/ML (ref 4–24)
VIT B12 SERPL-MCNC: 320 PG/ML (ref 210–950)

## 2021-03-01 ENCOUNTER — TELEPHONE (OUTPATIENT)
Dept: INTERNAL MEDICINE | Facility: CLINIC | Age: 81
End: 2021-03-01

## 2021-03-01 ENCOUNTER — HOSPITAL ENCOUNTER (OUTPATIENT)
Dept: RADIOLOGY | Facility: HOSPITAL | Age: 81
Discharge: HOME | End: 2021-03-01
Attending: INTERNAL MEDICINE
Payer: MEDICARE

## 2021-03-01 ENCOUNTER — OFFICE VISIT (OUTPATIENT)
Dept: INTERNAL MEDICINE | Facility: CLINIC | Age: 81
End: 2021-03-01
Payer: MEDICARE

## 2021-03-01 ENCOUNTER — HOSPITAL ENCOUNTER (OUTPATIENT)
Dept: CARDIOLOGY | Facility: CLINIC | Age: 81
Discharge: HOME | End: 2021-03-01
Payer: MEDICARE

## 2021-03-01 VITALS
DIASTOLIC BLOOD PRESSURE: 70 MMHG | HEIGHT: 61 IN | SYSTOLIC BLOOD PRESSURE: 128 MMHG | HEART RATE: 75 BPM | BODY MASS INDEX: 34.96 KG/M2 | WEIGHT: 185.19 LBS

## 2021-03-01 DIAGNOSIS — I70.0 AORTIC ATHEROSCLEROSIS: ICD-10-CM

## 2021-03-01 DIAGNOSIS — C50.411 MALIGNANT NEOPLASM OF UPPER-OUTER QUADRANT OF RIGHT BREAST IN FEMALE, ESTROGEN RECEPTOR POSITIVE: ICD-10-CM

## 2021-03-01 DIAGNOSIS — E66.09 CLASS 1 OBESITY DUE TO EXCESS CALORIES WITH SERIOUS COMORBIDITY AND BODY MASS INDEX (BMI) OF 34.0 TO 34.9 IN ADULT: ICD-10-CM

## 2021-03-01 DIAGNOSIS — R06.09 DYSPNEA ON EXERTION: ICD-10-CM

## 2021-03-01 DIAGNOSIS — E55.9 MILD VITAMIN D DEFICIENCY: ICD-10-CM

## 2021-03-01 DIAGNOSIS — E78.1 HYPERTRIGLYCERIDEMIA: ICD-10-CM

## 2021-03-01 DIAGNOSIS — K21.9 GASTROESOPHAGEAL REFLUX DISEASE, UNSPECIFIED WHETHER ESOPHAGITIS PRESENT: ICD-10-CM

## 2021-03-01 DIAGNOSIS — R06.2 WHEEZING: ICD-10-CM

## 2021-03-01 DIAGNOSIS — R09.82 POST-NASAL DRIP: ICD-10-CM

## 2021-03-01 DIAGNOSIS — D63.8 ANEMIA OF CHRONIC DISEASE: ICD-10-CM

## 2021-03-01 DIAGNOSIS — R11.0 NAUSEA: ICD-10-CM

## 2021-03-01 DIAGNOSIS — Z87.81 HISTORY OF COMPRESSION FRACTURE OF SPINE: ICD-10-CM

## 2021-03-01 DIAGNOSIS — Z79.811 PROPHYLACTIC USE OF ANASTROZOLE (ARIMIDEX): ICD-10-CM

## 2021-03-01 DIAGNOSIS — Z17.0 MALIGNANT NEOPLASM OF UPPER-OUTER QUADRANT OF RIGHT BREAST IN FEMALE, ESTROGEN RECEPTOR POSITIVE: ICD-10-CM

## 2021-03-01 DIAGNOSIS — R42 VERTIGO: ICD-10-CM

## 2021-03-01 DIAGNOSIS — M81.0 AGE-RELATED OSTEOPOROSIS WITHOUT CURRENT PATHOLOGICAL FRACTURE: ICD-10-CM

## 2021-03-01 DIAGNOSIS — R06.09 DYSPNEA ON EXERTION: Primary | ICD-10-CM

## 2021-03-01 DIAGNOSIS — R93.89 ABNORMAL CHEST X-RAY: Primary | ICD-10-CM

## 2021-03-01 DIAGNOSIS — N18.31 STAGE 3A CHRONIC KIDNEY DISEASE: ICD-10-CM

## 2021-03-01 PROCEDURE — 99999 PR PBB SHADOW E&M-EST. PATIENT-LVL V: ICD-10-PCS | Mod: PBBFAC,,, | Performed by: INTERNAL MEDICINE

## 2021-03-01 PROCEDURE — 1159F PR MEDICATION LIST DOCUMENTED IN MEDICAL RECORD: ICD-10-PCS | Mod: S$GLB,,, | Performed by: INTERNAL MEDICINE

## 2021-03-01 PROCEDURE — 93010 EKG 12-LEAD: ICD-10-PCS | Mod: S$GLB,,, | Performed by: INTERNAL MEDICINE

## 2021-03-01 PROCEDURE — 1125F PR PAIN SEVERITY QUANTIFIED, PAIN PRESENT: ICD-10-PCS | Mod: S$GLB,,, | Performed by: INTERNAL MEDICINE

## 2021-03-01 PROCEDURE — 99999 PR PBB SHADOW E&M-EST. PATIENT-LVL V: CPT | Mod: PBBFAC,,, | Performed by: INTERNAL MEDICINE

## 2021-03-01 PROCEDURE — 1101F PT FALLS ASSESS-DOCD LE1/YR: CPT | Mod: CPTII,S$GLB,, | Performed by: INTERNAL MEDICINE

## 2021-03-01 PROCEDURE — 93005 EKG 12-LEAD: ICD-10-PCS | Mod: S$GLB,,, | Performed by: INTERNAL MEDICINE

## 2021-03-01 PROCEDURE — 71046 X-RAY EXAM CHEST 2 VIEWS: CPT | Mod: TC

## 2021-03-01 PROCEDURE — 99499 RISK ADDL DX/OHS AUDIT: ICD-10-PCS | Mod: S$GLB,,, | Performed by: INTERNAL MEDICINE

## 2021-03-01 PROCEDURE — 1159F MED LIST DOCD IN RCRD: CPT | Mod: S$GLB,,, | Performed by: INTERNAL MEDICINE

## 2021-03-01 PROCEDURE — 99499 UNLISTED E&M SERVICE: CPT | Mod: S$GLB,,, | Performed by: INTERNAL MEDICINE

## 2021-03-01 PROCEDURE — 71046 X-RAY EXAM CHEST 2 VIEWS: CPT | Mod: 26,,, | Performed by: RADIOLOGY

## 2021-03-01 PROCEDURE — 99214 OFFICE O/P EST MOD 30 MIN: CPT | Mod: S$GLB,,, | Performed by: INTERNAL MEDICINE

## 2021-03-01 PROCEDURE — 93010 ELECTROCARDIOGRAM REPORT: CPT | Mod: S$GLB,,, | Performed by: INTERNAL MEDICINE

## 2021-03-01 PROCEDURE — 1101F PR PT FALLS ASSESS DOC 0-1 FALLS W/OUT INJ PAST YR: ICD-10-PCS | Mod: CPTII,S$GLB,, | Performed by: INTERNAL MEDICINE

## 2021-03-01 PROCEDURE — 71046 XR CHEST PA AND LATERAL: ICD-10-PCS | Mod: 26,,, | Performed by: RADIOLOGY

## 2021-03-01 PROCEDURE — 1125F AMNT PAIN NOTED PAIN PRSNT: CPT | Mod: S$GLB,,, | Performed by: INTERNAL MEDICINE

## 2021-03-01 PROCEDURE — 99214 PR OFFICE/OUTPT VISIT, EST, LEVL IV, 30-39 MIN: ICD-10-PCS | Mod: S$GLB,,, | Performed by: INTERNAL MEDICINE

## 2021-03-01 PROCEDURE — 3288F PR FALLS RISK ASSESSMENT DOCUMENTED: ICD-10-PCS | Mod: CPTII,S$GLB,, | Performed by: INTERNAL MEDICINE

## 2021-03-01 PROCEDURE — 3288F FALL RISK ASSESSMENT DOCD: CPT | Mod: CPTII,S$GLB,, | Performed by: INTERNAL MEDICINE

## 2021-03-01 PROCEDURE — 93005 ELECTROCARDIOGRAM TRACING: CPT | Mod: S$GLB,,, | Performed by: INTERNAL MEDICINE

## 2021-03-01 RX ORDER — ONDANSETRON 4 MG/1
4 TABLET, FILM COATED ORAL EVERY 8 HOURS PRN
Qty: 30 TABLET | Refills: 0 | Status: SHIPPED | OUTPATIENT
Start: 2021-03-01

## 2021-03-02 ENCOUNTER — TELEPHONE (OUTPATIENT)
Dept: OTOLARYNGOLOGY | Facility: CLINIC | Age: 81
End: 2021-03-02

## 2021-03-02 ENCOUNTER — OFFICE VISIT (OUTPATIENT)
Dept: CARDIOLOGY | Facility: CLINIC | Age: 81
End: 2021-03-02
Payer: MEDICARE

## 2021-03-02 VITALS
BODY MASS INDEX: 34.55 KG/M2 | HEIGHT: 61 IN | WEIGHT: 183 LBS | HEART RATE: 64 BPM | SYSTOLIC BLOOD PRESSURE: 146 MMHG | OXYGEN SATURATION: 98 % | DIASTOLIC BLOOD PRESSURE: 64 MMHG

## 2021-03-02 DIAGNOSIS — Z17.0 MALIGNANT NEOPLASM OF UPPER-OUTER QUADRANT OF RIGHT BREAST IN FEMALE, ESTROGEN RECEPTOR POSITIVE: ICD-10-CM

## 2021-03-02 DIAGNOSIS — I70.0 AORTIC ATHEROSCLEROSIS: ICD-10-CM

## 2021-03-02 DIAGNOSIS — N18.31 STAGE 3A CHRONIC KIDNEY DISEASE: ICD-10-CM

## 2021-03-02 DIAGNOSIS — E78.1 HYPERTRIGLYCERIDEMIA: ICD-10-CM

## 2021-03-02 DIAGNOSIS — C50.411 MALIGNANT NEOPLASM OF UPPER-OUTER QUADRANT OF RIGHT BREAST IN FEMALE, ESTROGEN RECEPTOR POSITIVE: ICD-10-CM

## 2021-03-02 DIAGNOSIS — R06.09 DYSPNEA ON EXERTION: Primary | ICD-10-CM

## 2021-03-02 PROCEDURE — 1159F PR MEDICATION LIST DOCUMENTED IN MEDICAL RECORD: ICD-10-PCS | Mod: S$GLB,,, | Performed by: INTERNAL MEDICINE

## 2021-03-02 PROCEDURE — 99999 PR PBB SHADOW E&M-EST. PATIENT-LVL V: ICD-10-PCS | Mod: PBBFAC,,, | Performed by: INTERNAL MEDICINE

## 2021-03-02 PROCEDURE — 1126F PR PAIN SEVERITY QUANTIFIED, NO PAIN PRESENT: ICD-10-PCS | Mod: S$GLB,,, | Performed by: INTERNAL MEDICINE

## 2021-03-02 PROCEDURE — 99999 PR PBB SHADOW E&M-EST. PATIENT-LVL V: CPT | Mod: PBBFAC,,, | Performed by: INTERNAL MEDICINE

## 2021-03-02 PROCEDURE — 99214 PR OFFICE/OUTPT VISIT, EST, LEVL IV, 30-39 MIN: ICD-10-PCS | Mod: S$GLB,,, | Performed by: INTERNAL MEDICINE

## 2021-03-02 PROCEDURE — 1126F AMNT PAIN NOTED NONE PRSNT: CPT | Mod: S$GLB,,, | Performed by: INTERNAL MEDICINE

## 2021-03-02 PROCEDURE — 99214 OFFICE O/P EST MOD 30 MIN: CPT | Mod: S$GLB,,, | Performed by: INTERNAL MEDICINE

## 2021-03-02 PROCEDURE — 1159F MED LIST DOCD IN RCRD: CPT | Mod: S$GLB,,, | Performed by: INTERNAL MEDICINE

## 2021-03-04 ENCOUNTER — HOSPITAL ENCOUNTER (OUTPATIENT)
Dept: RADIOLOGY | Facility: HOSPITAL | Age: 81
Discharge: HOME | End: 2021-03-04
Attending: INTERNAL MEDICINE
Payer: MEDICARE

## 2021-03-04 DIAGNOSIS — R06.09 DYSPNEA ON EXERTION: ICD-10-CM

## 2021-03-04 DIAGNOSIS — R93.89 ABNORMAL CHEST X-RAY: ICD-10-CM

## 2021-03-04 PROCEDURE — 71260 CT CHEST WITH CONTRAST: ICD-10-PCS | Mod: 26,,, | Performed by: RADIOLOGY

## 2021-03-04 PROCEDURE — 71260 CT THORAX DX C+: CPT | Mod: TC

## 2021-03-04 PROCEDURE — 71260 CT THORAX DX C+: CPT | Mod: 26,,, | Performed by: RADIOLOGY

## 2021-03-04 PROCEDURE — 25500020 PHARM REV CODE 255: Performed by: INTERNAL MEDICINE

## 2021-03-04 RX ADMIN — IOHEXOL 100 ML: 350 INJECTION, SOLUTION INTRAVENOUS at 05:03

## 2021-03-06 ENCOUNTER — LAB VISIT (OUTPATIENT)
Dept: INTERNAL MEDICINE | Facility: CLINIC | Age: 81
End: 2021-03-06
Payer: MEDICARE

## 2021-03-06 DIAGNOSIS — Z13.9 SCREENING PROCEDURE: ICD-10-CM

## 2021-03-06 PROCEDURE — U0003 INFECTIOUS AGENT DETECTION BY NUCLEIC ACID (DNA OR RNA); SEVERE ACUTE RESPIRATORY SYNDROME CORONAVIRUS 2 (SARS-COV-2) (CORONAVIRUS DISEASE [COVID-19]), AMPLIFIED PROBE TECHNIQUE, MAKING USE OF HIGH THROUGHPUT TECHNOLOGIES AS DESCRIBED BY CMS-2020-01-R: HCPCS | Performed by: INTERNAL MEDICINE

## 2021-03-06 PROCEDURE — U0005 INFEC AGEN DETEC AMPLI PROBE: HCPCS | Performed by: INTERNAL MEDICINE

## 2021-03-07 LAB — SARS-COV-2 RNA RESP QL NAA+PROBE: NOT DETECTED

## 2021-03-09 ENCOUNTER — HOSPITAL ENCOUNTER (OUTPATIENT)
Dept: PULMONOLOGY | Facility: CLINIC | Age: 81
Discharge: HOME | End: 2021-03-09
Payer: MEDICARE

## 2021-03-09 DIAGNOSIS — R06.09 DYSPNEA ON EXERTION: ICD-10-CM

## 2021-03-09 PROCEDURE — 94729 PR C02/MEMBANE DIFFUSE CAPACITY: ICD-10-PCS | Mod: S$GLB,,, | Performed by: INTERNAL MEDICINE

## 2021-03-09 PROCEDURE — 94727 PR PULM FUNCTION TEST BY GAS: ICD-10-PCS | Mod: S$GLB,,, | Performed by: INTERNAL MEDICINE

## 2021-03-09 PROCEDURE — 94060 EVALUATION OF WHEEZING: CPT | Mod: S$GLB,,, | Performed by: INTERNAL MEDICINE

## 2021-03-09 PROCEDURE — 94727 GAS DIL/WSHOT DETER LNG VOL: CPT | Mod: S$GLB,,, | Performed by: INTERNAL MEDICINE

## 2021-03-09 PROCEDURE — 94060 PR EVAL OF BRONCHOSPASM: ICD-10-PCS | Mod: S$GLB,,, | Performed by: INTERNAL MEDICINE

## 2021-03-09 PROCEDURE — 94729 DIFFUSING CAPACITY: CPT | Mod: S$GLB,,, | Performed by: INTERNAL MEDICINE

## 2021-03-11 ENCOUNTER — TELEPHONE (OUTPATIENT)
Dept: INTERNAL MEDICINE | Facility: CLINIC | Age: 81
End: 2021-03-11

## 2021-03-11 DIAGNOSIS — E04.2 MULTIPLE THYROID NODULES: Primary | ICD-10-CM

## 2021-03-11 DIAGNOSIS — R06.09 DYSPNEA ON EXERTION: ICD-10-CM

## 2021-03-12 ENCOUNTER — HOSPITAL ENCOUNTER (OUTPATIENT)
Dept: RADIOLOGY | Facility: CLINIC | Age: 81
Discharge: HOME | End: 2021-03-12
Attending: INTERNAL MEDICINE
Payer: MEDICARE

## 2021-03-12 DIAGNOSIS — M81.0 AGE-RELATED OSTEOPOROSIS WITHOUT CURRENT PATHOLOGICAL FRACTURE: ICD-10-CM

## 2021-03-12 PROCEDURE — 77080 DXA BONE DENSITY AXIAL: CPT | Mod: 26,,, | Performed by: INTERNAL MEDICINE

## 2021-03-12 PROCEDURE — 77080 DXA BONE DENSITY AXIAL: CPT | Mod: TC

## 2021-03-12 PROCEDURE — 77080 DEXA BONE DENSITY SPINE HIP: ICD-10-PCS | Mod: 26,,, | Performed by: INTERNAL MEDICINE

## 2021-03-16 ENCOUNTER — OFFICE VISIT (OUTPATIENT)
Dept: OTOLARYNGOLOGY | Facility: CLINIC | Age: 81
End: 2021-03-16
Payer: MEDICARE

## 2021-03-16 DIAGNOSIS — R09.82 POST-NASAL DRIP: ICD-10-CM

## 2021-03-16 DIAGNOSIS — R06.2 WHEEZING: ICD-10-CM

## 2021-03-16 DIAGNOSIS — R06.09 DYSPNEA ON EXERTION: ICD-10-CM

## 2021-03-16 DIAGNOSIS — J31.0 CHRONIC RHINITIS: Primary | ICD-10-CM

## 2021-03-16 PROCEDURE — 99999 PR PBB SHADOW E&M-EST. PATIENT-LVL II: ICD-10-PCS | Mod: PBBFAC,,, | Performed by: NURSE PRACTITIONER

## 2021-03-16 PROCEDURE — 1159F MED LIST DOCD IN RCRD: CPT | Mod: S$GLB,,, | Performed by: NURSE PRACTITIONER

## 2021-03-16 PROCEDURE — 1159F PR MEDICATION LIST DOCUMENTED IN MEDICAL RECORD: ICD-10-PCS | Mod: S$GLB,,, | Performed by: NURSE PRACTITIONER

## 2021-03-16 PROCEDURE — 99999 PR PBB SHADOW E&M-EST. PATIENT-LVL II: CPT | Mod: PBBFAC,,, | Performed by: NURSE PRACTITIONER

## 2021-03-16 PROCEDURE — 99203 OFFICE O/P NEW LOW 30 MIN: CPT | Mod: S$GLB,,, | Performed by: NURSE PRACTITIONER

## 2021-03-16 PROCEDURE — 99203 PR OFFICE/OUTPT VISIT, NEW, LEVL III, 30-44 MIN: ICD-10-PCS | Mod: S$GLB,,, | Performed by: NURSE PRACTITIONER

## 2021-03-16 RX ORDER — FLUTICASONE PROPIONATE 50 MCG
2 SPRAY, SUSPENSION (ML) NASAL DAILY
Qty: 16 G | Refills: 2 | Status: SHIPPED | OUTPATIENT
Start: 2021-03-16 | End: 2021-06-14

## 2021-03-30 ENCOUNTER — TELEPHONE (OUTPATIENT)
Dept: INTERNAL MEDICINE | Facility: CLINIC | Age: 81
End: 2021-03-30

## 2021-03-30 DIAGNOSIS — M81.0 AGE-RELATED OSTEOPOROSIS WITHOUT CURRENT PATHOLOGICAL FRACTURE: Primary | ICD-10-CM

## 2021-04-09 ENCOUNTER — PES CALL (OUTPATIENT)
Dept: ADMINISTRATIVE | Facility: CLINIC | Age: 81
End: 2021-04-09

## 2021-05-27 RX ORDER — MONTELUKAST SODIUM 10 MG/1
10 TABLET ORAL NIGHTLY
Qty: 90 TABLET | Refills: 3 | Status: SHIPPED | OUTPATIENT
Start: 2021-05-27 | End: 2021-06-26

## 2021-06-23 ENCOUNTER — HOSPITAL ENCOUNTER (OUTPATIENT)
Dept: RADIOLOGY | Facility: HOSPITAL | Age: 81
Discharge: HOME | End: 2021-06-23
Attending: INTERNAL MEDICINE
Payer: MEDICARE

## 2021-06-23 ENCOUNTER — OFFICE VISIT (OUTPATIENT)
Dept: HEMATOLOGY/ONCOLOGY | Facility: CLINIC | Age: 81
End: 2021-06-23
Payer: MEDICARE

## 2021-06-23 VITALS
SYSTOLIC BLOOD PRESSURE: 133 MMHG | HEIGHT: 61 IN | WEIGHT: 183.44 LBS | RESPIRATION RATE: 18 BRPM | DIASTOLIC BLOOD PRESSURE: 83 MMHG | BODY MASS INDEX: 34.63 KG/M2 | OXYGEN SATURATION: 96 % | HEART RATE: 72 BPM | TEMPERATURE: 98 F

## 2021-06-23 VITALS — WEIGHT: 175 LBS | BODY MASS INDEX: 33.04 KG/M2 | HEIGHT: 61 IN

## 2021-06-23 DIAGNOSIS — Z17.0 MALIGNANT NEOPLASM OF UPPER-OUTER QUADRANT OF RIGHT BREAST IN FEMALE, ESTROGEN RECEPTOR POSITIVE: Primary | ICD-10-CM

## 2021-06-23 DIAGNOSIS — Z79.811 PROPHYLACTIC USE OF ANASTROZOLE (ARIMIDEX): ICD-10-CM

## 2021-06-23 DIAGNOSIS — C50.411 MALIGNANT NEOPLASM OF UPPER-OUTER QUADRANT OF RIGHT BREAST IN FEMALE, ESTROGEN RECEPTOR POSITIVE: ICD-10-CM

## 2021-06-23 DIAGNOSIS — C50.411 MALIGNANT NEOPLASM OF UPPER-OUTER QUADRANT OF RIGHT BREAST IN FEMALE, ESTROGEN RECEPTOR POSITIVE: Primary | ICD-10-CM

## 2021-06-23 DIAGNOSIS — Z17.0 MALIGNANT NEOPLASM OF UPPER-OUTER QUADRANT OF RIGHT BREAST IN FEMALE, ESTROGEN RECEPTOR POSITIVE: ICD-10-CM

## 2021-06-23 PROCEDURE — 77061 BREAST TOMOSYNTHESIS UNI: CPT | Mod: 26,LT,, | Performed by: RADIOLOGY

## 2021-06-23 PROCEDURE — 99499 RISK ADDL DX/OHS AUDIT: ICD-10-PCS | Mod: S$GLB,,, | Performed by: INTERNAL MEDICINE

## 2021-06-23 PROCEDURE — 99999 PR PBB SHADOW E&M-EST. PATIENT-LVL IV: ICD-10-PCS | Mod: PBBFAC,,, | Performed by: INTERNAL MEDICINE

## 2021-06-23 PROCEDURE — 3288F FALL RISK ASSESSMENT DOCD: CPT | Mod: CPTII,S$GLB,, | Performed by: INTERNAL MEDICINE

## 2021-06-23 PROCEDURE — 77065 MAMMO DIGITAL DIAGNOSTIC LEFT WITH TOMO: ICD-10-PCS | Mod: 26,LT,, | Performed by: RADIOLOGY

## 2021-06-23 PROCEDURE — 99213 PR OFFICE/OUTPT VISIT, EST, LEVL III, 20-29 MIN: ICD-10-PCS | Mod: S$GLB,,, | Performed by: INTERNAL MEDICINE

## 2021-06-23 PROCEDURE — 3288F PR FALLS RISK ASSESSMENT DOCUMENTED: ICD-10-PCS | Mod: CPTII,S$GLB,, | Performed by: INTERNAL MEDICINE

## 2021-06-23 PROCEDURE — 99499 UNLISTED E&M SERVICE: CPT | Mod: S$GLB,,, | Performed by: INTERNAL MEDICINE

## 2021-06-23 PROCEDURE — 77061 MAMMO DIGITAL DIAGNOSTIC LEFT WITH TOMO: ICD-10-PCS | Mod: 26,LT,, | Performed by: RADIOLOGY

## 2021-06-23 PROCEDURE — 1159F PR MEDICATION LIST DOCUMENTED IN MEDICAL RECORD: ICD-10-PCS | Mod: S$GLB,,, | Performed by: INTERNAL MEDICINE

## 2021-06-23 PROCEDURE — 99213 OFFICE O/P EST LOW 20 MIN: CPT | Mod: S$GLB,,, | Performed by: INTERNAL MEDICINE

## 2021-06-23 PROCEDURE — 77065 DX MAMMO INCL CAD UNI: CPT | Mod: 26,LT,, | Performed by: RADIOLOGY

## 2021-06-23 PROCEDURE — 1101F PR PT FALLS ASSESS DOC 0-1 FALLS W/OUT INJ PAST YR: ICD-10-PCS | Mod: CPTII,S$GLB,, | Performed by: INTERNAL MEDICINE

## 2021-06-23 PROCEDURE — 99999 PR PBB SHADOW E&M-EST. PATIENT-LVL IV: CPT | Mod: PBBFAC,,, | Performed by: INTERNAL MEDICINE

## 2021-06-23 PROCEDURE — 1125F PR PAIN SEVERITY QUANTIFIED, PAIN PRESENT: ICD-10-PCS | Mod: S$GLB,,, | Performed by: INTERNAL MEDICINE

## 2021-06-23 PROCEDURE — 77061 BREAST TOMOSYNTHESIS UNI: CPT | Mod: TC,LT

## 2021-06-23 PROCEDURE — 1125F AMNT PAIN NOTED PAIN PRSNT: CPT | Mod: S$GLB,,, | Performed by: INTERNAL MEDICINE

## 2021-06-23 PROCEDURE — 1159F MED LIST DOCD IN RCRD: CPT | Mod: S$GLB,,, | Performed by: INTERNAL MEDICINE

## 2021-06-23 PROCEDURE — 1101F PT FALLS ASSESS-DOCD LE1/YR: CPT | Mod: CPTII,S$GLB,, | Performed by: INTERNAL MEDICINE

## 2021-07-12 ENCOUNTER — HOSPITAL ENCOUNTER (OUTPATIENT)
Dept: RADIOLOGY | Facility: HOSPITAL | Age: 81
Discharge: HOME | End: 2021-07-12
Attending: ORTHOPAEDIC SURGERY
Payer: MEDICARE

## 2021-07-12 ENCOUNTER — OFFICE VISIT (OUTPATIENT)
Dept: ORTHOPEDICS | Facility: CLINIC | Age: 81
End: 2021-07-12
Payer: MEDICARE

## 2021-07-12 VITALS — WEIGHT: 181.69 LBS | BODY MASS INDEX: 34.3 KG/M2 | HEIGHT: 61 IN

## 2021-07-12 DIAGNOSIS — G89.29 CHRONIC PAIN OF LEFT KNEE: Primary | ICD-10-CM

## 2021-07-12 DIAGNOSIS — M25.562 CHRONIC PAIN OF LEFT KNEE: Primary | ICD-10-CM

## 2021-07-12 DIAGNOSIS — M25.561 PAIN IN BOTH KNEES, UNSPECIFIED CHRONICITY: ICD-10-CM

## 2021-07-12 DIAGNOSIS — M25.562 PAIN IN BOTH KNEES, UNSPECIFIED CHRONICITY: Primary | ICD-10-CM

## 2021-07-12 DIAGNOSIS — Z96.652 STATUS POST TOTAL LEFT KNEE REPLACEMENT: ICD-10-CM

## 2021-07-12 DIAGNOSIS — M25.562 PAIN IN BOTH KNEES, UNSPECIFIED CHRONICITY: ICD-10-CM

## 2021-07-12 DIAGNOSIS — M25.561 PAIN IN BOTH KNEES, UNSPECIFIED CHRONICITY: Primary | ICD-10-CM

## 2021-07-12 PROCEDURE — 73562 X-RAY EXAM OF KNEE 3: CPT | Mod: 26,RT,, | Performed by: RADIOLOGY

## 2021-07-12 PROCEDURE — 1125F AMNT PAIN NOTED PAIN PRSNT: CPT | Mod: S$GLB,,, | Performed by: ORTHOPAEDIC SURGERY

## 2021-07-12 PROCEDURE — 99999 PR PBB SHADOW E&M-EST. PATIENT-LVL III: CPT | Mod: PBBFAC,,, | Performed by: ORTHOPAEDIC SURGERY

## 2021-07-12 PROCEDURE — 73562 X-RAY EXAM OF KNEE 3: CPT | Mod: TC,50

## 2021-07-12 PROCEDURE — 1159F MED LIST DOCD IN RCRD: CPT | Mod: S$GLB,,, | Performed by: ORTHOPAEDIC SURGERY

## 2021-07-12 PROCEDURE — 1159F PR MEDICATION LIST DOCUMENTED IN MEDICAL RECORD: ICD-10-PCS | Mod: S$GLB,,, | Performed by: ORTHOPAEDIC SURGERY

## 2021-07-12 PROCEDURE — 99999 PR PBB SHADOW E&M-EST. PATIENT-LVL III: ICD-10-PCS | Mod: PBBFAC,,, | Performed by: ORTHOPAEDIC SURGERY

## 2021-07-12 PROCEDURE — 99213 PR OFFICE/OUTPT VISIT, EST, LEVL III, 20-29 MIN: ICD-10-PCS | Mod: S$GLB,,, | Performed by: ORTHOPAEDIC SURGERY

## 2021-07-12 PROCEDURE — 99213 OFFICE O/P EST LOW 20 MIN: CPT | Mod: S$GLB,,, | Performed by: ORTHOPAEDIC SURGERY

## 2021-07-12 PROCEDURE — 1125F PR PAIN SEVERITY QUANTIFIED, PAIN PRESENT: ICD-10-PCS | Mod: S$GLB,,, | Performed by: ORTHOPAEDIC SURGERY

## 2021-07-12 PROCEDURE — 73562 PR  X-RAY KNEE 3 VIEW: ICD-10-PCS | Mod: 26,LT,, | Performed by: RADIOLOGY

## 2021-07-12 PROCEDURE — 73562 X-RAY EXAM OF KNEE 3: CPT | Mod: 26,LT,, | Performed by: RADIOLOGY

## 2021-07-13 ENCOUNTER — PES CALL (OUTPATIENT)
Dept: ADMINISTRATIVE | Facility: CLINIC | Age: 81
End: 2021-07-13

## 2021-07-21 ENCOUNTER — TELEPHONE (OUTPATIENT)
Dept: INTERNAL MEDICINE | Facility: CLINIC | Age: 81
End: 2021-07-21

## 2021-07-22 ENCOUNTER — OFFICE VISIT (OUTPATIENT)
Dept: INTERNAL MEDICINE | Facility: CLINIC | Age: 81
End: 2021-07-22
Payer: MEDICARE

## 2021-07-22 VITALS
OXYGEN SATURATION: 97 % | HEIGHT: 61 IN | BODY MASS INDEX: 34.42 KG/M2 | DIASTOLIC BLOOD PRESSURE: 82 MMHG | WEIGHT: 182.31 LBS | SYSTOLIC BLOOD PRESSURE: 136 MMHG | HEART RATE: 66 BPM

## 2021-07-22 DIAGNOSIS — I70.0 AORTIC ATHEROSCLEROSIS: ICD-10-CM

## 2021-07-22 DIAGNOSIS — M81.0 AGE-RELATED OSTEOPOROSIS WITHOUT CURRENT PATHOLOGICAL FRACTURE: ICD-10-CM

## 2021-07-22 DIAGNOSIS — G25.0 ESSENTIAL TREMOR: ICD-10-CM

## 2021-07-22 DIAGNOSIS — Z86.718 HISTORY OF DEEP VEIN THROMBOSIS (DVT) OF LOWER EXTREMITY: ICD-10-CM

## 2021-07-22 DIAGNOSIS — C50.411 MALIGNANT NEOPLASM OF UPPER-OUTER QUADRANT OF RIGHT BREAST IN FEMALE, ESTROGEN RECEPTOR POSITIVE: ICD-10-CM

## 2021-07-22 DIAGNOSIS — E55.9 MILD VITAMIN D DEFICIENCY: ICD-10-CM

## 2021-07-22 DIAGNOSIS — M17.0 PRIMARY OSTEOARTHRITIS OF BOTH KNEES: ICD-10-CM

## 2021-07-22 DIAGNOSIS — Z86.010 HISTORY OF COLONIC POLYPS: ICD-10-CM

## 2021-07-22 DIAGNOSIS — E66.09 CLASS 1 OBESITY DUE TO EXCESS CALORIES WITH SERIOUS COMORBIDITY IN ADULT, UNSPECIFIED BMI: ICD-10-CM

## 2021-07-22 DIAGNOSIS — Z87.81 HISTORY OF COMPRESSION FRACTURE OF SPINE: ICD-10-CM

## 2021-07-22 DIAGNOSIS — N18.30 STAGE 3 CHRONIC KIDNEY DISEASE, UNSPECIFIED WHETHER STAGE 3A OR 3B CKD: ICD-10-CM

## 2021-07-22 DIAGNOSIS — Z17.0 MALIGNANT NEOPLASM OF UPPER-OUTER QUADRANT OF RIGHT BREAST IN FEMALE, ESTROGEN RECEPTOR POSITIVE: ICD-10-CM

## 2021-07-22 DIAGNOSIS — M46.1 SACROILIITIS, NOT ELSEWHERE CLASSIFIED: ICD-10-CM

## 2021-07-22 DIAGNOSIS — Z00.00 ENCOUNTER FOR PREVENTIVE HEALTH EXAMINATION: Primary | ICD-10-CM

## 2021-07-22 DIAGNOSIS — D63.8 ANEMIA OF CHRONIC DISEASE: ICD-10-CM

## 2021-07-22 DIAGNOSIS — E78.1 HYPERTRIGLYCERIDEMIA: ICD-10-CM

## 2021-07-22 DIAGNOSIS — M51.35 DDD (DEGENERATIVE DISC DISEASE), THORACOLUMBAR: ICD-10-CM

## 2021-07-22 DIAGNOSIS — R73.03 PRE-DIABETES: ICD-10-CM

## 2021-07-22 DIAGNOSIS — R26.9 ABNORMALITY OF GAIT AND MOBILITY: ICD-10-CM

## 2021-07-22 DIAGNOSIS — M43.17 SPONDYLOLISTHESIS AT L5-S1 LEVEL: ICD-10-CM

## 2021-07-22 DIAGNOSIS — Z74.09 OTHER REDUCED MOBILITY: ICD-10-CM

## 2021-07-22 DIAGNOSIS — M50.30 DDD (DEGENERATIVE DISC DISEASE), CERVICAL: ICD-10-CM

## 2021-07-22 DIAGNOSIS — K21.00 GASTROESOPHAGEAL REFLUX DISEASE WITH ESOPHAGITIS, UNSPECIFIED WHETHER HEMORRHAGE: ICD-10-CM

## 2021-07-22 DIAGNOSIS — Z79.811 PROPHYLACTIC USE OF ANASTROZOLE (ARIMIDEX): ICD-10-CM

## 2021-07-22 PROBLEM — R06.09 DYSPNEA ON EXERTION: Status: RESOLVED | Noted: 2021-03-02 | Resolved: 2021-07-22

## 2021-07-22 PROCEDURE — 1101F PT FALLS ASSESS-DOCD LE1/YR: CPT | Mod: CPTII,S$GLB,, | Performed by: NURSE PRACTITIONER

## 2021-07-22 PROCEDURE — G0439 PR MEDICARE ANNUAL WELLNESS SUBSEQUENT VISIT: ICD-10-PCS | Mod: S$GLB,,, | Performed by: NURSE PRACTITIONER

## 2021-07-22 PROCEDURE — 99999 PR PBB SHADOW E&M-EST. PATIENT-LVL IV: CPT | Mod: PBBFAC,,, | Performed by: NURSE PRACTITIONER

## 2021-07-22 PROCEDURE — 1125F PR PAIN SEVERITY QUANTIFIED, PAIN PRESENT: ICD-10-PCS | Mod: CPTII,S$GLB,, | Performed by: NURSE PRACTITIONER

## 2021-07-22 PROCEDURE — 1125F AMNT PAIN NOTED PAIN PRSNT: CPT | Mod: CPTII,S$GLB,, | Performed by: NURSE PRACTITIONER

## 2021-07-22 PROCEDURE — 99999 PR PBB SHADOW E&M-EST. PATIENT-LVL IV: ICD-10-PCS | Mod: PBBFAC,,, | Performed by: NURSE PRACTITIONER

## 2021-07-22 PROCEDURE — 3288F FALL RISK ASSESSMENT DOCD: CPT | Mod: CPTII,S$GLB,, | Performed by: NURSE PRACTITIONER

## 2021-07-22 PROCEDURE — 1101F PR PT FALLS ASSESS DOC 0-1 FALLS W/OUT INJ PAST YR: ICD-10-PCS | Mod: CPTII,S$GLB,, | Performed by: NURSE PRACTITIONER

## 2021-07-22 PROCEDURE — 3288F PR FALLS RISK ASSESSMENT DOCUMENTED: ICD-10-PCS | Mod: CPTII,S$GLB,, | Performed by: NURSE PRACTITIONER

## 2021-07-22 PROCEDURE — G0439 PPPS, SUBSEQ VISIT: HCPCS | Mod: S$GLB,,, | Performed by: NURSE PRACTITIONER

## 2021-07-22 RX ORDER — LORATADINE 10 MG/1
10 TABLET ORAL DAILY
COMMUNITY

## 2021-07-22 RX ORDER — FLUTICASONE PROPIONATE 50 MCG
1 SPRAY, SUSPENSION (ML) NASAL DAILY
COMMUNITY

## 2021-07-22 RX ORDER — VITAMIN B COMPLEX
1 CAPSULE ORAL DAILY
COMMUNITY

## 2021-09-07 ENCOUNTER — TELEPHONE (OUTPATIENT)
Dept: HEMATOLOGY/ONCOLOGY | Facility: CLINIC | Age: 81
End: 2021-09-07

## 2021-09-07 DIAGNOSIS — Z79.811 USE OF AROMATASE INHIBITORS: ICD-10-CM

## 2021-09-07 RX ORDER — ANASTROZOLE 1 MG/1
1 TABLET ORAL DAILY
Qty: 90 TABLET | Refills: 2 | Status: SHIPPED | OUTPATIENT
Start: 2021-09-07 | End: 2022-05-03

## 2021-10-05 ENCOUNTER — IMMUNIZATION (OUTPATIENT)
Dept: INTERNAL MEDICINE | Facility: CLINIC | Age: 81
End: 2021-10-05
Payer: MEDICARE

## 2021-10-05 DIAGNOSIS — Z23 NEED FOR VACCINATION: Primary | ICD-10-CM

## 2021-10-05 PROCEDURE — 0003A COVID-19, MRNA, LNP-S, PF, 30 MCG/0.3 ML DOSE VACCINE: CPT | Mod: HCNC,PBBFAC | Performed by: INTERNAL MEDICINE

## 2021-10-05 PROCEDURE — 91300 COVID-19, MRNA, LNP-S, PF, 30 MCG/0.3 ML DOSE VACCINE: CPT | Mod: HCNC,PBBFAC | Performed by: INTERNAL MEDICINE

## 2022-01-27 ENCOUNTER — TELEPHONE (OUTPATIENT)
Dept: HEMATOLOGY/ONCOLOGY | Facility: CLINIC | Age: 82
End: 2022-01-27
Payer: MEDICARE

## 2022-02-28 ENCOUNTER — OFFICE VISIT (OUTPATIENT)
Dept: HEMATOLOGY/ONCOLOGY | Facility: CLINIC | Age: 82
End: 2022-02-28
Payer: MEDICARE

## 2022-02-28 VITALS
RESPIRATION RATE: 25 BRPM | SYSTOLIC BLOOD PRESSURE: 159 MMHG | DIASTOLIC BLOOD PRESSURE: 72 MMHG | HEIGHT: 61 IN | WEIGHT: 180.13 LBS | BODY MASS INDEX: 34.01 KG/M2 | OXYGEN SATURATION: 98 % | TEMPERATURE: 98 F | HEART RATE: 66 BPM

## 2022-02-28 DIAGNOSIS — Z17.0 MALIGNANT NEOPLASM OF UPPER-OUTER QUADRANT OF RIGHT BREAST IN FEMALE, ESTROGEN RECEPTOR POSITIVE: Primary | ICD-10-CM

## 2022-02-28 DIAGNOSIS — Z79.811 PROPHYLACTIC USE OF ANASTROZOLE (ARIMIDEX): ICD-10-CM

## 2022-02-28 DIAGNOSIS — C50.411 MALIGNANT NEOPLASM OF UPPER-OUTER QUADRANT OF RIGHT BREAST IN FEMALE, ESTROGEN RECEPTOR POSITIVE: Primary | ICD-10-CM

## 2022-02-28 PROCEDURE — 99214 PR OFFICE/OUTPT VISIT, EST, LEVL IV, 30-39 MIN: ICD-10-PCS | Mod: HCNC,S$GLB,, | Performed by: INTERNAL MEDICINE

## 2022-02-28 PROCEDURE — 1159F PR MEDICATION LIST DOCUMENTED IN MEDICAL RECORD: ICD-10-PCS | Mod: HCNC,CPTII,S$GLB, | Performed by: INTERNAL MEDICINE

## 2022-02-28 PROCEDURE — 99499 RISK ADDL DX/OHS AUDIT: ICD-10-PCS | Mod: HCNC,S$GLB,, | Performed by: INTERNAL MEDICINE

## 2022-02-28 PROCEDURE — 3078F PR MOST RECENT DIASTOLIC BLOOD PRESSURE < 80 MM HG: ICD-10-PCS | Mod: HCNC,CPTII,S$GLB, | Performed by: INTERNAL MEDICINE

## 2022-02-28 PROCEDURE — 99999 PR PBB SHADOW E&M-EST. PATIENT-LVL IV: ICD-10-PCS | Mod: PBBFAC,HCNC,, | Performed by: INTERNAL MEDICINE

## 2022-02-28 PROCEDURE — 3077F SYST BP >= 140 MM HG: CPT | Mod: HCNC,CPTII,S$GLB, | Performed by: INTERNAL MEDICINE

## 2022-02-28 PROCEDURE — 99214 OFFICE O/P EST MOD 30 MIN: CPT | Mod: HCNC,S$GLB,, | Performed by: INTERNAL MEDICINE

## 2022-02-28 PROCEDURE — 99999 PR PBB SHADOW E&M-EST. PATIENT-LVL IV: CPT | Mod: PBBFAC,HCNC,, | Performed by: INTERNAL MEDICINE

## 2022-02-28 PROCEDURE — 3288F PR FALLS RISK ASSESSMENT DOCUMENTED: ICD-10-PCS | Mod: HCNC,CPTII,S$GLB, | Performed by: INTERNAL MEDICINE

## 2022-02-28 PROCEDURE — 99499 UNLISTED E&M SERVICE: CPT | Mod: HCNC,S$GLB,, | Performed by: INTERNAL MEDICINE

## 2022-02-28 PROCEDURE — 1101F PR PT FALLS ASSESS DOC 0-1 FALLS W/OUT INJ PAST YR: ICD-10-PCS | Mod: HCNC,CPTII,S$GLB, | Performed by: INTERNAL MEDICINE

## 2022-02-28 PROCEDURE — 1125F AMNT PAIN NOTED PAIN PRSNT: CPT | Mod: HCNC,CPTII,S$GLB, | Performed by: INTERNAL MEDICINE

## 2022-02-28 PROCEDURE — 1101F PT FALLS ASSESS-DOCD LE1/YR: CPT | Mod: HCNC,CPTII,S$GLB, | Performed by: INTERNAL MEDICINE

## 2022-02-28 PROCEDURE — 1159F MED LIST DOCD IN RCRD: CPT | Mod: HCNC,CPTII,S$GLB, | Performed by: INTERNAL MEDICINE

## 2022-02-28 PROCEDURE — 3078F DIAST BP <80 MM HG: CPT | Mod: HCNC,CPTII,S$GLB, | Performed by: INTERNAL MEDICINE

## 2022-02-28 PROCEDURE — 1125F PR PAIN SEVERITY QUANTIFIED, PAIN PRESENT: ICD-10-PCS | Mod: HCNC,CPTII,S$GLB, | Performed by: INTERNAL MEDICINE

## 2022-02-28 PROCEDURE — 3077F PR MOST RECENT SYSTOLIC BLOOD PRESSURE >= 140 MM HG: ICD-10-PCS | Mod: HCNC,CPTII,S$GLB, | Performed by: INTERNAL MEDICINE

## 2022-02-28 PROCEDURE — 3288F FALL RISK ASSESSMENT DOCD: CPT | Mod: HCNC,CPTII,S$GLB, | Performed by: INTERNAL MEDICINE

## 2022-03-22 ENCOUNTER — TELEPHONE (OUTPATIENT)
Dept: ORTHOPEDICS | Facility: CLINIC | Age: 82
End: 2022-03-22
Payer: MEDICARE

## 2022-03-22 NOTE — TELEPHONE ENCOUNTER
----- Message from Angelica Edwards MA sent at 3/22/2022  2:19 PM CDT -----  Regarding: FW: [employer] recall patient  Contact: pt @ [phone]    ----- Message -----  From: Anali Fuchs  Sent: 3/22/2022  10:53 AM CDT  To: [facility]MENS Staff  Subject: [employer] recall patient                          Pt calling to speak with someone in 's office regarding the [employer] recall device. Please call.

## 2022-03-25 ENCOUNTER — TELEPHONE (OUTPATIENT)
Dept: ORTHOPEDICS | Facility: CLINIC | Age: 82
End: 2022-03-25
Payer: MEDICARE

## 2022-03-25 DIAGNOSIS — Z96.651 HISTORY OF RIGHT KNEE JOINT REPLACEMENT: Primary | ICD-10-CM

## 2022-03-25 NOTE — TELEPHONE ENCOUNTER
Spoke to patient, scheduled xray for [employer] recall. Patient understanding.       ----- Message from Helen Martin sent at 3/25/2022 10:54 AM CDT -----  Regarding: [employer] Recall Patient  Pt calling to schedule Xray  says  she called before and hasnt  gotten a  response     [phone] (home)

## 2022-03-29 ENCOUNTER — TELEPHONE (OUTPATIENT)
Dept: ORTHOPEDICS | Facility: CLINIC | Age: 82
End: 2022-03-29
Payer: MEDICARE

## 2022-03-29 ENCOUNTER — HOSPITAL ENCOUNTER (OUTPATIENT)
Dept: RADIOLOGY | Facility: HOSPITAL | Age: 82
Discharge: HOME | End: 2022-03-29
Attending: NURSE PRACTITIONER
Payer: MEDICARE

## 2022-03-29 DIAGNOSIS — Z96.651 HISTORY OF RIGHT KNEE JOINT REPLACEMENT: ICD-10-CM

## 2022-03-29 PROCEDURE — 73560 XR KNEE ORTHO RIGHT: ICD-10-PCS | Mod: 26,LT,, | Performed by: RADIOLOGY

## 2022-03-29 PROCEDURE — 73562 X-RAY EXAM OF KNEE 3: CPT | Mod: 26,RT,, | Performed by: RADIOLOGY

## 2022-03-29 PROCEDURE — 73562 X-RAY EXAM OF KNEE 3: CPT | Mod: TC,RT

## 2022-03-29 PROCEDURE — 73562 XR KNEE ORTHO RIGHT: ICD-10-PCS | Mod: 26,RT,, | Performed by: RADIOLOGY

## 2022-03-29 PROCEDURE — 73560 X-RAY EXAM OF KNEE 1 OR 2: CPT | Mod: 26,LT,, | Performed by: RADIOLOGY

## 2022-03-29 PROCEDURE — 73560 X-RAY EXAM OF KNEE 1 OR 2: CPT | Mod: 59,TC,LT

## 2022-03-29 NOTE — TELEPHONE ENCOUNTER
Called and left a voicemail for patient regarding xray knee results.  Instructed patient to call back to discuss results, or if she is having any problems to call back and we will schedule her in clinic.

## 2022-03-29 NOTE — TELEPHONE ENCOUNTER
----- Message from Bev Garcia PA-C sent at 3/29/2022  3:16 PM CDT -----    ----- Message -----  From: Interface, Rad Results In  Sent: 3/29/2022   2:41 PM CDT  To: Darline Keane NP

## 2022-03-30 ENCOUNTER — TELEPHONE (OUTPATIENT)
Dept: ORTHOPEDICS | Facility: CLINIC | Age: 82
End: 2022-03-30
Payer: MEDICARE

## 2022-03-30 NOTE — TELEPHONE ENCOUNTER
----- Message from Angelica Edwards MA sent at 3/30/2022 12:07 PM CDT -----  Contact: @ [phone]    ----- Message -----  From: Sonny Fenton  Sent: 3/30/2022  10:58 AM CDT  To: Lakhwinder Gregorio Staff, #     Patient requesting a return call to discuss test results, pls call ( her portal and vm is unavailable at the moment )

## 2022-03-30 NOTE — TELEPHONE ENCOUNTER
Returned call to patient regarding the xray related to the [employer] recall. Her right knee xray looks good. Advised her to call back if she wants to come in to see Dr. Washburn. She saw him in July 2021 for left knee pain and can f/u for that as needed.

## 2022-04-19 ENCOUNTER — TELEPHONE (OUTPATIENT)
Dept: SPORTS MEDICINE | Facility: CLINIC | Age: 82
End: 2022-04-19
Payer: MEDICARE

## 2022-04-19 NOTE — TELEPHONE ENCOUNTER
Spoke to the Pt about her upcoming appt for Dr. Martinez.  Informed her that we will need to reschedule the appt to have more time for the NP eval and xrays.  Pt then instructed me that she wanted to cancel and wait to see what her PCP said for the R shoulder pain she is having.

## 2022-06-22 ENCOUNTER — OFFICE VISIT (OUTPATIENT)
Dept: ORTHOPEDICS | Facility: CLINIC | Age: 82
End: 2022-06-22
Payer: MEDICARE

## 2022-06-22 VITALS — HEIGHT: 61 IN | BODY MASS INDEX: 33.96 KG/M2 | WEIGHT: 179.88 LBS

## 2022-06-22 DIAGNOSIS — G89.29 CHRONIC PAIN OF BOTH KNEES: Primary | ICD-10-CM

## 2022-06-22 DIAGNOSIS — Z96.651 HISTORY OF RIGHT KNEE JOINT REPLACEMENT: ICD-10-CM

## 2022-06-22 DIAGNOSIS — M25.562 CHRONIC PAIN OF BOTH KNEES: Primary | ICD-10-CM

## 2022-06-22 DIAGNOSIS — M25.561 CHRONIC PAIN OF BOTH KNEES: Primary | ICD-10-CM

## 2022-06-22 DIAGNOSIS — Z96.652 HISTORY OF LEFT KNEE REPLACEMENT: ICD-10-CM

## 2022-06-22 PROCEDURE — 1160F PR REVIEW ALL MEDS BY PRESCRIBER/CLIN PHARMACIST DOCUMENTED: ICD-10-PCS | Mod: CPTII,S$GLB,, | Performed by: ORTHOPAEDIC SURGERY

## 2022-06-22 PROCEDURE — 1101F PT FALLS ASSESS-DOCD LE1/YR: CPT | Mod: CPTII,S$GLB,, | Performed by: ORTHOPAEDIC SURGERY

## 2022-06-22 PROCEDURE — 99213 PR OFFICE/OUTPT VISIT, EST, LEVL III, 20-29 MIN: ICD-10-PCS | Mod: S$GLB,,, | Performed by: ORTHOPAEDIC SURGERY

## 2022-06-22 PROCEDURE — 1159F PR MEDICATION LIST DOCUMENTED IN MEDICAL RECORD: ICD-10-PCS | Mod: CPTII,S$GLB,, | Performed by: ORTHOPAEDIC SURGERY

## 2022-06-22 PROCEDURE — 99999 PR PBB SHADOW E&M-EST. PATIENT-LVL III: ICD-10-PCS | Mod: PBBFAC,,, | Performed by: ORTHOPAEDIC SURGERY

## 2022-06-22 PROCEDURE — 1159F MED LIST DOCD IN RCRD: CPT | Mod: CPTII,S$GLB,, | Performed by: ORTHOPAEDIC SURGERY

## 2022-06-22 PROCEDURE — 1101F PR PT FALLS ASSESS DOC 0-1 FALLS W/OUT INJ PAST YR: ICD-10-PCS | Mod: CPTII,S$GLB,, | Performed by: ORTHOPAEDIC SURGERY

## 2022-06-22 PROCEDURE — 99999 PR PBB SHADOW E&M-EST. PATIENT-LVL III: CPT | Mod: PBBFAC,,, | Performed by: ORTHOPAEDIC SURGERY

## 2022-06-22 PROCEDURE — 1125F AMNT PAIN NOTED PAIN PRSNT: CPT | Mod: CPTII,S$GLB,, | Performed by: ORTHOPAEDIC SURGERY

## 2022-06-22 PROCEDURE — 1160F RVW MEDS BY RX/DR IN RCRD: CPT | Mod: CPTII,S$GLB,, | Performed by: ORTHOPAEDIC SURGERY

## 2022-06-22 PROCEDURE — 1125F PR PAIN SEVERITY QUANTIFIED, PAIN PRESENT: ICD-10-PCS | Mod: CPTII,S$GLB,, | Performed by: ORTHOPAEDIC SURGERY

## 2022-06-22 PROCEDURE — 3288F FALL RISK ASSESSMENT DOCD: CPT | Mod: CPTII,S$GLB,, | Performed by: ORTHOPAEDIC SURGERY

## 2022-06-22 PROCEDURE — 99213 OFFICE O/P EST LOW 20 MIN: CPT | Mod: S$GLB,,, | Performed by: ORTHOPAEDIC SURGERY

## 2022-06-22 PROCEDURE — 3288F PR FALLS RISK ASSESSMENT DOCUMENTED: ICD-10-PCS | Mod: CPTII,S$GLB,, | Performed by: ORTHOPAEDIC SURGERY

## 2022-06-22 NOTE — PROGRESS NOTES
"Subjective:      Patient ID: Nicho Espinosa is a 81 y.o. female.    Chief Complaint: Follow-up of the Left Knee and Follow-up of the Right Knee    HPI  Nicho Espinosa has minimal bilateral knee pain. She had some transient right knee pain from climbing a ladder, but this has improved.  Her history, medications and problem list were reviewed.    Review of Systems   Constitutional: Negative for chills, fever and night sweats.   HENT: Negative for hearing loss.    Eyes: Negative for blurred vision and double vision.   Cardiovascular: Negative for chest pain, claudication and leg swelling.   Respiratory: Negative for shortness of breath.    Endocrine: Negative for polydipsia, polyphagia and polyuria.   Hematologic/Lymphatic: Negative for adenopathy and bleeding problem. Does not bruise/bleed easily.   Skin: Negative for poor wound healing.   Musculoskeletal: Positive for joint pain.   Gastrointestinal: Negative for diarrhea and heartburn.   Genitourinary: Negative for bladder incontinence.   Neurological: Negative for focal weakness, headaches, numbness, paresthesias and sensory change.   Psychiatric/Behavioral: The patient is not nervous/anxious.    Allergic/Immunologic: Negative for persistent infections.         Objective:      Body mass index is 33.99 kg/m².  Vitals:    06/22/22 1337   Weight: 81.6 kg (179 lb 14.3 oz)   Height: 5' 1" (1.549 m)           General    Constitutional: She is oriented to person, place, and time. She appears well-developed and well-nourished.   HENT:   Head: Normocephalic and atraumatic.   Eyes: EOM are normal.   Cardiovascular: Normal rate.    Pulmonary/Chest: Effort normal.   Neurological: She is alert and oriented to person, place, and time.   Psychiatric: She has a normal mood and affect. Her behavior is normal.     General Musculoskeletal Exam   Gait: normal       Right Knee Exam     Inspection   Erythema: absent  Scars: present  Swelling: absent  Effusion: absent  Deformity: " absent  Bruising: absent    Tenderness   The patient is experiencing no tenderness.     Range of Motion   Extension: 0   Flexion: 130     Tests   Ligament Examination Lachman: normal (-1 to 2mm)   MCL - Valgus: normal (0 to 2mm)  LCL - Varus: normal  Patella   Passive Patellar Tilt: neutral    Other   Sensation: normal    Left Knee Exam     Inspection   Erythema: absent  Scars: present  Swelling: absent  Effusion: absent  Deformity: absent  Bruising: absent    Tenderness   The patient is experiencing no tenderness.     Range of Motion   Extension: 0   Flexion: 130     Tests   Stability Lachman: normal (-1 to 2mm)   MCL - Valgus: normal (0 to 2mm)  LCL - Varus: normal (0 to 2mm)  Patella   Passive Patellar Tilt: neutral    Other   Sensation: normal    Muscle Strength   Right Lower Extremity   Hip Abduction: 5/5   Quadriceps:  5/5   Hamstrin/5   Left Lower Extremity   Hip Abduction: 5/5   Quadriceps:  5/5   Hamstrin/5     Reflexes     Left Side  Quadriceps:  2+    Right Side   Quadriceps:  2+    Vascular Exam     Right Pulses  Dorsalis Pedis:      2+          Left Pulses  Dorsalis Pedis:      2+          Edema  Right Lower Leg: absent  Left Lower Leg: absent      Radiographs taken recently were reviewed by me.  There is a prosthetic replacement of the bilateral knee(s).  The prosthesis is not well positioned.  There is not evidence of bone loss, osteolysis, or loosening. There is not a fracture.  No change from prior.  [place] on left            Assessment:       Encounter Diagnoses   Name Primary?    Chronic pain of both knees Yes    History of right knee joint replacement     History of left knee replacement           Plan:       Nicho was seen today for follow-up and follow-up.    Diagnoses and all orders for this visit:    Chronic pain of both knees    History of right knee joint replacement    History of left knee replacement        I discussed the [facility] recall with the patient.  She is aware of  the reasons behind the recall and the possible risks associated with it.      F/U in one year with xrays.  Sooner if needed.

## 2022-06-27 ENCOUNTER — TELEPHONE (OUTPATIENT)
Dept: HEMATOLOGY/ONCOLOGY | Facility: CLINIC | Age: 82
End: 2022-06-27
Payer: MEDICARE

## 2022-06-28 ENCOUNTER — HOSPITAL ENCOUNTER (OUTPATIENT)
Dept: RADIOLOGY | Facility: HOSPITAL | Age: 82
Discharge: HOME | End: 2022-06-28
Attending: INTERNAL MEDICINE
Payer: MEDICARE

## 2022-06-28 ENCOUNTER — OFFICE VISIT (OUTPATIENT)
Dept: HEMATOLOGY/ONCOLOGY | Facility: CLINIC | Age: 82
End: 2022-06-28
Payer: MEDICARE

## 2022-06-28 VITALS
SYSTOLIC BLOOD PRESSURE: 136 MMHG | HEIGHT: 61 IN | RESPIRATION RATE: 18 BRPM | DIASTOLIC BLOOD PRESSURE: 67 MMHG | BODY MASS INDEX: 34.09 KG/M2 | WEIGHT: 180.56 LBS | HEART RATE: 70 BPM | OXYGEN SATURATION: 97 %

## 2022-06-28 DIAGNOSIS — Z85.3 HISTORY OF BREAST CANCER: Primary | ICD-10-CM

## 2022-06-28 DIAGNOSIS — C50.411 MALIGNANT NEOPLASM OF UPPER-OUTER QUADRANT OF RIGHT BREAST IN FEMALE, ESTROGEN RECEPTOR POSITIVE: ICD-10-CM

## 2022-06-28 DIAGNOSIS — Z17.0 MALIGNANT NEOPLASM OF UPPER-OUTER QUADRANT OF RIGHT BREAST IN FEMALE, ESTROGEN RECEPTOR POSITIVE: ICD-10-CM

## 2022-06-28 DIAGNOSIS — Z79.811 PROPHYLACTIC USE OF ANASTROZOLE (ARIMIDEX): ICD-10-CM

## 2022-06-28 DIAGNOSIS — Z12.31 ENCOUNTER FOR SCREENING MAMMOGRAM FOR MALIGNANT NEOPLASM OF BREAST: ICD-10-CM

## 2022-06-28 PROCEDURE — 1126F PR PAIN SEVERITY QUANTIFIED, NO PAIN PRESENT: ICD-10-PCS | Mod: CPTII,S$GLB,, | Performed by: INTERNAL MEDICINE

## 2022-06-28 PROCEDURE — 77065 DX MAMMO INCL CAD UNI: CPT | Mod: TC,LT

## 2022-06-28 PROCEDURE — 3078F PR MOST RECENT DIASTOLIC BLOOD PRESSURE < 80 MM HG: ICD-10-PCS | Mod: CPTII,S$GLB,, | Performed by: INTERNAL MEDICINE

## 2022-06-28 PROCEDURE — 77065 DX MAMMO INCL CAD UNI: CPT | Mod: 26,LT,, | Performed by: RADIOLOGY

## 2022-06-28 PROCEDURE — 1101F PR PT FALLS ASSESS DOC 0-1 FALLS W/OUT INJ PAST YR: ICD-10-PCS | Mod: CPTII,S$GLB,, | Performed by: INTERNAL MEDICINE

## 2022-06-28 PROCEDURE — 99213 OFFICE O/P EST LOW 20 MIN: CPT | Mod: S$GLB,,, | Performed by: INTERNAL MEDICINE

## 2022-06-28 PROCEDURE — 77065 MAMMO DIGITAL DIAGNOSTIC LEFT WITH TOMO: ICD-10-PCS | Mod: 26,LT,, | Performed by: RADIOLOGY

## 2022-06-28 PROCEDURE — 3075F SYST BP GE 130 - 139MM HG: CPT | Mod: CPTII,S$GLB,, | Performed by: INTERNAL MEDICINE

## 2022-06-28 PROCEDURE — 3288F FALL RISK ASSESSMENT DOCD: CPT | Mod: CPTII,S$GLB,, | Performed by: INTERNAL MEDICINE

## 2022-06-28 PROCEDURE — 1159F PR MEDICATION LIST DOCUMENTED IN MEDICAL RECORD: ICD-10-PCS | Mod: CPTII,S$GLB,, | Performed by: INTERNAL MEDICINE

## 2022-06-28 PROCEDURE — 1101F PT FALLS ASSESS-DOCD LE1/YR: CPT | Mod: CPTII,S$GLB,, | Performed by: INTERNAL MEDICINE

## 2022-06-28 PROCEDURE — 77061 BREAST TOMOSYNTHESIS UNI: CPT | Mod: 26,LT,, | Performed by: RADIOLOGY

## 2022-06-28 PROCEDURE — 3075F PR MOST RECENT SYSTOLIC BLOOD PRESS GE 130-139MM HG: ICD-10-PCS | Mod: CPTII,S$GLB,, | Performed by: INTERNAL MEDICINE

## 2022-06-28 PROCEDURE — 77061 MAMMO DIGITAL DIAGNOSTIC LEFT WITH TOMO: ICD-10-PCS | Mod: 26,LT,, | Performed by: RADIOLOGY

## 2022-06-28 PROCEDURE — 3288F PR FALLS RISK ASSESSMENT DOCUMENTED: ICD-10-PCS | Mod: CPTII,S$GLB,, | Performed by: INTERNAL MEDICINE

## 2022-06-28 PROCEDURE — 99999 PR PBB SHADOW E&M-EST. PATIENT-LVL III: CPT | Mod: PBBFAC,,, | Performed by: INTERNAL MEDICINE

## 2022-06-28 PROCEDURE — 1159F MED LIST DOCD IN RCRD: CPT | Mod: CPTII,S$GLB,, | Performed by: INTERNAL MEDICINE

## 2022-06-28 PROCEDURE — 3078F DIAST BP <80 MM HG: CPT | Mod: CPTII,S$GLB,, | Performed by: INTERNAL MEDICINE

## 2022-06-28 PROCEDURE — 99213 PR OFFICE/OUTPT VISIT, EST, LEVL III, 20-29 MIN: ICD-10-PCS | Mod: S$GLB,,, | Performed by: INTERNAL MEDICINE

## 2022-06-28 PROCEDURE — 99999 PR PBB SHADOW E&M-EST. PATIENT-LVL III: ICD-10-PCS | Mod: PBBFAC,,, | Performed by: INTERNAL MEDICINE

## 2022-06-28 PROCEDURE — 1126F AMNT PAIN NOTED NONE PRSNT: CPT | Mod: CPTII,S$GLB,, | Performed by: INTERNAL MEDICINE

## 2022-06-28 NOTE — PROGRESS NOTES
Subjective:       Patient ID: Nicho Espinosa is a 81 y.o. female.    Chief Complaint: No chief complaint on file.    Follow-up       Mrs. Espinosa returns today for follow up.  I had last seen her in late February 2022.  She has been on anastrazole since August 2017, and has tolerated it well so far.  Of note, her Oncotype score was 7, suggesting that she has a 6 % chance of recurrence at ten years with tamoxifen alone.  Briefly, she is an 81 year-old  female who, on 08/09/2017, underwent a right modified radical mastectomy and sentinel lymph node biopsy.  The pathology report from the procedure indicates that she had a 2 cm lobular carcinoma that was ER strongly positive, MO positive and HER-2 negative; resection margins were clear.  On the right axillary sentinel lymph nodes isolated tumor cells were seen.    Her most recent DXA scan in March 2021 had shown low normal bone density.    A mammogram earlier today was read as BIRADS I, and a one year follow up was recommended.      Review of Systems    Overall she feels fair and states that her back pain is worse.  Specifically, she states that the pain is more intense and had shifted in location.  She is under the care of a pain specialist.  She  denies any anxiety, depression, easy bruising, fevers, chills, night  sweats, weight loss, nausea, vomiting, diarrhea, constipation, diplopia, blurred vision, headache, chest pain, palpitations, shortness of breath, breast pain, abdominal pain, extremity pain, or difficulty ambulating.  The remainder of the ten-point ROS, including general, skin, lymph, H/N, cardiorespiratory, GI, , Neuro, Endocrine, and psychiatric is negative.     Of note, her most recent lumbar spine MRI was from May 15, 2019 and shows extensive DJD.  I have reviewed the images myself.      Objective:      Physical Exam    She is alert, oriented to time, place, person, pleasant, well      nourished, in no acute physical distress.                                     VITAL SIGNS:  Reviewed                                      HEENT:  Normal.  There are no nasal, oral, lip, gingival, auricular, lid,    or conjunctival lesions.  Mucosae are moist and pink, and there is no        thrush.  Pupils are equal, reactive to light and accommodation.              Extraocular muscle movements are intact.  For  Dentition is good.                                   NECK:  Supple without JVD, adenopathy, or thyromegaly.                       LUNGS:  Clear to auscultation without wheezing, rales, or rhonchi.           CARDIOVASCULAR:  Reveals an S1, S2, a grade I systolic ejection murmur at the LSB, no rubs, no gallops.         ABDOMEN:  Soft, nontender, without organomegaly.  Bowel sounds are    present.                                                                     EXTREMITIES:  No cyanosis, clubbing, or edema.  Scars of bilateral knee replacement surgeries are noted.          BREASTS:  She is status post right mastectomy with a well-healed incision.    There are no masses in the left breast.                                        LYMPHATIC:  There is no cervical, axillary, or supraclavicular adenopathy.   SKIN:  Warm and moist, without petechiae, rashes, induration, or ecchymoses.           NEUROLOGIC:  DTRs are 0-1+ bilaterally, symmetrical, motor function is 5/5,  and cranial nerves are  within normal limits.      Assessment:       1. History of breast cancer    2. Adjuvant use of anastrozole       3.   Extensive DJD, s/p fusion of lumbar spine.    Plan:        Ffrom the breast cancer perspective Mrs Espinosa is doing well and remains LUZ ELENA.  I have asked her to remain on anastrazole through the end of August 2022.   I will see her again in a year with a left mammogram.  Her questions were answered to her satisfaction.

## 2022-09-26 ENCOUNTER — IMMUNIZATION (OUTPATIENT)
Dept: INTERNAL MEDICINE | Facility: CLINIC | Age: 82
End: 2022-09-26
Payer: MEDICARE

## 2022-09-26 DIAGNOSIS — Z23 NEED FOR VACCINATION: Primary | ICD-10-CM

## 2022-09-26 PROCEDURE — 91312 COVID-19, MRNA, LNP-S, BIVALENT BOOSTER, PF, 30 MCG/0.3 ML DOSE: CPT | Mod: S$GLB,,, | Performed by: INTERNAL MEDICINE

## 2022-09-26 PROCEDURE — 91312 COVID-19, MRNA, LNP-S, BIVALENT BOOSTER, PF, 30 MCG/0.3 ML DOSE: ICD-10-PCS | Mod: S$GLB,,, | Performed by: INTERNAL MEDICINE

## 2022-09-26 PROCEDURE — 0124A COVID-19, MRNA, LNP-S, BIVALENT BOOSTER, PF, 30 MCG/0.3 ML DOSE: CPT | Mod: CV19,PBBFAC | Performed by: INTERNAL MEDICINE

## 2022-10-20 ENCOUNTER — IMMUNIZATION (OUTPATIENT)
Dept: INTERNAL MEDICINE | Facility: CLINIC | Age: 82
End: 2022-10-20
Payer: MEDICARE

## 2022-10-20 ENCOUNTER — PES CALL (OUTPATIENT)
Dept: ADMINISTRATIVE | Facility: CLINIC | Age: 82
End: 2022-10-20
Payer: MEDICARE

## 2022-10-20 PROCEDURE — 90694 VACC AIIV4 NO PRSRV 0.5ML IM: CPT | Mod: S$GLB,,, | Performed by: INTERNAL MEDICINE

## 2022-10-20 PROCEDURE — 90694 FLU VACCINE - QUADRIVALENT - ADJUVANTED: ICD-10-PCS | Mod: S$GLB,,, | Performed by: INTERNAL MEDICINE

## 2022-10-20 PROCEDURE — G0008 ADMIN INFLUENZA VIRUS VAC: HCPCS | Mod: S$GLB,,, | Performed by: INTERNAL MEDICINE

## 2022-10-20 PROCEDURE — G0008 FLU VACCINE - QUADRIVALENT - ADJUVANTED: ICD-10-PCS | Mod: S$GLB,,, | Performed by: INTERNAL MEDICINE

## 2022-10-25 ENCOUNTER — PATIENT MESSAGE (OUTPATIENT)
Dept: DERMATOLOGY | Facility: CLINIC | Age: 82
End: 2022-10-25
Payer: MEDICARE

## 2022-10-25 DIAGNOSIS — L21.9 SEBORRHEIC DERMATITIS, UNSPECIFIED: Primary | ICD-10-CM

## 2022-10-31 RX ORDER — KETOCONAZOLE 20 MG/G
CREAM TOPICAL
Qty: 60 G | Refills: 3 | Status: SHIPPED | OUTPATIENT
Start: 2022-10-31

## 2022-11-03 ENCOUNTER — OFFICE VISIT (OUTPATIENT)
Dept: INTERNAL MEDICINE | Facility: CLINIC | Age: 82
End: 2022-11-03
Payer: MEDICARE

## 2022-11-03 VITALS
HEART RATE: 72 BPM | DIASTOLIC BLOOD PRESSURE: 64 MMHG | WEIGHT: 182.13 LBS | BODY MASS INDEX: 34.39 KG/M2 | HEIGHT: 61 IN | SYSTOLIC BLOOD PRESSURE: 106 MMHG

## 2022-11-03 DIAGNOSIS — E66.09 CLASS 1 OBESITY DUE TO EXCESS CALORIES WITH SERIOUS COMORBIDITY IN ADULT, UNSPECIFIED BMI: ICD-10-CM

## 2022-11-03 DIAGNOSIS — Z86.718 HISTORY OF DEEP VEIN THROMBOSIS (DVT) OF LOWER EXTREMITY: ICD-10-CM

## 2022-11-03 DIAGNOSIS — M81.0 AGE-RELATED OSTEOPOROSIS WITHOUT CURRENT PATHOLOGICAL FRACTURE: ICD-10-CM

## 2022-11-03 DIAGNOSIS — M50.30 DDD (DEGENERATIVE DISC DISEASE), CERVICAL: ICD-10-CM

## 2022-11-03 DIAGNOSIS — G25.0 ESSENTIAL TREMOR: ICD-10-CM

## 2022-11-03 DIAGNOSIS — E78.1 HYPERTRIGLYCERIDEMIA: ICD-10-CM

## 2022-11-03 DIAGNOSIS — K21.9 GASTROESOPHAGEAL REFLUX DISEASE, UNSPECIFIED WHETHER ESOPHAGITIS PRESENT: ICD-10-CM

## 2022-11-03 DIAGNOSIS — I70.0 AORTIC ATHEROSCLEROSIS: ICD-10-CM

## 2022-11-03 DIAGNOSIS — M51.35 DDD (DEGENERATIVE DISC DISEASE), THORACOLUMBAR: ICD-10-CM

## 2022-11-03 DIAGNOSIS — M46.1 SACROILIITIS, NOT ELSEWHERE CLASSIFIED: ICD-10-CM

## 2022-11-03 DIAGNOSIS — E55.9 MILD VITAMIN D DEFICIENCY: ICD-10-CM

## 2022-11-03 DIAGNOSIS — Z00.00 ENCOUNTER FOR PREVENTIVE HEALTH EXAMINATION: Primary | ICD-10-CM

## 2022-11-03 DIAGNOSIS — Z85.3 HISTORY OF BREAST CANCER: ICD-10-CM

## 2022-11-03 DIAGNOSIS — Z87.81 HISTORY OF COMPRESSION FRACTURE OF SPINE: ICD-10-CM

## 2022-11-03 DIAGNOSIS — R73.03 PRE-DIABETES: ICD-10-CM

## 2022-11-03 DIAGNOSIS — N18.30 STAGE 3 CHRONIC KIDNEY DISEASE, UNSPECIFIED WHETHER STAGE 3A OR 3B CKD: ICD-10-CM

## 2022-11-03 PROCEDURE — 1159F MED LIST DOCD IN RCRD: CPT | Mod: CPTII,S$GLB,, | Performed by: NURSE PRACTITIONER

## 2022-11-03 PROCEDURE — 99499 UNLISTED E&M SERVICE: CPT | Mod: HCNC,S$GLB,, | Performed by: NURSE PRACTITIONER

## 2022-11-03 PROCEDURE — 99999 PR PBB SHADOW E&M-EST. PATIENT-LVL IV: ICD-10-PCS | Mod: PBBFAC,,, | Performed by: NURSE PRACTITIONER

## 2022-11-03 PROCEDURE — 1101F PT FALLS ASSESS-DOCD LE1/YR: CPT | Mod: CPTII,S$GLB,, | Performed by: NURSE PRACTITIONER

## 2022-11-03 PROCEDURE — 1170F FXNL STATUS ASSESSED: CPT | Mod: CPTII,S$GLB,, | Performed by: NURSE PRACTITIONER

## 2022-11-03 PROCEDURE — G0439 PR MEDICARE ANNUAL WELLNESS SUBSEQUENT VISIT: ICD-10-PCS | Mod: S$GLB,,, | Performed by: NURSE PRACTITIONER

## 2022-11-03 PROCEDURE — 3288F PR FALLS RISK ASSESSMENT DOCUMENTED: ICD-10-PCS | Mod: CPTII,S$GLB,, | Performed by: NURSE PRACTITIONER

## 2022-11-03 PROCEDURE — 3078F PR MOST RECENT DIASTOLIC BLOOD PRESSURE < 80 MM HG: ICD-10-PCS | Mod: CPTII,S$GLB,, | Performed by: NURSE PRACTITIONER

## 2022-11-03 PROCEDURE — 99999 PR PBB SHADOW E&M-EST. PATIENT-LVL IV: CPT | Mod: PBBFAC,,, | Performed by: NURSE PRACTITIONER

## 2022-11-03 PROCEDURE — 1160F RVW MEDS BY RX/DR IN RCRD: CPT | Mod: CPTII,S$GLB,, | Performed by: NURSE PRACTITIONER

## 2022-11-03 PROCEDURE — 1159F PR MEDICATION LIST DOCUMENTED IN MEDICAL RECORD: ICD-10-PCS | Mod: CPTII,S$GLB,, | Performed by: NURSE PRACTITIONER

## 2022-11-03 PROCEDURE — 3074F PR MOST RECENT SYSTOLIC BLOOD PRESSURE < 130 MM HG: ICD-10-PCS | Mod: CPTII,S$GLB,, | Performed by: NURSE PRACTITIONER

## 2022-11-03 PROCEDURE — 3288F FALL RISK ASSESSMENT DOCD: CPT | Mod: CPTII,S$GLB,, | Performed by: NURSE PRACTITIONER

## 2022-11-03 PROCEDURE — 3078F DIAST BP <80 MM HG: CPT | Mod: CPTII,S$GLB,, | Performed by: NURSE PRACTITIONER

## 2022-11-03 PROCEDURE — 1160F PR REVIEW ALL MEDS BY PRESCRIBER/CLIN PHARMACIST DOCUMENTED: ICD-10-PCS | Mod: CPTII,S$GLB,, | Performed by: NURSE PRACTITIONER

## 2022-11-03 PROCEDURE — G0439 PPPS, SUBSEQ VISIT: HCPCS | Mod: S$GLB,,, | Performed by: NURSE PRACTITIONER

## 2022-11-03 PROCEDURE — 1125F AMNT PAIN NOTED PAIN PRSNT: CPT | Mod: CPTII,S$GLB,, | Performed by: NURSE PRACTITIONER

## 2022-11-03 PROCEDURE — 99499 RISK ADDL DX/OHS AUDIT: ICD-10-PCS | Mod: HCNC,S$GLB,, | Performed by: NURSE PRACTITIONER

## 2022-11-03 PROCEDURE — 3074F SYST BP LT 130 MM HG: CPT | Mod: CPTII,S$GLB,, | Performed by: NURSE PRACTITIONER

## 2022-11-03 PROCEDURE — 1125F PR PAIN SEVERITY QUANTIFIED, PAIN PRESENT: ICD-10-PCS | Mod: CPTII,S$GLB,, | Performed by: NURSE PRACTITIONER

## 2022-11-03 PROCEDURE — 1170F PR FUNCTIONAL STATUS ASSESSED: ICD-10-PCS | Mod: CPTII,S$GLB,, | Performed by: NURSE PRACTITIONER

## 2022-11-03 PROCEDURE — 1101F PR PT FALLS ASSESS DOC 0-1 FALLS W/OUT INJ PAST YR: ICD-10-PCS | Mod: CPTII,S$GLB,, | Performed by: NURSE PRACTITIONER

## 2022-11-03 NOTE — PROGRESS NOTES
"  Nicho Espinosa presented for a  Medicare AWV and comprehensive Health Risk Assessment today. The following components were reviewed and updated:    Medical history  Family History  Social history  Allergies and Current Medications  Health Risk Assessment  Health Maintenance  Care Team         ** See Completed Assessments for Annual Wellness Visit within the encounter summary.**         The following assessments were completed:  Living Situation  CAGE  Depression Screening  Timed Get Up and Go  Whisper Test  Cognitive Function Screening      Nutrition Screening  ADL Screening  PAQ Screening  OPIOID Screening: Patient has a prescription for narcotics. Patient does not use substance         Vitals:    11/03/22 1404   BP: 106/64   BP Location: Left arm   Pulse: 72   Weight: 82.6 kg (182 lb 1.6 oz)   Height: 5' 1" (1.549 m)     Body mass index is 34.41 kg/m².  Physical Exam  Vitals and nursing note reviewed.   Constitutional:       Appearance: She is well-developed.   HENT:      Head: Normocephalic.   Cardiovascular:      Rate and Rhythm: Normal rate and regular rhythm.   Pulmonary:      Effort: Pulmonary effort is normal.      Breath sounds: Normal breath sounds.   Abdominal:      General: Bowel sounds are normal.      Palpations: Abdomen is soft.   Musculoskeletal:         General: Normal range of motion.   Skin:     General: Skin is warm and dry.   Neurological:      Mental Status: She is alert and oriented to person, place, and time.      Motor: No abnormal muscle tone.   Psychiatric:         Mood and Affect: Mood normal.             Diagnoses and health risks identified today and associated recommendations/orders:    1. Encounter for preventive health examination  Here for Health Risk Assessment/Annual Wellness Visit.  Health maintenance reviewed and updated. Follow up in one year.   She will discuss colonoscopy with PCP.    2. Aortic atherosclerosis  Chronic, stable. Noted CT Chest 3/04/21. Followed by PCP.     3. " Hypertriglyceridemia  Chronic, stable on current medication. Followed by PCP.     4. Stage 3 chronic kidney disease, unspecified whether stage 3a or 3b CKD  Chronic, stable. Followed by PCP.     5. History of deep vein thrombosis (DVT) of lower extremity  Stable. Followed by PCP.     6. History of breast cancer  Completed anastrozole therapy in 8/2022.  Followed by Oncology, PCP.     7. Mild vitamin D deficiency  Chronic, stable on current medication. Followed by PCP.     8. Pre-diabetes  Chronic, stable with diet. Last A1c 5.2.  Followed by PCP.     9. Class 1 obesity due to excess calories with serious comorbidity in adult, unspecified BMI  Chronic, stable. She is participating in water aerobics 3 times weekly.  Followed by PCP.     10. Gastroesophageal reflux disease, unspecified whether esophagitis present  Chronic, stable. Followed by PCP.     11. Age-related osteoporosis without current pathological fracture  Chronic, stable on current medications. Followed by PCP.     12. Essential tremor  Chronic, stable on current medication. Followed by PCP.     13. DDD (degenerative disc disease), cervical  Chronic, stable on current medications. Patient has tried nonnarcotic treatment for chronic pain, abuse potential evaluated on ongoing basis by his provider. Does not require referral. He is followed by Pain Management. Followed by PCP.     14. DDD (degenerative disc disease), thoracolumbar  Chronic, stable on current medications. Patient has tried nonnarcotic treatment for chronic pain without adequate relief, abuse potential evaluated on ongoing basis by providers. Does not require referral. Followed by Pain Management. Followed by outside Pain Management,PCP.     15. History of compression fracture of spine  Chronic, stable on current medications. Patient has tried nonnarcotic treatment for chronic pain without adequate relief, abuse potential evaluated on ongoing basis by providers. Does not require referral.  Followed by Pain Management. Followed by outside Pain Management,PCP.     16. Sacroiliitis, not elsewhere classified  Chronic, stable on current medications. Patient has tried nonnarcotic treatment for chronic pain without adequate relief, abuse potential evaluated on ongoing basis by providers. Does not require referral. Followed by Pain Management. Followed by outside Pain Management,PCP.       Provided Nicho with a 5-10 year written screening schedule and personal prevention plan. Recommendations were developed using the USPSTF age appropriate recommendations. Education, counseling, and referrals were provided as needed. After Visit Summary printed and given to patient which includes a list of additional screenings\tests needed.    Follow up in 5 months (on 3/29/2023).with PCP    Mel Camargo NP

## 2022-11-03 NOTE — PATIENT INSTRUCTIONS
Counseling and Referral of Other Preventative  (Italic type indicates deductible and co-insurance are waived)    Patient Name: Nicho Espinosa  Today's Date: 11/3/2022    Health Maintenance       Date Due Completion Date    Colonoscopy 11/01/2021 11/1/2016    DEXA Scan 03/12/2023 3/12/2021    TETANUS VACCINE 09/19/2023 9/19/2013    Lipid Panel 02/25/2026 2/25/2021        No orders of the defined types were placed in this encounter.    The following information is provided to all patients.  This information is to help you find resources for any of the problems found today that may be affecting your health:                Living healthy guide: www.[facility].[place].[facility]      Understanding Diabetes: www.diabetes.org      Eating healthy: www.cdc.gov/healthyweight      CDC home safety checklist: www.cdc.gov/steadi/patient.html      Agency on Aging: www.goea.[place].[facility]      Alcoholics anonymous (AA): www.aa.org      Physical Activity: www.[place].nih.gov/cz6wvsa      Tobacco use: www.quitwithusla.org

## 2022-11-03 NOTE — PROGRESS NOTES
I offered to discuss advanced care planning, including how to pick a person who would make decisions for you if you were unable to make them for yourself, called a health care power of , and what kind of decisions you might make such as use of life sustaining treatments such as ventilators and tube feeding when faced with a life limiting illness recorded on a living will that they will need to know. (How you want to be cared for as you near the end of your natural life)     X  Patient has advanced directive written but has opted not to place them on file with the institution.

## 2023-02-07 ENCOUNTER — LAB VISIT (OUTPATIENT)
Dept: LAB | Facility: HOSPITAL | Age: 83
End: 2023-02-07
Attending: INTERNAL MEDICINE
Payer: MEDICARE

## 2023-02-07 ENCOUNTER — OFFICE VISIT (OUTPATIENT)
Dept: INTERNAL MEDICINE | Facility: CLINIC | Age: 83
End: 2023-02-07
Payer: MEDICARE

## 2023-02-07 VITALS
HEIGHT: 61 IN | OXYGEN SATURATION: 95 % | HEART RATE: 68 BPM | DIASTOLIC BLOOD PRESSURE: 80 MMHG | BODY MASS INDEX: 33.59 KG/M2 | SYSTOLIC BLOOD PRESSURE: 136 MMHG | WEIGHT: 177.94 LBS

## 2023-02-07 DIAGNOSIS — C50.411 MALIGNANT NEOPLASM OF UPPER-OUTER QUADRANT OF RIGHT BREAST IN FEMALE, ESTROGEN RECEPTOR POSITIVE: ICD-10-CM

## 2023-02-07 DIAGNOSIS — D63.8 ANEMIA OF CHRONIC DISEASE: ICD-10-CM

## 2023-02-07 DIAGNOSIS — Z00.00 ENCOUNTER FOR MEDICARE ANNUAL WELLNESS EXAM: ICD-10-CM

## 2023-02-07 DIAGNOSIS — M51.35 DDD (DEGENERATIVE DISC DISEASE), THORACOLUMBAR: ICD-10-CM

## 2023-02-07 DIAGNOSIS — E66.09 CLASS 1 OBESITY DUE TO EXCESS CALORIES WITH SERIOUS COMORBIDITY AND BODY MASS INDEX (BMI) OF 33.0 TO 33.9 IN ADULT: ICD-10-CM

## 2023-02-07 DIAGNOSIS — M17.0 PRIMARY OSTEOARTHRITIS OF BOTH KNEES: ICD-10-CM

## 2023-02-07 DIAGNOSIS — E78.1 HYPERTRIGLYCERIDEMIA: ICD-10-CM

## 2023-02-07 DIAGNOSIS — Z00.00 VISIT FOR ANNUAL HEALTH EXAMINATION: Primary | ICD-10-CM

## 2023-02-07 DIAGNOSIS — Z17.0 MALIGNANT NEOPLASM OF UPPER-OUTER QUADRANT OF RIGHT BREAST IN FEMALE, ESTROGEN RECEPTOR POSITIVE: ICD-10-CM

## 2023-02-07 DIAGNOSIS — Z12.11 SCREEN FOR COLON CANCER: ICD-10-CM

## 2023-02-07 DIAGNOSIS — R30.0 DYSURIA: ICD-10-CM

## 2023-02-07 DIAGNOSIS — R73.03 PRE-DIABETES: ICD-10-CM

## 2023-02-07 DIAGNOSIS — N18.31 STAGE 3A CHRONIC KIDNEY DISEASE: ICD-10-CM

## 2023-02-07 DIAGNOSIS — I70.0 AORTIC ATHEROSCLEROSIS: ICD-10-CM

## 2023-02-07 PROBLEM — M46.1 SACROILIITIS, NOT ELSEWHERE CLASSIFIED: Status: RESOLVED | Noted: 2020-06-15 | Resolved: 2023-02-07

## 2023-02-07 LAB
ALBUMIN SERPL BCP-MCNC: 4.2 G/DL (ref 3.5–5.2)
ALP SERPL-CCNC: 43 U/L (ref 55–135)
ALT SERPL W/O P-5'-P-CCNC: 12 U/L (ref 10–44)
ANION GAP SERPL CALC-SCNC: 13 MMOL/L (ref 8–16)
AST SERPL-CCNC: 20 U/L (ref 10–40)
BASOPHILS # BLD AUTO: 0.05 K/UL (ref 0–0.2)
BASOPHILS NFR BLD: 0.8 % (ref 0–1.9)
BILIRUB SERPL-MCNC: 0.6 MG/DL (ref 0.1–1)
BILIRUB UR QL STRIP: NEGATIVE
BUN SERPL-MCNC: 19 MG/DL (ref 8–23)
CALCIUM SERPL-MCNC: 9.9 MG/DL (ref 8.7–10.5)
CHLORIDE SERPL-SCNC: 103 MMOL/L (ref 95–110)
CHOLEST SERPL-MCNC: 166 MG/DL (ref 120–199)
CHOLEST/HDLC SERPL: 3.5 {RATIO} (ref 2–5)
CLARITY UR REFRACT.AUTO: CLEAR
CO2 SERPL-SCNC: 22 MMOL/L (ref 23–29)
COLOR UR AUTO: COLORLESS
CREAT SERPL-MCNC: 1.1 MG/DL (ref 0.5–1.4)
DIFFERENTIAL METHOD: ABNORMAL
EOSINOPHIL # BLD AUTO: 0.1 K/UL (ref 0–0.5)
EOSINOPHIL NFR BLD: 1.7 % (ref 0–8)
ERYTHROCYTE [DISTWIDTH] IN BLOOD BY AUTOMATED COUNT: 12.5 % (ref 11.5–14.5)
EST. GFR  (NO RACE VARIABLE): 50.2 ML/MIN/1.73 M^2
ESTIMATED AVG GLUCOSE: 114 MG/DL (ref 68–131)
GLUCOSE SERPL-MCNC: 98 MG/DL (ref 70–110)
GLUCOSE UR QL STRIP: NEGATIVE
HBA1C MFR BLD: 5.6 % (ref 4–5.6)
HCT VFR BLD AUTO: 37.9 % (ref 37–48.5)
HDLC SERPL-MCNC: 48 MG/DL (ref 40–75)
HDLC SERPL: 28.9 % (ref 20–50)
HGB BLD-MCNC: 12.3 G/DL (ref 12–16)
HGB UR QL STRIP: NEGATIVE
IMM GRANULOCYTES # BLD AUTO: 0.02 K/UL (ref 0–0.04)
IMM GRANULOCYTES NFR BLD AUTO: 0.3 % (ref 0–0.5)
KETONES UR QL STRIP: NEGATIVE
LDLC SERPL CALC-MCNC: 93.2 MG/DL (ref 63–159)
LEUKOCYTE ESTERASE UR QL STRIP: NEGATIVE
LYMPHOCYTES # BLD AUTO: 1.9 K/UL (ref 1–4.8)
LYMPHOCYTES NFR BLD: 32.3 % (ref 18–48)
MCH RBC QN AUTO: 31.9 PG (ref 27–31)
MCHC RBC AUTO-ENTMCNC: 32.5 G/DL (ref 32–36)
MCV RBC AUTO: 98 FL (ref 82–98)
MONOCYTES # BLD AUTO: 0.5 K/UL (ref 0.3–1)
MONOCYTES NFR BLD: 7.7 % (ref 4–15)
NEUTROPHILS # BLD AUTO: 3.4 K/UL (ref 1.8–7.7)
NEUTROPHILS NFR BLD: 57.2 % (ref 38–73)
NITRITE UR QL STRIP: NEGATIVE
NONHDLC SERPL-MCNC: 118 MG/DL
NRBC BLD-RTO: 0 /100 WBC
PH UR STRIP: 7 [PH] (ref 5–8)
PLATELET # BLD AUTO: 361 K/UL (ref 150–450)
PMV BLD AUTO: 9.2 FL (ref 9.2–12.9)
POTASSIUM SERPL-SCNC: 4 MMOL/L (ref 3.5–5.1)
PROT SERPL-MCNC: 7.3 G/DL (ref 6–8.4)
PROT UR QL STRIP: NEGATIVE
RBC # BLD AUTO: 3.86 M/UL (ref 4–5.4)
SODIUM SERPL-SCNC: 138 MMOL/L (ref 136–145)
SP GR UR STRIP: 1.01 (ref 1–1.03)
TRIGL SERPL-MCNC: 124 MG/DL (ref 30–150)
URN SPEC COLLECT METH UR: ABNORMAL
WBC # BLD AUTO: 5.95 K/UL (ref 3.9–12.7)

## 2023-02-07 PROCEDURE — 1159F MED LIST DOCD IN RCRD: CPT | Mod: HCNC,CPTII,S$GLB, | Performed by: INTERNAL MEDICINE

## 2023-02-07 PROCEDURE — 81003 URINALYSIS AUTO W/O SCOPE: CPT | Mod: HCNC | Performed by: INTERNAL MEDICINE

## 2023-02-07 PROCEDURE — 85025 COMPLETE CBC W/AUTO DIFF WBC: CPT | Mod: HCNC | Performed by: INTERNAL MEDICINE

## 2023-02-07 PROCEDURE — 3075F SYST BP GE 130 - 139MM HG: CPT | Mod: HCNC,CPTII,S$GLB, | Performed by: INTERNAL MEDICINE

## 2023-02-07 PROCEDURE — 80053 COMPREHEN METABOLIC PANEL: CPT | Mod: HCNC | Performed by: INTERNAL MEDICINE

## 2023-02-07 PROCEDURE — 87086 URINE CULTURE/COLONY COUNT: CPT | Mod: HCNC | Performed by: INTERNAL MEDICINE

## 2023-02-07 PROCEDURE — 1159F PR MEDICATION LIST DOCUMENTED IN MEDICAL RECORD: ICD-10-PCS | Mod: HCNC,CPTII,S$GLB, | Performed by: INTERNAL MEDICINE

## 2023-02-07 PROCEDURE — 1125F PR PAIN SEVERITY QUANTIFIED, PAIN PRESENT: ICD-10-PCS | Mod: HCNC,CPTII,S$GLB, | Performed by: INTERNAL MEDICINE

## 2023-02-07 PROCEDURE — 1160F RVW MEDS BY RX/DR IN RCRD: CPT | Mod: HCNC,CPTII,S$GLB, | Performed by: INTERNAL MEDICINE

## 2023-02-07 PROCEDURE — 1160F PR REVIEW ALL MEDS BY PRESCRIBER/CLIN PHARMACIST DOCUMENTED: ICD-10-PCS | Mod: HCNC,CPTII,S$GLB, | Performed by: INTERNAL MEDICINE

## 2023-02-07 PROCEDURE — 99397 PR PREVENTIVE VISIT,EST,65 & OVER: ICD-10-PCS | Mod: HCNC,S$GLB,, | Performed by: INTERNAL MEDICINE

## 2023-02-07 PROCEDURE — 3079F DIAST BP 80-89 MM HG: CPT | Mod: HCNC,CPTII,S$GLB, | Performed by: INTERNAL MEDICINE

## 2023-02-07 PROCEDURE — 83036 HEMOGLOBIN GLYCOSYLATED A1C: CPT | Mod: HCNC | Performed by: INTERNAL MEDICINE

## 2023-02-07 PROCEDURE — 3079F PR MOST RECENT DIASTOLIC BLOOD PRESSURE 80-89 MM HG: ICD-10-PCS | Mod: HCNC,CPTII,S$GLB, | Performed by: INTERNAL MEDICINE

## 2023-02-07 PROCEDURE — 99397 PER PM REEVAL EST PAT 65+ YR: CPT | Mod: HCNC,S$GLB,, | Performed by: INTERNAL MEDICINE

## 2023-02-07 PROCEDURE — 99999 PR PBB SHADOW E&M-EST. PATIENT-LVL V: CPT | Mod: PBBFAC,HCNC,, | Performed by: INTERNAL MEDICINE

## 2023-02-07 PROCEDURE — 1125F AMNT PAIN NOTED PAIN PRSNT: CPT | Mod: HCNC,CPTII,S$GLB, | Performed by: INTERNAL MEDICINE

## 2023-02-07 PROCEDURE — 80061 LIPID PANEL: CPT | Mod: HCNC | Performed by: INTERNAL MEDICINE

## 2023-02-07 PROCEDURE — 3075F PR MOST RECENT SYSTOLIC BLOOD PRESS GE 130-139MM HG: ICD-10-PCS | Mod: HCNC,CPTII,S$GLB, | Performed by: INTERNAL MEDICINE

## 2023-02-07 PROCEDURE — 99999 PR PBB SHADOW E&M-EST. PATIENT-LVL V: ICD-10-PCS | Mod: PBBFAC,HCNC,, | Performed by: INTERNAL MEDICINE

## 2023-02-07 PROCEDURE — 36415 COLL VENOUS BLD VENIPUNCTURE: CPT | Mod: HCNC | Performed by: INTERNAL MEDICINE

## 2023-02-07 NOTE — PROGRESS NOTES
INTERNAL MEDICINE ESTABLISHED PATIENT VISIT NOTE    Subjective:     Chief Complaint: Annual Exam       Patient ID: Nicho Espinosa is a 82 y.o. female with PMHx of R sided breast ca s/p R modified radical mastectomy 8/2017and on arimidex, DDD c hx compression fx of spine and spondylolisthesis s/p lumbar fusion 1/2015, preDM, OA B knees, insomnia, essential tremor, osteoporosis c Vit D def, hx tob use but quit in the past, hx DVT 11/2013 remigio-operative prev on coumadin but only for a few weeks, GERD, anemia of chronic disease, elevated triglycerides, OA knees s/p L TKA 2/2019 by Dr. Washburn, last seen by me 3/2021, here today for annual exam and f/u.    Seeing Dr. Mayo for pain and on Norco every 4 mos.  Takes 4 half tablets daily.  Had LEI in the past but states pain returned after about two weeks.  Also had RFA which only helped for a short while and was very expensive.  States they discussed an implant but is reluctant to proceed.    Takes fiber and stool softeners and eats prunes to prevent constipation.  Has BM daily.    Today c/o dysuria for about the last year.  Was tx'ed c abx last year and thinks it maybe improved.    Past Medical History:  Past Medical History:   Diagnosis Date    Allergy     Arthritis     Blepharitis of both eyes     breast ca 7/18/2017    right    Complication of anesthesia     nausea    DDD (degenerative disc disease), cervical 9/19/2013    Degenerative disc disease     GERD (gastroesophageal reflux disease)     patient denies reflux    History of compression fracture of spine 4/10/2014    Hyperlipidemia     Lumbar disc disease     Meibomitis     Obesity 9/19/2013    Osteoporosis     Papilloma of eyelid     RUL    Postoperative anemia 11/21/2013    Thoracic or lumbosacral neuritis or radiculitis, unspecified 9/20/2013    Tremors of nervous system 2/2015       Home Medications:  Prior to Admission medications    Medication Sig Start Date End Date Taking? Authorizing Provider   b complex  vitamins capsule Take 1 capsule by mouth once daily.    Historical Provider   calcium-vitamin D3 (OS-DIANDRA 500 + D3) 500 mg-5 mcg (200 unit) per tablet Take 1 tablet by mouth 2 (two) times daily with meals.    Historical Provider   cholecalciferol, vitamin D3, 1,000 unit capsule Take 3 capsules daily 17   Arleen Lara MD   fenofibrate micronized (LOFIBRA) 67 MG capsule TAKE 2 CAPSULES EVERY DAY WITH BREAKFAST 10/19/22   Azalea Walker MD   fluticasone propionate (FLONASE) 50 mcg/actuation nasal spray 1 spray by Each Nostril route once daily.    Historical Provider   HYDROcodone-acetaminophen (NORCO) 5-325 mg per tablet Take 1/2  tablet 3 times per day prn 20   Historical Provider   ketoconazole (NIZORAL) 2 % cream aaa bid prn flare 10/31/22   Amber Fallon MD   loratadine (CLARITIN) 10 mg tablet Take 10 mg by mouth daily as needed for Allergies.    Historical Provider   MULTIVIT WITH CALCIUM,IRON,MIN (WOMEN'S DAILY MULTIVITAMIN ORAL) Take by mouth.    Historical Provider   ondansetron (ZOFRAN) 4 MG tablet Take 1 tablet (4 mg total) by mouth every 8 (eight) hours as needed for Nausea. 3/1/21   Azalea Walker MD   propranoloL (INDERAL) 10 MG tablet TAKE 1 TABLET THREE TIMES DAILY 10/6/22   Azalea Walker MD   traZODone (DESYREL) 50 MG tablet TAKE 1 TABLET NIGHTLY AS NEEDED FOR INSOMNIA. 5/3/22   Azalea Walker MD       Allergies:  Review of patient's allergies indicates:   Allergen Reactions    Sulfa (sulfonamide antibiotics) Other (See Comments)     Yeast infection and irritation    Ciprofloxacin      Rash    Latex, natural rubber     Adhesive Hives and Itching       Social History:  Social History     Tobacco Use    Smoking status: Former     Packs/day: 0.25     Years: 40.00     Pack years: 10.00     Types: Cigarettes     Quit date: 2011     Years since quittin.1    Smokeless tobacco: Never   Substance Use Topics    Alcohol use: Yes     Comment: one glass of wine every 2 months    Drug use: No        Review  "of Systems   Constitutional:  Negative for appetite change, chills, fatigue, fever and unexpected weight change.   HENT:  Negative for congestion, hearing loss and rhinorrhea.    Eyes:  Negative for pain and visual disturbance.   Respiratory:  Negative for cough, chest tightness, shortness of breath and wheezing.    Cardiovascular:  Negative for chest pain, palpitations and leg swelling.   Gastrointestinal:  Negative for abdominal distention and abdominal pain.   Endocrine: Negative for polydipsia and polyuria.   Genitourinary:  Positive for dysuria. Negative for decreased urine volume, difficulty urinating, hematuria and vaginal discharge.   Musculoskeletal:  Positive for arthralgias and back pain.   Neurological:  Negative for weakness, numbness and headaches.   Psychiatric/Behavioral:  Negative for behavioral problems and confusion.        Health Maintenance:     Immunizations:   Influenza 10/2022  TDap 9/2013  Pneumovax is up to date. 9/2013, 12/2015  Shingrix 6/2020, 9/2020 completed.  COVID vaccines 1/2021 x2, 10/2021, 9/2022     Cancer Screening:  PAP: is up to date. 6/2017 wnl, WWE done 11/2020 c Dr. Greco  Mammogram: 6/2022 on L benign, repeat for Sept.  Colonoscopy: is up to date. 11/2016, diverticula; otherwise normal, rec repeat csc in 5 years, ordered at last visit but not completed, will place referral to Endo .  DEXA:  is up to date. 3/2019 normal BMD but possibly false increase in BMD due to degenerative changes in the spine.    Objective:   /80   Pulse 68   Ht 5' 1" (1.549 m)   Wt 80.7 kg (177 lb 14.6 oz)   LMP 07/05/1987   SpO2 95%   BMI 33.62 kg/m²        General: AAO x3, no apparent distress  HEENT: PERRL, OP clear  CV: RRR, no m/r/g  Pulm: Lungs CTAB, no crackles, no wheezes  Abd: s/NT/ND +BS  Extremities: no c/c/e    Labs:     Lab Results   Component Value Date    WBC 5.28 02/25/2021    HGB 10.9 (L) 02/25/2021    HCT 34.3 (L) 02/25/2021    MCV 97 02/25/2021     " 02/25/2021     Sodium   Date Value Ref Range Status   02/25/2021 138 136 - 145 mmol/L Final     Potassium   Date Value Ref Range Status   02/25/2021 4.1 3.5 - 5.1 mmol/L Final     Chloride   Date Value Ref Range Status   02/25/2021 103 95 - 110 mmol/L Final     CO2   Date Value Ref Range Status   02/25/2021 26 23 - 29 mmol/L Final     Glucose   Date Value Ref Range Status   02/25/2021 98 70 - 110 mg/dL Final     BUN   Date Value Ref Range Status   02/25/2021 15 8 - 23 mg/dL Final     Creatinine   Date Value Ref Range Status   02/25/2021 1.0 0.5 - 1.4 mg/dL Final     Calcium   Date Value Ref Range Status   02/25/2021 9.3 8.7 - 10.5 mg/dL Final     Total Protein   Date Value Ref Range Status   02/25/2021 6.8 6.0 - 8.4 g/dL Final     Albumin   Date Value Ref Range Status   02/25/2021 3.8 3.5 - 5.2 g/dL Final     Total Bilirubin   Date Value Ref Range Status   02/25/2021 0.5 0.1 - 1.0 mg/dL Final     Comment:     For infants and newborns, interpretation of results should be based  on gestational age, weight and in agreement with clinical  observations.    Premature Infant recommended reference ranges:  Up to 24 hours.............<8.0 mg/dL  Up to 48 hours............<12.0 mg/dL  3-5 days..................<15.0 mg/dL  6-29 days.................<15.0 mg/dL       Alkaline Phosphatase   Date Value Ref Range Status   02/25/2021 39 (L) 55 - 135 U/L Final     AST   Date Value Ref Range Status   02/25/2021 19 10 - 40 U/L Final     ALT   Date Value Ref Range Status   02/25/2021 11 10 - 44 U/L Final     Anion Gap   Date Value Ref Range Status   02/25/2021 9 8 - 16 mmol/L Final     eGFR if    Date Value Ref Range Status   02/25/2021 >60.0 >60 mL/min/1.73 m^2 Final     eGFR if non    Date Value Ref Range Status   02/25/2021 53.3 (A) >60 mL/min/1.73 m^2 Final     Comment:     Calculation used to obtain the estimated glomerular filtration  rate (eGFR) is the CKD-EPI equation.        Lab Results    Component Value Date    HGBA1C 5.2 06/18/2020     Lab Results   Component Value Date    LDLCALC 99.2 02/25/2021     Lab Results   Component Value Date    TSH 1.270 06/18/2020         Assessment/Plan     Nicho was seen today for annual exam.    Diagnoses and all orders for this visit:    Visit for annual health examination  History and physical exam completed.  Health maintenance reviewed as above.    Pre-diabetes  Lab Results   Component Value Date    HGBA1C 5.2 06/18/2020     Normal on last check  Cont dietary modifications.  -     Comprehensive Metabolic Panel; Future  -     Hemoglobin A1C; Future    Stage 3a chronic kidney disease  Stable on last check  Needs updated labs  Bp and glc have been normal on last checks.    Malignant neoplasm of upper-outer quadrant of right breast in female, estrogen receptor positive  As per HPI  Followed by breast onc, mmg on L utd    Anemia of chronic disease  Stable, will repeat  -     CBC Auto Differential; Future    Hypertriglyceridemia  Controlled on meds, will cont current regimen.  -     Lipid Panel; Future    Aortic atherosclerosis  No acute issues, cont lipid control as above    DDD (degenerative disc disease), thoracolumbar  Primary osteoarthritis of both knees  As per HPI  On [place] as per Dr. Mayo    Dysuria  Will send for urine studies and tx pending results  Discussed avoidance of bladder irritants such as spicy/acidic foods.  -     Urinalysis  -     Urine culture    Class 1 obesity due to excess calories with serious comorbidity and body mass index (BMI) of 33.0 to 33.9 in adult  Rec wt loss via dietary modifications and daily exercise as tolerated.    Screen for colon cancer  -     Ambulatory referral/consult to Endo Procedure ; Future        HM as above  RTC in 12 mos, sooner if needed and depending on labs.    Azalea Walker MD  Department of Internal Medicine - [facility]  02/07/2023

## 2023-02-08 ENCOUNTER — OFFICE VISIT (OUTPATIENT)
Dept: DERMATOLOGY | Facility: CLINIC | Age: 83
End: 2023-02-08
Payer: MEDICARE

## 2023-02-08 DIAGNOSIS — L21.9 SEBORRHEIC DERMATITIS, UNSPECIFIED: ICD-10-CM

## 2023-02-08 DIAGNOSIS — L57.0 AK (ACTINIC KERATOSIS): Primary | ICD-10-CM

## 2023-02-08 DIAGNOSIS — L82.1 SK (SEBORRHEIC KERATOSIS): ICD-10-CM

## 2023-02-08 DIAGNOSIS — L81.4 LENTIGO: ICD-10-CM

## 2023-02-08 DIAGNOSIS — L72.0 MILIA: ICD-10-CM

## 2023-02-08 DIAGNOSIS — L82.0 INFLAMED SEBORRHEIC KERATOSIS: ICD-10-CM

## 2023-02-08 DIAGNOSIS — L30.8 PSORIASIFORM DERMATITIS: ICD-10-CM

## 2023-02-08 LAB
BACTERIA UR CULT: NORMAL
BACTERIA UR CULT: NORMAL

## 2023-02-08 PROCEDURE — 99999 PR PBB SHADOW E&M-EST. PATIENT-LVL III: CPT | Mod: PBBFAC,HCNC,, | Performed by: DERMATOLOGY

## 2023-02-08 PROCEDURE — 99214 OFFICE O/P EST MOD 30 MIN: CPT | Mod: 25,HCNC,S$GLB, | Performed by: DERMATOLOGY

## 2023-02-08 PROCEDURE — 1159F PR MEDICATION LIST DOCUMENTED IN MEDICAL RECORD: ICD-10-PCS | Mod: HCNC,CPTII,S$GLB, | Performed by: DERMATOLOGY

## 2023-02-08 PROCEDURE — 1160F RVW MEDS BY RX/DR IN RCRD: CPT | Mod: HCNC,CPTII,S$GLB, | Performed by: DERMATOLOGY

## 2023-02-08 PROCEDURE — 3288F PR FALLS RISK ASSESSMENT DOCUMENTED: ICD-10-PCS | Mod: HCNC,CPTII,S$GLB, | Performed by: DERMATOLOGY

## 2023-02-08 PROCEDURE — 1160F PR REVIEW ALL MEDS BY PRESCRIBER/CLIN PHARMACIST DOCUMENTED: ICD-10-PCS | Mod: HCNC,CPTII,S$GLB, | Performed by: DERMATOLOGY

## 2023-02-08 PROCEDURE — 1101F PR PT FALLS ASSESS DOC 0-1 FALLS W/OUT INJ PAST YR: ICD-10-PCS | Mod: HCNC,CPTII,S$GLB, | Performed by: DERMATOLOGY

## 2023-02-08 PROCEDURE — 99999 PR PBB SHADOW E&M-EST. PATIENT-LVL III: ICD-10-PCS | Mod: PBBFAC,HCNC,, | Performed by: DERMATOLOGY

## 2023-02-08 PROCEDURE — 1101F PT FALLS ASSESS-DOCD LE1/YR: CPT | Mod: HCNC,CPTII,S$GLB, | Performed by: DERMATOLOGY

## 2023-02-08 PROCEDURE — 17000 PR DESTRUCTION(LASER SURGERY,CRYOSURGERY,CHEMOSURGERY),PREMALIGNANT LESIONS,FIRST LESION: ICD-10-PCS | Mod: 59,HCNC,S$GLB, | Performed by: DERMATOLOGY

## 2023-02-08 PROCEDURE — 1125F AMNT PAIN NOTED PAIN PRSNT: CPT | Mod: HCNC,CPTII,S$GLB, | Performed by: DERMATOLOGY

## 2023-02-08 PROCEDURE — 3288F FALL RISK ASSESSMENT DOCD: CPT | Mod: HCNC,CPTII,S$GLB, | Performed by: DERMATOLOGY

## 2023-02-08 PROCEDURE — 1159F MED LIST DOCD IN RCRD: CPT | Mod: HCNC,CPTII,S$GLB, | Performed by: DERMATOLOGY

## 2023-02-08 PROCEDURE — 1125F PR PAIN SEVERITY QUANTIFIED, PAIN PRESENT: ICD-10-PCS | Mod: HCNC,CPTII,S$GLB, | Performed by: DERMATOLOGY

## 2023-02-08 PROCEDURE — 17110 PR DESTRUCTION BENIGN LESIONS UP TO 14: ICD-10-PCS | Mod: HCNC,S$GLB,, | Performed by: DERMATOLOGY

## 2023-02-08 PROCEDURE — 99214 PR OFFICE/OUTPT VISIT, EST, LEVL IV, 30-39 MIN: ICD-10-PCS | Mod: 25,HCNC,S$GLB, | Performed by: DERMATOLOGY

## 2023-02-08 PROCEDURE — 17110 DESTRUCTION B9 LES UP TO 14: CPT | Mod: HCNC,S$GLB,, | Performed by: DERMATOLOGY

## 2023-02-08 PROCEDURE — 17000 DESTRUCT PREMALG LESION: CPT | Mod: 59,HCNC,S$GLB, | Performed by: DERMATOLOGY

## 2023-02-08 RX ORDER — TRIAMCINOLONE ACETONIDE 0.25 MG/G
CREAM TOPICAL
Qty: 45 G | Refills: 1 | Status: SHIPPED | OUTPATIENT
Start: 2023-02-08

## 2023-02-08 RX ORDER — FLUOCINONIDE 0.5 MG/G
CREAM TOPICAL
Qty: 45 G | Refills: 3 | Status: ON HOLD | OUTPATIENT
Start: 2023-02-08 | End: 2024-02-26 | Stop reason: HOSPADM

## 2023-02-08 NOTE — PROGRESS NOTES
"  Subjective:       Patient ID:  Nicho Espinosa is a 82 y.o. female who presents for   Chief Complaint   Patient presents with    Skin Check     ubse    Lesion     Face  Chest      Pt here today for a UBSE    Patient is here today for a "mole" check.   Pt has a history of  moderate sun exposure in the past.   Pt recalls several blistering sunburns in the past- no  Pt has history of tanning bed use- no  Pt has  had moles removed in the past- yes, benign per pt  Pt has history of melanoma in first degree relatives-  No    Patient with new complaint of lesion(s)  Location: chest  Duration: pt unsure  Symptoms: irritated  Relieving factors/Previous treatments: none    Pt also c/o lesion to face   Also c/o discoloration to L elbow; Present for over 1 year. dry and irritated. Father had same condition. No tx  C/o rash on face for over 1 year.  Red and scaly. Uses ketoconazole cream 2 % but still flares some times        Lesion    Review of Systems   Skin:  Positive for rash, dry skin and activity-related sunscreen use. Negative for tendency to form keloidal scars.   Hematologic/Lymphatic: Bruises/bleeds easily (bruise).      Objective:    Physical Exam   Constitutional: She appears well-developed and well-nourished. No distress.   Neurological: She is alert and oriented to person, place, and time. She is not disoriented.   Psychiatric: She has a normal mood and affect.   Skin:   Areas Examined (abnormalities noted in diagram):   Scalp / Hair Palpated and Inspected  Head / Face Inspection Performed  Neck Inspection Performed  Chest / Axilla Inspection Performed  Abdomen Inspection Performed  Back Inspection Performed  RUE Inspected  LUE Inspection Performed                     Diagram Legend     Erythematous scaling macule/papule c/w actinic keratosis       Vascular papule c/w angioma      Pigmented verrucoid papule/plaque c/w seborrheic keratosis      Yellow umbilicated papule c/w sebaceous hyperplasia      Irregularly " shaped tan macule c/w lentigo     1-2 mm smooth white papules consistent with Milia      Movable subcutaneous cyst with punctum c/w epidermal inclusion cyst      Subcutaneous movable cyst c/w pilar cyst      Firm pink to brown papule c/w dermatofibroma      Pedunculated fleshy papule(s) c/w skin tag(s)      Evenly pigmented macule c/w junctional nevus     Mildly variegated pigmented, slightly irregular-bordered macule c/w mildly atypical nevus      Flesh colored to evenly pigmented papule c/w intradermal nevus       Pink pearly papule/plaque c/w basal cell carcinoma      Erythematous hyperkeratotic cursted plaque c/w SCC      Surgical scar with no sign of skin cancer recurrence      Open and closed comedones      Inflammatory papules and pustules      Verrucoid papule consistent consistent with wart     Erythematous eczematous patches and plaques     Dystrophic onycholytic nail with subungual debris c/w onychomycosis     Umbilicated papule    Erythematous-base heme-crusted tan verrucoid plaque consistent with inflamed seborrheic keratosis     Erythematous Silvery Scaling Plaque c/w Psoriasis     See annotation      Assessment / Plan:        AK (actinic keratosis)  Cryosurgery Procedure Note    Verbal consent from the patient is obtained including, but not limited to, risk of hypopigmentation/hyperpigmentation, scar, recurrence of lesion. The patient is aware of the precancerous quality and need for treatment of these lesions. Liquid nitrogen cryosurgery is applied to the 1 actinic keratoses, as detailed in the physical exam, to produce a freeze injury. The patient is aware that blisters may form and is instructed on wound care with gentle cleansing and use of vaseline ointment to keep moist until healed. The patient is supplied a handout on cryosurgery and is instructed to call if lesions do not completely resolve.    Inflamed seborrheic keratosis  Cryosurgery procedure note:    Verbal consent from the patient is  obtained including, but not limited to, risk of hypopigmentation/hyperpigmentation, scar, recurrence of lesion. Liquid nitrogen cryosurgery is applied to 2 lesions to produce a freeze injury. The patient is aware that blisters may form and is instructed on wound care with gentle cleansing and use of vaseline ointment to keep moist until healed. The patient is supplied a handout on cryosurgery and is instructed to call if lesions do not completely resolve.    SK (seborrheic keratosis)  These are benign inherited growths without a malignant potential. Reassurance given to patient. No treatment is necessary.     Milia  Reassurance given to patient. No treatment is necessary.   Treatment of benign, asymptomatic lesions may be considered cosmetic.    Lentigo  This is a benign hyperpigmented sun induced lesion. Recommend daily sun protection/avoidance and use of at least SPF 30, broad spectrum sunscreen (OTC drug) will reduce the number of new lesions. Treatment of these benign lesions are considered cosmetic.  The nature of sun-induced photo-aging and skin cancers is discussed.  Sun avoidance, protective clothing, and the use of 30-SPF sunscreens is advised. Observe closely for skin damage/changes, and call if such occurs.    Psoriasiform dermatitis  -     fluocinonide 0.05% (LIDEX) 0.05 % cream; AAA on elbow qhs prn flare. Not more than 2 weeks straight in same location; avoid use on face  Dispense: 45 g; Refill: 3  Fluofinolone cream to elbow for flares. CEtaphil rough and bumpy to elbows nightly     Pt educated that overuse of steroids can lead to skin thinning/atrophy, hypopigmentation, striae.  Not to use more than 2 weeks straight in same location.        Seborrheic dermatitis, unspecified  -     triamcinolone acetonide 0.025% (KENALOG) 0.025 % cream; aaa qd to face prn flare.  Not more than 1-2 weeks straight in same location  Dispense: 45 g; Refill: 1  Ketoconazole cream 2 % to face daily for  maintenance  Triamcinolone cream 0.025% for flare to face      Pt educated that overuse of steroids can lead to skin thinning/atrophy, hypopigmentation, striae.  Not to use more than 2 weeks straight in same location.       Upper body skin examination performed today including at least 6 points as noted in physical examination. No lesions suspicious for malignancy noted.    Recommend daily sun protection/avoidance and use of at least SPF 30, broad spectrum sunscreen (OTC drug).            Follow up in about 1 year (around 2/8/2024).

## 2023-02-08 NOTE — PATIENT INSTRUCTIONS
Ketoconazole cream 2 % to face daily for maintenance  Triamcinolone cream 0.025% for flare to face  Fluofinolone cream to elbow for flares. CEtaphil rough and bumpy to elbows nightly     Pt educated that overuse of steroids can lead to skin thinning/atrophy, hypopigmentation, striae.  Not to use more than 2 weeks straight in same location.     We would like to see you back in the clinic in 12 months.  You will be able to schedule this appointment by calling or by using your [facility] portal 3 months before this time. Because our schedule fills so quickly, please set a reminder in your phone or on your calendar to schedule 3 months before you are due to come in so that we can see you in a timely fashion.  You should also receive a reminder from us in the mail. This will help us ensure we can continue to provide excellent healthcare for you. Thank you.

## 2023-02-09 DIAGNOSIS — Z00.00 ENCOUNTER FOR MEDICARE ANNUAL WELLNESS EXAM: ICD-10-CM

## 2023-04-03 NOTE — PROGRESS NOTES
Subjective:       Patient ID: Nicho Espinosa is a 81 y.o. female.    Chief Complaint: No chief complaint on file.    Follow-up       Mrs. Espinosa returns today for follow up.  I had last seen her in June 2021.  She has been on anastrazole since August 2017, and has tolerated it well so far.  Of note, her Oncotype score was 7, suggesting that she has a 6 % chance of recurrence at ten years with tamoxifen alone.  Briefly, she is an 81 year-old  female who, on 08/09/2017, underwent a right modified radical mastectomy and sentinel lymph node biopsy.  The pathology report from the procedure indicates that she had a 2 cm lobular carcinoma that was ER strongly positive, LA positive and HER-2 negative; resection margins were clear.  On the right axillary sentinel lymph nodes isolated tumor cells were seen.    Her most recent DXA scan in March 2021 had shown low normal bone density.    A mammogram on June 23, 2021 was read as BIRADS I, and a one year follow up was recommended.      Review of Systems    Overall she feels fair and states that her back pain is worse.  Specifically, she states that the pain is more intense and had shifted in location.  She is under the care of a pain specialist.  She  denies any anxiety, depression, easy bruising, fevers, chills, night  sweats, weight loss, nausea, vomiting, diarrhea, constipation, diplopia, blurred vision, headache, chest pain, palpitations, shortness of breath, breast pain, abdominal pain, extremity pain, or difficulty ambulating.  The remainder of the ten-point ROS, including general, skin, lymph, H/N, cardiorespiratory, GI, , Neuro, Endocrine, and psychiatric is negative.     Of note, her most recent lumbar spine MRI was from May 15, 2019 and shows extensive DJD.  I have reviewed the images myself.      Objective:      Physical Exam    She is alert, oriented to time, place, person, pleasant, well      nourished, in no acute physical distress.                                     VITAL SIGNS:  Reviewed                                      HEENT:  Normal.  There are no nasal, oral, lip, gingival, auricular, lid,    or conjunctival lesions.  Mucosae are moist and pink, and there is no        thrush.  Pupils are equal, reactive to light and accommodation.              Extraocular muscle movements are intact.  For  Dentition is good.                                   NECK:  Supple without JVD, adenopathy, or thyromegaly.                       LUNGS:  Clear to auscultation without wheezing, rales, or rhonchi.           CARDIOVASCULAR:  Reveals an S1, S2, a grade I systolic ejection murmur at the LSB, no rubs, no gallops.         ABDOMEN:  Soft, nontender, without organomegaly.  Bowel sounds are    present.                                                                     EXTREMITIES:  No cyanosis, clubbing, or edema.  Scars of bilateral knee replacement surgeries are noted.          BREASTS:  She is status post right mastectomy with a well-healed incision.    There are no masses in the left breast.                                        LYMPHATIC:  There is no cervical, axillary, or supraclavicular adenopathy.   SKIN:  Warm and moist, without petechiae, rashes, induration, or ecchymoses.           NEUROLOGIC:  DTRs are 0-1+ bilaterally, symmetrical, motor function is 5/5,  and cranial nerves are  within normal limits.      Assessment:       1. Malignant neoplasm of upper-outer quadrant of right breast in female, estrogen receptor positive    2. Prophylactic use of anastrozole (Arimidex)      3.   Extensive DJD, s/p fusion of lumbar spine.    Plan:        Ffrom the breast cancer perspective Mrs Espinosa is doing well and remains LUZ ELENA.  I have asked her to remain on anastrazole through August 2022.  I will see her again in late June with a mammogram.  As far as her back pain is concerned, I offered to obtain a bone scan to ascertain that she does not have metastatic disease.  She stated  that she would prefer that this be handled by her pain specialist.  No radiologic imaging will be ordered at this point.  Her questions were answered to her satisfaction.               Ketoconazole Counseling:   Patient counseled regarding improving absorption with orange juice.  Adverse effects include but are not limited to breast enlargement, headache, diarrhea, nausea, upset stomach, liver function test abnormalities, taste disturbance, and stomach pain.  There is a rare possibility of liver failure that can occur when taking ketoconazole. The patient understands that monitoring of LFTs may be required, especially at baseline. The patient verbalized understanding of the proper use and possible adverse effects of ketoconazole.  All of the patient's questions and concerns were addressed.

## 2023-05-01 ENCOUNTER — PES CALL (OUTPATIENT)
Dept: ADMINISTRATIVE | Facility: CLINIC | Age: 83
End: 2023-05-01
Payer: MEDICARE

## 2023-05-25 DIAGNOSIS — F51.01 PRIMARY INSOMNIA: ICD-10-CM

## 2023-05-25 RX ORDER — TRAZODONE HYDROCHLORIDE 50 MG/1
TABLET ORAL
Qty: 90 TABLET | Refills: 2 | Status: SHIPPED | OUTPATIENT
Start: 2023-05-25

## 2023-05-25 NOTE — TELEPHONE ENCOUNTER
No care due was identified.  [facility] Embedded Care Due Messages. Reference number: [id number].   5/25/2023 2:19:38 PM CDT

## 2023-05-25 NOTE — TELEPHONE ENCOUNTER
Refill Decision Note   Nicho Jesús  is requesting a refill authorization.  Brief Assessment and Rationale for Refill:  Approve     Medication Therapy Plan:         Comments:     Note composed:2:19 PM 05/25/2023

## 2023-05-31 ENCOUNTER — CLINICAL SUPPORT (OUTPATIENT)
Dept: ENDOSCOPY | Facility: HOSPITAL | Age: 83
End: 2023-05-31
Attending: INTERNAL MEDICINE
Payer: MEDICARE

## 2023-05-31 ENCOUNTER — TELEPHONE (OUTPATIENT)
Dept: ENDOSCOPY | Facility: HOSPITAL | Age: 83
End: 2023-05-31

## 2023-05-31 DIAGNOSIS — Z12.11 SCREEN FOR COLON CANCER: ICD-10-CM

## 2023-05-31 NOTE — TELEPHONE ENCOUNTER
Dr Low-  Patient has an order in from his PCP for a colonoscopy.  The order is based upon the recommendation from Dr Oviedo from colonoscopy done on 11/1/2016 to repeat in 5 years.  With the new guidelines for patients >81 y/o, do we proceed with this procedure?  Please advise.  Thank you.     Christina

## 2023-06-05 ENCOUNTER — HOSPITAL ENCOUNTER (OUTPATIENT)
Dept: RADIOLOGY | Facility: HOSPITAL | Age: 83
Discharge: HOME | End: 2023-06-05
Attending: INTERNAL MEDICINE
Payer: MEDICARE

## 2023-06-05 ENCOUNTER — OFFICE VISIT (OUTPATIENT)
Dept: INTERNAL MEDICINE | Facility: CLINIC | Age: 83
End: 2023-06-05
Payer: MEDICARE

## 2023-06-05 VITALS — HEIGHT: 61 IN | BODY MASS INDEX: 33.42 KG/M2 | WEIGHT: 177 LBS

## 2023-06-05 VITALS
OXYGEN SATURATION: 95 % | SYSTOLIC BLOOD PRESSURE: 104 MMHG | HEART RATE: 67 BPM | WEIGHT: 180.75 LBS | BODY MASS INDEX: 34.16 KG/M2 | DIASTOLIC BLOOD PRESSURE: 60 MMHG

## 2023-06-05 DIAGNOSIS — Z00.00 ENCOUNTER FOR PREVENTIVE HEALTH EXAMINATION: Primary | ICD-10-CM

## 2023-06-05 DIAGNOSIS — D63.8 ANEMIA OF CHRONIC DISEASE: ICD-10-CM

## 2023-06-05 DIAGNOSIS — I70.0 AORTIC ATHEROSCLEROSIS: ICD-10-CM

## 2023-06-05 DIAGNOSIS — E55.9 MILD VITAMIN D DEFICIENCY: ICD-10-CM

## 2023-06-05 DIAGNOSIS — Z86.718 HISTORY OF DEEP VEIN THROMBOSIS (DVT) OF LOWER EXTREMITY: ICD-10-CM

## 2023-06-05 DIAGNOSIS — M81.0 AGE-RELATED OSTEOPOROSIS WITHOUT CURRENT PATHOLOGICAL FRACTURE: ICD-10-CM

## 2023-06-05 DIAGNOSIS — G25.0 ESSENTIAL TREMOR: ICD-10-CM

## 2023-06-05 DIAGNOSIS — M50.30 DDD (DEGENERATIVE DISC DISEASE), CERVICAL: ICD-10-CM

## 2023-06-05 DIAGNOSIS — E66.09 CLASS 1 OBESITY DUE TO EXCESS CALORIES WITH SERIOUS COMORBIDITY IN ADULT, UNSPECIFIED BMI: ICD-10-CM

## 2023-06-05 DIAGNOSIS — Z12.31 ENCOUNTER FOR SCREENING MAMMOGRAM FOR MALIGNANT NEOPLASM OF BREAST: ICD-10-CM

## 2023-06-05 DIAGNOSIS — N18.30 STAGE 3 CHRONIC KIDNEY DISEASE, UNSPECIFIED WHETHER STAGE 3A OR 3B CKD: ICD-10-CM

## 2023-06-05 DIAGNOSIS — M51.35 DDD (DEGENERATIVE DISC DISEASE), THORACOLUMBAR: ICD-10-CM

## 2023-06-05 DIAGNOSIS — Z85.3 HISTORY OF BREAST CANCER: ICD-10-CM

## 2023-06-05 DIAGNOSIS — E78.1 HYPERTRIGLYCERIDEMIA: ICD-10-CM

## 2023-06-05 DIAGNOSIS — K21.9 GASTROESOPHAGEAL REFLUX DISEASE, UNSPECIFIED WHETHER ESOPHAGITIS PRESENT: ICD-10-CM

## 2023-06-05 PROCEDURE — 77067 SCR MAMMO BI INCL CAD: CPT | Mod: TC,52

## 2023-06-05 PROCEDURE — G0439 PR MEDICARE ANNUAL WELLNESS SUBSEQUENT VISIT: ICD-10-PCS | Mod: S$GLB,,, | Performed by: NURSE PRACTITIONER

## 2023-06-05 PROCEDURE — 3074F SYST BP LT 130 MM HG: CPT | Mod: CPTII,S$GLB,, | Performed by: NURSE PRACTITIONER

## 2023-06-05 PROCEDURE — 1125F AMNT PAIN NOTED PAIN PRSNT: CPT | Mod: CPTII,S$GLB,, | Performed by: NURSE PRACTITIONER

## 2023-06-05 PROCEDURE — 77063 BREAST TOMOSYNTHESIS BI: CPT | Mod: 26,52,, | Performed by: RADIOLOGY

## 2023-06-05 PROCEDURE — G0439 PPPS, SUBSEQ VISIT: HCPCS | Mod: S$GLB,,, | Performed by: NURSE PRACTITIONER

## 2023-06-05 PROCEDURE — 3288F FALL RISK ASSESSMENT DOCD: CPT | Mod: CPTII,S$GLB,, | Performed by: NURSE PRACTITIONER

## 2023-06-05 PROCEDURE — 77067 MAMMO DIGITAL SCREENING LEFT WITH TOMO: ICD-10-PCS | Mod: 26,52,, | Performed by: RADIOLOGY

## 2023-06-05 PROCEDURE — 77063 MAMMO DIGITAL SCREENING LEFT WITH TOMO: ICD-10-PCS | Mod: 26,52,, | Performed by: RADIOLOGY

## 2023-06-05 PROCEDURE — 1101F PR PT FALLS ASSESS DOC 0-1 FALLS W/OUT INJ PAST YR: ICD-10-PCS | Mod: CPTII,S$GLB,, | Performed by: NURSE PRACTITIONER

## 2023-06-05 PROCEDURE — 3078F PR MOST RECENT DIASTOLIC BLOOD PRESSURE < 80 MM HG: ICD-10-PCS | Mod: CPTII,S$GLB,, | Performed by: NURSE PRACTITIONER

## 2023-06-05 PROCEDURE — 99999 PR PBB SHADOW E&M-EST. PATIENT-LVL III: CPT | Mod: PBBFAC,,, | Performed by: NURSE PRACTITIONER

## 2023-06-05 PROCEDURE — 1125F PR PAIN SEVERITY QUANTIFIED, PAIN PRESENT: ICD-10-PCS | Mod: CPTII,S$GLB,, | Performed by: NURSE PRACTITIONER

## 2023-06-05 PROCEDURE — 99999 PR PBB SHADOW E&M-EST. PATIENT-LVL III: ICD-10-PCS | Mod: PBBFAC,,, | Performed by: NURSE PRACTITIONER

## 2023-06-05 PROCEDURE — 1170F PR FUNCTIONAL STATUS ASSESSED: ICD-10-PCS | Mod: CPTII,S$GLB,, | Performed by: NURSE PRACTITIONER

## 2023-06-05 PROCEDURE — 1101F PT FALLS ASSESS-DOCD LE1/YR: CPT | Mod: CPTII,S$GLB,, | Performed by: NURSE PRACTITIONER

## 2023-06-05 PROCEDURE — 3074F PR MOST RECENT SYSTOLIC BLOOD PRESSURE < 130 MM HG: ICD-10-PCS | Mod: CPTII,S$GLB,, | Performed by: NURSE PRACTITIONER

## 2023-06-05 PROCEDURE — 3288F PR FALLS RISK ASSESSMENT DOCUMENTED: ICD-10-PCS | Mod: CPTII,S$GLB,, | Performed by: NURSE PRACTITIONER

## 2023-06-05 PROCEDURE — 3078F DIAST BP <80 MM HG: CPT | Mod: CPTII,S$GLB,, | Performed by: NURSE PRACTITIONER

## 2023-06-05 PROCEDURE — 77067 SCR MAMMO BI INCL CAD: CPT | Mod: 26,52,, | Performed by: RADIOLOGY

## 2023-06-05 PROCEDURE — 1170F FXNL STATUS ASSESSED: CPT | Mod: CPTII,S$GLB,, | Performed by: NURSE PRACTITIONER

## 2023-06-05 RX ORDER — POLYETHYLENE GLYCOL 3350, SODIUM SULFATE ANHYDROUS, SODIUM BICARBONATE, SODIUM CHLORIDE, POTASSIUM CHLORIDE 236; 22.74; 6.74; 5.86; 2.97 G/4L; G/4L; G/4L; G/4L; G/4L
4 POWDER, FOR SOLUTION ORAL ONCE
Qty: 4000 ML | Refills: 0 | Status: SHIPPED | OUTPATIENT
Start: 2023-06-05 | End: 2023-06-05

## 2023-06-05 NOTE — PROGRESS NOTES
Nicho Espinosa presented for a  Medicare AWV and comprehensive Health Risk Assessment today. The following components were reviewed and updated:    Medical history  Family History  Social history  Allergies and Current Medications  Health Risk Assessment  Health Maintenance  Care Team         ** See Completed Assessments for Annual Wellness Visit within the encounter summary.**         The following assessments were completed:  Living Situation  CAGE  Depression Screening  Timed Get Up and Go  Whisper Test  Cognitive Function Screening      Nutrition Screening  ADL Screening  PAQ Screening  Patient has tried nonnarcotic treatment /therapies for chronic pain without adequate pain control. Abuse potential evaluated on ongoing basis by her provider. Does not require referral, followed by Pain Management.          Vitals:    06/05/23 1310   BP: 104/60   Pulse: 67   SpO2: 95%   Weight: 82 kg (180 lb 12.4 oz)     Body mass index is 34.16 kg/m².  Physical Exam  Vitals and nursing note reviewed.   Constitutional:       Appearance: She is well-developed. She is obese.   HENT:      Head: Normocephalic.   Cardiovascular:      Rate and Rhythm: Normal rate and regular rhythm.      Heart sounds: Normal heart sounds. No murmur heard.  Pulmonary:      Effort: Pulmonary effort is normal.      Breath sounds: Normal breath sounds.   Abdominal:      General: Bowel sounds are normal.      Palpations: Abdomen is soft.   Musculoskeletal:         General: Normal range of motion.   Skin:     General: Skin is warm and dry.   Neurological:      Mental Status: She is alert and oriented to person, place, and time.      Motor: No abnormal muscle tone.   Psychiatric:         Mood and Affect: Mood normal.             Diagnoses and health risks identified today and associated recommendations/orders:    1. Encounter for preventive health examination  Here for Health Risk Assessment/Annual Wellness Visit.  Health maintenance reviewed and updated.  Follow up in one year.   Declined DEXA    2. Aortic atherosclerosis  Chronic, stable on current medications. Noted CT Chest 5/04/21. Followed by PCP.     3. Hypertriglyceridemia  Chronic, stable on current medication. Followed by PCP.     4. Stage 3 chronic kidney disease, unspecified whether stage 3a or 3b CKD  Chronic, stable. Followed by PCP.     5. History of deep vein thrombosis (DVT) of lower extremity  Stable. Followed by PCP.     6. History of breast cancer  Followed by Oncology, PCP.    7. Anemia of chronic disease  Chronic, stable. Followed by PCP.     8. Class 1 obesity due to excess calories with serious comorbidity in adult, unspecified BMI  Chronic, stable. Followed by PCP.     9. Essential tremor  Chronic, stable on current medication. Followed by PCP.     10. Mild vitamin D deficiency  Chronic, stable on current medication. Followed by PCP.     11. Gastroesophageal reflux disease, unspecified whether esophagitis present  Chronic, stable Followed by PCP.     12. Age-related osteoporosis without current pathological fracture  Chronic, stable. Declined DEXA. Followed by PCP.     13. DDD (degenerative disc disease), cervical  Chronic, stable on current medications. Followed by PCP, Pain Management.     14. DDD (degenerative disc disease), thoracolumbar  Chronic, stable on current medications. Followed by PCP, Pain Management.       Provided Nicho with a 5-10 year written screening schedule and personal prevention plan. Recommendations were developed using the USPSTF age appropriate recommendations. Education, counseling, and referrals were provided as needed. After Visit Summary printed and given to patient which includes a list of additional screenings\tests needed.    Follow up in about 8 months (around 2/7/2024).with PCP    Mel Camargo NP

## 2023-06-05 NOTE — PATIENT INSTRUCTIONS
Counseling and Referral of Other Preventative  (Italic type indicates deductible and co-insurance are waived)    Patient Name: Nicho Espinosa  Today's Date: 6/5/2023    Health Maintenance       Date Due Completion Date    Colonoscopy 11/01/2021 11/1/2016    DEXA Scan 03/12/2023 3/12/2021    TETANUS VACCINE 09/19/2023 9/19/2013    Hemoglobin A1c (Prediabetes) 02/07/2024 2/7/2023    Lipid Panel 02/07/2028 2/7/2023        No orders of the defined types were placed in this encounter.    The following information is provided to all patients.  This information is to help you find resources for any of the problems found today that may be affecting your health:                Living healthy guide: www.[facility].[place].gov      Understanding Diabetes: www.diabetes.org      Eating healthy: www.cdc.gov/healthyweight      [facility] home safety checklist: www.cdc.gov/steadi/patient.html      Agency on Aging: www.goea.[place].gov      Alcoholics anonymous (AA): www.aa.org      Physical Activity: www.[place].nih.gov/yc6gdcl      Tobacco use: www.quitwithusla.org

## 2023-06-22 ENCOUNTER — TELEPHONE (OUTPATIENT)
Dept: ENDOSCOPY | Facility: HOSPITAL | Age: 83
End: 2023-06-22
Payer: MEDICARE

## 2023-06-22 NOTE — TELEPHONE ENCOUNTER
Spoke to patient to schedule procedure(s) Colonoscopy       Physician to perform procedure(s) Dr. ESA Fuentes  Date of Procedure (s) 7/27/23  Arrival Time 9:30 AM  Time of Procedure(s) 10:30 AM   Location of Procedure(s) [place] [address] Floor  Type of Rx Prep sent to patient: PEG  Instructions provided to patient via [facility]    Patient was informed on the following information and verbalized understanding. Screening questionnaire reviewed with patient and complete. If procedure requires anesthesia, a responsible adult needs to be present to accompany the patient home, patient cannot drive after receiving anesthesia. Appointment details are tentative, especially check-in time. Patient will receive a prep-op call 4 days prior to confirm check-in time for procedure. If applicable the patient should contact their pharmacy to verify Rx for procedure prep is ready for pick-up. Patient was advised to call the scheduling department at [phone] if pharmacy states no Rx is available. Patient was advised to call the endoscopy scheduling department if any questions or concerns arise.      [facility] Scheduling Department

## 2023-07-27 ENCOUNTER — ANESTHESIA (OUTPATIENT)
Dept: ENDOSCOPY | Facility: HOSPITAL | Age: 83
End: 2023-07-27
Payer: MEDICARE

## 2023-07-27 ENCOUNTER — ANESTHESIA EVENT (OUTPATIENT)
Dept: ENDOSCOPY | Facility: HOSPITAL | Age: 83
End: 2023-07-27
Payer: MEDICARE

## 2023-07-27 ENCOUNTER — HOSPITAL ENCOUNTER (OUTPATIENT)
Facility: HOSPITAL | Age: 83
Discharge: HOME | End: 2023-07-27
Attending: COLON & RECTAL SURGERY | Admitting: COLON & RECTAL SURGERY
Payer: MEDICARE

## 2023-07-27 VITALS
BODY MASS INDEX: 32.66 KG/M2 | OXYGEN SATURATION: 96 % | WEIGHT: 173 LBS | SYSTOLIC BLOOD PRESSURE: 132 MMHG | HEART RATE: 68 BPM | DIASTOLIC BLOOD PRESSURE: 62 MMHG | RESPIRATION RATE: 14 BRPM | HEIGHT: 61 IN | TEMPERATURE: 98 F

## 2023-07-27 DIAGNOSIS — Z12.11 ENCOUNTER FOR SCREENING COLONOSCOPY: ICD-10-CM

## 2023-07-27 PROCEDURE — 88305 TISSUE EXAM BY PATHOLOGIST: CPT | Mod: HCNC | Performed by: PATHOLOGY

## 2023-07-27 PROCEDURE — 88305 TISSUE EXAM BY PATHOLOGIST: CPT | Mod: 26,HCNC,, | Performed by: PATHOLOGY

## 2023-07-27 PROCEDURE — 27201012 HC FORCEPS, HOT/COLD, DISP: Mod: HCNC | Performed by: COLON & RECTAL SURGERY

## 2023-07-27 PROCEDURE — 37000008 HC ANESTHESIA 1ST 15 MINUTES: Mod: HCNC | Performed by: COLON & RECTAL SURGERY

## 2023-07-27 PROCEDURE — 37000009 HC ANESTHESIA EA ADD 15 MINS: Mod: HCNC | Performed by: COLON & RECTAL SURGERY

## 2023-07-27 PROCEDURE — 25000003 PHARM REV CODE 250: Mod: HCNC | Performed by: NURSE ANESTHETIST, CERTIFIED REGISTERED

## 2023-07-27 PROCEDURE — E9220 PRA ENDO ANESTHESIA: ICD-10-PCS | Mod: HCNC,PT,, | Performed by: NURSE ANESTHETIST, CERTIFIED REGISTERED

## 2023-07-27 PROCEDURE — 45380 COLONOSCOPY AND BIOPSY: CPT | Mod: PT,HCNC | Performed by: COLON & RECTAL SURGERY

## 2023-07-27 PROCEDURE — 45380 PR COLONOSCOPY,BIOPSY: ICD-10-PCS | Mod: PT,HCNC,, | Performed by: COLON & RECTAL SURGERY

## 2023-07-27 PROCEDURE — E9220 PRA ENDO ANESTHESIA: HCPCS | Mod: HCNC,PT,, | Performed by: NURSE ANESTHETIST, CERTIFIED REGISTERED

## 2023-07-27 PROCEDURE — 88305 TISSUE EXAM BY PATHOLOGIST: ICD-10-PCS | Mod: 26,HCNC,, | Performed by: PATHOLOGY

## 2023-07-27 PROCEDURE — 45380 COLONOSCOPY AND BIOPSY: CPT | Mod: PT,HCNC,, | Performed by: COLON & RECTAL SURGERY

## 2023-07-27 PROCEDURE — 63600175 PHARM REV CODE 636 W HCPCS: Mod: HCNC | Performed by: NURSE ANESTHETIST, CERTIFIED REGISTERED

## 2023-07-27 RX ORDER — PROPOFOL 10 MG/ML
VIAL (ML) INTRAVENOUS CONTINUOUS PRN
Status: DISCONTINUED | OUTPATIENT
Start: 2023-07-27 | End: 2023-07-27

## 2023-07-27 RX ORDER — LIDOCAINE HYDROCHLORIDE 20 MG/ML
INJECTION INTRAVENOUS
Status: DISCONTINUED | OUTPATIENT
Start: 2023-07-27 | End: 2023-07-27

## 2023-07-27 RX ORDER — PROPOFOL 10 MG/ML
VIAL (ML) INTRAVENOUS
Status: DISCONTINUED | OUTPATIENT
Start: 2023-07-27 | End: 2023-07-27

## 2023-07-27 RX ADMIN — SODIUM CHLORIDE: 0.9 INJECTION, SOLUTION INTRAVENOUS at 11:07

## 2023-07-27 RX ADMIN — Medication 75 MCG/KG/MIN: at 12:07

## 2023-07-27 RX ADMIN — LIDOCAINE HYDROCHLORIDE 30 MG: 20 INJECTION INTRAVENOUS at 12:07

## 2023-07-27 RX ADMIN — PROPOFOL 30 MG: 10 INJECTION, EMULSION INTRAVENOUS at 12:07

## 2023-07-27 NOTE — H&P
Colonoscopy History and Physical      Procedure : Colonoscopy    Indications:  Ms Espinosa presents for screening colonoscopy. Her last one was in 2016, demonstrated diverticulosis but was otherwise normal. Notes constipation with use of norco but takes fiber supplementation which helps. Otherwise denies abdominal pain, blood per rectum, diarrhea.     Family Hx of CRC: Denies    Last Colonoscopy:      Past Medical History:   Diagnosis Date    Allergy     Arthritis     Blepharitis of both eyes     breast ca 2017    right    Complication of anesthesia     nausea    DDD (degenerative disc disease), cervical 2013    Degenerative disc disease     GERD (gastroesophageal reflux disease)     patient denies reflux    History of compression fracture of spine 4/10/2014    Hyperlipidemia     Lumbar disc disease     Meibomitis     Obesity 2013    Osteoporosis     Papilloma of eyelid     RUL    Postoperative anemia 2013    Thoracic or lumbosacral neuritis or radiculitis, unspecified 2013    Tremors of nervous system 2015       Family History   Problem Relation Age of Onset    Cancer Father         bone    Breast cancer Mother         never had biopsy    Dementia Mother         vascular    No Known Problems Brother     No Known Problems Brother     Stroke Daughter     Diabetes Daughter         was obese    Cancer Paternal Grandmother         skin cancer, unknown type    Cancer Paternal Uncle         stomach    Cancer Other         possibly other cancers in other relatives    Dementia Maternal Grandmother     Hypertension Neg Hx     Ovarian cancer Neg Hx     Melanoma Neg Hx        Social History     Socioeconomic History    Marital status: Single   Tobacco Use    Smoking status: Former     Packs/day: 0.25     Years: 40.00     Pack years: 10.00     Types: Cigarettes     Quit date: 2011     Years since quittin.5    Smokeless tobacco: Never   Substance and Sexual Activity    Alcohol use: Yes      Comment: one glass of wine every 2 months    Drug use: No    Sexual activity: Not Currently     Birth control/protection: Post-menopausal   Social History Narrative    Lives in a duplex, daughter lives downstairs       Review of patient's allergies indicates:   Allergen Reactions    Sulfa (sulfonamide antibiotics) Other (See Comments)     Yeast infection and irritation    Ciprofloxacin      Rash    Latex, natural rubber     Adhesive Hives and Itching       No current facility-administered medications on file prior to encounter.     Current Outpatient Medications on File Prior to Encounter   Medication Sig Dispense Refill    b complex vitamins capsule Take 1 capsule by mouth once daily.      calcium-vitamin D3 (OS-DIANDRA 500 + D3) 500 mg-5 mcg (200 unit) per tablet Take 1 tablet by mouth 3 (three) times a week.      cholecalciferol, vitamin D3, 1,000 unit capsule Take 3 capsules daily 90 capsule 11    fenofibrate micronized (LOFIBRA) 67 MG capsule TAKE 2 CAPSULES EVERY DAY WITH BREAKFAST 180 capsule 3    HYDROcodone-acetaminophen (NORCO) 5-325 mg per tablet Take 1/2  tablet 3 times per day prn      MULTIVIT WITH CALCIUM,IRON,MIN (WOMEN'S DAILY MULTIVITAMIN ORAL) Take by mouth.      propranoloL (INDERAL) 10 MG tablet TAKE 1 TABLET THREE TIMES DAILY 270 tablet 3    traZODone (DESYREL) 50 MG tablet TAKE 1 TABLET NIGHTLY AS NEEDED FOR INSOMNIA 90 tablet 2    fluocinonide 0.05% (LIDEX) 0.05 % cream AAA on elbow qhs prn flare. Not more than 2 weeks straight in same location; avoid use on face 45 g 3    fluticasone propionate (FLONASE) 50 mcg/actuation nasal spray 1 spray by Each Nostril route once daily.      ketoconazole (NIZORAL) 2 % cream aaa bid prn flare 60 g 3    loratadine (CLARITIN) 10 mg tablet Take 10 mg by mouth once daily.      ondansetron (ZOFRAN) 4 MG tablet Take 1 tablet (4 mg total) by mouth every 8 (eight) hours as needed for Nausea. 30 tablet 0    triamcinolone acetonide 0.025% (KENALOG) 0.025 % cream aaa qd  to face prn flare.  Not more than 1-2 weeks straight in same location 45 g 1       Review of Systems -   Respiratory ROS: negative  Cardiovascular ROS: negative  Gastrointestinal ROS: negative  Musculoskeletal ROS: negative  Neurological ROS: negative        Physical Exam:  General: no distress  Head: normocephalic  Lungs:  normal respiratory effort  Heart: regular rate  Abdomen: soft,  Non-tender  Extremities: warm and well perfused       Deep Sedation: Mallampati Score per anesthesia    ASA: II      Patient cleared for Anesthesia:  MAC    Anesthesia/Surgery risks, benefits, and alternative options discussed and understood by patient/family.

## 2023-07-27 NOTE — PROVATION PATIENT INSTRUCTIONS
Discharge Summary/Instructions after an Endoscopic Procedure  Patient Name: Nicho Espinosa  Patient MRN: [id number]  Patient YOB: 1940 Thursday, July 27, 2023  Jacobo Fuentes MD  Dear patient,  As a result of recent federal legislation (The Federal Cures Act), you may   receive lab or pathology results from your procedure in your [facility]   account before your physician is able to contact you. Your physician or   their representative will relay the results to you with their   recommendations at their soonest availability.  Thank you,  RESTRICTIONS:  During your procedure today, you received medications for sedation.  These   medications may affect your judgment, balance and coordination.  Therefore,   for 24 hours, you have the following restrictions:   - DO NOT drive a car, operate machinery, make legal/financial decisions,   sign important papers or drink alcohol.    ACTIVITY:  Today: no heavy lifting, straining or running due to procedural   sedation/anesthesia.  The following day: return to full activity including work.  DIET:  Eat and drink normally unless instructed otherwise.     TREATMENT FOR COMMON SIDE EFFECTS:  - Mild abdominal pain, nausea, belching, bloating or excessive gas:  rest,   eat lightly and use a heating pad.  - Sore Throat: treat with throat lozenges and/or gargle with warm salt   water.  - Because air was used during the procedure, expelling large amounts of air   from your rectum or belching is normal.  - If a bowel prep was taken, you may not have a bowel movement for 1-3 days.    This is normal.  SYMPTOMS TO WATCH FOR AND REPORT TO YOUR PHYSICIAN:  1. Abdominal pain or bloating, other than gas cramps.  2. Chest pain.  3. Back pain.  4. Signs of infection such as: chills or fever occurring within 24 hours   after the procedure.  5. Rectal bleeding, which would show as bright red, maroon, or black stools.   (A tablespoon of blood from the rectum is not serious, especially if    hemorrhoids are present.)  6. Vomiting.  7. Weakness or dizziness.  GO DIRECTLY TO THE NEAREST EMERGENCY ROOM IF YOU HAVE ANY OF THE FOLLOWING:      Difficulty breathing              Chills and/or fever over 101 F   Persistent vomiting and/or vomiting blood   Severe abdominal pain   Severe chest pain   Black, tarry stools   Bleeding- more than one tablespoon   Any other symptom or condition that you feel may need urgent attention  Your doctor recommends these additional instructions:  If any biopsies were taken, your doctors clinic will contact you in 1 to 2   weeks with any results.  - Discharge patient to home.   - High fiber diet.   - Continue present medications.   - Await pathology results.   - Patient has a contact number available for emergencies.  The signs and   symptoms of potential delayed complications were discussed with the   patient.  Return to normal activities tomorrow.  Written discharge   instructions were provided to the patient.   - Repeat colonoscopy date to be determined after pending pathology results   are reviewed for surveillance based on pathology results.  For questions, problems or results please call your physician - Jacobo Fuentes MD at Work:  [phone].  [facility], EMERGENCY ROOM PHONE NUMBER: [phone]  IF A COMPLICATION OR EMERGENCY SITUATION ARISES AND YOU ARE UNABLE TO REACH   YOUR PHYSICIAN - GO DIRECTLY TO THE EMERGENCY ROOM.  Jacobo Fuentes MD  7/27/2023 12:59:23 PM  This report has been verified and signed electronically.  Dear patient,  As a result of recent federal legislation (The Federal Cures Act), you may   receive lab or pathology results from your procedure in your [facility]   account before your physician is able to contact you. Your physician or   their representative will relay the results to you with their   recommendations at their soonest availability.  Thank you,  [employer]ATION

## 2023-07-27 NOTE — TRANSFER OF CARE
"Anesthesia Transfer of Care Note    Patient: Nicho Espinosa    Procedure(s) Performed: Procedure(s) (LRB):  COLONOSCOPY (N/A)    Patient location: PACU    Anesthesia Type: general    Transport from OR: Transported from OR on room air with adequate spontaneous ventilation    Post pain: adequate analgesia    Post assessment: no apparent anesthetic complications and tolerated procedure well    Post vital signs: stable    Level of consciousness: awake, alert and oriented    Nausea/Vomiting: no nausea/vomiting    Complications: none    Transfer of care protocol was followed      Last vitals:   Visit Vitals  BP (!) 130/77(BP Location: Left arm, Patient Position: Lying)   Pulse 68   Temp 36.5 °C (97.7 °F) (Temporal)   Resp 20   Ht 5' 1" (1.549 m)   Wt 78.5 kg (173 lb)   LMP 07/05/1987   SpO2 95%   Breastfeeding No   BMI 32.69 kg/m²     "

## 2023-07-27 NOTE — ANESTHESIA POSTPROCEDURE EVALUATION
Anesthesia Post Evaluation    Patient: Nicho Espinosa    Procedure(s) Performed: Procedure(s) (LRB):  COLONOSCOPY (N/A)    Final Anesthesia Type: general      Patient location during evaluation: GI PACU  Patient participation: Yes- Able to Participate  Level of consciousness: awake and alert, awake and oriented  Post-procedure vital signs: reviewed and stable  Pain management: adequate  Airway patency: patent    PONV status at discharge: No PONV  Anesthetic complications: no      Cardiovascular status: blood pressure returned to baseline, stable and hemodynamically stable  Respiratory status: unassisted, spontaneous ventilation and room air  Hydration status: euvolemic  Follow-up not needed.          Vitals Value Taken Time   /62 07/27/23 1317   Temp 36.5 °C (97.7 °F) 07/27/23 1302   Pulse 68 07/27/23 1317   Resp 14 07/27/23 1317   SpO2 96 % 07/27/23 1317         No case tracking events are documented in the log.      Pain/Yamile Score: Yamile Score: 10 (7/27/2023  1:17 PM)

## 2023-07-27 NOTE — ANESTHESIA PREPROCEDURE EVALUATION
07/27/2023  Nicho Espinosa is a 82 y.o., female.      2D ECHO 2017 CONCLUSIONS     1 - Mild left atrial enlargement.     2 - Normal left ventricular systolic function (EF 60-65%).     3 - No wall motion abnormalities.     4 - Normal right ventricular systolic function .     5 - The estimated PA systolic pressure is 25 mmHg.     6 - Indeterminate LV diastolic function.       Pre-op Assessment    I have reviewed the Patient Summary Reports.     I have reviewed the Nursing Notes. I have reviewed the NPO Status.   I have reviewed the Medications.     Review of Systems  Anesthesia Hx:  PONV Neg history of prior surgery. Denies Family Hx of Anesthesia complications.   Denies Personal Hx of Anesthesia complications.   Social:  Non-Smoker, No Alcohol Use 25 pack year smoker, quit 2010   Hematology/Oncology:  Hematology Normal   Oncology Normal     EENT/Dental:EENT/Dental Normal   Cardiovascular:  Cardiovascular Normal Exercise tolerance: good     Pulmonary:  Pulmonary Normal    Renal/:   Chronic Renal Disease, CKD    Hepatic/GI:   GERD, well controlled    Musculoskeletal:   Arthritis     Neurological:  Neurology Normal    Endocrine:  Endocrine Normal    Dermatological:  Skin Normal    Psych:  Psychiatric Normal           Physical Exam  General: Well nourished, Cooperative, Alert and Oriented    Airway:  Mallampati: II / II  Mouth Opening: Normal  TM Distance: Normal  Tongue: Normal  Neck ROM: Normal ROM    Dental:  Intact    Chest/Lungs:  Clear to auscultation, Normal Respiratory Rate    Heart:  Rate: Normal  Rhythm: Regular Rhythm        Anesthesia Plan  Type of Anesthesia, risks & benefits discussed:    Anesthesia Type: Gen Natural Airway  Intra-op Monitoring Plan: Standard ASA Monitors  Post Op Pain Control Plan: multimodal analgesia and IV/PO Opioids PRN  Induction:  IV  Informed Consent: Patient consented to  blood products? No  ASA Score: 3  Day of Surgery Review of History & Physical: H&P Update referred to the surgeon/provider.    Ready For Surgery From Anesthesia Perspective.     .

## 2023-07-31 LAB
FINAL PATHOLOGIC DIAGNOSIS: NORMAL
GROSS: NORMAL
Lab: NORMAL

## 2023-09-27 DIAGNOSIS — Z78.0 MENOPAUSE: ICD-10-CM

## 2023-12-02 DIAGNOSIS — E78.1 HYPERTRIGLYCERIDEMIA: ICD-10-CM

## 2023-12-03 NOTE — TELEPHONE ENCOUNTER
Care Due:                  Date            Visit Type   Department     Provider  --------------------------------------------------------------------------------                                MYCHART                              FOLLOWUP/OF  [facility] INTERNAL  Last Visit: 02-      FICE VISIT   MEDICINE       Windy Walker  Next Visit: None Scheduled  None         None Found                                                            Last  Test          Frequency    Reason                     Performed    Due Date  --------------------------------------------------------------------------------    CBC.........  12 months..  fenofibrate..............  02- 02-    CMP.........  12 months..  fenofibrate..............  02- 02-    Lipid Panel.  12 months..  fenofibrate..............  02- 02-    [employer] Embedded Care Due Messages. Reference number: [id number].   12/02/2023 8:39:00 PM CST

## 2023-12-04 RX ORDER — FENOFIBRATE 67 MG/1
CAPSULE ORAL
Qty: 180 CAPSULE | Refills: 3 | Status: SHIPPED | OUTPATIENT
Start: 2023-12-04

## 2023-12-04 NOTE — TELEPHONE ENCOUNTER
Refill Routing Note   Medication(s) are not appropriate for processing by [facility] for the following reason(s):        Drug-disease interaction: fenofibrate micronized and Anemia of chronic disease; Stage 3 chronic kidney disease    [facility] action(s):  Defer     Requires labs : Yes             Appointments  past 12m or future 3m with PCP    Date Provider   Last Visit   2/7/2023 Azalea Walker MD   Next Visit   Visit date not found Azalea Walker MD   ED visits in past 90 days: 0        Note composed:12:15 AM 12/04/2023

## 2024-01-22 ENCOUNTER — PATIENT MESSAGE (OUTPATIENT)
Dept: ADMINISTRATIVE | Facility: HOSPITAL | Age: 84
End: 2024-01-22
Payer: MEDICARE

## 2024-01-23 ENCOUNTER — OFFICE VISIT (OUTPATIENT)
Dept: INTERNAL MEDICINE | Facility: CLINIC | Age: 84
End: 2024-01-23
Payer: MEDICARE

## 2024-01-23 ENCOUNTER — LAB VISIT (OUTPATIENT)
Dept: LAB | Facility: HOSPITAL | Age: 84
End: 2024-01-23
Payer: MEDICARE

## 2024-01-23 ENCOUNTER — TELEPHONE (OUTPATIENT)
Dept: ORTHOPEDICS | Facility: CLINIC | Age: 84
End: 2024-01-23
Payer: MEDICARE

## 2024-01-23 VITALS
WEIGHT: 181.44 LBS | DIASTOLIC BLOOD PRESSURE: 84 MMHG | BODY MASS INDEX: 34.26 KG/M2 | OXYGEN SATURATION: 92 % | HEIGHT: 61 IN | HEART RATE: 61 BPM | SYSTOLIC BLOOD PRESSURE: 120 MMHG

## 2024-01-23 DIAGNOSIS — D63.8 ANEMIA OF CHRONIC DISEASE: ICD-10-CM

## 2024-01-23 DIAGNOSIS — R05.9 COUGH, UNSPECIFIED TYPE: ICD-10-CM

## 2024-01-23 DIAGNOSIS — Z85.3 HISTORY OF BREAST CANCER: ICD-10-CM

## 2024-01-23 DIAGNOSIS — R06.09 DOE (DYSPNEA ON EXERTION): Primary | ICD-10-CM

## 2024-01-23 DIAGNOSIS — N18.30 STAGE 3 CHRONIC KIDNEY DISEASE, UNSPECIFIED WHETHER STAGE 3A OR 3B CKD: ICD-10-CM

## 2024-01-23 DIAGNOSIS — E78.1 HYPERTRIGLYCERIDEMIA: ICD-10-CM

## 2024-01-23 DIAGNOSIS — R73.03 PRE-DIABETES: ICD-10-CM

## 2024-01-23 DIAGNOSIS — N18.31 STAGE 3A CHRONIC KIDNEY DISEASE: ICD-10-CM

## 2024-01-23 DIAGNOSIS — M46.1 SACROILIITIS, NOT ELSEWHERE CLASSIFIED: ICD-10-CM

## 2024-01-23 DIAGNOSIS — G25.0 ESSENTIAL TREMOR: ICD-10-CM

## 2024-01-23 DIAGNOSIS — R06.09 DOE (DYSPNEA ON EXERTION): ICD-10-CM

## 2024-01-23 DIAGNOSIS — M51.35 DDD (DEGENERATIVE DISC DISEASE), THORACOLUMBAR: ICD-10-CM

## 2024-01-23 DIAGNOSIS — M81.0 AGE-RELATED OSTEOPOROSIS WITHOUT CURRENT PATHOLOGICAL FRACTURE: ICD-10-CM

## 2024-01-23 DIAGNOSIS — R52 PAIN: Primary | ICD-10-CM

## 2024-01-23 DIAGNOSIS — M50.30 DDD (DEGENERATIVE DISC DISEASE), CERVICAL: ICD-10-CM

## 2024-01-23 DIAGNOSIS — E66.09 CLASS 1 OBESITY DUE TO EXCESS CALORIES WITH SERIOUS COMORBIDITY IN ADULT, UNSPECIFIED BMI: ICD-10-CM

## 2024-01-23 DIAGNOSIS — M41.9 SCOLIOSIS, UNSPECIFIED SCOLIOSIS TYPE, UNSPECIFIED SPINAL REGION: ICD-10-CM

## 2024-01-23 DIAGNOSIS — I70.0 AORTIC ATHEROSCLEROSIS: ICD-10-CM

## 2024-01-23 DIAGNOSIS — Z00.00 ENCOUNTER FOR PREVENTIVE HEALTH EXAMINATION: ICD-10-CM

## 2024-01-23 DIAGNOSIS — R22.32 NODULE OF FINGER OF LEFT HAND: ICD-10-CM

## 2024-01-23 LAB
ALBUMIN SERPL BCP-MCNC: 4.1 G/DL (ref 3.5–5.2)
ALP SERPL-CCNC: 40 U/L (ref 55–135)
ALT SERPL W/O P-5'-P-CCNC: 11 U/L (ref 10–44)
ANION GAP SERPL CALC-SCNC: 9 MMOL/L (ref 8–16)
AST SERPL-CCNC: 28 U/L (ref 10–40)
BASOPHILS # BLD AUTO: 0.06 K/UL (ref 0–0.2)
BASOPHILS NFR BLD: 0.9 % (ref 0–1.9)
BILIRUB SERPL-MCNC: 0.6 MG/DL (ref 0.1–1)
BUN SERPL-MCNC: 19 MG/DL (ref 8–23)
CALCIUM SERPL-MCNC: 10 MG/DL (ref 8.7–10.5)
CHLORIDE SERPL-SCNC: 104 MMOL/L (ref 95–110)
CO2 SERPL-SCNC: 25 MMOL/L (ref 23–29)
CREAT SERPL-MCNC: 0.9 MG/DL (ref 0.5–1.4)
DIFFERENTIAL METHOD BLD: ABNORMAL
EOSINOPHIL # BLD AUTO: 0.1 K/UL (ref 0–0.5)
EOSINOPHIL NFR BLD: 1.9 % (ref 0–8)
ERYTHROCYTE [DISTWIDTH] IN BLOOD BY AUTOMATED COUNT: 12.8 % (ref 11.5–14.5)
EST. GFR  (NO RACE VARIABLE): >60 ML/MIN/1.73 M^2
ESTIMATED AVG GLUCOSE: 108 MG/DL (ref 68–131)
GLUCOSE SERPL-MCNC: 91 MG/DL (ref 70–110)
HBA1C MFR BLD: 5.4 % (ref 4–5.6)
HCT VFR BLD AUTO: 38.2 % (ref 37–48.5)
HGB BLD-MCNC: 12.2 G/DL (ref 12–16)
IMM GRANULOCYTES # BLD AUTO: 0.02 K/UL (ref 0–0.04)
IMM GRANULOCYTES NFR BLD AUTO: 0.3 % (ref 0–0.5)
LYMPHOCYTES # BLD AUTO: 2.4 K/UL (ref 1–4.8)
LYMPHOCYTES NFR BLD: 36 % (ref 18–48)
MCH RBC QN AUTO: 31.5 PG (ref 27–31)
MCHC RBC AUTO-ENTMCNC: 31.9 G/DL (ref 32–36)
MCV RBC AUTO: 99 FL (ref 82–98)
MONOCYTES # BLD AUTO: 0.7 K/UL (ref 0.3–1)
MONOCYTES NFR BLD: 9.9 % (ref 4–15)
NEUTROPHILS # BLD AUTO: 3.4 K/UL (ref 1.8–7.7)
NEUTROPHILS NFR BLD: 51 % (ref 38–73)
NRBC BLD-RTO: 0 /100 WBC
PLATELET # BLD AUTO: 329 K/UL (ref 150–450)
PMV BLD AUTO: 9.5 FL (ref 9.2–12.9)
POTASSIUM SERPL-SCNC: 4 MMOL/L (ref 3.5–5.1)
PROT SERPL-MCNC: 7.3 G/DL (ref 6–8.4)
RBC # BLD AUTO: 3.87 M/UL (ref 4–5.4)
SODIUM SERPL-SCNC: 138 MMOL/L (ref 136–145)
TSH SERPL DL<=0.005 MIU/L-ACNC: 0.91 UIU/ML (ref 0.4–4)
WBC # BLD AUTO: 6.67 K/UL (ref 3.9–12.7)

## 2024-01-23 PROCEDURE — 1159F MED LIST DOCD IN RCRD: CPT | Mod: HCNC,CPTII,S$GLB, | Performed by: NURSE PRACTITIONER

## 2024-01-23 PROCEDURE — 1101F PT FALLS ASSESS-DOCD LE1/YR: CPT | Mod: HCNC,CPTII,S$GLB, | Performed by: NURSE PRACTITIONER

## 2024-01-23 PROCEDURE — 3079F DIAST BP 80-89 MM HG: CPT | Mod: HCNC,CPTII,S$GLB, | Performed by: NURSE PRACTITIONER

## 2024-01-23 PROCEDURE — 1126F AMNT PAIN NOTED NONE PRSNT: CPT | Mod: HCNC,CPTII,S$GLB, | Performed by: NURSE PRACTITIONER

## 2024-01-23 PROCEDURE — 3074F SYST BP LT 130 MM HG: CPT | Mod: HCNC,CPTII,S$GLB, | Performed by: NURSE PRACTITIONER

## 2024-01-23 PROCEDURE — 99999 PR PBB SHADOW E&M-EST. PATIENT-LVL V: CPT | Mod: PBBFAC,HCNC,, | Performed by: NURSE PRACTITIONER

## 2024-01-23 PROCEDURE — 99214 OFFICE O/P EST MOD 30 MIN: CPT | Mod: HCNC,S$GLB,, | Performed by: NURSE PRACTITIONER

## 2024-01-23 PROCEDURE — 83036 HEMOGLOBIN GLYCOSYLATED A1C: CPT | Mod: HCNC | Performed by: NURSE PRACTITIONER

## 2024-01-23 PROCEDURE — 3288F FALL RISK ASSESSMENT DOCD: CPT | Mod: HCNC,CPTII,S$GLB, | Performed by: NURSE PRACTITIONER

## 2024-01-23 PROCEDURE — 36415 COLL VENOUS BLD VENIPUNCTURE: CPT | Mod: HCNC | Performed by: NURSE PRACTITIONER

## 2024-01-23 PROCEDURE — 84443 ASSAY THYROID STIM HORMONE: CPT | Mod: HCNC | Performed by: NURSE PRACTITIONER

## 2024-01-23 PROCEDURE — 85025 COMPLETE CBC W/AUTO DIFF WBC: CPT | Mod: HCNC | Performed by: NURSE PRACTITIONER

## 2024-01-23 PROCEDURE — 80053 COMPREHEN METABOLIC PANEL: CPT | Mod: HCNC | Performed by: NURSE PRACTITIONER

## 2024-01-23 RX ORDER — ALBUTEROL SULFATE 90 UG/1
2 AEROSOL, METERED RESPIRATORY (INHALATION) EVERY 6 HOURS PRN
Qty: 18 G | Refills: 0 | Status: SHIPPED | OUTPATIENT
Start: 2024-01-23

## 2024-01-23 NOTE — PROGRESS NOTES
"INTERNAL MEDICINE PROGRESS NOTE    CHIEF COMPLAINT     Chief Complaint   Patient presents with    Annual Exam       HPI     Nicho Espinosa is a 83 y.o. female with PMHx of R sided breast ca s/p R modified radical mastectomy 8/2017and on arimidex, DDD c hx compression fx of spine and spondylolisthesis s/p lumbar fusion 1/2015, preDM, OA B knees, insomnia, essential tremor, osteoporosis c Vit D def, hx tob use but quit in the past, hx DVT 11/2013 remigio-operative prev on coumadin but only for a few weeks, GERD, anemia of chronic disease, elevated triglycerides, OA knees s/p L TKA 2/2019 by Dr. Washburn who presents for a follow up visit today.    She is an established pt of Dr Walker - last seen 2/7/2023    C/o shortness of breath - "breathing heavily" able to walk up one flight of stairs. No orthopnea, no swelling   No chest pain   +wheezing with ACOSTA   No coughing   Reviewed CT Chest and CXR 3/2021 and PFTs 3/2021 - mild restriction   Stress echo 2015    Chronic pain - followed by Dr Maria Esther machado as needed.   Scoliosis - left sided deviation - has pain to the left side of the     Insomnia - taking trazodone     Tremor - taking propanolol     HM-  Dexa- 3/12/2021 - referred to Endo - never followed up   C-scope- 7/27/2023      Past Medical History:  Past Medical History:   Diagnosis Date    Allergy     Arthritis     Blepharitis of both eyes     breast ca 7/18/2017    right    Complication of anesthesia     nausea    DDD (degenerative disc disease), cervical 9/19/2013    Degenerative disc disease     GERD (gastroesophageal reflux disease)     patient denies reflux    History of compression fracture of spine 4/10/2014    Hyperlipidemia     Lumbar disc disease     Meibomitis     Obesity 9/19/2013    Osteoporosis     Papilloma of eyelid     RUL    Postoperative anemia 11/21/2013    Thoracic or lumbosacral neuritis or radiculitis, unspecified 9/20/2013    Tremors of nervous system 2/2015       Home Medications:  Prior to " Admission medications    Medication Sig Start Date End Date Taking? Authorizing Provider   b complex vitamins capsule Take 1 capsule by mouth once daily.   Yes Provider, Historical   calcium-vitamin D3 (OS-DIANDRA 500 + D3) 500 mg-5 mcg (200 unit) per tablet Take 1 tablet by mouth 3 (three) times a week.   Yes Provider, Historical   cholecalciferol, vitamin D3, 1,000 unit capsule Take 3 capsules daily 2/1/17  Yes Arleen Lara MD   fenofibrate micronized (LOFIBRA) 67 MG capsule TAKE 2 CAPSULES EVERY DAY WITH BREAKFAST 12/4/23  Yes Aazlea Walker MD   fluocinonide 0.05% (LIDEX) 0.05 % cream AAA on elbow qhs prn flare. Not more than 2 weeks straight in same location; avoid use on face 2/8/23  Yes Amber Fallon MD   fluticasone propionate (FLONASE) 50 mcg/actuation nasal spray 1 spray by Each Nostril route once daily.   Yes Provider, Historical   HYDROcodone-acetaminophen (NORCO) 5-325 mg per tablet Take 1/2  tablet 3 times per day prn 1/7/20  Yes Provider, Historical   ketoconazole (NIZORAL) 2 % cream aaa bid prn flare 10/31/22  Yes Amber Fallon MD   loratadine (CLARITIN) 10 mg tablet Take 10 mg by mouth once daily.   Yes Provider, Historical   MULTIVIT WITH CALCIUM,IRON,MIN (WOMEN'S DAILY MULTIVITAMIN ORAL) Take by mouth.   Yes Provider, Historical   propranoloL (INDERAL) 10 MG tablet TAKE 1 TABLET THREE TIMES DAILY 10/6/22  Yes Azalea Walker MD   traZODone (DESYREL) 50 MG tablet TAKE 1 TABLET NIGHTLY AS NEEDED FOR INSOMNIA 5/25/23  Yes Azalea Walker MD   triamcinolone acetonide 0.025% (KENALOG) 0.025 % cream aaa qd to face prn flare.  Not more than 1-2 weeks straight in same location 2/8/23  Yes Amber Fallon MD   ondansetron (ZOFRAN) 4 MG tablet Take 1 tablet (4 mg total) by mouth every 8 (eight) hours as needed for Nausea.  Patient not taking: Reported on 1/23/2024 3/1/21   Azalea Walker MD       Review of Systems:  Review of Systems   Constitutional:  Negative for chills, fatigue and fever.   Respiratory:   "Positive for shortness of breath and wheezing. Negative for cough.    Cardiovascular:  Negative for chest pain and palpitations.        Right sided breast pain in the area of the mastectomy - > 5 years ago, no longer seeing Dr Flores    Gastrointestinal:  Negative for abdominal pain, diarrhea, nausea and vomiting.   Musculoskeletal:  Positive for arthralgias and back pain.   Neurological:  Negative for dizziness, light-headedness, numbness and headaches.       Health Maintainence:   Immunizations:  Health Maintenance         Date Due Completion Date    DEXA Scan 03/12/2023 3/12/2021    COVID-19 Vaccine (6 - 2023-24 season) 12/07/2023 10/12/2023    Hemoglobin A1c (Prediabetes) 02/07/2024 2/7/2023    Lipid Panel 02/07/2028 2/7/2023    Colonoscopy 07/27/2028 7/27/2023    TETANUS VACCINE 09/11/2033 9/11/2023             PHYSICAL EXAM     /84 (BP Location: Right arm, Patient Position: Sitting, BP Method: Medium (Manual))   Pulse 61   Ht 5' 1" (1.549 m)   Wt 82.3 kg (181 lb 7 oz)   LMP 07/05/1987   SpO2 (!) 92%   BMI 34.28 kg/m²     Physical Exam  Vitals reviewed.   Constitutional:       Appearance: She is well-developed.   HENT:      Head: Normocephalic.      Right Ear: External ear normal.      Left Ear: External ear normal.      Nose: Nose normal.      Mouth/Throat:      Pharynx: No oropharyngeal exudate.   Eyes:      Pupils: Pupils are equal, round, and reactive to light.   Neck:      Thyroid: No thyromegaly.      Vascular: No JVD.      Trachea: No tracheal deviation.   Cardiovascular:      Rate and Rhythm: Normal rate and regular rhythm.      Heart sounds: Normal heart sounds. No murmur heard.     No friction rub. No gallop.   Pulmonary:      Effort: Pulmonary effort is normal. No respiratory distress.      Breath sounds: Normal breath sounds. No wheezing or rales.   Abdominal:      General: Bowel sounds are normal. There is no distension.      Palpations: Abdomen is soft.      Tenderness: There is no " abdominal tenderness.   Musculoskeletal:         General: No tenderness. Normal range of motion.      Cervical back: Neck supple.      Thoracic back: Scoliosis present.        Back:    Lymphadenopathy:      Cervical: No cervical adenopathy.   Skin:     General: Skin is warm and dry.      Findings: No rash.   Neurological:      Mental Status: She is alert and oriented to person, place, and time.   Psychiatric:         Behavior: Behavior normal.         LABS     Lab Results   Component Value Date    HGBA1C 5.6 02/07/2023     CMP  Sodium   Date Value Ref Range Status   02/07/2023 138 136 - 145 mmol/L Final     Potassium   Date Value Ref Range Status   02/07/2023 4.0 3.5 - 5.1 mmol/L Final     Chloride   Date Value Ref Range Status   02/07/2023 103 95 - 110 mmol/L Final     CO2   Date Value Ref Range Status   02/07/2023 22 (L) 23 - 29 mmol/L Final     Glucose   Date Value Ref Range Status   02/07/2023 98 70 - 110 mg/dL Final     BUN   Date Value Ref Range Status   02/07/2023 19 8 - 23 mg/dL Final     Creatinine   Date Value Ref Range Status   02/07/2023 1.1 0.5 - 1.4 mg/dL Final     Calcium   Date Value Ref Range Status   02/07/2023 9.9 8.7 - 10.5 mg/dL Final     Total Protein   Date Value Ref Range Status   02/07/2023 7.3 6.0 - 8.4 g/dL Final     Albumin   Date Value Ref Range Status   02/07/2023 4.2 3.5 - 5.2 g/dL Final     Total Bilirubin   Date Value Ref Range Status   02/07/2023 0.6 0.1 - 1.0 mg/dL Final     Comment:     For infants and newborns, interpretation of results should be based  on gestational age, weight and in agreement with clinical  observations.    Premature Infant recommended reference ranges:  Up to 24 hours.............<8.0 mg/dL  Up to 48 hours............<12.0 mg/dL  3-5 days..................<15.0 mg/dL  6-29 days.................<15.0 mg/dL       Alkaline Phosphatase   Date Value Ref Range Status   02/07/2023 43 (L) 55 - 135 U/L Final     AST   Date Value Ref Range Status   02/07/2023 20 10 -  40 U/L Final     ALT   Date Value Ref Range Status   02/07/2023 12 10 - 44 U/L Final     Anion Gap   Date Value Ref Range Status   02/07/2023 13 8 - 16 mmol/L Final     eGFR if    Date Value Ref Range Status   02/25/2021 >60.0 >60 mL/min/1.73 m^2 Final     eGFR if non    Date Value Ref Range Status   02/25/2021 53.3 (A) >60 mL/min/1.73 m^2 Final     Comment:     Calculation used to obtain the estimated glomerular filtration  rate (eGFR) is the CKD-EPI equation.        Lab Results   Component Value Date    WBC 5.95 02/07/2023    HGB 12.3 02/07/2023    HCT 37.9 02/07/2023    MCV 98 02/07/2023     02/07/2023     Lab Results   Component Value Date    CHOL 166 02/07/2023    CHOL 170 02/25/2021    CHOL 154 06/18/2020     Lab Results   Component Value Date    HDL 48 02/07/2023    HDL 47 02/25/2021    HDL 51 06/18/2020     Lab Results   Component Value Date    LDLCALC 93.2 02/07/2023    LDLCALC 99.2 02/25/2021    LDLCALC 85.6 06/18/2020     Lab Results   Component Value Date    TRIG 124 02/07/2023    TRIG 119 02/25/2021    TRIG 87 06/18/2020     Lab Results   Component Value Date    CHOLHDL 28.9 02/07/2023    CHOLHDL 27.6 02/25/2021    CHOLHDL 33.1 06/18/2020     Lab Results   Component Value Date    TSH 1.270 06/18/2020       ASSESSMENT/PLAN     Nicho Espinosa is a 83 y.o. female     ACOSTA (dyspnea on exertion)- hinds end for CT of the lungs, refer to pulmonology. May use albuterol as needed for restrictive lung disease (mild). If non contributory will send for stress echo. Pt states understanding   -     Ambulatory referral/consult to Pulmonology; Future; Expected date: 01/30/2024  -     CBC Auto Differential; Future; Expected date: 01/23/2024  -     Comprehensive Metabolic Panel; Future; Expected date: 01/23/2024  -     Hemoglobin A1C; Future; Expected date: 01/23/2024  -     TSH; Future; Expected date: 01/23/2024  -     Stress Echo Which stress agent will be used? Pharmacological;  Color Flow Doppler? No; Future    Cough, unspecified type  -     CT Chest Without Contrast; Future; Expected date: 01/23/2024  -     Ambulatory referral/consult to Pulmonology; Future; Expected date: 01/30/2024  -     CBC Auto Differential; Future; Expected date: 01/23/2024  -     Comprehensive Metabolic Panel; Future; Expected date: 01/23/2024  -     Hemoglobin A1C; Future; Expected date: 01/23/2024  -     TSH; Future; Expected date: 01/23/2024    Scoliosis, unspecified scoliosis type, unspecified spinal region- refer to [facility] for PT and f/u with pain mgmt   -     Cancel: Ambulatory referral/consult to Physical/Occupational Therapy; Future; Expected date: 01/30/2024  -     CBC Auto Differential; Future; Expected date: 01/23/2024  -     Comprehensive Metabolic Panel; Future; Expected date: 01/23/2024  -     Hemoglobin A1C; Future; Expected date: 01/23/2024  -     TSH; Future; Expected date: 01/23/2024  -     Ambulatory referral/consult to Physical/Occupational Therapy; Future; Expected date: 01/30/2024    Nodule of finger of left hand- refer to ortho hand   -     Ambulatory referral/consult to Orthopedics; Future; Expected date: 01/30/2024  -     CBC Auto Differential; Future; Expected date: 01/23/2024  -     Comprehensive Metabolic Panel; Future; Expected date: 01/23/2024  -     Hemoglobin A1C; Future; Expected date: 01/23/2024  -     TSH; Future; Expected date: 01/23/2024    Aortic atherosclerosis  -     CBC Auto Differential; Future; Expected date: 01/23/2024  -     Comprehensive Metabolic Panel; Future; Expected date: 01/23/2024  -     Hemoglobin A1C; Future; Expected date: 01/23/2024  -     TSH; Future; Expected date: 01/23/2024    Stage 3 chronic kidney disease, unspecified whether stage 3a or 3b CKD  -     CBC Auto Differential; Future; Expected date: 01/23/2024  -     Comprehensive Metabolic Panel; Future; Expected date: 01/23/2024  -     Hemoglobin A1C; Future; Expected date: 01/23/2024  -     TSH;  Future; Expected date: 01/23/2024    Hypertriglyceridemia  -     CBC Auto Differential; Future; Expected date: 01/23/2024  -     Comprehensive Metabolic Panel; Future; Expected date: 01/23/2024  -     Hemoglobin A1C; Future; Expected date: 01/23/2024  -     TSH; Future; Expected date: 01/23/2024    History of breast cancer  -     CBC Auto Differential; Future; Expected date: 01/23/2024  -     Comprehensive Metabolic Panel; Future; Expected date: 01/23/2024  -     Hemoglobin A1C; Future; Expected date: 01/23/2024  -     TSH; Future; Expected date: 01/23/2024    Anemia of chronic disease  -     CBC Auto Differential; Future; Expected date: 01/23/2024  -     Comprehensive Metabolic Panel; Future; Expected date: 01/23/2024  -     Hemoglobin A1C; Future; Expected date: 01/23/2024  -     TSH; Future; Expected date: 01/23/2024    Class 1 obesity due to excess calories with serious comorbidity in adult, unspecified BMI  -     CBC Auto Differential; Future; Expected date: 01/23/2024  -     Comprehensive Metabolic Panel; Future; Expected date: 01/23/2024  -     Hemoglobin A1C; Future; Expected date: 01/23/2024  -     TSH; Future; Expected date: 01/23/2024    Essential tremor  -     CBC Auto Differential; Future; Expected date: 01/23/2024  -     Comprehensive Metabolic Panel; Future; Expected date: 01/23/2024  -     Hemoglobin A1C; Future; Expected date: 01/23/2024  -     TSH; Future; Expected date: 01/23/2024    Age-related osteoporosis without current pathological fracture  -     CBC Auto Differential; Future; Expected date: 01/23/2024  -     Comprehensive Metabolic Panel; Future; Expected date: 01/23/2024  -     Hemoglobin A1C; Future; Expected date: 01/23/2024  -     TSH; Future; Expected date: 01/23/2024    DDD (degenerative disc disease), cervical  -     CBC Auto Differential; Future; Expected date: 01/23/2024  -     Comprehensive Metabolic Panel; Future; Expected date: 01/23/2024  -     Hemoglobin A1C; Future; Expected  date: 01/23/2024  -     TSH; Future; Expected date: 01/23/2024    DDD (degenerative disc disease), thoracolumbar  -     CBC Auto Differential; Future; Expected date: 01/23/2024  -     Comprehensive Metabolic Panel; Future; Expected date: 01/23/2024  -     Hemoglobin A1C; Future; Expected date: 01/23/2024  -     TSH; Future; Expected date: 01/23/2024    Pre-diabetes  -     CBC Auto Differential; Future; Expected date: 01/23/2024  -     Comprehensive Metabolic Panel; Future; Expected date: 01/23/2024  -     Hemoglobin A1C; Future; Expected date: 01/23/2024  -     TSH; Future; Expected date: 01/23/2024    Stage 3a chronic kidney disease  -     CBC Auto Differential; Future; Expected date: 01/23/2024  -     Comprehensive Metabolic Panel; Future; Expected date: 01/23/2024  -     Hemoglobin A1C; Future; Expected date: 01/23/2024  -     TSH; Future; Expected date: 01/23/2024    Sacroiliitis, not elsewhere classified  -     Cancel: Ambulatory referral/consult to Physical/Occupational Therapy; Future; Expected date: 01/30/2024  -     CBC Auto Differential; Future; Expected date: 01/23/2024  -     Comprehensive Metabolic Panel; Future; Expected date: 01/23/2024  -     Hemoglobin A1C; Future; Expected date: 01/23/2024  -     TSH; Future; Expected date: 01/23/2024  -     Ambulatory referral/consult to Physical/Occupational Therapy; Future; Expected date: 01/30/2024    Other orders  -     albuterol (VENTOLIN HFA) 90 mcg/actuation inhaler; Inhale 2 puffs into the lungs every 6 (six) hours as needed for Wheezing. Rescue  Dispense: 18 g; Refill: 0           Follow up with PCP    Post Discharge Follow-up Today:   Future Appointments:  Future Appointments   Date Time Provider Department Center   1/31/2024  1:00 PM [facility], DEXA1 [facility]         Patient education provided from [facility]. Patient was counseled on when and how to seek emergent care.       Yani Jolley MN, APRN, FNP-c   Department of Internal Medicine -  [facility]  9:15 AM

## 2024-01-24 ENCOUNTER — OFFICE VISIT (OUTPATIENT)
Dept: ORTHOPEDICS | Facility: CLINIC | Age: 84
End: 2024-01-24
Payer: MEDICARE

## 2024-01-24 ENCOUNTER — DOCUMENTATION ONLY (OUTPATIENT)
Dept: ORTHOPEDICS | Facility: CLINIC | Age: 84
End: 2024-01-24

## 2024-01-24 ENCOUNTER — HOSPITAL ENCOUNTER (OUTPATIENT)
Dept: RADIOLOGY | Facility: OTHER | Age: 84
Discharge: HOME | End: 2024-01-24
Attending: SPECIALIST/TECHNOLOGIST
Payer: MEDICARE

## 2024-01-24 VITALS — HEIGHT: 61 IN | WEIGHT: 181.44 LBS | BODY MASS INDEX: 34.26 KG/M2

## 2024-01-24 DIAGNOSIS — R22.32 NODULE OF FINGER OF LEFT HAND: ICD-10-CM

## 2024-01-24 DIAGNOSIS — R22.31 FINGER MASS, RIGHT: Primary | ICD-10-CM

## 2024-01-24 DIAGNOSIS — R52 PAIN: ICD-10-CM

## 2024-01-24 PROCEDURE — 73130 X-RAY EXAM OF HAND: CPT | Mod: 26,HCNC,LT, | Performed by: RADIOLOGY

## 2024-01-24 PROCEDURE — 1125F AMNT PAIN NOTED PAIN PRSNT: CPT | Mod: HCNC,CPTII,S$GLB, | Performed by: SPECIALIST/TECHNOLOGIST

## 2024-01-24 PROCEDURE — 1101F PT FALLS ASSESS-DOCD LE1/YR: CPT | Mod: HCNC,CPTII,S$GLB, | Performed by: SPECIALIST/TECHNOLOGIST

## 2024-01-24 PROCEDURE — 1159F MED LIST DOCD IN RCRD: CPT | Mod: HCNC,CPTII,S$GLB, | Performed by: SPECIALIST/TECHNOLOGIST

## 2024-01-24 PROCEDURE — 99214 OFFICE O/P EST MOD 30 MIN: CPT | Mod: HCNC,S$GLB,, | Performed by: SPECIALIST/TECHNOLOGIST

## 2024-01-24 PROCEDURE — 99999 PR PBB SHADOW E&M-EST. PATIENT-LVL IV: CPT | Mod: PBBFAC,HCNC,, | Performed by: SPECIALIST/TECHNOLOGIST

## 2024-01-24 PROCEDURE — 3288F FALL RISK ASSESSMENT DOCD: CPT | Mod: HCNC,CPTII,S$GLB, | Performed by: SPECIALIST/TECHNOLOGIST

## 2024-01-24 PROCEDURE — 73130 X-RAY EXAM OF HAND: CPT | Mod: TC,HCNC,FY,LT

## 2024-01-24 NOTE — PROGRESS NOTES
Spoke with pt and informed her that the labs are non fasting and she can get them done at any [facility]      What is the request in detail: pt needs a call back to schedule labs, I would've schedule them but not sure if they are fasting or non fasting, would like the, scheduled at main campus, please advise.

## 2024-01-24 NOTE — PROGRESS NOTES
[facility]  History & Physical  Orthopedics    SUBJECTIVE:     Chief Complaint: left finger nodule    Referring Provider: Susana Self NP    History of Present Illness:  Patient is a 83 y.o. right hand dominant female who presents today with complaints of left middle finger PIP and right index finger DIP nodules. Reports multiple (x5) nodules on left finger which began as 1 nodule. Reports occasional pain 1/10 upon flexion of left middle finger.  Reports tenderness to touch on right index finger D IP. Dislikes appearance. Denies any numbness or weakness.  Positive for hand tremors of left hand.    Onset of symptoms: several months ago.    The patient denies any fevers, chills, N/V, D/C and presents for evaluation.    Review of patient's allergies indicates:   Allergen Reactions    Sulfa (sulfonamide antibiotics) Other (See Comments)     Yeast infection and irritation    Ciprofloxacin      Rash    Latex, natural rubber     Adhesive Hives and Itching       Past Medical History:   Diagnosis Date    Allergy     Arthritis     Blepharitis of both eyes     breast ca 7/18/2017    right    Complication of anesthesia     nausea    DDD (degenerative disc disease), cervical 9/19/2013    Degenerative disc disease     GERD (gastroesophageal reflux disease)     patient denies reflux    History of compression fracture of spine 4/10/2014    Hyperlipidemia     Lumbar disc disease     Meibomitis     Obesity 9/19/2013    Osteoporosis     Papilloma of eyelid     RUL    Postoperative anemia 11/21/2013    Thoracic or lumbosacral neuritis or radiculitis, unspecified 9/20/2013    Tremors of nervous system 2/2015     Past Surgical History:   Procedure Laterality Date    APPENDECTOMY      BREAST BIOPSY Right 2017    BREAST SURGERY      COLONOSCOPY N/A 11/1/2016    Procedure: COLONOSCOPY;  Surgeon: Candelario Oviedo MD;  Location: [address];  Service: Endoscopy;  Laterality: N/A;  may use Prepopik or other low  volume prep    COLONOSCOPY N/A 2023    Procedure: COLONOSCOPY;  Surgeon: Jacobo Fuentes MD;  Location: [facility] (4TH FLR);  Service: Colon and Rectal;  Laterality: N/A;  81 y/o-ok to schedule per Dr Low  referral Dr [id number] portal-GT   r/s -instr portal -ml  pre-call complete, pt confirmed     FOOT SURGERY      JOINT REPLACEMENT Bilateral     ,     MASTECTOMY Right 2017    SHOULDER ARTHROSCOPY      left    SPINE SURGERY  1-12-15    [facility]    TONSILLECTOMY      TOTAL KNEE ARTHROPLASTY       Family History   Problem Relation Age of Onset    Cancer Father         bone    Breast cancer Mother         never had biopsy    Dementia Mother         vascular    No Known Problems Brother     No Known Problems Brother     Stroke Daughter     Diabetes Daughter         was obese    Cancer Paternal Grandmother         skin cancer, unknown type    Cancer Paternal Uncle         stomach    Cancer Other         possibly other cancers in other relatives    Dementia Maternal Grandmother     Hypertension Neg Hx     Ovarian cancer Neg Hx     Melanoma Neg Hx      Social History     Tobacco Use    Smoking status: Former     Current packs/day: 0.00     Average packs/day: 0.5 packs/day for 40.0 years (20.0 ttl pk-yrs)     Types: Cigarettes     Start date: 1971     Quit date: 2011     Years since quittin.0    Smokeless tobacco: Never   Substance Use Topics    Alcohol use: Yes     Comment: one glass of wine every 2 months    Drug use: No        Review of Systems:  Constitutional: Denies fever/chills  Neurological: Denies numbness/tingling (any exceptions noted in orthopaedic exam)   Psychiatric/Behavioral: Denies change in normal mood  Eyes: Denies change in vision  Cardiovascular: Denies chest pain  Respiratory: Denies shortness of breath  Hematologic/Lymphatic: Denies easy bleeding/bruising   Skin: Denies new rash or skin lesions   Gastrointestinal: Denies nausea/vomitting/diarrhea,  change in bowel habits, abdominal pain   Allergic/Immunologic: Denies adverse reactions to current medications  Musculoskeletal: see HPI      OBJECTIVE:     Vital Signs (Most Recent)       Physical Exam:  Gen:  No acute distress  CV:  Peripherally well-perfused.  Pulses 2+ bilaterally.  Lungs:  Normal respiratory effort.  Abdomen:  Soft, non-tender, non-distended  Head/Neck:  Normocephalic.  Atraumatic. No TTP, AROM and PROM intact without pain  Neuro:  CN intact without deficit, SILT throughout B/L Upper & Lower Extremities    Bilateral Hand/Wrist Examination:    Observation/Inspection:  Swelling  none    Deformity  none  Discoloration  none     Scars   none    Atrophy  none    5 nodules noted at PIP joint on the left middle finger.  Stiffness left middle finger noted, but able to make a composite fist.   Nodule noted at the DIP joint of  right index finger     Neurovascular Exam:  Digits WWP, brisk CR < 3s throughout  NVI motor/LTS to M/R/U nerves, radial pulse 2+    ROM wrist full, painless    ROM elbow full, painless    Abdomen not guarded  Respirations nonlabored  Perfusion intact    Diagnostic Results:   XRAY 01/24/2024  FINDINGS:  No acute fracture or dislocation seen.     Advanced osteophyte formation, subchondral cyst formation, and joint space narrowing 1st CMC joint.     Osteophyte formation and joint space narrowing PIP and D IP joints.     No significant soft tissue edema or radiopaque retained foreign body.     Impression:     No acute osseous abnormality seen.  Osteoarthritis with advanced joint space narrowing 1st CMC joint.     Imaging - I independently viewed the patient's imaging as well as the radiology report.  Xrays of the patient's does not  demonstrate no evidence of any acute fractures or dislocations or significant degenerative changes.      ASSESSMENT/PLAN:     Nicho was seen today for pain.    Diagnoses and all orders for this visit:    Finger mass, right  Comments:  index finger    Nodule  of finger of left hand  -     Ambulatory referral/consult to Orthopedics  -     Sedimentation rate; Future  -     C-reactive protein; Future  -     NEELA; Future  -     Rheumatoid factor; Future  -     Cyclic citrul peptide antibody, IgG; Future  -     Sjogrens syndrome-A extractable nuclear antibody; Future  -     Protein / creatinine ratio, urine  -     URIC ACID; Future  -     Ambulatory referral/consult to Rheumatology; Future         83 y.o. yo female with right index finger DIP mass and left middle finger PIP nodules      Plan:  Nicho was seen today for pain.    Diagnoses and all orders for this visit:    Finger mass, right  Comments:  index finger    Nodule of finger of left hand  -     Ambulatory referral/consult to Orthopedics  -     Sedimentation rate; Future  -     C-reactive protein; Future  -     NEELA; Future  -     Rheumatoid factor; Future  -     Cyclic citrul peptide antibody, IgG; Future  -     Sjogrens syndrome-A extractable nuclear antibody; Future  -     Protein / creatinine ratio, urine  -     URIC ACID; Future  -     Ambulatory referral/consult to Rheumatology; Future       Today we had long discussion about surgical intervention for removal of left middle finger nodules. The risk and benefits of surgical excision  were discussed. She would like to proceed with surgery for removal of left middle finger nodules.    Surgical consent were signed in clinic today.    Autoimmune panel was ordered to rule out rheumatoid arthritis or gout.  Referral to rheumatology was placed.

## 2024-01-25 ENCOUNTER — HOSPITAL ENCOUNTER (OUTPATIENT)
Dept: RADIOLOGY | Facility: HOSPITAL | Age: 84
Discharge: HOME | End: 2024-01-25
Attending: NURSE PRACTITIONER
Payer: MEDICARE

## 2024-01-25 DIAGNOSIS — R05.9 COUGH, UNSPECIFIED TYPE: ICD-10-CM

## 2024-01-25 PROCEDURE — 71250 CT THORAX DX C-: CPT | Mod: TC,HCNC

## 2024-01-25 PROCEDURE — 71250 CT THORAX DX C-: CPT | Mod: 26,HCNC,, | Performed by: STUDENT IN AN ORGANIZED HEALTH CARE EDUCATION/TRAINING PROGRAM

## 2024-01-26 ENCOUNTER — TELEPHONE (OUTPATIENT)
Dept: INTERNAL MEDICINE | Facility: CLINIC | Age: 84
End: 2024-01-26
Payer: MEDICARE

## 2024-01-26 DIAGNOSIS — J43.9 PULMONARY EMPHYSEMA, UNSPECIFIED EMPHYSEMA TYPE: Primary | ICD-10-CM

## 2024-01-26 RX ORDER — FLUTICASONE PROPIONATE AND SALMETEROL 100; 50 UG/1; UG/1
1 POWDER RESPIRATORY (INHALATION) 2 TIMES DAILY
Qty: 60 EACH | Refills: 3 | Status: SHIPPED | OUTPATIENT
Start: 2024-01-26 | End: 2025-01-25

## 2024-01-26 NOTE — TELEPHONE ENCOUNTER
LM for pt - CT of the lungs show emphysematous changes. Will start advair and refer to pulmonology

## 2024-01-31 ENCOUNTER — HOSPITAL ENCOUNTER (OUTPATIENT)
Dept: RADIOLOGY | Facility: CLINIC | Age: 84
Discharge: HOME | End: 2024-01-31
Attending: INTERNAL MEDICINE
Payer: MEDICARE

## 2024-01-31 DIAGNOSIS — Z78.0 MENOPAUSE: ICD-10-CM

## 2024-01-31 PROCEDURE — 77080 DXA BONE DENSITY AXIAL: CPT | Mod: 26,HCNC,, | Performed by: INTERNAL MEDICINE

## 2024-01-31 PROCEDURE — 77080 DXA BONE DENSITY AXIAL: CPT | Mod: TC,HCNC

## 2024-02-01 DIAGNOSIS — R22.32 NODULE OF FINGER OF LEFT HAND: Primary | ICD-10-CM

## 2024-02-01 DIAGNOSIS — R52 PAIN: ICD-10-CM

## 2024-02-01 DIAGNOSIS — R22.31 FINGER MASS, RIGHT: ICD-10-CM

## 2024-02-06 ENCOUNTER — TELEPHONE (OUTPATIENT)
Dept: INTERNAL MEDICINE | Facility: CLINIC | Age: 84
End: 2024-02-06

## 2024-02-06 NOTE — TELEPHONE ENCOUNTER
Rainer-Yani Self NP         1/26/24  3:01 PM  Note  LM for pt - CT of the lungs show emphysematous changes. Will start advair and refer to pulmonology

## 2024-02-06 NOTE — TELEPHONE ENCOUNTER
----- Message from Anuj Banks sent at 2/6/2024  9:19 AM CST -----  Contact: Pt [phone]  Patient is returning a phone call.  Who left a message for the patient: Yani Jolley  Does patient know what this is regarding:  No

## 2024-02-15 DIAGNOSIS — M81.0 AGE-RELATED OSTEOPOROSIS WITHOUT CURRENT PATHOLOGICAL FRACTURE: Primary | ICD-10-CM

## 2024-02-21 RX ORDER — TRAMADOL HYDROCHLORIDE 50 MG/1
50 TABLET ORAL EVERY 6 HOURS PRN
Qty: 10 TABLET | Refills: 0 | Status: SHIPPED | OUTPATIENT
Start: 2024-02-21 | End: 2024-02-21 | Stop reason: HOSPADM

## 2024-02-21 RX ORDER — IBUPROFEN 600 MG/1
600 TABLET ORAL 3 TIMES DAILY PRN
Qty: 45 TABLET | Refills: 0 | Status: SHIPPED | OUTPATIENT
Start: 2024-02-21

## 2024-02-21 RX ORDER — ACETAMINOPHEN 500 MG
1000 TABLET ORAL 2 TIMES DAILY PRN
Qty: 50 TABLET | Refills: 0 | Status: SHIPPED | OUTPATIENT
Start: 2024-02-21

## 2024-02-22 DIAGNOSIS — R22.31 FINGER MASS, RIGHT: ICD-10-CM

## 2024-02-22 RX ORDER — MUPIROCIN 20 MG/G
OINTMENT TOPICAL
Status: CANCELLED | OUTPATIENT
Start: 2024-02-22

## 2024-02-22 RX ORDER — SODIUM CHLORIDE 9 MG/ML
INJECTION, SOLUTION INTRAVENOUS CONTINUOUS
Status: CANCELLED | OUTPATIENT
Start: 2024-02-22

## 2024-02-23 ENCOUNTER — PATIENT MESSAGE (OUTPATIENT)
Dept: ORTHOPEDICS | Facility: CLINIC | Age: 84
End: 2024-02-23
Payer: MEDICARE

## 2024-02-23 ENCOUNTER — ANESTHESIA EVENT (OUTPATIENT)
Dept: SURGERY | Facility: HOSPITAL | Age: 84
End: 2024-02-23
Payer: MEDICARE

## 2024-02-26 ENCOUNTER — HOSPITAL ENCOUNTER (OUTPATIENT)
Facility: HOSPITAL | Age: 84
Discharge: HOME | End: 2024-02-26
Attending: ORTHOPAEDIC SURGERY | Admitting: ORTHOPAEDIC SURGERY
Payer: MEDICARE

## 2024-02-26 ENCOUNTER — ANESTHESIA (OUTPATIENT)
Dept: SURGERY | Facility: HOSPITAL | Age: 84
End: 2024-02-26
Payer: MEDICARE

## 2024-02-26 VITALS
BODY MASS INDEX: 34.17 KG/M2 | SYSTOLIC BLOOD PRESSURE: 146 MMHG | OXYGEN SATURATION: 93 % | HEART RATE: 55 BPM | DIASTOLIC BLOOD PRESSURE: 69 MMHG | TEMPERATURE: 97 F | HEIGHT: 61 IN | RESPIRATION RATE: 21 BRPM | WEIGHT: 181 LBS

## 2024-02-26 DIAGNOSIS — R22.31 FINGER MASS, RIGHT: Primary | ICD-10-CM

## 2024-02-26 PROCEDURE — 25000003 PHARM REV CODE 250: Performed by: NURSE ANESTHETIST, CERTIFIED REGISTERED

## 2024-02-26 PROCEDURE — 63600175 PHARM REV CODE 636 W HCPCS: Performed by: PHYSICIAN ASSISTANT

## 2024-02-26 PROCEDURE — 37000008 HC ANESTHESIA 1ST 15 MINUTES: Performed by: ORTHOPAEDIC SURGERY

## 2024-02-26 PROCEDURE — 88304 TISSUE EXAM BY PATHOLOGIST: CPT | Mod: 26,HCNC,, | Performed by: PATHOLOGY

## 2024-02-26 PROCEDURE — 25000003 PHARM REV CODE 250: Performed by: STUDENT IN AN ORGANIZED HEALTH CARE EDUCATION/TRAINING PROGRAM

## 2024-02-26 PROCEDURE — D9220A PRA ANESTHESIA: Mod: ANES,,, | Performed by: ANESTHESIOLOGY

## 2024-02-26 PROCEDURE — D9220A PRA ANESTHESIA: Mod: CRNA,,, | Performed by: NURSE ANESTHETIST, CERTIFIED REGISTERED

## 2024-02-26 PROCEDURE — 36000706: Performed by: ORTHOPAEDIC SURGERY

## 2024-02-26 PROCEDURE — 64415 NJX AA&/STRD BRCH PLXS IMG: CPT | Performed by: ANESTHESIOLOGY

## 2024-02-26 PROCEDURE — 71000033 HC RECOVERY, INTIAL HOUR: Performed by: ORTHOPAEDIC SURGERY

## 2024-02-26 PROCEDURE — 63600175 PHARM REV CODE 636 W HCPCS: Performed by: STUDENT IN AN ORGANIZED HEALTH CARE EDUCATION/TRAINING PROGRAM

## 2024-02-26 PROCEDURE — 88304 TISSUE EXAM BY PATHOLOGIST: CPT | Mod: HCNC | Performed by: PATHOLOGY

## 2024-02-26 PROCEDURE — 25000003 PHARM REV CODE 250: Performed by: PHYSICIAN ASSISTANT

## 2024-02-26 PROCEDURE — 25000003 PHARM REV CODE 250: Performed by: ORTHOPAEDIC SURGERY

## 2024-02-26 PROCEDURE — 63600175 PHARM REV CODE 636 W HCPCS: Performed by: NURSE ANESTHETIST, CERTIFIED REGISTERED

## 2024-02-26 PROCEDURE — 36000707: Performed by: ORTHOPAEDIC SURGERY

## 2024-02-26 PROCEDURE — 27201423 OPTIME MED/SURG SUP & DEVICES STERILE SUPPLY: Performed by: ORTHOPAEDIC SURGERY

## 2024-02-26 PROCEDURE — 94761 N-INVAS EAR/PLS OXIMETRY MLT: CPT

## 2024-02-26 PROCEDURE — 26160 REMOVE TENDON SHEATH LESION: CPT | Mod: F2,HCNC,, | Performed by: ORTHOPAEDIC SURGERY

## 2024-02-26 PROCEDURE — 71000015 HC POSTOP RECOV 1ST HR: Performed by: ORTHOPAEDIC SURGERY

## 2024-02-26 PROCEDURE — 63600175 PHARM REV CODE 636 W HCPCS: Performed by: ANESTHESIOLOGY

## 2024-02-26 PROCEDURE — 99900035 HC TECH TIME PER 15 MIN (STAT)

## 2024-02-26 PROCEDURE — 37000009 HC ANESTHESIA EA ADD 15 MINS: Performed by: ORTHOPAEDIC SURGERY

## 2024-02-26 RX ORDER — FAMOTIDINE 10 MG/ML
INJECTION INTRAVENOUS
Status: DISCONTINUED | OUTPATIENT
Start: 2024-02-26 | End: 2024-02-26

## 2024-02-26 RX ORDER — DEXMEDETOMIDINE HYDROCHLORIDE 100 UG/ML
INJECTION, SOLUTION INTRAVENOUS
Status: DISCONTINUED | OUTPATIENT
Start: 2024-02-26 | End: 2024-02-26

## 2024-02-26 RX ORDER — SODIUM CHLORIDE 9 MG/ML
INJECTION, SOLUTION INTRAVENOUS CONTINUOUS
Status: DISCONTINUED | OUTPATIENT
Start: 2024-02-26 | End: 2024-02-26 | Stop reason: HOSPADM

## 2024-02-26 RX ORDER — BACITRACIN ZINC 500 UNIT/G
OINTMENT (GRAM) TOPICAL
Status: DISCONTINUED | OUTPATIENT
Start: 2024-02-26 | End: 2024-02-26 | Stop reason: HOSPADM

## 2024-02-26 RX ORDER — LIDOCAINE HYDROCHLORIDE 20 MG/ML
INJECTION INTRAVENOUS
Status: DISCONTINUED | OUTPATIENT
Start: 2024-02-26 | End: 2024-02-26

## 2024-02-26 RX ORDER — DOCUSATE SODIUM 50 MG/5ML
50 LIQUID ORAL DAILY
COMMUNITY

## 2024-02-26 RX ORDER — ONDANSETRON HYDROCHLORIDE 2 MG/ML
INJECTION, SOLUTION INTRAVENOUS
Status: DISCONTINUED | OUTPATIENT
Start: 2024-02-26 | End: 2024-02-26

## 2024-02-26 RX ORDER — MUPIROCIN 20 MG/G
OINTMENT TOPICAL
Status: DISCONTINUED | OUTPATIENT
Start: 2024-02-26 | End: 2024-02-26 | Stop reason: HOSPADM

## 2024-02-26 RX ORDER — PROPOFOL 10 MG/ML
INJECTION, EMULSION INTRAVENOUS CONTINUOUS PRN
Status: DISCONTINUED | OUTPATIENT
Start: 2024-02-26 | End: 2024-02-26

## 2024-02-26 RX ORDER — ROPIVACAINE HYDROCHLORIDE 5 MG/ML
INJECTION, SOLUTION EPIDURAL; INFILTRATION; PERINEURAL
Status: COMPLETED | OUTPATIENT
Start: 2024-02-26 | End: 2024-02-26

## 2024-02-26 RX ORDER — SODIUM CHLORIDE 9 MG/ML
INJECTION, SOLUTION INTRAVENOUS CONTINUOUS PRN
Status: DISCONTINUED | OUTPATIENT
Start: 2024-02-26 | End: 2024-02-26

## 2024-02-26 RX ORDER — ONDANSETRON HYDROCHLORIDE 2 MG/ML
4 INJECTION, SOLUTION INTRAVENOUS ONCE AS NEEDED
Status: DISCONTINUED | OUTPATIENT
Start: 2024-02-26 | End: 2024-02-26 | Stop reason: HOSPADM

## 2024-02-26 RX ORDER — DEXAMETHASONE SODIUM PHOSPHATE 4 MG/ML
INJECTION, SOLUTION INTRA-ARTICULAR; INTRALESIONAL; INTRAMUSCULAR; INTRAVENOUS; SOFT TISSUE
Status: DISCONTINUED | OUTPATIENT
Start: 2024-02-26 | End: 2024-02-26

## 2024-02-26 RX ORDER — ACETAMINOPHEN 500 MG
1000 TABLET ORAL
Status: COMPLETED | OUTPATIENT
Start: 2024-02-26 | End: 2024-02-26

## 2024-02-26 RX ORDER — SODIUM CHLORIDE 0.9 % (FLUSH) 0.9 %
3 SYRINGE (ML) INJECTION
Status: DISCONTINUED | OUTPATIENT
Start: 2024-02-26 | End: 2024-02-26 | Stop reason: HOSPADM

## 2024-02-26 RX ORDER — FENTANYL CITRATE 50 UG/ML
100 INJECTION, SOLUTION INTRAMUSCULAR; INTRAVENOUS
Status: DISPENSED | OUTPATIENT
Start: 2024-02-26

## 2024-02-26 RX ORDER — MIDAZOLAM HYDROCHLORIDE 1 MG/ML
1 INJECTION INTRAMUSCULAR; INTRAVENOUS
Status: DISPENSED | OUTPATIENT
Start: 2024-02-26

## 2024-02-26 RX ADMIN — PROPOFOL 75 MCG/KG/MIN: 10 INJECTION, EMULSION INTRAVENOUS at 08:02

## 2024-02-26 RX ADMIN — SODIUM CHLORIDE: 0.9 INJECTION, SOLUTION INTRAVENOUS at 07:02

## 2024-02-26 RX ADMIN — CEFAZOLIN 2 G: 2 INJECTION, POWDER, FOR SOLUTION INTRAMUSCULAR; INTRAVENOUS at 08:02

## 2024-02-26 RX ADMIN — MUPIROCIN: 20 OINTMENT TOPICAL at 07:02

## 2024-02-26 RX ADMIN — DEXMEDETOMIDINE 10 MCG: 100 INJECTION, SOLUTION, CONCENTRATE INTRAVENOUS at 08:02

## 2024-02-26 RX ADMIN — SODIUM CHLORIDE: 9 INJECTION, SOLUTION INTRAVENOUS at 07:02

## 2024-02-26 RX ADMIN — DEXAMETHASONE SODIUM PHOSPHATE 4 MG: 4 INJECTION, SOLUTION INTRAMUSCULAR; INTRAVENOUS at 08:02

## 2024-02-26 RX ADMIN — ROPIVACAINE HYDROCHLORIDE 25 ML: 5 INJECTION EPIDURAL; INFILTRATION; PERINEURAL at 07:02

## 2024-02-26 RX ADMIN — ONDANSETRON 4 MG: 2 INJECTION INTRAMUSCULAR; INTRAVENOUS at 08:02

## 2024-02-26 RX ADMIN — FAMOTIDINE 20 MG: 10 INJECTION, SOLUTION INTRAVENOUS at 08:02

## 2024-02-26 RX ADMIN — LIDOCAINE HYDROCHLORIDE 80 MG: 20 INJECTION INTRAVENOUS at 08:02

## 2024-02-26 RX ADMIN — FENTANYL CITRATE 50 MCG: 50 INJECTION INTRAMUSCULAR; INTRAVENOUS at 07:02

## 2024-02-26 RX ADMIN — ACETAMINOPHEN 1000 MG: 500 TABLET ORAL at 07:02

## 2024-02-26 NOTE — H&P
H&P  Orthopaedics    SUBJECTIVE:     Chief Complaint: left finger nodule     History of Present Illness:  Patient is a 83 y.o. right hand dominant female who presents today with complaints of left middle finger PIP and right index finger DIP nodules. Reports multiple (x5) nodules on left finger which began as 1 nodule. Reports occasional pain 1/10 upon flexion of left middle finger.  Reports tenderness to touch on right index finger D IP. Dislikes appearance. Denies any numbness or weakness.  Positive for hand tremors of left hand.     Onset of symptoms: several months ago.     The patient denies any fevers, chills, N/V, D/C and presents for evaluation.       Current Outpatient Medications   Medication Instructions    acetaminophen (TYLENOL) 1,000 mg, Oral, 2 times daily PRN, Begin post op day 3.    albuterol (VENTOLIN HFA) 90 mcg/actuation inhaler 2 puffs, Inhalation, Every 6 hours PRN, Rescue    b complex vitamins capsule 1 capsule, Oral, Daily    calcium-vitamin D3 (OS-DIANDRA 500 + D3) 500 mg-5 mcg (200 unit) per tablet 1 tablet, Oral, Three times weekly    cholecalciferol, vitamin D3, 1,000 unit capsule Take 3 capsules daily    fenofibrate micronized (LOFIBRA) 67 MG capsule TAKE 2 CAPSULES EVERY DAY WITH BREAKFAST    fluocinonide 0.05% (LIDEX) 0.05 % cream AAA on elbow qhs prn flare. Not more than 2 weeks straight in same location; avoid use on face    fluticasone propionate (FLONASE) 50 mcg/actuation nasal spray 1 spray, Each Nostril, Daily    fluticasone-salmeterol diskus inhaler 100-50 mcg 1 puff, Inhalation, 2 times daily, Controller    HYDROcodone-acetaminophen (NORCO) 5-325 mg per tablet Take 1/2  tablet 3 times per day prn    ibuprofen (ADVIL,MOTRIN) 600 MG tablet Take 1 tablet (600 mg total) by mouth 3 (three) times daily as needed for Pain.     ketoconazole (NIZORAL) 2 % cream aaa bid prn flare    loratadine (CLARITIN) 10 mg, Oral, Daily    MULTIVIT WITH CALCIUM,IRON,MIN (WOMEN'S DAILY MULTIVITAMIN ORAL)  Oral    ondansetron (ZOFRAN) 4 mg, Oral, Every 8 hours PRN    propranoloL (INDERAL) 10 MG tablet TAKE 1 TABLET THREE TIMES DAILY    traZODone (DESYREL) 50 MG tablet TAKE 1 TABLET NIGHTLY AS NEEDED FOR INSOMNIA    triamcinolone acetonide 0.025% (KENALOG) 0.025 % cream aaa qd to face prn flare.  Not more than 1-2 weeks straight in same location         Review of patient's allergies indicates:   Allergen Reactions    Sulfa (sulfonamide antibiotics) Other (See Comments)     Yeast infection and irritation    Ciprofloxacin      Rash    Latex, natural rubber     Adhesive Hives and Itching       Past Medical History:   Diagnosis Date    Allergy     Arthritis     Blepharitis of both eyes     breast ca 7/18/2017    right    Complication of anesthesia     nausea    DDD (degenerative disc disease), cervical 9/19/2013    Degenerative disc disease     GERD (gastroesophageal reflux disease)     patient denies reflux    History of compression fracture of spine 4/10/2014    Hyperlipidemia     Lumbar disc disease     Meibomitis     Obesity 9/19/2013    Osteoporosis     Papilloma of eyelid     RUL    Postoperative anemia 11/21/2013    Thoracic or lumbosacral neuritis or radiculitis, unspecified 9/20/2013    Tremors of nervous system 2/2015     Past Surgical History:   Procedure Laterality Date    APPENDECTOMY      BREAST BIOPSY Right 2017    BREAST SURGERY      COLONOSCOPY N/A 11/1/2016    Procedure: COLONOSCOPY;  Surgeon: Candelario Oviedo MD;  Location: [facility] ([address] [place] [address]);  Service: Endoscopy;  Laterality: N/A;  may use Prepopik or other low volume prep    COLONOSCOPY N/A 7/27/2023    Procedure: COLONOSCOPY;  Surgeon: Jacobo Fuentes MD;  Location: [facility] ([address] [place] [address]);  Service: Colon and Rectal;  Laterality: N/A;  81 y/o-ok to schedule per Dr Low  referral Dr [id number] portal-GT  6/22 r/s -instr portal -ml  pre-call complete, pt confirmed 7/21    FOOT SURGERY      JOINT REPLACEMENT Bilateral     2013, 2019    MASTECTOMY  Right 2017    SHOULDER ARTHROSCOPY      left    SPINE SURGERY  1-12-15    [facility]    TONSILLECTOMY      TOTAL KNEE ARTHROPLASTY       Family History   Problem Relation Age of Onset    Cancer Father         bone    Breast cancer Mother         never had biopsy    Dementia Mother         vascular    No Known Problems Brother     No Known Problems Brother     Stroke Daughter     Diabetes Daughter         was obese    Cancer Paternal Grandmother         skin cancer, unknown type    Cancer Paternal Uncle         stomach    Cancer Other         possibly other cancers in other relatives    Dementia Maternal Grandmother     Hypertension Neg Hx     Ovarian cancer Neg Hx     Melanoma Neg Hx      Social History     Tobacco Use    Smoking status: Former     Current packs/day: 0.00     Average packs/day: 0.5 packs/day for 40.0 years (20.0 ttl pk-yrs)     Types: Cigarettes     Start date: 1971     Quit date: 2011     Years since quittin.1    Smokeless tobacco: Never   Substance Use Topics    Alcohol use: Yes     Comment: one glass of wine every 2 months    Drug use: No        Review of Systems:  Patient denies constitutional symptoms, cardiac symptoms, respiratory symptoms, GI symptoms.  The remainder of the musculoskeletal ROS is included in the HPI.      OBJECTIVE:     Physical Exam:  Gen:  No acute distress  CV:  Peripherally well-perfused.  Pulses 2+ bilaterally.  Lungs:  Normal respiratory effort.  Abdomen:  Soft, non-tender, non-distended  Head/Neck:  Normocephalic.  Atraumatic. No TTP, AROM and PROM intact without pain  Neuro:  CN intact without deficit, SILT throughout B/L Upper & Lower Extremities    Bilateral Hand/Wrist Examination:     Observation/Inspection:  Swelling                       none                  Deformity                     none  Discoloration               none                  Scars                           none                  Atrophy                        none     5 nodules  noted at PIP joint on the left middle finger.  Stiffness left middle finger noted, but able to make a composite fist.   Nodule noted at the DIP joint of  right index finger      Neurovascular Exam:  Digits WWP, brisk CR < 3s throughout  NVI motor/LTS to M/R/U nerves, radial pulse 2+     ROM wrist full, painless    ROM elbow full, painless     Abdomen not guarded  Respirations nonlabored  Perfusion intact       Diagnostic Results:     XRAY 01/24/2024  FINDINGS:  No acute fracture or dislocation seen.     Advanced osteophyte formation, subchondral cyst formation, and joint space narrowing 1st CMC joint.     Osteophyte formation and joint space narrowing PIP and D IP joints.     No significant soft tissue edema or radiopaque retained foreign body.     Impression:     No acute osseous abnormality seen.  Osteoarthritis with advanced joint space narrowing 1st CMC joint.     Imaging - I independently viewed the patient's imaging as well as the radiology report.  Xrays of the patient's does not  demonstrate no evidence of any acute fractures or dislocations or significant degenerative changes.       ASSESSMENT/PLAN:     A/P: Nicho OLIVE Espinosa is a 83 y.o. with bilateral hand nodules presenting for surgical removal of left middle finger nodules       Plan:  - to OR for ganglion cysts excision from left middle finger       Harsha Ferreira MD  Orthopaedic Surgery Resident

## 2024-02-26 NOTE — BRIEF OP NOTE
[facility] Surgery ([facility])  Brief Operative Note    Surgery Date: 2/26/2024     Surgeon(s) and Role:     * Michelle Moon MD - Primary    Assisting Surgeon: None    Pre-op Diagnosis:  Nodule of finger of left hand [R22.32]  Pain [R52]    Post-op Diagnosis:  Post-Op Diagnosis Codes:     * Nodule of finger of left hand [R22.32]     * Pain [R52]    Procedure(s) (LRB):  LEFT LONG FINGER EXCISION, GANGLION CYST, (Left)    Anesthesia: Regional    Operative Findings: removal of ganglion cyst from finger    Estimated Blood Loss: * No values recorded between 2/26/2024  8:10 AM and 2/26/2024  8:30 AM *         Specimens:   Specimen (24h ago, onward)       Start     Ordered    02/26/24 0821  Specimen to Pathology, Surgery Orthopedics  Once        Comments: Pre-op Diagnosis: Nodule of finger of left hand [R22.32]Pain [R52]Procedure(s):LEFT LONG FINGER EXCISION, GANGLION CYST, Number of specimens: 1Name of specimens: 1. Cyst left middle finger     References:    Click here for ordering Quick Tip   Question Answer Comment   Procedure Type: Orthopedics    Which provider would you like to cc? MICHELLE MOON    Release to patient Immediate        02/26/24 0821                      Discharge Note    OUTCOME: Patient tolerated treatment/procedure well without complication and is now ready for discharge.    DISPOSITION: Home or Self Care    FINAL DIAGNOSIS:  Nodule of finger of left hand    FOLLOWUP: In clinic    DISCHARGE INSTRUCTIONS:    Discharge Procedure Orders   Notify your health care provider if you experience any of the following:  temperature >100.4     Notify your health care provider if you experience any of the following:  persistent nausea and vomiting or diarrhea     Notify your health care provider if you experience any of the following:  severe uncontrolled pain     Activity as tolerated

## 2024-02-26 NOTE — OP NOTE
[facility] Surgery ([facility])  Surgery Department  Operative Note    SUMMARY     Date of Procedure: 2/26/2024     Procedure: Procedure(s) (LRB):  LEFT LONG FINGER EXCISION, GANGLION CYST, (Left)   Extensor tendon repair left long finger  Surgeon(s) and Role:     * Michelle Beth MD - Primary    Assisting Surgeon: Edgar HERNÁNDEZ    Pre-Operative Diagnosis: Nodule of finger of left hand [R22.32]  Pain [R52]    Post-Operative Diagnosis: Post-Op Diagnosis Codes:     * Nodule of finger of left hand [R22.32]     * Pain [R52]    Anesthesia: Regional    Technical Procedures Used: surgery    Description of the Findings of the Procedure:  Indication for procedure Julien Espinosa is an 83-year-old female who had an enlarging mass over her left middle finger over the PIP joint she has arthritis in multiple joints after much discussion with the patient elected for surgical intervention risks and benefits were explained to the patient in clinic consents were signed in clinic     Procedure in detail the correct site was marked with the patient's participation the holding area the patient underwent regional anesthesia was brought to the operating placed in supine position underwent MAC anesthesia well-padded nonsterile tourniquet was placed on the left upper extremity left upper extremities prepped draped normal sterile fashion time-out was conducted for the correct procedure to be indicated IV antibiotics given patient preoperatively dorsal curvilinear incision was made over the PIP joint the skin flaps were elevated that there were multiple cysts present the largest measuring 1 cm x 0.5 cm it was removed the these 2 were actually associated with the tendon sheath and these were gently elevated capsule in all and passed off the back table however there was a small 1 associated with the joint itself that was measuring less than 5 cm x 5 cm it was completely removed passed off the back table a rongeur was used to remove the bone spurs  this was shaved off the areas irrigated copious amounts normal saline where the ganglion cyst is protruded through the extensor tendon extensor tendon was repaired with PDS suture nylon closed the skin sterile dressing was applied patient was placed in a well-padded splint tolerated suture was brought to cover room in stable condition     Postop plans patient keep the dressing clean dry intact will say splinted for the 2 weeks therapy to be initiated at 2 week nicole after the sutures are removed    Significant Surgical Tasks Conducted by the Assistant(s), if Applicable: retraction    Complications: No    Estimated Blood Loss (EBL): * No values recorded between 2/26/2024  8:10 AM and 2/26/2024  8:30 AM *           Implants: * No implants in log *    Specimens:   Specimen (24h ago, onward)       Start     Ordered    02/26/24 0821  Specimen to Pathology, Surgery Orthopedics  Once        Comments: Pre-op Diagnosis: Nodule of finger of left hand [R22.32]Pain [R52]Procedure(s):LEFT LONG FINGER EXCISION, GANGLION CYST, Number of specimens: 1Name of specimens: 1. Cyst left middle finger     References:    Click here for ordering Quick Tip   Question Answer Comment   Procedure Type: Orthopedics    Which provider would you like to cc? JOY MOON    Release to patient Immediate        02/26/24 0821                            Condition: Good    Disposition: PACU - hemodynamically stable.    Attestation: I performed the procedure.    Discharge Note    SUMMARY     Admit Date: 2/26/2024    Discharge Date and Time: No discharge date for patient encounter.    Hospital Course (synopsis of major diagnoses, care, treatment, and services provided during the course of the hospital stay): surgery     Final Diagnosis: Post-Op Diagnosis Codes:     * Nodule of finger of left hand [R22.32]     * Pain [R52]    Disposition: Home or Self Care    Follow Up/Patient Instructions:     Medications:  Reconciled Home Medications:       Medication List        START taking these medications      acetaminophen 500 MG tablet  Commonly known as: TYLENOL  Take 2 tablets (1,000 mg total) by mouth 2 (two) times daily as needed for Pain. Begin post op day 3.     ibuprofen 600 MG tablet  Commonly known as: ADVIL,MOTRIN  Take 1 tablet (600 mg total) by mouth 3 (three) times daily as needed for Pain.            CONTINUE taking these medications      albuterol 90 mcg/actuation inhaler  Commonly known as: VENTOLIN HFA  Inhale 2 puffs into the lungs every 6 (six) hours as needed for Wheezing. Rescue     b complex vitamins capsule  Take 1 capsule by mouth once daily.     calcium-vitamin D3 500 mg-5 mcg (200 unit) per tablet  Commonly known as: OS-DIANDRA 500 + D3  Take 1 tablet by mouth 3 (three) times a week.     cholecalciferol (vitamin D3) 25 mcg (1,000 unit) capsule  Commonly known as: VITAMIN D3  Take 3 capsules daily     docusate 50 mg/5 mL liquid  Commonly known as: COLACE  Take 50 mg by mouth once daily.     fenofibrate micronized 67 MG capsule  Commonly known as: LOFIBRA  TAKE 2 CAPSULES EVERY DAY WITH BREAKFAST     fluticasone propionate 50 mcg/actuation nasal spray  Commonly known as: FLONASE  1 spray by Each Nostril route once daily.     fluticasone-salmeterol 100-50 mcg/dose 100-50 mcg/dose diskus inhaler  Commonly known as: ADVAIR  Inhale 1 puff into the lungs 2 (two) times daily. Controller     HYDROcodone-acetaminophen 5-325 mg per tablet  Commonly known as: NORCO  Take 1/2  tablet 3 times per day prn     ketoconazole 2 % cream  Commonly known as: NIZORAL  aaa bid prn flare     loratadine 10 mg tablet  Commonly known as: CLARITIN  Take 10 mg by mouth once daily.     ondansetron 4 MG tablet  Commonly known as: ZOFRAN  Take 1 tablet (4 mg total) by mouth every 8 (eight) hours as needed for Nausea.     polycarbophil 625 mg tablet  Commonly known as: FIBERCON  Take 625 mg by mouth once daily.     propranoloL 10 MG tablet  Commonly known as:  INDERAL  TAKE 1 TABLET THREE TIMES DAILY     traZODone 50 MG tablet  Commonly known as: DESYREL  TAKE 1 TABLET NIGHTLY AS NEEDED FOR INSOMNIA     triamcinolone acetonide 0.025% 0.025 % cream  Commonly known as: KENALOG  aaa qd to face prn flare.  Not more than 1-2 weeks straight in same location     WOMEN'S DAILY MULTIVITAMIN ORAL  Take by mouth.            STOP taking these medications      fluocinonide 0.05% 0.05 % cream  Commonly known as: LIDEX            Discharge Procedure Orders   Ambulatory referral/consult to Physical/Occupational Therapy   Standing Status: Future   Referral Priority: Routine Referral Type: Physical Medicine   Referral Reason: Specialty Services Required   Number of Visits Requested: 1     Notify your health care provider if you experience any of the following:  temperature >100.4     Notify your health care provider if you experience any of the following:  persistent nausea and vomiting or diarrhea     Notify your health care provider if you experience any of the following:  severe uncontrolled pain     Activity as tolerated

## 2024-02-26 NOTE — DISCHARGE INSTRUCTIONS
HAND SURGERY - Care of the Hand after Surgery       Post-Op Care  It is important to follow your orthopaedic surgeon's instructions carefully after you return home. You should ask   someone to check on you that evening. The protocols described here are general in nature. Every person and   every surgery is different so the information given here is for guidance only. If you have questions you should   contact us.     Day of Surgery    You were place in a soft dressing with coban today to protect the surgical area during healing. Do not remove the soft dressing with coban until you are seen by Occupational Therapy or Dr. Dougherty for your first postop visit    The hand and fingers may be numb for quite some time after surgery. This is due to the anesthetic block used at the time of surgery for pain control.    Begin taking liquids and food as soon as you can. You should always eat some solid food, a sandwich or light meal, a little while before taking your pain meds.     Day 3  Things are much the same on the third day after your surgery. Usually you have less pain and feel like doing more.    Showering: It is important to keep the incision absolutely dry while showering or bathing (use two plastic bags over your hand) or a shower bag.   If you feel that the pain medication you were given after surgery is stronger than you really need you can reduce the dose, take it less frequently or switch to ibuprofen or Tylenol. If you received antibiotics they should be taken until the entire prescription is completed.    Day 5  Occupational Therapy will see you between this time. Please let us know if you are not scheduled [phone]    Day 14  We will see you back after your surgery to review with you what was done in surgery and will talk about rehab and answer any questions you may have.       HAND SURGERY  Driving: You can drive if you are comfortable and have regained full finger movements and if you have sufficient power  to control the vehicle.   Return to work: Timing of your return to work is variable according to your occupation and specific surgery. We should discuss this at your follow up visit if not already discussed prior.  Elevation: Hand elevation is important to prevent swelling and stiffness of the fingers. One minute of leaving your hand dangling negates four hours of keeping it elevated. Please remember not to walk with your hand hanging down or to sit with your hand resting in your lap. It is fine, however, to lower your hand for light use and you should get back to normal light activities as soon as possible as guided by common sense.     Post-operative exercises  [x] Bend your fingers  Relax your hand. Start with your fingers straight and close together. Bend the end and middle joints of your fingers. Keep your wrist and knuckles straight. Moving slowly and smoothly, return your hand to the starting position. Repeat with your other hand. If you can, perform multiple repetitions of this exercise on each hand.    [x] Open your hand wide                       Spread your fingers apart as wide as you can and hold that position. Slowly relax your fingers and bring them together. Return to the open-wide position. Repeat with each hand and gradually add to the number of repetitions.    [x] Make a fist  Start with your fingers straight and spread apart. Make a loose, gentle fist and wrap your thumb around the outside of your fingers. Be careful not to squeeze your fingers together too tightly. Moving slowly and smoothly, return to the starting position. Repeat. Perform this exercise on both hands.    [x] Touch your fingertips  Straighten your fingers and thumb. Bend your thumb across your palm, touching the tip of your thumb to the pad of your hand just below your pinky finger. If you can't make your thumb touch, just stretch as far as you can. Return your thumb to its starting position, as shown in images 1 through 3.  For  "the next exercise, form the letter O by touching your thumb to each fingertip, as shown in images 4 through 6. Moving slowly and smoothly, touch your index finger to your thumb, then straighten your fingers. Touch your middle finger to your thumb and straighten. Follow with your ring and pinky fingers.    [x] Walk your fingers  Rest your hand on a flat surface, such as a tabletop, with your palm facing down and your fingers spread slightly apart. Moving one finger at a time, slowly walk your fingers toward your thumb. Start by lifting and moving your index finger toward your thumb. Follow by lifting and moving your middle finger toward your thumb. Proceed with moving your ring finger and then your pinky finger toward your thumb. Don't move your wrist or thumb while doing this exercise. Repeat with your other hand.      HAND SURGERY - Common Concerns and Frequently Asked Questions    When to Call the Doctor  Pain, burning, or numbness of the fingers or the back of the hand not relieved by elevation of the arm  Pale or cold finger; bluish nail beds  Red line or streak going up the arm  Excessive swelling  Fever over 101.3ºF  Pain unrelieved by pain medication    Tips for one armed living  It helps to have...   In the shower   Plastic bags and rubber bands to cover bandages - the bags that newspapers come in are good to cover the hand and wrist. Otherwise small trash can liners will do. Use two at a time.   Bottle sponge (soft sponge on a long stick) - for the armpit of your "good" hand.   Shower brush   A hair brush in the shower will help you to wash your hair.   Cotton tanya cloth bathrobe - to dry your back.    In the bathroom   Toothpaste, shampoo, etc. in flip-top or pump (not screw top) dispensers.   Consider an electric razor.   Flossers (dental floss on a "Y" shaped handle).    In the kitchen   Dycem mat (rubber jar opener mat) - to help open jars, but also keep things from sliding around while you are " "working on them.    Double suction cup pads ("little Octopus") - to hold items while you use or wash them.   Electric can opener with a lid magnet strong enough to hold the can in the air - for one handed use.   In the bedroom   Back scratcher.   Large sleeve shirts and tops.   Put away clothing which buttons, fastens or snaps in the back or which uses drawstrings.    Sports bra or a camisole instead of a bra.   [employer] nylons can be pulled on one handed - most others can't.   A "wash and wear" haircut.      "

## 2024-02-26 NOTE — ANESTHESIA PREPROCEDURE EVALUATION
02/26/2024  Pre-operative evaluation for Procedure(s) (LRB):  LEFT LONG FINGER EXCISION, GANGLION CYST, (Left)    Nicho Espinosa is a 83 y.o. female     Patient Active Problem List   Diagnosis    Essential tremor    Primary osteoarthritis of both knees    DDD (degenerative disc disease), cervical    DDD (degenerative disc disease), thoracolumbar    Mild vitamin D deficiency    Class 1 obesity due to excess calories with serious comorbidity in adult    History of colonic polyps    Hypertriglyceridemia    Insomnia    Anemia of chronic disease    History of compression fracture of spine    Scoliosis    Spondylolisthesis at L5-S1 level    Pre-diabetes    S/P lumbar spinal fusion    GERD (gastroesophageal reflux disease)    Age-related osteoporosis without current pathological fracture    History of deep vein thrombosis (DVT) of lower extremity    Malignant neoplasm of upper-outer quadrant of right breast in female, estrogen receptor positive    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    Prophylactic use of anastrozole (Arimidex)    Status post total left knee replacement 2/5/2019    Aortic atherosclerosis    Stage 3 chronic kidney disease    ACOSTA (dyspnea on exertion)    History of breast cancer    Nodule of finger of left hand    Cough       Review of patient's allergies indicates:   Allergen Reactions    Sulfa (sulfonamide antibiotics) Other (See Comments)     Yeast infection and irritation    Ciprofloxacin      Rash    Latex, natural rubber     Adhesive Hives and Itching       No current facility-administered medications on file prior to encounter.     Current Outpatient Medications on File Prior to Encounter   Medication Sig Dispense Refill    albuterol (VENTOLIN HFA) 90 mcg/actuation inhaler Inhale 2 puffs into the lungs every 6 (six) hours as needed for Wheezing. Rescue 18 g 0    b complex vitamins  capsule Take 1 capsule by mouth once daily.      calcium-vitamin D3 (OS-DIANDRA 500 + D3) 500 mg-5 mcg (200 unit) per tablet Take 1 tablet by mouth 3 (three) times a week.      cholecalciferol, vitamin D3, 1,000 unit capsule Take 3 capsules daily 90 capsule 11    docusate (COLACE) 50 mg/5 mL liquid Take 50 mg by mouth once daily.      fenofibrate micronized (LOFIBRA) 67 MG capsule TAKE 2 CAPSULES EVERY DAY WITH BREAKFAST 180 capsule 3    fluticasone propionate (FLONASE) 50 mcg/actuation nasal spray 1 spray by Each Nostril route once daily.      fluticasone-salmeterol diskus inhaler 100-50 mcg Inhale 1 puff into the lungs 2 (two) times daily. Controller 60 each 3    HYDROcodone-acetaminophen (NORCO) 5-325 mg per tablet Take 1/2  tablet 3 times per day prn      MULTIVIT WITH CALCIUM,IRON,MIN (WOMEN'S DAILY MULTIVITAMIN ORAL) Take by mouth.      polycarbophil (FIBERCON) 625 mg tablet Take 625 mg by mouth once daily.      propranoloL (INDERAL) 10 MG tablet TAKE 1 TABLET THREE TIMES DAILY 270 tablet 3    traZODone (DESYREL) 50 MG tablet TAKE 1 TABLET NIGHTLY AS NEEDED FOR INSOMNIA 90 tablet 2    fluocinonide 0.05% (LIDEX) 0.05 % cream AAA on elbow qhs prn flare. Not more than 2 weeks straight in same location; avoid use on face 45 g 3    ketoconazole (NIZORAL) 2 % cream aaa bid prn flare 60 g 3    loratadine (CLARITIN) 10 mg tablet Take 10 mg by mouth once daily.      ondansetron (ZOFRAN) 4 MG tablet Take 1 tablet (4 mg total) by mouth every 8 (eight) hours as needed for Nausea. 30 tablet 0    triamcinolone acetonide 0.025% (KENALOG) 0.025 % cream aaa qd to face prn flare.  Not more than 1-2 weeks straight in same location 45 g 1       Past Surgical History:   Procedure Laterality Date    APPENDECTOMY      BREAST BIOPSY Right 2017    BREAST SURGERY      COLONOSCOPY N/A 11/1/2016    Procedure: COLONOSCOPY;  Surgeon: Candelario Oviedo MD;  Location: [address]);  Service: Endoscopy;  Laterality: N/A;  may use Prepopik or  other low volume prep    COLONOSCOPY N/A 2023    Procedure: COLONOSCOPY;  Surgeon: Jacobo Fuentes MD;  Location: [facility] (4TH FLR);  Service: Colon and Rectal;  Laterality: N/A;  81 y/o-ok to schedule per Dr Low  referral Dr [id number] portal-GT   r/s -instr portal -ml  pre-call complete, pt confirmed     FOOT SURGERY      JOINT REPLACEMENT Bilateral     ,     MASTECTOMY Right 2017    SHOULDER ARTHROSCOPY      left    SPINE SURGERY  1-12-15    R[facility]    TONSILLECTOMY      TOTAL KNEE ARTHROPLASTY         Social History     Socioeconomic History    Marital status: Single   Tobacco Use    Smoking status: Former     Current packs/day: 0.00     Average packs/day: 0.5 packs/day for 40.0 years (20.0 ttl pk-yrs)     Types: Cigarettes     Start date: 1971     Quit date: 2011     Years since quittin.1    Smokeless tobacco: Never   Substance and Sexual Activity    Alcohol use: Yes     Comment: one glass of wine every 2 months    Drug use: No    Sexual activity: Not Currently     Birth control/protection: Post-menopausal   Social History Narrative    Lives in a duplex, daughter lives downstairs       EKG:  Normal sinus rhythm   Possible Left atrial enlargement   Right bundle branch block   ST and/or T wave abnormalities suggesting myocardial ischemia   Abnormal ECG   When compared with ECG of 2019 12:34,   No significant change was found   Confirmed by Ney Elliott MD ([id number]) on 3/2/2021 11:08:05 AM     2D Echo:  Results for orders placed or performed during the hospital encounter of 17   2D Echo w/ Color Flow Doppler   Result Value Ref Range    EF + QEF 65 55 - 65    Aortic Valve Regurgitation TRIVIAL     Est. PA Systolic Pressure 24.9     Mitral Valve Mobility NORMAL            Pre-op Assessment    I have reviewed the Patient Summary Reports.     I have reviewed the Nursing Notes. I have reviewed the NPO Status.   I have reviewed the Medications.     Review of  Systems  Anesthesia Hx:             Denies Family Hx of Anesthesia complications.    Denies Personal Hx of Anesthesia complications.                    Cardiovascular:  Exercise tolerance: good        Denies Dysrhythmias.   Denies Angina.      ACOSTA                            Pulmonary:    Denies COPD.  Denies Asthma.    Denies Recent URI.                 Renal/:  Chronic Renal Disease, CKD                Hepatic/GI:     GERD             Neurological:    Denies CVA.    Denies Seizures.                                Endocrine:  Denies Diabetes.               Physical Exam  General: Well nourished, Cooperative, Alert and Oriented    Airway:  Mallampati: II / I  Mouth Opening: Normal  TM Distance: Normal  Tongue: Normal  Neck ROM: Normal ROM    Dental:  Intact    Chest/Lungs:  Clear to auscultation, Normal Respiratory Rate    Heart:  Rate: Normal  Rhythm: Regular Rhythm        Anesthesia Plan  Type of Anesthesia, risks & benefits discussed:    Anesthesia Type: Gen Natural Airway, Regional  Intra-op Monitoring Plan: Standard ASA Monitors  Post Op Pain Control Plan: multimodal analgesia and peripheral nerve block  Induction:  IV  Informed Consent: Informed consent signed with the Patient and all parties understand the risks and agree with anesthesia plan.  All questions answered. Patient consented to blood products? Yes  ASA Score: 3  Day of Surgery Review of History & Physical: H&P Update referred to the surgeon/provider.    Ready For Surgery From Anesthesia Perspective.     .

## 2024-02-26 NOTE — PLAN OF CARE
Pre op checklist complete. Pt resting in bed with call light in reach. All questions answered. Pt belongings at bedside and plan to place in locker. Pt daughter brought to bedside. Pt still needs site nicole/anes consent/update.

## 2024-02-26 NOTE — PLAN OF CARE
Patient is AAO and VSS.  Tolerating PO and states pain is tolerable.  Dressing CDI.  Patient states they are ready for d/c.  IV removed.  Catheter tip intact.  Daughter at bedside.  Discharge instructions reviewed and copy given to the patient and daughter.  Questions answered.  Both verbalized understanding.  Medication delivered to bedside.  Patient wheeled to car by Cindy GRANADOS.

## 2024-02-28 ENCOUNTER — DOCUMENTATION ONLY (OUTPATIENT)
Dept: ORTHOPEDICS | Facility: CLINIC | Age: 84
End: 2024-02-28
Payer: MEDICARE

## 2024-02-28 ENCOUNTER — TELEPHONE (OUTPATIENT)
Dept: ORTHOPEDICS | Facility: CLINIC | Age: 84
End: 2024-02-28
Payer: MEDICARE

## 2024-02-28 LAB
FINAL PATHOLOGIC DIAGNOSIS: NORMAL
GROSS: NORMAL
Lab: NORMAL

## 2024-02-28 NOTE — PROGRESS NOTES
Spoke with pt she wanted to why OT was calling her so early  1 day after her surgery(2/26/24)   her 2 week PO is 3/11/24 with Brandon           What is the request in detail: pt would like call back from nurse in regards to going to OPT to early being that she had surgery on 02/26/2024   h pt

## 2024-02-28 NOTE — ANESTHESIA POSTPROCEDURE EVALUATION
Anesthesia Post Evaluation    Patient: Nicho Espinosa    Procedure(s) Performed: Procedure(s) (LRB):  LEFT LONG FINGER EXCISION, GANGLION CYST, (Left)    Final Anesthesia Type: general      Patient location during evaluation: PACU  Patient participation: Yes- Able to Participate  Level of consciousness: awake and alert and oriented  Post-procedure vital signs: reviewed and stable  Pain management: adequate  Airway patency: patent    PONV status at discharge: No PONV  Anesthetic complications: no      Cardiovascular status: blood pressure returned to baseline and hemodynamically stable  Respiratory status: unassisted, room air and spontaneous ventilation  Hydration status: euvolemic  Follow-up not needed.              Vitals Value Taken Time   /69 02/26/24 0905   Temp 36.3 °C (97.3 °F) 02/26/24 0837   Pulse 55 02/26/24 0915   Resp 23 02/26/24 0915   SpO2 92 % 02/26/24 0915   Vitals shown include unvalidated device data.      Event Time   Out of Recovery 09:02:00         Pain/Yamile Score: No data recorded

## 2024-02-28 NOTE — TELEPHONE ENCOUNTER
Attempted to reach out to patient to discuss message but was directed to voicemail.     ----- Message from Deborah Oreilly sent at 2/27/2024 10:24 AM CST -----  Regarding: self  Who called: self        What is the request in detail: pt would like call back from nurse in regards to going to OPT to early being that she had surgery on 02/26/2024       Can the clinic reply by [facility]? No        Would the patient rather a call back or a response via [facility]? Call back        Best call back number:[phone]

## 2024-03-11 ENCOUNTER — OFFICE VISIT (OUTPATIENT)
Dept: ORTHOPEDICS | Facility: CLINIC | Age: 84
End: 2024-03-11
Payer: MEDICARE

## 2024-03-11 VITALS — BODY MASS INDEX: 34.17 KG/M2 | HEIGHT: 61 IN | WEIGHT: 181 LBS

## 2024-03-11 DIAGNOSIS — Z98.890 POST-OPERATIVE STATE: Primary | ICD-10-CM

## 2024-03-11 PROCEDURE — 1101F PT FALLS ASSESS-DOCD LE1/YR: CPT | Mod: HCNC,CPTII,S$GLB, | Performed by: SPECIALIST/TECHNOLOGIST

## 2024-03-11 PROCEDURE — 3288F FALL RISK ASSESSMENT DOCD: CPT | Mod: HCNC,CPTII,S$GLB, | Performed by: SPECIALIST/TECHNOLOGIST

## 2024-03-11 PROCEDURE — 1159F MED LIST DOCD IN RCRD: CPT | Mod: HCNC,CPTII,S$GLB, | Performed by: SPECIALIST/TECHNOLOGIST

## 2024-03-11 PROCEDURE — 1126F AMNT PAIN NOTED NONE PRSNT: CPT | Mod: HCNC,CPTII,S$GLB, | Performed by: SPECIALIST/TECHNOLOGIST

## 2024-03-11 PROCEDURE — 99999 PR PBB SHADOW E&M-EST. PATIENT-LVL III: CPT | Mod: PBBFAC,HCNC,, | Performed by: SPECIALIST/TECHNOLOGIST

## 2024-03-11 PROCEDURE — 99024 POSTOP FOLLOW-UP VISIT: CPT | Mod: HCNC,S$GLB,, | Performed by: SPECIALIST/TECHNOLOGIST

## 2024-03-11 NOTE — PROGRESS NOTES
"Ms. Espinosa is here today for a post-operative visit.  She is 14 days status post L Long Finger Ganglion Cyst Excision by Dr. Beth on 2/26/24. She reports that she is in minimal pain. She denies taking any pain medications. She denies fever, chills, and sweats since the time of the surgery.     Physical exam:    Vitals:    03/11/24 1307   Weight: 82.1 kg (181 lb)   Height: 5' 1" (1.549 m)   PainSc: 0-No pain     Vital signs are stable, patient is afebrile.  Patient is well dressed and well groomed, no acute distress.  Alert and oriented to person, place, and time.  Post op dressing taken down.  Incision is clean, dry and intact.  There is no erythema or exudate.  There is no sign of any infection. She is NVI. Sutures removed without difficulty.  Unable to make a composite fist. Stiffness noted in the long finger at the PIP and DIP.         Component 2 wk ago   Final Pathologic Diagnosis SOFT TISSUE, LEFT MIDDLE FINGER, EXCISION:  - Ganglion cyst          Assessment:  s/p  L Long Finger Ganglion Cyst Excision        Plan:  Nicho was seen today for post-op evaluation.    Diagnoses and all orders for this visit:    Post-operative state  -     Ambulatory referral/consult to Physical/Occupational Therapy; Future      - PO instruction reviewed and provided to patient  - Educated on scar massage  - Educated on ROM of MCP, PIP, and DIP  - Ordered Therapy to Peg OT  - Patient will f/u in 4 weeks  "

## 2024-03-18 ENCOUNTER — CLINICAL SUPPORT (OUTPATIENT)
Dept: REHABILITATION | Facility: HOSPITAL | Age: 84
End: 2024-03-18
Payer: MEDICARE

## 2024-03-18 DIAGNOSIS — R22.31 FINGER MASS, RIGHT: ICD-10-CM

## 2024-03-18 DIAGNOSIS — M25.642 FINGER STIFFNESS, LEFT: Primary | ICD-10-CM

## 2024-03-18 DIAGNOSIS — M79.89 SWELLING OF LEFT MIDDLE FINGER: ICD-10-CM

## 2024-03-18 PROCEDURE — 97530 THERAPEUTIC ACTIVITIES: CPT | Mod: HCNC | Performed by: OCCUPATIONAL THERAPIST

## 2024-03-18 PROCEDURE — 97165 OT EVAL LOW COMPLEX 30 MIN: CPT | Mod: HCNC | Performed by: OCCUPATIONAL THERAPIST

## 2024-03-18 NOTE — PLAN OF CARE
[facility]  Occupational Therapy Initial Evaluation     Name: Nicho Espinosa  Clinic Number: [id number]    Therapy Diagnosis:   Encounter Diagnoses   Name Primary?    Finger mass, right     Finger stiffness, left Yes    Swelling of left middle finger      Physician: Harsha Ferreira MD    Physician Orders: Eval and Treat; L Long Finger Ganglion Cyst Excision  DOS: 2/26/24  12 Visits  Scar massage and ROM  Medical Diagnosis:   Z98.890 (ICD-10-CM) - Post-operative state     Surgical Procedure and Date: L MF ganglion cyst excision, 2/26/24   Evaluation Date: 3/18/2024  Insurance Authorization Period Expiration: 12/31/24  Plan of Care Certification Period: 6/14/24  Date of Return to MD: 4/9/24  Visit # / Visits authorized: 1 / 1  FOTO: 1/3    Precautions:  Standard    Time In: 1:05 pm  Time Out: 1:45 pm  Total Appointment Time (timed & untimed codes): 45 minutes    Subjective     Date of Onset: 2/26/24    History of Current Condition/Mechanism of Injury: Pt underwent surgery for ganglion cyst excision. She presents today with c/o stiffness and swelling    Involved Side: Left  Dominant Side: Right    Mechanism of Injury: gradual onset  Surgical Procedure: ganglion cyst excistion  Imaging: see chart for imaging     Prior Therapy: none    Pain:  Functional Pain Scale Rating 0-10:   3/10 on average  0 /10 at best  3/10 at worst  Location: Left dorsal MF PIP joint  Description: Sharp  Aggravating Factors: bending it  Easing Factors: rest    Occupation:  retired  Working presently: retired  Duties: n/a    Functional Limitations/Social History:    Previous functional status includes: Independent with all ADLs.     Current Functional Status   Home/Living environment: lives alone      Limitation of Functional Status as follows:   ADLs/IADLs:     - Feeding: ind    - Bathing: ind    - Dressing/Grooming: ind    - Home Management: ind    - Driving: ind     Leisure: gardening, reading, civic  volunteering    Patient's Goals for Therapy: to bend finger     Past Medical History/Physical Systems Review:   Nicho Espinosa  has a past medical history of Allergy, Arthritis, Blepharitis of both eyes, breast ca, Complication of anesthesia, DDD (degenerative disc disease), cervical, Degenerative disc disease, GERD (gastroesophageal reflux disease), History of compression fracture of spine, Hyperlipidemia, Lumbar disc disease, Meibomitis, Obesity, Osteoporosis, Papilloma of eyelid, Postoperative anemia, Thoracic or lumbosacral neuritis or radiculitis, unspecified, and Tremors of nervous system.    Nicho Espinosa  has a past surgical history that includes Shoulder arthroscopy; Tonsillectomy; Appendectomy; Total knee arthroplasty; Spine surgery (1-12-15); Foot surgery; Colonoscopy (N/A, 11/1/2016); Breast biopsy (Right, 2017); Mastectomy (Right, 08/2017); Joint replacement (Bilateral); Breast surgery; Colonoscopy (N/A, 7/27/2023); and Excision of ganglion cyst of hand (Left, 2/26/2024).    Nicho has a current medication list which includes the following prescription(s): acetaminophen, albuterol, b complex vitamins, calcium-vitamin d3, cholecalciferol (vitamin d3), docusate, fenofibrate micronized, fluticasone propionate, fluticasone-salmeterol 100-50 mcg/dose, hydrocodone-acetaminophen, ibuprofen, ketoconazole, loratadine, multivit with calcium,iron,min, ondansetron, polycarbophil, propranolol, trazodone, and triamcinolone acetonide 0.025%, and the following Facility-Administered Medications: fentanyl and midazolam.    Review of patient's allergies indicates:   Allergen Reactions    Sulfa (sulfonamide antibiotics) Other (See Comments)     Yeast infection and irritation    Ciprofloxacin      Rash    Latex, natural rubber     Adhesive Hives and Itching        Objective     Observation/Appearance: healed scar over L dorsal MF PIP and P1 with mild edema; arthritic changes in fingers, especially DIP joint    Edema. Measured  in centimeters.   3/18/2024 3/18/2024      Left Right     MF P1 7.4 6.7     MF PIP 7.3 6.5       Hand ROM. Measured in degrees.   3/18/2024 3/18/2024      Left Right            Long:  MP 75 85                PIP 75 90                DIP 35 55               Strength (Dynamometer) and Pinch Strength (Pinch Gauge)  Measured in pounds.   3/18/2024 3/18/2024      Left Right     Rung II 21 27.8       Sensation: no numbness or tingling reported; not formally assessed     CMS Impairment/Limitation/Restriction for FOTO HAND Survey    Therapist reviewed FOTO scores for Nicho Espinosa on 3/18/2024.   FOTO documents entered into "[employer] - see Media section.    Limitation Score: 40%         Treatment     Total Treatment time (time-based codes) separate from Evaluation: 25 minutes    Nicho received the following supervised modalities after being cleared for contradictions for 10 minutes:   -Paraffin to decrease pain and stiffness and increase tissue elasticity      Nicho received the following manual therapy techniques for 5 minutes:   -IASTM    Nicho received therapeutic activities for 10 minutes including:  -TGEs and blocking exercises  -Issued size 8 oval 8 splint to correct DIP deformity    Patient Education and Home Exercises      Education provided:   -role of OT, goals for OT, scheduling/cancellations, insurance limitations with patient.  -Additional Education provided: diagnosis and progression of treatment    Written Home Exercises Provided: yes.  Exercises were reviewed and Nicho was able to demonstrate them prior to the end of the session.    Nicho demonstrated good  understanding of the education provided.     Pt was advised to perform these exercises free of pain, and to stop performing them if pain occurs.    See EMR under Patient Instructions for exercises provided 3/18/2024.    Assessment     Nicho Espinosa is a 83 y.o. female referred to outpatient occupational therapy and presents with a medical diagnosis of L MF  ganglion cyst excision.      Assessment of Occupational Performance   Performance Deficits    Physical:  Joint Mobility  Muscle Power/Strength  Skin Integrity/Scar Formation  Edema   Strength    Cognitive:  No Deficits    Psychosocial:    No Deficits     Following medical record review it is determined that pt will benefit from occupational therapy services in order to maximize pain free and/or functional use of left hand. The following goals were discussed with the patient and patient is in agreement with them as to be addressed in the treatment plan. The patient's rehab potential is Excellent.     Anticipated barriers to occupational therapy: none    Plan of care discussed with patient: Yes  Patient's spiritual, cultural and educational needs considered and patient is agreeable to the plan of care and goals as stated below:     Medical Necessity is demonstrated by the following  Occupational Profile/History  Co-morbidities and personal factors that may impact the plan of care [x] LOW: Brief chart review  [] MODERATE: Expanded chart review   [] HIGH: Extensive chart review    Moderate / High Support Documentation:      Examination  Performance deficits relating to physical, cognitive or psychosocial skills that result in activity limitations and/or participation restrictions  [x] LOW: addressing 1-3 Performance deficits  [] MODERATE: 3-5 Performance deficits  [] HIGH: 5+ Performance deficits (please support below)    Moderate / High Support Documentation:      Treatment Options [x] LOW: Limited options  [] MODERATE: Several options  [] HIGH: Multiple options      Decision Making/ Complexity Score: low       Goals:   The following goals were discussed with the patient and patient is in agreement with them as to be addressed in the treatment plan.   Short term Goals:  1) Initiate HEP  2) Pt will increase AROM of L MF by 5-10 degrees in order to assist with gripping by 4 weeks.  3) Pt will reduce edema by .5-1 cm  in affected finger by 4 weeks.  4) Pt will reduce pain to less than 2/10 by 4 weeks.  5) Pt will increase functional  strength by 5# in order to A in opening containers for med management or home management tasks by 4 weeks.     Long Term Goals:  Goals to be met by discharge:  1) Independent with HEP  2) Pt will increase L MF DEAN by 20 degrees in order to increase functional use for grasp with home management or work related tasks by d/c.   3) Pt will decrease edema to trace or none to increase functional ROM by d/c.   4) Pt will decrease pain to trace or none while completing light home management tasks or work related tasks by d/c.   5) Patient will be able to achieve less than or equal to 30% on the FOTO, demonstrating overall improved functional ability with upper extremity.  (Self-care category)      Plan     Certification Period/Plan of care expiration: 3/18/2024 to 6/14/24.    Outpatient Occupational Therapy 2 times weekly for 8 weeks to include the following interventions: Paraffin, Fluidotherapy, Manual therapy/joint mobilizations, Therapeutic exercises/activities., Strengthening, Edema Control, and Scar Management.    Margaret Boasberg, OT

## 2024-03-18 NOTE — PATIENT INSTRUCTIONS
"[facility], OCCUPATIONAL THERAPY  HOME EXERCISE PROGRAM     Use heat as needed    Complete the following massage for 3 minutes, 3x/day:                                  Using lotion and medium pressure, massage on and around scar in circles, side to side and up and down.    Complete the following exercises for 10 repetitions, 3x/day:       AROM: Isolated IPJ Flexion / Extension ("Hook")   Bend only your middle and end knuckles. Hold 3 seconds.   Straighten your fingers.       AROM: MCP and PIP Flexion / Extension ("Straight Fist")  Bend your bottom and middle knuckles, keeping the tips of your fingers straight.   Try to touch the pads of your fingers on your palm. Hold 3 seconds.   Straighten your fingers.       AROM: Composite Flexion / Extension ("Full Fist")  Bend every joint in your hand into a fist. Hold 3 seconds.   Straighten your fingers.     AROM: PIP (Middle Joint) Flexion / Extension  Pinch bottom knuckle  to prevent bending.   Actively bend middle knuckle until stretch is felt.   Hold 3 seconds. Relax. Straighten finger as far as possible.    AROM: DIP (Tip Joint) Flexion / Extension  Pinch middle knuckle to prevent bending.   Bend end knuckle until stretch is felt. Hold   3 seconds. Relax. Straighten finger as far as possible.     Therapist: Maggie Boasberg, FLAKITA/CHT        "

## 2024-03-26 ENCOUNTER — CLINICAL SUPPORT (OUTPATIENT)
Dept: REHABILITATION | Facility: HOSPITAL | Age: 84
End: 2024-03-26
Payer: MEDICARE

## 2024-03-26 DIAGNOSIS — M79.89 SWELLING OF LEFT MIDDLE FINGER: ICD-10-CM

## 2024-03-26 DIAGNOSIS — M25.642 FINGER STIFFNESS, LEFT: Primary | ICD-10-CM

## 2024-03-26 PROCEDURE — 97530 THERAPEUTIC ACTIVITIES: CPT | Mod: HCNC

## 2024-03-26 PROCEDURE — 97140 MANUAL THERAPY 1/> REGIONS: CPT | Mod: HCNC

## 2024-03-26 NOTE — PROGRESS NOTES
[facility]  Occupational Therapy Treatment Note    Date: 3/26/2024  Name: Nicho Espinosa  Clinic Number: [id number]    Therapy Diagnosis:        Encounter Diagnoses   Name Primary?    Finger mass, right      Finger stiffness, left Yes    Swelling of left middle finger        Physician: Harsha Ferreira MD     Physician Orders: Eval and Treat; L Long Finger Ganglion Cyst Excision  DOS: 2/26/24  12 Visits  Scar massage and ROM  Medical Diagnosis:   Z98.890 (ICD-10-CM) - Post-operative state      Surgical Procedure and Date: L MF ganglion cyst excision, 2/26/24     Evaluation Date: 3/18/2024  Insurance Authorization Period Expiration: 12/31/24  Plan of Care Certification Period: 6/14/24  Date of Return to MD: 4/9/24  Visit # / Visits authorized: 2/12  FOTO: 1/3     Precautions:  Standard     Time In: 945 a  Time Out: 1025 a  Treatment Time (timed & untimed codes): 40 minutes      SUBJECTIVE     Pt reports: I'm using it, the splint doesn't always work on the tip, it just wants to bend, but its doing ok   She was compliant with home exercise program given last session.   Response to previous treatment:more mobility   Functional change: I use it in my gardening     Pain: 2/10  Location: left MF some stiffness and tightness in PIP joint       OBJECTIVE     Objective Measures updated at progress report unless specified.  Edema. Measured in centimeters.    3/18/2024 3/18/2024         Left Right       MF P1 7.4 6.7       MF PIP 7.3 6.5          Hand ROM. Measured in degrees.    3/18/2024 3/18/2024         Left Right                   Long:  MP 75 85                  PIP 75 90                  DIP 35 55                       Strength (Dynamometer) and Pinch Strength (Pinch Gauge)  Measured in pounds.    3/18/2024 3/18/2024         Left Right       Rung II 21 27.8          Sensation: no numbness or tingling reported; not formally assessed           Treatment     Nicho received the treatments listed  below:     Nicho received the following supervised modalities after being cleared for contradictions for 10 minutes:   -Paraffin to decrease pain and stiffness and increase tissue elasticity     Nicho received the following manual therapy techniques for 15 minutes:   - scar massage with mini vibrator to decrease adhesions   -PROM of finger to improve joint mobility   - mepitac to scar adhesion        Nicho received therapeutic activities for 15 minutes including:  -blocking DIP, PIP, isolated PIP flexion, hook, wave, flat and full fist, digit ab/ad and digit extension x 10 reps.   - added sanjay taping for hook, wave, full fist x 10 reps this date.       Patient Education and Home Exercises      Education provided:   - Cont HEP   - Progress towards goals     Written Home Exercises Provided: Patient instructed to cont prior HEP.  Exercises were reviewed and Nicho was able to demonstrate them prior to the end of the session.  Nicho demonstrated good  understanding of the HEP provided. See EMR under Patient Instructions for exercises provided during therapy sessions.       Assessment     Nicho is progressing well towards her goals and there are no updates to goals at this time. Pt prognosis is Good. ROM improving.  Slight limitation if full PIP flexion; scar adhesion healing well.     Pt will continue to benefit from skilled outpatient occupational therapy to address the deficits listed in the problem list on initial evaluation provide pt/family education and to maximize pt's level of independence in the home and community environment.     Pt's spiritual, cultural and educational needs considered and pt agreeable to plan of care and goals.    Anticipated barriers to occupational therapy: none     Goals:   The following goals were discussed with the patient and patient is in agreement with them as to be addressed in the treatment plan.   Short term Goals:  1) Initiate HEP  2) Pt will increase AROM of L MF by 5-10 degrees in  order to assist with gripping by 4 weeks.  3) Pt will reduce edema by .5-1 cm in affected finger by 4 weeks.  4) Pt will reduce pain to less than 2/10 by 4 weeks.  5) Pt will increase functional  strength by 5# in order to A in opening containers for med management or home management tasks by 4 weeks.      Long Term Goals:  Goals to be met by discharge:  1) Independent with HEP  2) Pt will increase L MF DEAN by 20 degrees in order to increase functional use for grasp with home management or work related tasks by d/c.   3) Pt will decrease edema to trace or none to increase functional ROM by d/c.   4) Pt will decrease pain to trace or none while completing light home management tasks or work related tasks by d/c.   5) Patient will be able to achieve less than or equal to 30% on the FOTO, demonstrating overall improved functional ability with upper extremity.  (Self-care category)        Plan      Certification Period/Plan of care expiration: 3/18/2024 to 6/14/24.        Radha Morton, OT  , CHT

## 2024-04-02 ENCOUNTER — TELEPHONE (OUTPATIENT)
Dept: PULMONOLOGY | Facility: CLINIC | Age: 84
End: 2024-04-02
Payer: MEDICARE

## 2024-04-02 DIAGNOSIS — R06.02 SOB (SHORTNESS OF BREATH): Primary | ICD-10-CM

## 2024-04-05 ENCOUNTER — TELEPHONE (OUTPATIENT)
Dept: ORTHOPEDICS | Facility: CLINIC | Age: 84
End: 2024-04-05
Payer: MEDICARE

## 2024-04-09 ENCOUNTER — CLINICAL SUPPORT (OUTPATIENT)
Dept: REHABILITATION | Facility: HOSPITAL | Age: 84
End: 2024-04-09
Payer: MEDICARE

## 2024-04-09 ENCOUNTER — OFFICE VISIT (OUTPATIENT)
Dept: ORTHOPEDICS | Facility: CLINIC | Age: 84
End: 2024-04-09
Payer: MEDICARE

## 2024-04-09 VITALS — WEIGHT: 181 LBS | BODY MASS INDEX: 34.17 KG/M2 | HEIGHT: 61 IN

## 2024-04-09 DIAGNOSIS — M25.642 FINGER STIFFNESS, LEFT: Primary | ICD-10-CM

## 2024-04-09 DIAGNOSIS — Z98.890 POST-OPERATIVE STATE: Primary | ICD-10-CM

## 2024-04-09 DIAGNOSIS — M79.89 SWELLING OF LEFT MIDDLE FINGER: ICD-10-CM

## 2024-04-09 PROCEDURE — 3288F FALL RISK ASSESSMENT DOCD: CPT | Mod: HCNC,CPTII,S$GLB, | Performed by: SPECIALIST/TECHNOLOGIST

## 2024-04-09 PROCEDURE — 97140 MANUAL THERAPY 1/> REGIONS: CPT | Mod: HCNC

## 2024-04-09 PROCEDURE — 1125F AMNT PAIN NOTED PAIN PRSNT: CPT | Mod: HCNC,CPTII,S$GLB, | Performed by: SPECIALIST/TECHNOLOGIST

## 2024-04-09 PROCEDURE — 99999 PR PBB SHADOW E&M-EST. PATIENT-LVL III: CPT | Mod: PBBFAC,HCNC,, | Performed by: SPECIALIST/TECHNOLOGIST

## 2024-04-09 PROCEDURE — 1101F PT FALLS ASSESS-DOCD LE1/YR: CPT | Mod: HCNC,CPTII,S$GLB, | Performed by: SPECIALIST/TECHNOLOGIST

## 2024-04-09 PROCEDURE — 1159F MED LIST DOCD IN RCRD: CPT | Mod: HCNC,CPTII,S$GLB, | Performed by: SPECIALIST/TECHNOLOGIST

## 2024-04-09 PROCEDURE — 97530 THERAPEUTIC ACTIVITIES: CPT | Mod: HCNC

## 2024-04-09 PROCEDURE — 97018 PARAFFIN BATH THERAPY: CPT | Mod: HCNC

## 2024-04-09 PROCEDURE — 99024 POSTOP FOLLOW-UP VISIT: CPT | Mod: HCNC,S$GLB,, | Performed by: SPECIALIST/TECHNOLOGIST

## 2024-04-09 NOTE — PROGRESS NOTES
"4/9/24  Patient reports 6 weeks status post left long finger ganglion cyst excision.  She has been attending therapy and doing home exercise program.  She denies any pain.  She is able to make a full composite fist without issues.    3/11/24  Ms. Espinosa is here today for a post-operative visit.  She is 14 days status post L Long Finger Ganglion Cyst Excision by Dr. Beth on 2/26/24. She reports that she is in minimal pain. She denies taking any pain medications. She denies fever, chills, and sweats since the time of the surgery.     Physical exam:    Vitals:    04/09/24 1304   Weight: 82.1 kg (181 lb)   Height: 5' 1" (1.549 m)   PainSc:   2   PainLoc: Hand     Vital signs are stable, patient is afebrile.  Patient is well dressed and well groomed, no acute distress.  Alert and oriented to person, place, and time.  Incision is clean, dry and intact.  There is no erythema or exudate.  There is no sign of any infection. She is NVI. Able to make a composite fist.  Finger deformity noted of the DIP.  Same as preoperatively.        Component 2 wk ago   Final Pathologic Diagnosis SOFT TISSUE, LEFT MIDDLE FINGER, EXCISION:  - Ganglion cyst          Assessment:  s/p  L Long Finger Ganglion Cyst Excision      Plan:  Nicho was seen today for pain.    Diagnoses and all orders for this visit:    Post-operative state        Patient doing very well status post ganglion cyst excision.  She can follow up in clinic as needed.  Continue doing her home exercise program.  I do not think she warrants any further formal therapy.  "

## 2024-04-09 NOTE — PROGRESS NOTES
[facility]  Occupational Therapy Treatment Note AND Discharge Summary     Date: 4/9/2024  Name: Nicho Espinosa  Clinic Number: [id number]    Therapy Diagnosis:        Encounter Diagnoses   Name Primary?    Finger mass, right      Finger stiffness, left Yes    Swelling of left middle finger        Physician: Harsha Ferreira MD     Physician Orders: Eval and Treat; L Long Finger Ganglion Cyst Excision  DOS: 2/26/24  12 Visits  Scar massage and ROM  Medical Diagnosis:   Z98.890 (ICD-10-CM) - Post-operative state      Surgical Procedure and Date: L MF ganglion cyst excision, 2/26/24     Evaluation Date: 3/18/2024  Insurance Authorization Period Expiration: 12/31/24  Plan of Care Certification Period: 6/14/24  Date of Return to MD: 4/9/24  Visit # / Visits authorized: 3/12  FOTO: 1/3   FOTO: 2/3 4/9/24 69%  Precautions:  Standard     Time In: 145   Time Out: 225  Treatment Time (timed & untimed codes): 40 minutes      SUBJECTIVE     Pt reports: Im not using splint any more, Saw PA today, mostly just stiff, but I'm using it like I did before, so I don't think I need any more visits   She was compliant with home exercise program given last session.   Response to previous treatment:more mobility   Functional change: I use it in my gardening     Pain: 2/10  Location: left MF some stiffness and tightness in PIP joint       OBJECTIVE     Objective Measures updated at progress report unless specified.    Discharge status as follows:   Edema. Measured in centimeters.    3/18/2024 3/18/2024 4/9/24        Left Right Left       MF P1 7.4 6.7 6.6 (-0.8)     MF PIP 7.3 6.5 6.4 (-0.9)        Hand ROM. Measured in degrees.    3/18/2024 3/18/2024 4/9/24        Left Right left                  Long:  MP 75 85  85                PIP 75 90  96                DIP 35 55  58        185   239 (+54)         Strength (Dynamometer) and Pinch Strength (Pinch Gauge)  Measured in pounds.    3/18/2024 3/18/2024 4/9/24         Left Right Left       Rung II 21 27.8  20.6        Sensation: no numbness or tingling reported; not formally assessed           Treatment     Nicho received the treatments listed below:     Nicho received the following supervised modalities after being cleared for contradictions for 10 minutes:   -Paraffin to decrease pain and stiffness and increase tissue elasticity     Nicho received the following manual therapy techniques for 15 minutes:   - scar massage with mini vibrator to decrease adhesions   -PROM of finger to improve joint mobility   - mepitac to scar adhesion        Nicho received therapeutic activities for 15 minutes including:  -re-assessed per above  -blocking DIP, PIP, isolated PIP flexion, hook, wave, flat and full fist, digit ab/ad and digit extension x 10 reps.   -Reviewed and recommended continued HEP  -FOTO      Patient Education and Home Exercises      Education provided:   - Cont HEP   - Progress towards goals     Written Home Exercises Provided: Patient instructed to cont prior HEP.  Exercises were reviewed and Nicho was able to demonstrate them prior to the end of the session.  Nicho demonstrated good  understanding of the HEP provided. See EMR under Patient Instructions for exercises provided during therapy sessions.       Assessment     Nicho is progressing well towards her goals and there are no updates to goals at this time. Pt prognosis is Good. ROM improved.   Scar adhesion healing well. No significant change in  noted. Will discharge at this time with patient in agreement.     Anticipated barriers to occupational therapy: none     Goals:   The following goals were discussed with the patient and patient is in agreement with them as to be addressed in the treatment plan.   Short term Goals:  1) Initiate HEP- met  2) Pt will increase AROM of L MF by 5-10 degrees in order to assist with gripping by 4 weeks.- met  3) Pt will reduce edema by .5-1 cm in affected finger by 4 weeks.- met  4)  Pt will reduce pain to less than 2/10 by 4 weeks.- met  5) Pt will increase functional  strength by 5# in order to A in opening containers for med management or home management tasks by 4 weeks. - not met     Long Term Goals:  Goals to be met by discharge:  1) Independent with HEP- MET  2) Pt will increase L MF DEAN by 20 degrees in order to increase functional use for grasp with home management or work related tasks by d/c. -MET  3) Pt will decrease edema to trace or none to increase functional ROM by d/c. - MET  4) Pt will decrease pain to trace or none while completing light home management tasks or work related tasks by d/c. - MET  5) Patient will be able to achieve less than or equal to 30% on the FOTO, demonstrating overall improved functional ability with upper extremity.  (Self-care category)- NEARLY MET (31%)         Plan      Will discharge at this time with 9 of 10 goals met and 1 goal nearly met. Patient in agreement with discharge at this time.     Radha Morton, OT  , CHT

## 2024-04-11 ENCOUNTER — HOSPITAL ENCOUNTER (OUTPATIENT)
Dept: PULMONOLOGY | Facility: CLINIC | Age: 84
Discharge: HOME | End: 2024-04-11
Payer: MEDICARE

## 2024-04-11 ENCOUNTER — OFFICE VISIT (OUTPATIENT)
Dept: PULMONOLOGY | Facility: CLINIC | Age: 84
End: 2024-04-11
Payer: MEDICARE

## 2024-04-11 VITALS — WEIGHT: 181 LBS | BODY MASS INDEX: 34.17 KG/M2 | HEIGHT: 61 IN

## 2024-04-11 VITALS
SYSTOLIC BLOOD PRESSURE: 178 MMHG | HEART RATE: 63 BPM | BODY MASS INDEX: 34.17 KG/M2 | HEIGHT: 61 IN | WEIGHT: 181 LBS | DIASTOLIC BLOOD PRESSURE: 76 MMHG | OXYGEN SATURATION: 98 %

## 2024-04-11 DIAGNOSIS — R06.02 SOB (SHORTNESS OF BREATH): ICD-10-CM

## 2024-04-11 DIAGNOSIS — R91.8 ABNORMAL CT SCAN, LUNG: ICD-10-CM

## 2024-04-11 DIAGNOSIS — R06.09 DOE (DYSPNEA ON EXERTION): ICD-10-CM

## 2024-04-11 DIAGNOSIS — J30.2 SEASONAL ALLERGIES: ICD-10-CM

## 2024-04-11 DIAGNOSIS — J84.9 INTERSTITIAL LUNG DISEASE: Primary | ICD-10-CM

## 2024-04-11 DIAGNOSIS — M41.9 SCOLIOSIS, UNSPECIFIED SCOLIOSIS TYPE, UNSPECIFIED SPINAL REGION: ICD-10-CM

## 2024-04-11 DIAGNOSIS — R05.9 COUGH, UNSPECIFIED TYPE: ICD-10-CM

## 2024-04-11 PROCEDURE — 94618 PULMONARY STRESS TESTING: CPT | Mod: HCNC,S$GLB,, | Performed by: INTERNAL MEDICINE

## 2024-04-11 PROCEDURE — 94729 DIFFUSING CAPACITY: CPT | Mod: HCNC,S$GLB,, | Performed by: INTERNAL MEDICINE

## 2024-04-11 PROCEDURE — 94010 BREATHING CAPACITY TEST: CPT | Mod: HCNC,59,S$GLB, | Performed by: INTERNAL MEDICINE

## 2024-04-11 PROCEDURE — 1159F MED LIST DOCD IN RCRD: CPT | Mod: HCNC,CPTII,S$GLB, | Performed by: INTERNAL MEDICINE

## 2024-04-11 PROCEDURE — 1101F PT FALLS ASSESS-DOCD LE1/YR: CPT | Mod: HCNC,CPTII,S$GLB, | Performed by: INTERNAL MEDICINE

## 2024-04-11 PROCEDURE — 3078F DIAST BP <80 MM HG: CPT | Mod: HCNC,CPTII,S$GLB, | Performed by: INTERNAL MEDICINE

## 2024-04-11 PROCEDURE — 3077F SYST BP >= 140 MM HG: CPT | Mod: HCNC,CPTII,S$GLB, | Performed by: INTERNAL MEDICINE

## 2024-04-11 PROCEDURE — 99999 PR PBB SHADOW E&M-EST. PATIENT-LVL V: CPT | Mod: PBBFAC,HCNC,, | Performed by: INTERNAL MEDICINE

## 2024-04-11 PROCEDURE — 3288F FALL RISK ASSESSMENT DOCD: CPT | Mod: HCNC,CPTII,S$GLB, | Performed by: INTERNAL MEDICINE

## 2024-04-11 PROCEDURE — 99204 OFFICE O/P NEW MOD 45 MIN: CPT | Mod: HCNC,25,S$GLB, | Performed by: INTERNAL MEDICINE

## 2024-04-11 PROCEDURE — 94727 GAS DIL/WSHOT DETER LNG VOL: CPT | Mod: HCNC,S$GLB,, | Performed by: INTERNAL MEDICINE

## 2024-04-11 PROCEDURE — 1160F RVW MEDS BY RX/DR IN RCRD: CPT | Mod: HCNC,CPTII,S$GLB, | Performed by: INTERNAL MEDICINE

## 2024-04-11 NOTE — ASSESSMENT & PLAN NOTE
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  DLCO is reduced but TLC remains preserved on PFTs.  We will monitor by repeating PFTs every 6 months.  If significant decline occurs we will pursue a more extensive workup for idiopathic pulmonary fibrosis, including repeat CT scan, TTE, and autoimmune workup.

## 2024-04-11 NOTE — PROGRESS NOTES
Subjective:      Patient ID: Nicho Espinosa is a 83 y.o. female.    Chief Complaint: Shortness of Breath and Cough    Nicho Espinosa has an active medical problem list that includes:  Essential tremor, degenerative disc disease, compression fracture of the spine, scoliosis, lumbar spinal fusion, shortness of breath, cough, hypertriglyceridemia, aortic atherosclerosis, stage 3 chronic kidney disease, DVT of lower extremity, anemia of chronic disease, right breast cancer status post mastectomy, prophylactic use of anastrozole, mild vitamin-D deficiency, class 1 obesity due to excess calories, prediabetes, osteoporosis, gastroesophageal reflux disease, osteoarthritis of the knees, insomnia, who presents as a new patient referral for cough.    Tobacco/vape: former, quit in 2011.  Smoked about 50 years at less than 1 PPD.  Occupation: teacher  Exertional capacity: steps (1 flight) are challenging. Walking is limited by back pain. Does water exercise, and walks with walker at the park.  Not short of breath with her water walking. Able to do ADLs without an issue.   Prior lung disease:  pneumonia and bronchitis previously  Sputum production: in the AM, clear sputum  Allergies/sinusitis: yes, not on meds  GERD/Aspiration: occasional, PRN tums  Pets: none  Travel/TB: never  Respiratory regimen:  PRN albuterol, wixela  Prior cancer:  breast cancer in remission  Prior hospitalization/intubation: never  Snoring/apnea: not to her knowledge.     Today she is in her usual state of health.  No acute complaints. No autoimmune symptoms.  Does have osteoarthritis.       Review of Systems   Constitutional:  Negative for chills, weight gain, activity change, appetite change, fatigue and weakness.   HENT:  Positive for postnasal drip and congestion. Negative for rhinorrhea and sinus pressure.    Respiratory:  Positive for sputum production, shortness of breath and dyspnea on extertion. Negative for cough, hemoptysis, chest tightness,  "wheezing, previous hospitialization due to pulmonary problems and use of rescue inhaler.    Cardiovascular:  Negative for chest pain, palpitations and leg swelling.   Gastrointestinal:  Negative for nausea, vomiting, abdominal pain and acid reflux.   Psychiatric/Behavioral:  Negative for confusion and sleep disturbance. The patient is not nervous/anxious.      Objective:     Vitals:    04/11/24 1021   BP: (!) 178/76   Pulse: 63   SpO2: 98%   Weight: 82.1 kg (181 lb)   Height: 5' 1" (1.549 m)       Physical Exam   Constitutional: She is oriented to person, place, and time. She appears well-developed and well-nourished. She is obese.   HENT:   Head: Normocephalic.   Neck: No JVD present.   Cardiovascular: Normal rate, regular rhythm and normal heart sounds. Exam reveals no gallop and no friction rub.   No murmur heard.  Pulmonary/Chest: Normal expansion, effort normal and breath sounds normal. No respiratory distress. She has no wheezes. She has no rhonchi.   Abdominal: Soft. Bowel sounds are normal. She exhibits no distension.   Musculoskeletal:         General: No edema. Normal range of motion.      Cervical back: Normal range of motion and neck supple.      Comments: Scoliosis   Neurological: She is alert and oriented to person, place, and time.   Skin: Skin is warm and dry. She is not diaphoretic.   Psychiatric: She has a normal mood and affect. Her behavior is normal. Judgment and thought content normal.       Personal Diagnostic Review     PFTs 4/11/2024  FEV1/FVC - 80%  FEV1 - 1.51L (90%)  FVC - 1.90L (86%)  TLC - 3.82L (86%)  DLCO - 34%  Bronchodilators: not tested.    6MWT 4/11/2024  98% -> 90% -> 98%     CT Thorax 1/25/2024  1. Unchanged extensive emphysematous changes with lower lobe predominant reticular peripheral opacities suggestive of smoking related fibrotic changes.  Unchanged few subsegmental atelectasis and micronodules.  No new focal opacities or consolidations.  2. Stable ascending aorta " "ectasia  3. Left thyroid nodules partially evaluated.  Recommend dedicated ultrasound.    TTE 11/1/2027    1 - Mild left atrial enlargement.     2 - Normal left ventricular systolic function (EF 60-65%).     3 - No wall motion abnormalities.     4 - Normal right ventricular systolic function .     5 - The estimated PA systolic pressure is 25 mmHg.     6 - Indeterminate LV diastolic function.         4/11/2024    11:17 AM 4/11/2024    10:21 AM 4/9/2024     1:04 PM 3/11/2024     1:07 PM 2/26/2024     9:15 AM 2/26/2024     9:05 AM 2/26/2024     9:00 AM   Pulmonary Function Tests   SpO2  98 %   93 % 94 % 93 %   Ordering Provider MD Garrison         Performing nurse/tech/RT Yenifer RRT         Diagnosis Shortness of Breath         Height 5' 1" (1.549 m) 5' 1" (1.549 m) 5' 1" (1.549 m) 5' 1" (1.549 m)      Weight 82.1 kg (181 lb) 82.1 kg (181 lb) 82.1 kg (181 lb) 82.1 kg (181 lb)      BMI (Calculated) 34.2 34.2 34.2 34.2      Patient Race          6MWT Status completed without stopping         Patient Reported Dyspnea;Other (Comment)         Was O2 used? No         6MW Distance walked (feet) 1077 feet         Distance walked (meters) 328.27 meters         Did patient stop? No         Type of assistive device(s) used? no assistive devices         Oxygen Saturation 98 %         Supplemental Oxygen Room Air         Heart Rate 59 bpm         Blood Pressure 174/80         Vibha Dyspnea Rating  light         Oxygen Saturation 90 %         Supplemental Oxygen Room Air         Heart Rate 91 bpm         Blood Pressure 200/91         Vibha Dyspnea Rating  somewhat heavy         Recovery Time (seconds) 342 seconds         Oxygen Saturation 98 %         Supplemental Oxygen Room Air         Heart Rate 63 bpm         Blood Pressure 178/76         Is procedure ready for interpretation? Yes         Oxygen Qualification? No              Assessment:     1. Interstitial lung disease    2. Cough, unspecified type    3. ACOSTA (dyspnea on " exertion)    4. Scoliosis, unspecified scoliosis type, unspecified spinal region    5. Seasonal allergies    6. Abnormal CT scan, lung         Outpatient Encounter Medications as of 4/11/2024   Medication Sig Dispense Refill    acetaminophen (TYLENOL) 500 MG tablet Take 2 tablets (1,000 mg total) by mouth 2 (two) times daily as needed for Pain. Begin post op day 3. 50 tablet 0    albuterol (VENTOLIN HFA) 90 mcg/actuation inhaler Inhale 2 puffs into the lungs every 6 (six) hours as needed for Wheezing. Rescue 18 g 0    b complex vitamins capsule Take 1 capsule by mouth once daily.      calcium-vitamin D3 (OS-DIANDRA 500 + D3) 500 mg-5 mcg (200 unit) per tablet Take 1 tablet by mouth 3 (three) times a week.      cholecalciferol, vitamin D3, 1,000 unit capsule Take 3 capsules daily 90 capsule 11    docusate (COLACE) 50 mg/5 mL liquid Take 50 mg by mouth once daily.      fenofibrate micronized (LOFIBRA) 67 MG capsule TAKE 2 CAPSULES EVERY DAY WITH BREAKFAST 180 capsule 3    fluticasone propionate (FLONASE) 50 mcg/actuation nasal spray 1 spray by Each Nostril route once daily.      fluticasone-salmeterol diskus inhaler 100-50 mcg Inhale 1 puff into the lungs 2 (two) times daily. Controller 60 each 3    HYDROcodone-acetaminophen (NORCO) 5-325 mg per tablet Take 1/2  tablet 3 times per day prn      ibuprofen (ADVIL,MOTRIN) 600 MG tablet Take 1 tablet (600 mg total) by mouth 3 (three) times daily as needed for Pain.  45 tablet 0    ketoconazole (NIZORAL) 2 % cream aaa bid prn flare 60 g 3    loratadine (CLARITIN) 10 mg tablet Take 10 mg by mouth once daily.      MULTIVIT WITH CALCIUM,IRON,MIN (WOMEN'S DAILY MULTIVITAMIN ORAL) Take by mouth.      ondansetron (ZOFRAN) 4 MG tablet Take 1 tablet (4 mg total) by mouth every 8 (eight) hours as needed for Nausea. 30 tablet 0    polycarbophil (FIBERCON) 625 mg tablet Take 625 mg by mouth once daily.      propranoloL (INDERAL) 10 MG tablet TAKE 1 TABLET THREE TIMES DAILY 270 tablet 3     traZODone (DESYREL) 50 MG tablet TAKE 1 TABLET NIGHTLY AS NEEDED FOR INSOMNIA 90 tablet 2    triamcinolone acetonide 0.025% (KENALOG) 0.025 % cream aaa qd to face prn flare.  Not more than 1-2 weeks straight in same location 45 g 1     Facility-Administered Encounter Medications as of 4/11/2024   Medication Dose Route Frequency Provider Last Rate Last Admin    fentaNYL 50 mcg/mL injection 100 mcg  100 mcg Intravenous PRN Jamie Bello PA-C   50 mcg at 02/26/24 0746    midazolam (VERSED) 1 mg/mL injection 1 mg  1 mg Intravenous PRN Jamie Bello PA-C         Orders Placed This Encounter   Procedures    Complete PFT w/ bronchodilator     Standing Status:   Future     Standing Expiration Date:   4/11/2025     Order Specific Question:   Release to patient     Answer:   Immediate       Plan:     Problem List Items Addressed This Visit          Neuro    Scoliosis       ENT    Seasonal allergies    Current Assessment & Plan     Sinus rinse and flonase            Pulmonary    SOB (shortness of breath)    Current Assessment & Plan     History of smoking, but PFTs are not consistent with COPD.  She does have an isolated reduction in DLCO as well as significant desaturation on her 6 minute walk test.  Most recent CT scan is consistent with idiopathic pulmonary fibrosis.  She denies any symptoms of autoimmune disease outside of her chronic osteoarthritis.    - given lack of obstructive lung disease on PFTs we will attempt to trial it stopping her Wixela.  If she has worsening shortness of breath she should just resume the medication.  - we will monitor PFTs every 6 months and repeat imaging if worsening function is noted   - we will obtain autoimmune screening if her symptoms worsen  - may consider TTE if symptoms worsen to evaluate for pulmonary hypertension.         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  DLCO is reduced but TLC remains  preserved on PFTs.  We will monitor by repeating PFTs every 6 months.  If significant decline occurs we will pursue a more extensive workup for idiopathic pulmonary fibrosis, including repeat CT scan, TTE, and autoimmune workup.          Other Visit Diagnoses       Interstitial lung disease    -  Primary            RTC 6 months with Pft.     Wesly Ji MD  [facility]

## 2024-04-11 NOTE — ASSESSMENT & PLAN NOTE
History of smoking, but PFTs are not consistent with COPD.  She does have an isolated reduction in DLCO as well as significant desaturation on her 6 minute walk test.  Most recent CT scan is consistent with idiopathic pulmonary fibrosis.  She denies any symptoms of autoimmune disease outside of her chronic osteoarthritis.    - given lack of obstructive lung disease on PFTs we will attempt to trial it stopping her Wixela.  If she has worsening shortness of breath she should just resume the medication.  - we will monitor PFTs every 6 months and repeat imaging if worsening function is noted   - we will obtain autoimmune screening if her symptoms worsen  - may consider TTE if symptoms worsen to evaluate for pulmonary hypertension.

## 2024-04-11 NOTE — PROCEDURES
Nicho Espinosa is a 83 y.o.  female patient, who presents for a 6 minute walk test ordered by MD Garrison.  The diagnosis is Shortness of Breath; Emphysema.  The patient's BMI is 34.2 kg/m2.  Predicted distance (lower limit of normal) is 180.7 meters.      Test Results:    The test was completed without stopping.  The total time walked was 360 seconds.  During walking, the patient reported:  Dyspnea, Other (Comment) (back pain). The patient used no assistive devices during testing.     04/11/2024---------Distance: 328.27 meters (1077 feet)     O2 Sat % Supplemental Oxygen Heart Rate Blood Pressure Vibha Scale   Pre-exercise  (Resting) 98 % Room Air 59 bpm 174/80 mmHg 2   During Exercise 90 % Room Air 91 bpm 200/91 mmHg 4   Post-exercise  (Recovery) 98 % Room Air  63 bpm 178/76 mmHg      Recovery Time: 342 seconds    Performing nurse/tech: Yenifer TOMPKINS      PREVIOUS STUDY:   The patient has not had a previous study.      CLINICAL INTERPRETATION:  Six minute walk distance is 328.27 meters (1077 feet) with somewhat heavy dyspnea.  During exercise, there was significant desaturation while breathing room air.  Both blood pressure and heart rate increased significantly with walking.  Bradycardia and Hypertension were present prior to exercise.  The patient reported non-pulmonary symptoms during exercise.  No previous study performed.  Based upon age and body mass index, exercise capacity is normal.

## 2024-04-19 DIAGNOSIS — F51.01 PRIMARY INSOMNIA: ICD-10-CM

## 2024-04-19 RX ORDER — TRAZODONE HYDROCHLORIDE 50 MG/1
TABLET ORAL
Qty: 90 TABLET | Refills: 0 | Status: SHIPPED | OUTPATIENT
Start: 2024-04-19

## 2024-04-19 NOTE — TELEPHONE ENCOUNTER
Care Due:                  Date            Visit Type   Department     Provider  --------------------------------------------------------------------------------                                MYCHART                              FOLLOWUP/OF  [facility] INTERNAL  Last Visit: 02-      FICE VISIT   MEDICINE       Windy Walker  Next Visit: None Scheduled  None         None Found                                                            Last  Test          Frequency    Reason                     Performed    Due Date  --------------------------------------------------------------------------------    Office Visit  15 months..  fenofibrate, traZODone...  02- 05-    Lipid Panel.  12 months..  fenofibrate..............  02- 02-    [employer] Embedded Care Due Messages. Reference number: [id number].   4/19/2024 10:31:29 AM CDT

## 2024-04-19 NOTE — TELEPHONE ENCOUNTER
Provider Staff:  Action required for this patient    Requires appointment   Requires labs      Please see care gap opportunities below in Care Due Message.    Thanks!  [facility]     Appointments      Date Provider   Last Visit   2/7/2023 Azalea Walker MD   Next Visit   Visit date not found Azalea Walker MD     Refill Decision Note   Nicho Espinosa  is requesting a refill authorization.  Brief Assessment and Rationale for Refill:  Approve     Medication Therapy Plan:         Comments:     Note composed:12:35 PM 04/19/2024

## 2024-06-04 ENCOUNTER — TELEPHONE (OUTPATIENT)
Dept: INTERNAL MEDICINE | Facility: CLINIC | Age: 84
End: 2024-06-04
Payer: MEDICARE

## 2024-06-04 DIAGNOSIS — Z12.31 SCREENING MAMMOGRAM, ENCOUNTER FOR: Primary | ICD-10-CM

## 2024-06-04 NOTE — TELEPHONE ENCOUNTER
----- Message from Trey Mckee sent at 6/4/2024 11:48 AM CDT -----  Caller is requesting to schedule their annual mammogram appointment.  Order is not listed in EPIC.  Please enter order and contact patient to schedule.    Name of Caller:MARY KAY SOUSA [[id number]]      Where would they like the mammogram performed? [facility]      Best Call Back Number:.Telephone Information:  Mobile          [phone]          Additional Information:Please advise thank you

## 2024-06-10 ENCOUNTER — PATIENT MESSAGE (OUTPATIENT)
Dept: INTERNAL MEDICINE | Facility: CLINIC | Age: 84
End: 2024-06-10
Payer: MEDICARE

## 2024-07-11 ENCOUNTER — HOSPITAL ENCOUNTER (OUTPATIENT)
Dept: RADIOLOGY | Facility: HOSPITAL | Age: 84
Discharge: HOME | End: 2024-07-11
Attending: INTERNAL MEDICINE
Payer: MEDICARE

## 2024-07-11 VITALS — HEIGHT: 61 IN | BODY MASS INDEX: 32.1 KG/M2 | WEIGHT: 170 LBS

## 2024-07-11 DIAGNOSIS — Z12.31 SCREENING MAMMOGRAM, ENCOUNTER FOR: ICD-10-CM

## 2024-07-11 PROCEDURE — 77067 SCR MAMMO BI INCL CAD: CPT | Mod: 26,52,HCNC, | Performed by: RADIOLOGY

## 2024-07-11 PROCEDURE — 77063 BREAST TOMOSYNTHESIS BI: CPT | Mod: TC,52,HCNC

## 2024-07-11 PROCEDURE — 77067 SCR MAMMO BI INCL CAD: CPT | Mod: TC,52,HCNC

## 2024-07-11 PROCEDURE — 77063 BREAST TOMOSYNTHESIS BI: CPT | Mod: 26,52,HCNC, | Performed by: RADIOLOGY

## 2024-09-03 ENCOUNTER — PATIENT MESSAGE (OUTPATIENT)
Dept: INTERNAL MEDICINE | Facility: CLINIC | Age: 84
End: 2024-09-03
Payer: MEDICARE

## 2024-10-03 ENCOUNTER — TELEPHONE (OUTPATIENT)
Dept: ORTHOPEDICS | Facility: CLINIC | Age: 84
End: 2024-10-03
Payer: MEDICARE

## 2024-10-03 DIAGNOSIS — E78.1 HYPERTRIGLYCERIDEMIA: ICD-10-CM

## 2024-10-03 RX ORDER — FENOFIBRATE 67 MG/1
CAPSULE ORAL
Qty: 180 CAPSULE | Refills: 3 | Status: SHIPPED | OUTPATIENT
Start: 2024-10-03

## 2024-10-03 NOTE — TELEPHONE ENCOUNTER
Care Due:                  Date            Visit Type   Department     Provider  --------------------------------------------------------------------------------                                MYCHART                              FOLLOWUP/OF  [facility] INTERNAL  Last Visit: 02-      FICE VISIT   MEDICINE       Windy Walker  Next Visit: None Scheduled  None         None Found                                                            Last  Test          Frequency    Reason                     Performed    Due Date  --------------------------------------------------------------------------------    Office Visit  15 months..  fenofibrate, traZODone...  02- 05-    Lipid Panel.  12 months..  fenofibrate..............  02- 02-    [employer] Embedded Care Due Messages. Reference number: [id number].   10/03/2024 10:05:39 AM CDT

## 2024-10-10 ENCOUNTER — IMMUNIZATION (OUTPATIENT)
Dept: INTERNAL MEDICINE | Facility: CLINIC | Age: 84
End: 2024-10-10
Payer: MEDICARE

## 2024-10-10 ENCOUNTER — OFFICE VISIT (OUTPATIENT)
Dept: INTERNAL MEDICINE | Facility: CLINIC | Age: 84
End: 2024-10-10
Payer: MEDICARE

## 2024-10-10 VITALS
HEIGHT: 61 IN | BODY MASS INDEX: 33.63 KG/M2 | WEIGHT: 178.13 LBS | RESPIRATION RATE: 18 BRPM | OXYGEN SATURATION: 94 % | DIASTOLIC BLOOD PRESSURE: 76 MMHG | TEMPERATURE: 98 F | SYSTOLIC BLOOD PRESSURE: 132 MMHG | HEART RATE: 70 BPM

## 2024-10-10 DIAGNOSIS — G25.0 ESSENTIAL TREMOR: ICD-10-CM

## 2024-10-10 DIAGNOSIS — M51.35 DDD (DEGENERATIVE DISC DISEASE), THORACOLUMBAR: ICD-10-CM

## 2024-10-10 DIAGNOSIS — E78.1 HYPERTRIGLYCERIDEMIA: ICD-10-CM

## 2024-10-10 DIAGNOSIS — Z00.00 ENCOUNTER FOR PREVENTIVE HEALTH EXAMINATION: Primary | ICD-10-CM

## 2024-10-10 DIAGNOSIS — Z23 NEED FOR VACCINATION: Primary | ICD-10-CM

## 2024-10-10 DIAGNOSIS — R73.03 PRE-DIABETES: ICD-10-CM

## 2024-10-10 DIAGNOSIS — Z86.718 HISTORY OF DEEP VEIN THROMBOSIS (DVT) OF LOWER EXTREMITY: ICD-10-CM

## 2024-10-10 DIAGNOSIS — Z86.0100 HISTORY OF COLONIC POLYPS: ICD-10-CM

## 2024-10-10 DIAGNOSIS — Z87.81 HISTORY OF COMPRESSION FRACTURE OF SPINE: ICD-10-CM

## 2024-10-10 DIAGNOSIS — Z98.1 S/P LUMBAR SPINAL FUSION: ICD-10-CM

## 2024-10-10 DIAGNOSIS — I70.0 AORTIC ATHEROSCLEROSIS: ICD-10-CM

## 2024-10-10 DIAGNOSIS — Z85.3 HISTORY OF BREAST CANCER: ICD-10-CM

## 2024-10-10 DIAGNOSIS — Z74.09 OTHER REDUCED MOBILITY: ICD-10-CM

## 2024-10-10 DIAGNOSIS — N18.31 STAGE 3A CHRONIC KIDNEY DISEASE: ICD-10-CM

## 2024-10-10 DIAGNOSIS — D63.8 ANEMIA OF CHRONIC DISEASE: ICD-10-CM

## 2024-10-10 DIAGNOSIS — E55.9 MILD VITAMIN D DEFICIENCY: ICD-10-CM

## 2024-10-10 DIAGNOSIS — K21.9 GASTROESOPHAGEAL REFLUX DISEASE, UNSPECIFIED WHETHER ESOPHAGITIS PRESENT: ICD-10-CM

## 2024-10-10 DIAGNOSIS — M50.30 DDD (DEGENERATIVE DISC DISEASE), CERVICAL: ICD-10-CM

## 2024-10-10 DIAGNOSIS — M81.0 AGE-RELATED OSTEOPOROSIS WITHOUT CURRENT PATHOLOGICAL FRACTURE: ICD-10-CM

## 2024-10-10 PROCEDURE — 99999 PR PBB SHADOW E&M-EST. PATIENT-LVL V: CPT | Mod: PBBFAC,HCNC,, | Performed by: PHYSICIAN ASSISTANT

## 2024-10-10 PROCEDURE — 90480 ADMN SARSCOV2 VAC 1/ONLY CMP: CPT | Mod: HCNC,S$GLB,, | Performed by: INTERNAL MEDICINE

## 2024-10-10 PROCEDURE — 91320 SARSCV2 VAC 30MCG TRS-SUC IM: CPT | Mod: HCNC,S$GLB,, | Performed by: INTERNAL MEDICINE

## 2024-10-10 NOTE — PROGRESS NOTES
"  Nicho Espinosa presented for a  Medicare AWV and comprehensive Health Risk Assessment today. The following components were reviewed and updated:    Medical history  Family History  Social history  Allergies and Current Medications  Health Risk Assessment  Health Maintenance  Care Team         ** See Completed Assessments for Annual Wellness Visit within the encounter summary.**         The following assessments were completed:  Living Situation  CAGE  Depression Screening  Timed Get Up and Go  Whisper Test  Cognitive Function Screening  Nutrition Screening  ADL Screening  PAQ Screening      Opioid documentation:      Patient does have a current opioid prescription.      Patient accepted further discussion regarding opioid medication use.      Patient is currently taking hydrocodone narcotic for back and knee pain.        Pain level today is 5/10.       In addition to narcotic pain medications, patient is also using physical therapy, NSAIDs, and acetaminophen for pain control.       Patient is followed by a specialist currently for their pain and will not be referred today.       Patient's opioid risk potential based on ORT-OUD tool:       [place] each box that applies   No   Yes     Family history of substance abuse   Alcohol [] [x]   Illegal drugs [] [x]   Rx drugs [] [x]     Personal history of substance abuse   Alcohol [] [x]   Illegal drugs [] [x]   Rx drugs [] [x]     Age between 16-45 years   []   [x]     Patient with ADD, OCD, Bipolar disorder, schizoprenia   []   [x]     Patient with depression   []   [x]                         Scoring total                                                                 Non-opioid treatment options have been discussed today and added to the patient's after visit summary.        Vitals:    10/10/24 1031   BP: 132/76   Pulse: 70   Resp: 18   Temp: 98 °F (36.7 °C)   TempSrc: Oral   SpO2: (!) 94%   Weight: 80.8 kg (178 lb 2.1 oz)   Height: 5' 1" (1.549 m)     Body mass index is " 33.66 kg/m².  Physical Exam  Vitals and nursing note reviewed.   Constitutional:       Appearance: Normal appearance. She is well-developed.   HENT:      Head: Normocephalic.      Right Ear: External ear normal.      Left Ear: External ear normal.   Eyes:      Pupils: Pupils are equal, round, and reactive to light.   Cardiovascular:      Rate and Rhythm: Normal rate and regular rhythm.      Heart sounds: Normal heart sounds. No murmur heard.     No friction rub. No gallop.   Pulmonary:      Effort: Pulmonary effort is normal. No respiratory distress.      Breath sounds: Normal breath sounds.   Abdominal:      Palpations: Abdomen is soft.      Tenderness: There is no abdominal tenderness.   Skin:     General: Skin is warm and dry.   Neurological:      Mental Status: She is alert.               Diagnoses and health risks identified today and associated recommendations/orders:    1. Encounter for preventive health examination  Assessments completed.  Preventative health recommendations reviewed.     2. Other reduced mobility  Due to back and knee pain.    3. Essential tremor  Chronic, stable.    4. DDD (degenerative disc disease), cervical  Chronic ongoing back and neck pain.  Will be seeing a pain management specialist soon.  Taking norco, tylenol, and advil.      5. DDD (degenerative disc disease), thoracolumbar  Chronic ongoing back and neck pain.  Will be seeing a pain management specialist soon.  Taking norco, tylenol, and advil.      6. History of compression fracture of spine  Chronic ongoing back and neck pain.  Will be seeing a pain management specialist soon.  Taking norco, tylenol, and advil.      7. S/P lumbar spinal fusion  Chronic ongoing back and neck pain.  Will be seeing a pain management specialist soon.  Taking norco, tylenol, and advil.      8. Hypertriglyceridemia  TG controlled in recent labs, monitor.    Lab Results   Component Value Date    CHOL 166 02/07/2023    TRIG 124 02/07/2023    HDL 48  02/07/2023    LDLCALC 93.2 02/07/2023     9. Aortic atherosclerosis  Seen on imaging, monitor.    10. Stage 3a chronic kidney disease  Creatinine stable, continue to monitor kidney function.    11. History of deep vein thrombosis (DVT) of lower extremity    12. Anemia of chronic disease  Chronic, stable, resolved in recent lab work.    13. History of breast cancer  S/p mastectomy.    14. Mild vitamin D deficiency  Taking vitamin d 1000 IU OTC daily.    15. Pre-diabetes  A1C has been stable last few checks, monitor.  Lab Results   Component Value Date    HGBA1C 5.4 01/23/2024    HGBA1C 5.6 02/07/2023    HGBA1C 5.2 06/18/2020     16. Age-related osteoporosis without current pathological fracture  She did not see endocrine as rec by PCP based on last DEXA, she is focused on managing her back pain right now and will address once feeling better.    17. History of colonic polyps  Colonoscopy up to date.    18. Gastroesophageal reflux disease, unspecified whether esophagitis present  Stable, controlled on current medical therapy, followed by PCP.        Provided Nicho with a 5-10 year written screening schedule and personal prevention plan. Recommendations were developed using the USPSTF age appropriate recommendations. Education, counseling, and referrals were provided as needed. After Visit Summary printed and given to patient which includes a list of additional screenings\tests needed.    Follow up if symptoms worsen or fail to improve.    Alondra Mckeon PA-C  I offered to discuss advanced care planning, including how to pick a person who would make decisions for you if you were unable to make them for yourself, called a health care power of , and what kind of decisions you might make such as use of life sustaining treatments such as ventilators and tube feeding when faced with a life limiting illness recorded on a living will that they will need to know. (How you want to be cared for as you near the end of  your natural life)     X Patient is interested in learning more about how to make advanced directives.  I provided them paperwork and offered to discuss this with them.

## 2024-10-10 NOTE — PATIENT INSTRUCTIONS
Counseling and Referral of Other Preventative  (Italic type indicates deductible and co-insurance are waived)    Patient Name: Nicho Espinosa  Today's Date: 10/10/2024    Health Maintenance       Date Due Completion Date    Influenza Vaccine (1) 09/01/2024 9/11/2023    COVID-19 Vaccine (6 - 2024-25 season) 09/01/2024 10/12/2023    Hemoglobin A1c (Prediabetes) 01/23/2025 1/23/2024    DEXA Scan 01/31/2026 1/31/2024    Lipid Panel 02/07/2028 2/7/2023    Colonoscopy 07/27/2028 7/27/2023    TETANUS VACCINE 09/11/2033 9/11/2023        No orders of the defined types were placed in this encounter.    The following information is provided to all patients.  This information is to help you find resources for any of the problems found today that may be affecting your health:                  Living healthy guide: www.[facility]      Understanding Diabetes: www.diabetes.org      Eating healthy: www.cdc.gov/healthyweight      CDC home safety checklist: www.cdc.gov/steadi/patient.html      Agency on Aging: www.goea.[place].[facility]      Alcoholics anonymous (AA): www.aa.org      Physical Activity: www.chadwick.nih.gov/gm8lvzr      Tobacco use: www.quitwithusla.org

## 2024-10-14 ENCOUNTER — HOSPITAL ENCOUNTER (OUTPATIENT)
Dept: PULMONOLOGY | Facility: CLINIC | Age: 84
Discharge: HOME | End: 2024-10-14
Payer: MEDICARE

## 2024-10-14 ENCOUNTER — OFFICE VISIT (OUTPATIENT)
Dept: PULMONOLOGY | Facility: CLINIC | Age: 84
End: 2024-10-14
Payer: MEDICARE

## 2024-10-14 VITALS
DIASTOLIC BLOOD PRESSURE: 74 MMHG | HEART RATE: 63 BPM | WEIGHT: 178 LBS | OXYGEN SATURATION: 93 % | BODY MASS INDEX: 33.61 KG/M2 | SYSTOLIC BLOOD PRESSURE: 128 MMHG | HEIGHT: 61 IN

## 2024-10-14 DIAGNOSIS — J30.2 SEASONAL ALLERGIES: ICD-10-CM

## 2024-10-14 DIAGNOSIS — R91.8 ABNORMAL CT SCAN, LUNG: ICD-10-CM

## 2024-10-14 DIAGNOSIS — M41.9 SCOLIOSIS, UNSPECIFIED SCOLIOSIS TYPE, UNSPECIFIED SPINAL REGION: Primary | ICD-10-CM

## 2024-10-14 DIAGNOSIS — R06.02 SOB (SHORTNESS OF BREATH): ICD-10-CM

## 2024-10-14 DIAGNOSIS — R05.9 COUGH, UNSPECIFIED TYPE: ICD-10-CM

## 2024-10-14 DIAGNOSIS — J84.9 INTERSTITIAL LUNG DISEASE: ICD-10-CM

## 2024-10-14 LAB
DLCO SINGLE BREATH LLN: 11.79
DLCO SINGLE BREATH PRE REF: 29.3 %
DLCO SINGLE BREATH REF: 17.52
DLCOC SBVA LLN: 2.38
DLCOC SBVA REF: 3.95
DLCOC SINGLE BREATH LLN: 11.79
DLCOC SINGLE BREATH REF: 17.52
DLCOCSBVAULN: 5.51
DLCOCSINGLEBREATHULN: 23.25
DLCOSINGLEBREATHULN: 23.25
DLCOSINGLEBREATHZSCORE: -3.55
DLCOVA LLN: 2.38
DLCOVA PRE REF: 48.9 %
DLCOVA PRE: 1.93 ML/(MIN*MMHG*L) (ref 2.38–5.51)
DLCOVA REF: 3.95
DLCOVAULN: 5.51
ERVN2 LLN: -16449.58
ERVN2 PRE REF: 45.1 %
ERVN2 PRE: 0.19 L (ref -16449.58–16450.42)
ERVN2 REF: 0.42
ERVN2ULN: ABNORMAL
FEF 25 75 LLN: 0.64
FEF 25 75 PRE REF: 70 %
FEF 25 75 REF: 2.03
FET100 CHG: 6.2 %
FEV05 LLN: 0.52
FEV05 REF: 1.38
FEV1 CHG: -1.4 %
FEV1 FVC LLN: 62
FEV1 FVC PRE REF: 104.6 %
FEV1 FVC REF: 77
FEV1 LLN: 1.14
FEV1 PRE REF: 83.4 %
FEV1 REF: 1.66
FEV1 VOL CHG: -0.02
FEV1FVCZSCORE: 0.42
FEV1ZSCORE: -0.89
FRCN2 LLN: 1.73
FRCN2 PRE REF: 81.5 %
FRCN2 REF: 2.55
FRCN2ULN: 3.38
FVC CHG: 1 %
FVC LLN: 1.51
FVC PRE REF: 78.7 %
FVC REF: 2.19
FVC VOL CHG: 0.02
FVCZSCORE: -1.12
ICN2REF: 1.53
IVC PRE: 1.58 L (ref 1.51–2.9)
IVC SINGLE BREATH LLN: 1.51
IVC SINGLE BREATH PRE REF: 72.1 %
IVC SINGLE BREATH REF: 2.19
IVCSINGLEBREATHULN: 2.9
LLN IC N2: -16448.47
PEF LLN: 2.49
PEF PRE REF: 91.6 %
PEF REF: 4.05
PHYSICIAN COMMENT: ABNORMAL
POST FEF 25 75: 1.23 L/S (ref 0.64–3.43)
POST FET 100: 6.13 SEC
POST FEV1 FVC: 78.59 % (ref 61.75–90.31)
POST FEV1: 1.37 L (ref 1.14–2.15)
POST FEV5: 1.15 L (ref 0.52–2.23)
POST FVC: 1.74 L (ref 1.51–2.9)
POST PEF: 3.86 L/S (ref 2.49–5.6)
PRE DLCO: 5.14 ML/(MIN*MMHG) (ref 11.79–23.25)
PRE FEF 25 75: 1.42 L/S (ref 0.64–3.43)
PRE FET 100: 5.77 SEC
PRE FEV05 REF: 78.8 %
PRE FEV1 FVC: 80.52 % (ref 61.75–90.31)
PRE FEV1: 1.38 L (ref 1.14–2.15)
PRE FEV5: 1.08 L (ref 0.52–2.23)
PRE FRC N2: 2.08 L (ref 1.73–3.38)
PRE FVC: 1.72 L (ref 1.51–2.9)
PRE IC N2: 1.53 L (ref -16448.47–16451.53)
PRE PEF: 3.71 L/S (ref 2.49–5.6)
PRE REF IC N2: 100.1 %
RVN2 LLN: 1.56
RVN2 PRE REF: 88.7 %
RVN2 PRE: 1.89 L (ref 1.56–2.71)
RVN2 REF: 2.13
RVN2TLCN2 LLN: 37.59
RVN2TLCN2 PRE REF: 111 %
RVN2TLCN2 PRE: 52.38 % (ref 37.59–56.77)
RVN2TLCN2 REF: 47.18
RVN2TLCN2ULN: 56.77
RVN2ULN: 2.71
TLCN2 LLN: 3.45
TLCN2 PRE REF: 81.4 %
TLCN2 PRE: 3.61 L (ref 3.45–5.42)
TLCN2 REF: 4.44
TLCN2ULN: 5.42
TLCN2ZSCORE: -1.38
ULN IC N2: ABNORMAL
VA PRE: 2.66 L (ref 4.29–4.29)
VA SINGLE BREATH LLN: 4.29
VA SINGLE BREATH PRE REF: 62.1 %
VA SINGLE BREATH REF: 4.29
VASINGLEBREATHULN: 4.29
VCMAXN2 LLN: 1.51
VCMAXN2 PRE REF: 78.7 %
VCMAXN2 PRE: 1.72 L (ref 1.51–2.9)
VCMAXN2 REF: 2.19
VCMAXN2ULN: 2.9

## 2024-10-14 PROCEDURE — 3288F FALL RISK ASSESSMENT DOCD: CPT | Mod: HCNC,CPTII,S$GLB, | Performed by: INTERNAL MEDICINE

## 2024-10-14 PROCEDURE — 3074F SYST BP LT 130 MM HG: CPT | Mod: HCNC,CPTII,S$GLB, | Performed by: INTERNAL MEDICINE

## 2024-10-14 PROCEDURE — 1101F PT FALLS ASSESS-DOCD LE1/YR: CPT | Mod: HCNC,CPTII,S$GLB, | Performed by: INTERNAL MEDICINE

## 2024-10-14 PROCEDURE — 3078F DIAST BP <80 MM HG: CPT | Mod: HCNC,CPTII,S$GLB, | Performed by: INTERNAL MEDICINE

## 2024-10-14 PROCEDURE — 94729 DIFFUSING CAPACITY: CPT | Mod: HCNC,S$GLB,, | Performed by: INTERNAL MEDICINE

## 2024-10-14 PROCEDURE — 99999 PR PBB SHADOW E&M-EST. PATIENT-LVL IV: CPT | Mod: PBBFAC,HCNC,, | Performed by: INTERNAL MEDICINE

## 2024-10-14 PROCEDURE — 1159F MED LIST DOCD IN RCRD: CPT | Mod: HCNC,CPTII,S$GLB, | Performed by: INTERNAL MEDICINE

## 2024-10-14 PROCEDURE — 94727 GAS DIL/WSHOT DETER LNG VOL: CPT | Mod: HCNC,S$GLB,, | Performed by: INTERNAL MEDICINE

## 2024-10-14 PROCEDURE — 94060 EVALUATION OF WHEEZING: CPT | Mod: HCNC,S$GLB,, | Performed by: INTERNAL MEDICINE

## 2024-10-14 PROCEDURE — 99213 OFFICE O/P EST LOW 20 MIN: CPT | Mod: 25,HCNC,S$GLB, | Performed by: INTERNAL MEDICINE

## 2024-10-14 NOTE — ASSESSMENT & PLAN NOTE
History of smoking, but PFTs are not consistent with COPD.  She does have an isolated reduction in DLCO as well as significant desaturation on her 6 minute walk test.  Most recent CT scan may be consistent with idiopathic pulmonary fibrosis.  She denies any symptoms of autoimmune disease outside of her chronic osteoarthritis.      - we will monitor PFTs every 6 months and repeat imaging if worsening function is noted   - we will obtain autoimmune screening if her symptoms worsen  - TTE ordered to evaluate for pulmonary hypertension.  - if negative TTE we will repeat CT scan of thorax to evaluate for interval worsening.

## 2024-10-14 NOTE — PROGRESS NOTES
Subjective:      Patient ID: Nicho Espinosa is a 83 y.o. female.    Chief Complaint: Interstitial Lung Disease, Shortness of Breath, Cough, and Wheezing    Nicho Espinosa has an active medical problem list that includes:  Essential tremor, degenerative disc disease, compression fracture of the spine, scoliosis, lumbar spinal fusion, shortness of breath, cough, hypertriglyceridemia, aortic atherosclerosis, stage 3 chronic kidney disease, DVT of lower extremity, anemia of chronic disease, right breast cancer status post mastectomy, prophylactic use of anastrozole, mild vitamin-D deficiency, class 1 obesity due to excess calories, prediabetes, osteoporosis, gastroesophageal reflux disease, osteoarthritis of the knees, insomnia, who presents as a new patient referral for cough.    Tobacco/vape: former, quit in 2011.  Smoked about 50 years at less than 1 PPD.  Occupation: teacher  Exertional capacity: steps (1 flight) are challenging. Walking is limited by back pain. Does water exercise, and walks with walker at the park.  Not short of breath with her water walking. Able to do ADLs without an issue.   Prior lung disease:  pneumonia and bronchitis previously  Sputum production: in the AM, clear sputum  Allergies/sinusitis: yes, not on meds  GERD/Aspiration: occasional, PRN tums  Pets: none  Travel/TB: never  Respiratory regimen:  PRN albuterol, wixela  Prior cancer:  breast cancer in remission  Prior hospitalization/intubation: never  Snoring/apnea: not to her knowledge.     Today she is breathing about the same as list time she was seen.  Denies worsening symptoms.  Repeat PFTs today are relatively preserved except for her DLCO which continues to decline.  The etiology for this is unclear at this time.  Restriction is mild and consistent with her known history of scoliosis.        Review of Systems   Constitutional:  Negative for chills, weight gain, activity change, appetite change, fatigue and weakness.   HENT:  Positive  "for postnasal drip and congestion. Negative for rhinorrhea and sinus pressure.    Respiratory:  Positive for sputum production, shortness of breath and dyspnea on extertion. Negative for cough, hemoptysis, chest tightness, wheezing, previous hospitialization due to pulmonary problems and use of rescue inhaler.    Cardiovascular:  Negative for chest pain, palpitations and leg swelling.   Gastrointestinal:  Negative for nausea, vomiting, abdominal pain and acid reflux.   Psychiatric/Behavioral:  Negative for confusion and sleep disturbance. The patient is not nervous/anxious.      Objective:     Vitals:    10/14/24 1501   BP: 128/74   Pulse: 63   SpO2: (!) 93%   Weight: 80.7 kg (178 lb)   Height: 5' 1" (1.549 m)         Physical Exam   Constitutional: She is oriented to person, place, and time. She appears well-developed and well-nourished. She is obese.   HENT:   Head: Normocephalic.   Neck: No JVD present.   Cardiovascular: Normal rate, regular rhythm and normal heart sounds. Exam reveals no gallop and no friction rub.   No murmur heard.  Pulmonary/Chest: Normal expansion, effort normal and breath sounds normal. No respiratory distress. She has no wheezes. She has no rhonchi.   Abdominal: Soft. Bowel sounds are normal. She exhibits no distension.   Musculoskeletal:         General: No edema. Normal range of motion.      Cervical back: Normal range of motion and neck supple.      Comments: Scoliosis   Neurological: She is alert and oriented to person, place, and time.   Skin: Skin is warm and dry. She is not diaphoretic.   Psychiatric: She has a normal mood and affect. Her behavior is normal. Judgment and thought content normal.       Personal Diagnostic Review     PFTs 10/14/2024  FEV1/FVC - 81%  FEV1 - 1.38L (83%)  FVC - 1.72L (78%)  TLC - 3.61L (81%)  DLCO - 29%  Bronchodilators: not significant        PFTs 4/11/2024  FEV1/FVC - 80%  FEV1 - 1.51L (90%)  FVC - 1.90L (86%)  TLC - 3.82L (86%)  DLCO - " "34%  Bronchodilators: not tested.    6MWT 4/11/2024  98% -> 90% -> 98%     CT Thorax 1/25/2024  1. Unchanged extensive emphysematous changes with lower lobe predominant reticular peripheral opacities suggestive of smoking related fibrotic changes.  Unchanged few subsegmental atelectasis and micronodules.  No new focal opacities or consolidations.  2. Stable ascending aorta ectasia  3. Left thyroid nodules partially evaluated.  Recommend dedicated ultrasound.    TTE 11/1/2017    1 - Mild left atrial enlargement.     2 - Normal left ventricular systolic function (EF 60-65%).     3 - No wall motion abnormalities.     4 - Normal right ventricular systolic function .     5 - The estimated PA systolic pressure is 25 mmHg.     6 - Indeterminate LV diastolic function.         10/14/2024     3:01 PM 10/10/2024    10:31 AM 7/11/2024     9:27 AM 4/11/2024    11:17 AM 4/11/2024    10:21 AM 4/9/2024     1:04 PM 3/11/2024     1:07 PM   Pulmonary Function Tests   SpO2 93 % 94 %   98 %     Ordering Provider    MD Garrison      Performing nurse/tech/RT    Yenifer RRT      Diagnosis    Shortness of Breath      Height 5' 1" (1.549 m) 5' 1" (1.549 m) 5' 1" (1.549 m) 5' 1" (1.549 m) 5' 1" (1.549 m) 5' 1" (1.549 m) 5' 1" (1.549 m)   Weight 80.7 kg (178 lb) 80.8 kg (178 lb 2.1 oz) 77.1 kg (170 lb) 82.1 kg (181 lb) 82.1 kg (181 lb) 82.1 kg (181 lb) 82.1 kg (181 lb)   BMI (Calculated) 33.7 33.7 32.1 34.2 34.2 34.2 34.2   Patient Race          6MWT Status    completed without stopping      Patient Reported    Dyspnea;Other (Comment)      Was O2 used?    No      6MW Distance walked (feet)    1077 feet      Distance walked (meters)    328.27 meters      Did patient stop?    No      Type of assistive device(s) used?    no assistive devices      Oxygen Saturation    98 %      Supplemental Oxygen    Room Air      Heart Rate    59 bpm      Blood Pressure    174/80      Vibha Dyspnea Rating     light      Oxygen Saturation    90 %    "   Supplemental Oxygen    Room Air      Heart Rate    91 bpm      Blood Pressure    200/91      Vibha Dyspnea Rating     somewhat heavy      Recovery Time (seconds)    342 seconds      Oxygen Saturation    98 %      Supplemental Oxygen    Room Air      Heart Rate    63 bpm      Blood Pressure    178/76      Is procedure ready for interpretation?    Yes      Oxygen Qualification?    No           Assessment:     1. Scoliosis, unspecified scoliosis type, unspecified spinal region    2. Seasonal allergies    3. SOB (shortness of breath)    4. Cough, unspecified type    5. Abnormal CT scan, lung           Outpatient Encounter Medications as of 10/14/2024   Medication Sig Dispense Refill    acetaminophen (TYLENOL) 500 MG tablet Take 2 tablets (1,000 mg total) by mouth 2 (two) times daily as needed for Pain. Begin post op day 3. 50 tablet 0    b complex vitamins capsule Take 1 capsule by mouth once daily.      calcium-vitamin D3 (OS-DIANDRA 500 + D3) 500 mg-5 mcg (200 unit) per tablet Take 1 tablet by mouth 3 (three) times a week.      cholecalciferol, vitamin D3, 1,000 unit capsule Take 3 capsules daily 90 capsule 11    docusate (COLACE) 50 mg/5 mL liquid Take 50 mg by mouth once daily.      fenofibrate micronized (LOFIBRA) 67 MG capsule TAKE 2 CAPSULES EVERY DAY WITH BREAKFAST 180 capsule 3    fluticasone propionate (FLONASE) 50 mcg/actuation nasal spray 1 spray by Each Nostril route once daily.      HYDROcodone-acetaminophen (NORCO) 5-325 mg per tablet Take 1/2  tablet 3 times per day prn      ibuprofen (ADVIL,MOTRIN) 600 MG tablet Take 1 tablet (600 mg total) by mouth 3 (three) times daily as needed for Pain.  45 tablet 0    ketoconazole (NIZORAL) 2 % cream aaa bid prn flare 60 g 3    loratadine (CLARITIN) 10 mg tablet Take 10 mg by mouth once daily.      MULTIVIT WITH CALCIUM,IRON,MIN (WOMEN'S DAILY MULTIVITAMIN ORAL) Take by mouth.      ondansetron (ZOFRAN) 4 MG tablet Take 1 tablet (4 mg total) by mouth every 8 (eight)  hours as needed for Nausea. 30 tablet 0    polycarbophil (FIBERCON) 625 mg tablet Take 625 mg by mouth once daily.      propranoloL (INDERAL) 10 MG tablet TAKE 1 TABLET THREE TIMES DAILY 270 tablet 3    traZODone (DESYREL) 50 MG tablet TAKE 1 TABLET NIGHTLY AS NEEDED FOR INSOMNIA 90 tablet 0    triamcinolone acetonide 0.025% (KENALOG) 0.025 % cream aaa qd to face prn flare.  Not more than 1-2 weeks straight in same location 45 g 1    albuterol (VENTOLIN HFA) 90 mcg/actuation inhaler Inhale 2 puffs into the lungs every 6 (six) hours as needed for Wheezing. Rescue (Patient not taking: Reported on 10/14/2024) 18 g 0    fluticasone-salmeterol diskus inhaler 100-50 mcg Inhale 1 puff into the lungs 2 (two) times daily. Controller (Patient not taking: Reported on 10/14/2024) 60 each 3    [DISCONTINUED] fenofibrate micronized (LOFIBRA) 67 MG capsule TAKE 2 CAPSULES EVERY DAY WITH BREAKFAST 180 capsule 3     Facility-Administered Encounter Medications as of 10/14/2024   Medication Dose Route Frequency Provider Last Rate Last Admin    fentaNYL 50 mcg/mL injection 100 mcg  100 mcg Intravenous PRN Jamie Bello PA-C   50 mcg at 02/26/24 0746    midazolam (VERSED) 1 mg/mL injection 1 mg  1 mg Intravenous PRN Jamie Bello PA-C         Orders Placed This Encounter   Procedures    Echo     Standing Status:   Future     Standing Expiration Date:   10/14/2025     Order Specific Question:   Release to patient     Answer:   Immediate       Plan:     Problem List Items Addressed This Visit          Neuro    Scoliosis - Primary       ENT    Seasonal allergies       Pulmonary    SOB (shortness of breath)    Current Assessment & Plan     History of smoking, but PFTs are not consistent with COPD.  She does have an isolated reduction in DLCO as well as significant desaturation on her 6 minute walk test.  Most recent CT scan may be consistent with idiopathic pulmonary fibrosis.  She denies any symptoms of autoimmune disease outside  of her chronic osteoarthritis.      - we will monitor PFTs every 6 months and repeat imaging if worsening function is noted   - we will obtain autoimmune screening if her symptoms worsen  - TTE ordered to evaluate for pulmonary hypertension.  - if negative TTE we will repeat CT scan of thorax to evaluate for interval worsening.         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  DLCO is reduced but TLC remains preserved on PFTs.  We will monitor by repeating PFTs every 6 months.    - pending repeat TTE  - if negative TTE, will obtain CT scan and autoimmune workup.         Abnormal CT scan, lung     RTC 6 months    Wesly Ji MD  [facility]

## 2024-10-14 NOTE — ASSESSMENT & PLAN NOTE
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  DLCO is reduced but TLC remains preserved on PFTs.  We will monitor by repeating PFTs every 6 months.    - pending repeat TTE  - if negative TTE, will obtain CT scan and autoimmune workup.

## 2024-10-22 ENCOUNTER — HOSPITAL ENCOUNTER (OUTPATIENT)
Dept: CARDIOLOGY | Facility: HOSPITAL | Age: 84
Discharge: HOME | End: 2024-10-22
Attending: INTERNAL MEDICINE
Payer: MEDICARE

## 2024-10-22 VITALS
BODY MASS INDEX: 33.61 KG/M2 | HEIGHT: 61 IN | DIASTOLIC BLOOD PRESSURE: 71 MMHG | WEIGHT: 178 LBS | SYSTOLIC BLOOD PRESSURE: 149 MMHG

## 2024-10-22 DIAGNOSIS — R06.02 SOB (SHORTNESS OF BREATH): ICD-10-CM

## 2024-10-22 LAB
ASCENDING AORTA: 3.31 CM
AV AREA BY CONTINUOUS VTI: 2.6 CM2
AV INDEX (PROSTH): 0.8
AV LVOT MEAN GRADIENT: 6 MMHG
AV LVOT PEAK GRADIENT: 12 MMHG
AV MEAN GRADIENT: 7.8 MMHG
AV PEAK GRADIENT: 14.4 MMHG
AV VALVE AREA BY VELOCITY RATIO: 3.1 CM²
AV VALVE AREA: 2.8 CM2
AV VELOCITY RATIO: 0.89
BSA FOR ECHO PROCEDURE: 1.86 M2
CV ECHO LV RWT: 0.39 CM
DOP CALC AO PEAK VEL: 1.9 M/S
DOP CALC AO VTI: 44.3 CM
DOP CALC LVOT AREA: 3.5 CM2
DOP CALC LVOT DIAMETER: 2.1 CM
DOP CALC LVOT PEAK VEL: 1.7 M/S
DOP CALC LVOT STROKE VOLUME: 122.5 CM3
DOP CALCLVOT PEAK VEL VTI: 35.4 CM
E WAVE DECELERATION TIME: 285.84 MS
E/A RATIO: 0.82
E/E' RATIO: 16.4 M/S
ECHO EF ESTIMATED: 61 %
ECHO LV POSTERIOR WALL: 0.8 CM (ref 0.6–1.1)
FRACTIONAL SHORTENING: 31.7 % (ref 28–44)
INTERVENTRICULAR SEPTUM: 0.9 CM (ref 0.6–1.1)
IVC DIAMETER: 2.41 CM
LA MAJOR: 6.07 CM
LA MINOR: 5.65 CM
LA WIDTH: 3.65 CM
LEFT ATRIUM SIZE: 4.31 CM
LEFT ATRIUM VOLUME INDEX MOD: 37.1 ML/M2
LEFT ATRIUM VOLUME INDEX: 43.5 ML/M2
LEFT ATRIUM VOLUME MOD: 66.85 ML
LEFT ATRIUM VOLUME: 78.26 CM3
LEFT INTERNAL DIMENSION IN SYSTOLE: 2.8 CM (ref 2.1–4)
LEFT VENTRICLE DIASTOLIC VOLUME INDEX: 42.27 ML/M2
LEFT VENTRICLE DIASTOLIC VOLUME: 76.09 ML
LEFT VENTRICLE MASS INDEX: 58.7 G/M2
LEFT VENTRICLE SYSTOLIC VOLUME INDEX: 16.6 ML/M2
LEFT VENTRICLE SYSTOLIC VOLUME: 29.88 ML
LEFT VENTRICULAR INTERNAL DIMENSION IN DIASTOLE: 4.1 CM (ref 3.5–6)
LEFT VENTRICULAR MASS: 105.6 G
LV LATERAL E/E' RATIO: 16.4
LV SEPTAL E/E' RATIO: 16.4
MV PEAK A VEL: 1 M/S
MV PEAK E VEL: 0.82 M/S
OHS CV RV/LV RATIO: 0.85 CM
PISA TR MAX VEL: 1.91 M/S
RA MAJOR: 4.35 CM
RA PRESSURE ESTIMATED: 3 MMHG
RA WIDTH: 2.7 CM
RIGHT ATRIAL AREA: 12.6 CM2
RIGHT VENTRICLE DIASTOLIC BASEL DIMENSION: 3.5 CM
RV TB RVSP: 5 MMHG
RV TISSUE DOPPLER FREE WALL SYSTOLIC VELOCITY 1 (APICAL 4 CHAMBER VIEW): 11.62 CM/S
SINUS: 3.12 CM
STJ: 2.47 CM
TDI LATERAL: 0.05 M/S
TDI SEPTAL: 0.05 M/S
TDI: 0.05 M/S
TR MAX PG: 15 MMHG
TRICUSPID ANNULAR PLANE SYSTOLIC EXCURSION: 1.13 CM
TV PEAK GRADIENT: 15 MMHG
TV REST PULMONARY ARTERY PRESSURE: 18 MMHG
Z-SCORE OF LEFT VENTRICULAR DIMENSION IN END DIASTOLE: -1.94
Z-SCORE OF LEFT VENTRICULAR DIMENSION IN END SYSTOLE: -0.74

## 2024-10-22 PROCEDURE — 93306 TTE W/DOPPLER COMPLETE: CPT

## 2024-10-22 PROCEDURE — 93306 TTE W/DOPPLER COMPLETE: CPT | Mod: 26,,, | Performed by: INTERNAL MEDICINE

## 2024-10-28 DIAGNOSIS — F51.01 PRIMARY INSOMNIA: ICD-10-CM

## 2024-10-28 DIAGNOSIS — G25.0 ESSENTIAL TREMOR: ICD-10-CM

## 2024-10-28 RX ORDER — PROPRANOLOL HYDROCHLORIDE 10 MG/1
10 TABLET ORAL 3 TIMES DAILY
Qty: 270 TABLET | Refills: 3 | Status: SHIPPED | OUTPATIENT
Start: 2024-10-28

## 2024-10-28 RX ORDER — TRAZODONE HYDROCHLORIDE 50 MG/1
50 TABLET ORAL NIGHTLY
Qty: 90 TABLET | Refills: 0 | Status: SHIPPED | OUTPATIENT
Start: 2024-10-28

## 2024-11-12 ENCOUNTER — TELEPHONE (OUTPATIENT)
Dept: PULMONOLOGY | Facility: CLINIC | Age: 84
End: 2024-11-12
Payer: MEDICARE

## 2024-11-12 DIAGNOSIS — J84.9 INTERSTITIAL PULMONARY DISEASE, UNSPECIFIED: Primary | ICD-10-CM

## 2024-11-12 NOTE — TELEPHONE ENCOUNTER
I spoke with patient in regards to scheduling her CT and labs. Patient is scheduled on 11/25/24 at 3 pm. Patient confirmed and verbalized understanding.

## 2024-11-12 NOTE — PROGRESS NOTES
I have ordered a CT thorax repeat.  TTE did not show overt heart failure (although there was grade I DD) or pHTN.  I feel this does not explain the severity of her shortness of breath.  I have also ordered an initial autoimmune workup.  If these results are negative I will consider sending and more expanded evaluation.    Patient was updated by phone and agrees with the plan.    Wesly Ji MD  [facility]

## 2024-11-12 NOTE — TELEPHONE ENCOUNTER
----- Message from Wesly Ji MD sent at 11/12/2024 12:19 PM CST -----  Can you please help this patient arrange to obtain some blood work as well as a repeat CT thorax?      Thank you,    Wesly Ji MD   Pulmonary and critical care medicine

## 2024-11-25 ENCOUNTER — HOSPITAL ENCOUNTER (OUTPATIENT)
Dept: RADIOLOGY | Facility: HOSPITAL | Age: 84
Discharge: HOME | End: 2024-11-25
Attending: INTERNAL MEDICINE
Payer: MEDICARE

## 2024-11-25 DIAGNOSIS — J84.9 INTERSTITIAL PULMONARY DISEASE, UNSPECIFIED: ICD-10-CM

## 2024-11-25 PROCEDURE — 71250 CT THORAX DX C-: CPT | Mod: TC,HCNC

## 2024-12-27 NOTE — ANESTHESIA PROCEDURE NOTES
Pt arrived to floor was transfered to bed.  Vitals taken, telemetry applied. Admission and assessment complete. No distress noted. See doc flowsheet and admission navigator for details. POC and education initiated and reviewed with patient. Call light within reach, and pt educated on its use. Bed in lowest position, and locked. Side rails up x 2. Denied further questions or needs at this time. Will continue to monitor.    Supraclavicular Single Shot Nerve Block    Patient location during procedure: pre-op   Block not for primary anesthetic.  Reason for block: at surgeon's request and post-op pain management   Post-op Pain Location: L hand pain   Start time: 2/26/2024 7:48 AM  Timeout: 2/26/2024 7:48 AM   End time: 2/26/2024 7:51 AM    Staffing  Authorizing Provider: Jackson Gatica MD  Performing Provider: Jackson Gatica MD    Staffing  Performed by: Jackson Gatica MD  Authorized by: Jackson Gatica MD    Preanesthetic Checklist  Completed: patient identified, IV checked, site marked, risks and benefits discussed, surgical consent, monitors and equipment checked, pre-op evaluation and timeout performed  Peripheral Block  Patient position: supine  Prep: ChloraPrep  Patient monitoring: heart rate, continuous pulse ox and frequent blood pressure checks  Block type: supraclavicular  Laterality: left  Injection technique: single shot  Needle  Needle type: Stimuplex   Needle gauge: 22 G  Needle length: 2 in  Needle localization: ultrasound guidance   -ultrasound image captured on disc.  Assessment  Injection assessment: negative aspiration, negative parasthesia and local visualized surrounding nerve  Paresthesia pain: none  Heart rate change: no  Slow fractionated injection: yes  Pain Tolerance: comfortable throughout block and no complaints  Medications:    Medications: ropivacaine (NAROPIN) injection 0.5% - Perineural   25 mL - 2/26/2024 7:51:00 AM             family member/on unit

## 2025-01-30 DIAGNOSIS — Z00.00 ENCOUNTER FOR MEDICARE ANNUAL WELLNESS EXAM: ICD-10-CM

## 2025-02-07 DIAGNOSIS — F51.01 PRIMARY INSOMNIA: ICD-10-CM

## 2025-02-07 NOTE — TELEPHONE ENCOUNTER
Care Due:                  Date            Visit Type   Department     Provider  --------------------------------------------------------------------------------    Last Visit: None Found      None         None Found                              EP -                              PRIMARY      [facility] INTERNAL  Next Visit: 04-      CARE ([facility])   MEDICINE       Windy Walker                                                            Last  Test          Frequency    Reason                     Performed    Due Date  --------------------------------------------------------------------------------    CBC.........  12 months..  fenofibrate..............  01- 01-    CMP.........  12 months..  fenofibrate..............  01- 01-    Lipid Panel.  12 months..  fenofibrate..............  02- 02-    [employer] Embedded Care Due Messages. Reference number: [id number].   2/07/2025 5:06:07 PM CST

## 2025-02-10 RX ORDER — TRAZODONE HYDROCHLORIDE 50 MG/1
50 TABLET ORAL NIGHTLY
Qty: 90 TABLET | Refills: 0 | Status: SHIPPED | OUTPATIENT
Start: 2025-02-10

## 2025-03-05 NOTE — LETTER
[facility]  [address]  [facility] [address]  Phone: [phone]  Fax: [phone] July 18, 2017      Gwendolyn Quick MD  [address]  [facility] [address]    Patient: Nicho Espinosa   MR Number: [id number]   YOB: 1940   Date of Visit: 7/18/2017     Dear Dr. Quick:    Thank you for referring Nicho Espinosa to me for evaluation.     The patient is a 76-year-old  female who presents with her daughter for initial consultation regarding her newly diagnosed invasive breast carcinoma.  She did not note any abnormalities on self-breast examination and underwent routine screening mammogram on 06/27/2017 and subsequent diagnostic mammogram on 06/29/2017 which revealed a BI-RADS category BI-RADS category 4 suspicious right breast mass measuring approximately 8 mm in greatest dimension in the posterior upper outer quadrant of the 10 o'clock position of the right breast.  There were no suspicious clinical findings.  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  The E cadherin was negative, making the subtype of pleomorphic lobular carcinoma favored.  This was strongly estrogen and progesterone receptor positive.  It was HER-2/gwyn negative.    She presents today to discuss surgical options.  The greatest linear length of the tumor was 4 mm on a core needle biopsy, measuring 8 mm on diagnostic imaging with ultrasound.  It was found to have nonparallel indistinct margins and located 8 cm from the nipple.    PAST MEDICAL HISTORY:  Significant for back pain status post a L5 lumbar fusion by Dr. Rodney Beckett approximately 2-3 years ago.  This pain has been chronic for at least 10 years.  She still has some pain from this.  She also describes a tremor.  She denies any history of cardiac, pulmonary or renal  [facility] and how far out it is. Kelly expressed that the charge nurse was concerned with how far out this appt is currently scheduled. I explained the wait list process and she expressed a clear understanding. If we need to speak with the day  time charge nurse her name is doug.     Phone#: [phone]    "disease.    SOCIAL HISTORY:  Significant in that she was approximately a 1-pack per day smoker for approximately 50 years.  She quit smoking approximately seven years ago.  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      ALLERGIES:  To sulfa medications and adhesives.    MEDICATIONS:  Per EPIC and include Fosamax, Norco p.r.n., Phenergan p.r.n., Inderal, Desyrel, Zyrtec, Lyrica and vitamin D.    FAMILY HISTORY:  Significant in that her mother had breast cancer in her 60s. The patient's mother did well from her breast cancer, treated with breast conservation and radiotherapy.  She states that her father had a history of bone cancer.  There is no family history of ovarian cancer.  She states she had several members with "gastric" or possibly some form of peritoneal malignancy.    GYNECOLOGICAL HISTORY:  Reveals that she is a  2, para 2 with first pregnancy and live birth at age 27.  Menarche was at age 13, menopause was naturally at age 50.  She denies any gynecological surgery.  She denies any history of hormone replacement therapy.  She took oral contraceptive products in the remote past for approximately 10 years.      On exam it reveals vital signs stable and afebrile.  Her physical  exam is essentially within normal limits.  There are no suspicious clinical findings on breast exam.  The breasts are symmetrical with no suspicious masses, nodules, densities, skin changes or lymphadenopathy.  There is evidence of a recent core needle biopsy site with ecchymosis in the upper outer quadrant of the right breast, but no suspicious clinical findings, no suspicious skin changes and no lymphadenopathy. There is no palpable mass.    Newly diagnosed invasive mammary carcinoma of the right breast posteriorly upper outer quadrant.  The patient and her daughter were counseled on the options of breast conservation surgery and radiotherapy versus mastectomy.  We discussed some of the [facility] data to discuss potentially " having radiotherapy deferred as well with a slight increase in local recurrence to 10%-12%, but no impact on survival.  We discussed the role for endocrine therapy as part of her systemic therapy.  We discussed that mastectomy would typically allow her to avoid radiotherapy and we discussed some of the indications for postmastectomy radiotherapy, although it would be very unlikely that this patient would require postmastectomy radiotherapy. We discussed that the role for systemic therapy recommendations and options would have no impact on whether she had lumpectomy or mastectomy.  It would be based on clinical stage, pathologic stage and receptor status.  Her receptors reveal that her tumor is strongly estrogen and progesterone receptor positive and HER-2/gwyn negative.      The patient was counseled for approximately 60 minutes.  We discussed the indications, technique and rationale for sentinel lymph node biopsy.  We discussed the potential need for reexcision with lumpectomy.  We discussed reconstruction options.  We discussed autologous tissue reconstruction versus implant reconstruction.  The patient is wishing to decline any reconstruction options and appears to be highly motivated for mastectomy.      Approximate time spent with the patient and her daughter was 60 minutes with greater than 50% of that time in counseling.  After going over all the options, the patient has decided to proceed with a right total complete therapeutic mastectomy with right axillary sentinel lymph node biopsy without reconstruction.  This has been scheduled for Wednesday, 08/09/2017 under general anesthetic with laryngeal mask airway as choice for the induction of general anesthesia.  She will also be a candidate for paravertebral block.  The patient was seen by the clinical nurse navigator and provided with breast cancer information.  She would also be a candidate for L-Dex preoperatively.      Consent was obtained for the right  total complete therapeutic mastectomy with sentinel lymph node biopsy, possible axillary dissection if frozen section reveals metastatic carcinoma to the lymph nodes without reconstruction on Wednesday, 08/09/2017 for a T1b 8 mm invasive mammary carcinoma of the posterior upper outer quadrant of the right breast.    If you have questions, please do not hesitate to call me. I look forward to following Nicho along with you.    Sincerely,      Aurelio Rolle MD   Medical Director, [facility]  Section of General and Oncologic Surgery  [facility]    RLC/hcr    CC  Arleen Lara MD

## 2025-04-08 ENCOUNTER — OFFICE VISIT (OUTPATIENT)
Dept: INTERNAL MEDICINE | Facility: CLINIC | Age: 85
End: 2025-04-08
Payer: MEDICARE

## 2025-04-08 VITALS
HEIGHT: 61 IN | OXYGEN SATURATION: 97 % | DIASTOLIC BLOOD PRESSURE: 74 MMHG | HEART RATE: 68 BPM | WEIGHT: 177.69 LBS | BODY MASS INDEX: 33.55 KG/M2 | SYSTOLIC BLOOD PRESSURE: 136 MMHG

## 2025-04-08 DIAGNOSIS — J84.9 INTERSTITIAL LUNG DISEASE: Primary | ICD-10-CM

## 2025-04-08 DIAGNOSIS — R73.03 PRE-DIABETES: ICD-10-CM

## 2025-04-08 DIAGNOSIS — C50.411 MALIGNANT NEOPLASM OF UPPER-OUTER QUADRANT OF RIGHT BREAST IN FEMALE, ESTROGEN RECEPTOR POSITIVE: ICD-10-CM

## 2025-04-08 DIAGNOSIS — E55.9 MILD VITAMIN D DEFICIENCY: ICD-10-CM

## 2025-04-08 DIAGNOSIS — R20.9 UNSPECIFIED DISTURBANCES OF SKIN SENSATION: ICD-10-CM

## 2025-04-08 DIAGNOSIS — D63.8 ANEMIA OF CHRONIC DISEASE: ICD-10-CM

## 2025-04-08 DIAGNOSIS — Z01.00 ROUTINE EYE EXAM: ICD-10-CM

## 2025-04-08 DIAGNOSIS — E04.1 THYROID NODULE: ICD-10-CM

## 2025-04-08 DIAGNOSIS — R49.0 HOARSENESS: ICD-10-CM

## 2025-04-08 DIAGNOSIS — Z17.0 MALIGNANT NEOPLASM OF UPPER-OUTER QUADRANT OF RIGHT BREAST IN FEMALE, ESTROGEN RECEPTOR POSITIVE: ICD-10-CM

## 2025-04-08 DIAGNOSIS — E78.1 HYPERTRIGLYCERIDEMIA: ICD-10-CM

## 2025-04-08 DIAGNOSIS — Z12.31 SCREENING MAMMOGRAM, ENCOUNTER FOR: ICD-10-CM

## 2025-04-08 DIAGNOSIS — H91.93 BILATERAL HEARING LOSS, UNSPECIFIED HEARING LOSS TYPE: ICD-10-CM

## 2025-04-08 DIAGNOSIS — N18.31 STAGE 3A CHRONIC KIDNEY DISEASE: ICD-10-CM

## 2025-04-08 DIAGNOSIS — Z85.3 HISTORY OF BREAST CANCER: ICD-10-CM

## 2025-04-08 DIAGNOSIS — R79.9 ABNORMAL FINDING OF BLOOD CHEMISTRY, UNSPECIFIED: ICD-10-CM

## 2025-04-08 DIAGNOSIS — F51.01 PRIMARY INSOMNIA: ICD-10-CM

## 2025-04-08 DIAGNOSIS — D75.89 MACROCYTOSIS: ICD-10-CM

## 2025-04-08 DIAGNOSIS — M81.0 AGE-RELATED OSTEOPOROSIS WITHOUT CURRENT PATHOLOGICAL FRACTURE: ICD-10-CM

## 2025-04-08 PROCEDURE — 99999 PR PBB SHADOW E&M-EST. PATIENT-LVL V: CPT | Mod: PBBFAC,,, | Performed by: INTERNAL MEDICINE

## 2025-04-08 RX ORDER — TRAZODONE HYDROCHLORIDE 50 MG/1
50 TABLET ORAL NIGHTLY
Qty: 90 TABLET | Refills: 3 | Status: SHIPPED | OUTPATIENT
Start: 2025-04-08

## 2025-04-08 NOTE — PROGRESS NOTES
"INTERNAL MEDICINE ESTABLISHED PATIENT VISIT NOTE    Subjective:     Chief Complaint: Annual Exam       Patient ID: Nicho Espinosa is a 84 y.o. female with PMHx of R sided breast ca s/p R modified radical mastectomy 8/2017and on arimidex, DDD c hx compression fx of spine and spondylolisthesis s/p lumbar fusion 1/2015, preDM, OA B knees, insomnia, essential tremor, osteoporosis c Vit D def, hx tob use but quit in the past, hx DVT 11/2013 remigio-operative prev on coumadin but only for a few weeks, GERD, anemia of chronic disease, elevated triglycerides, OA knees s/p L TKA 2/2019 by Dr. Washburn, last seen by me 2/2023 and seen by Yani and Alondra Mckeon in the interim, here today for annual exam and f/u.    Still seeing Dr. Mayo for chronic pain issues and on Norco as she had no relief c LEI or RFA in the past.    Was seen by Dr. Ji last year for issues c chronic cough and SOB.  Of note, pt is a former smoker who quite in 2011.  Had PFTs not c/w COPD but did show isolated reduction in DLCO and 6MWT showed significant desat.  CT chest showed some degree of predominant fibrotic disease so rec PFTs every 6 mos.  TTE in Nov did not show overt HF or pHTN.  Additionally, NEELA, RF, and antiCCP Ab all neg on Nov labs.  States previous inhalers did not help so has not been taking them.    Also had incidental finding of thyroid nodule which was stable on f/u CT but rec for u/s for further eval.    Today also c c/o her voice sounding "gritty" which is new for her.  Denies sore throat.  States her brother was dx'ed c laryngeal cancer recently and wanted to make sure her sx were not due to a tumor.    Today also requesting referral for eye exam and hearing testing as she feels her hearing has been progressively declining.    Past Medical History:  Past Medical History:   Diagnosis Date    Allergy     Arthritis     Blepharitis of both eyes     breast ca 7/18/2017    right    Complication of anesthesia     nausea    DDD " (degenerative disc disease), cervical 9/19/2013    Degenerative disc disease     GERD (gastroesophageal reflux disease)     patient denies reflux    History of compression fracture of spine 4/10/2014    Hyperlipidemia     Lumbar disc disease     Meibomitis     Obesity 9/19/2013    Osteoporosis     Papilloma of eyelid     RUL    Postoperative anemia 11/21/2013    Thoracic or lumbosacral neuritis or radiculitis, unspecified 9/20/2013    Tremors of nervous system 2/2015       Home Medications:  Prior to Admission medications    Medication Sig Start Date End Date Taking? Authorizing Provider   b complex vitamins capsule Take 1 capsule by mouth once daily.   Yes Provider, Historical   calcium-vitamin D3 (OS-DIANDRA 500 + D3) 500 mg-5 mcg (200 unit) per tablet Take 1 tablet by mouth 3 (three) times a week.   Yes Provider, Historical   fenofibrate micronized (LOFIBRA) 67 MG capsule TAKE 2 CAPSULES EVERY DAY WITH BREAKFAST 10/3/24  Yes Azalea Walker MD   HYDROcodone-acetaminophen (NORCO) 5-325 mg per tablet Take 1/2  tablet 3 times per day prn 1/7/20  Yes Provider, Historical   ibuprofen (ADVIL,MOTRIN) 600 MG tablet Take 1 tablet (600 mg total) by mouth 3 (three) times daily as needed for Pain.  2/21/24  Yes Andrew Serrano PA-C   loratadine (CLARITIN) 10 mg tablet Take 10 mg by mouth once daily.   Yes Provider, Historical   MULTIVIT WITH CALCIUM,IRON,MIN (WOMEN'S DAILY MULTIVITAMIN ORAL) Take by mouth.   Yes Provider, Historical   polycarbophil (FIBERCON) 625 mg tablet Take 625 mg by mouth once daily.   Yes Provider, Historical   propranoloL (INDERAL) 10 MG tablet Take 1 tablet (10 mg total) by mouth 3 (three) times daily. 10/28/24  Yes Azalea Walker MD   traZODone (DESYREL) 50 MG tablet TAKE 1 TABLET(50 MG) BY MOUTH EVERY EVENING 2/10/25  Yes Azalea Walker MD   triamcinolone acetonide 0.025% (KENALOG) 0.025 % cream aaa qd to face prn flare.  Not more than 1-2 weeks straight in same location 2/8/23  Yes Amber Fallon MD    acetaminophen (TYLENOL) 500 MG tablet Take 2 tablets (1,000 mg total) by mouth 2 (two) times daily as needed for Pain. Begin post op day 3.  Patient not taking: Reported on 4/8/2025 2/21/24   Andrew Serrano PA-C   albuterol (VENTOLIN HFA) 90 mcg/actuation inhaler Inhale 2 puffs into the lungs every 6 (six) hours as needed for Wheezing. Rescue  Patient not taking: Reported on 10/10/2024 1/23/24   Yani Jolley NP   cholecalciferol, vitamin D3, 1,000 unit capsule Take 3 capsules daily  Patient not taking: Reported on 4/8/2025 2/1/17   Arleen Lara MD   docusate (COLACE) 50 mg/5 mL liquid Take 50 mg by mouth once daily.  Patient not taking: Reported on 4/8/2025    Provider, Historical   fluticasone propionate (FLONASE) 50 mcg/actuation nasal spray 1 spray by Each Nostril route once daily.  Patient not taking: Reported on 4/8/2025    Provider, Historical   fluticasone-salmeterol diskus inhaler 100-50 mcg Inhale 1 puff into the lungs 2 (two) times daily. Controller  Patient not taking: Reported on 10/14/2024 1/26/24 1/25/25  Yani Jolely NP   ketoconazole (NIZORAL) 2 % cream aaa bid prn flare 10/31/22   Amber Fallon MD   ondansetron (ZOFRAN) 4 MG tablet Take 1 tablet (4 mg total) by mouth every 8 (eight) hours as needed for Nausea.  Patient not taking: Reported on 4/8/2025 3/1/21   Azalea Walker MD       Allergies:  Review of patient's allergies indicates:   Allergen Reactions    Sulfa (sulfonamide antibiotics) Other (See Comments)     Yeast infection and irritation    Ciprofloxacin      Rash    Latex, natural rubber     Adhesive Hives and Itching       Social History:  Social History[1]     Review of Systems   Constitutional:  Negative for appetite change, chills, fatigue, fever and unexpected weight change.   HENT:  Negative for congestion, hearing loss and rhinorrhea.    Eyes:  Negative for pain and visual disturbance.   Respiratory:  Positive for cough (chronic, at baseline) and  "shortness of breath (at baseline). Negative for chest tightness and wheezing.    Cardiovascular:  Negative for chest pain, palpitations and leg swelling.   Gastrointestinal:  Negative for abdominal distention and abdominal pain.   Endocrine: Negative for polydipsia and polyuria.   Genitourinary:  Negative for decreased urine volume, difficulty urinating, dysuria, hematuria and vaginal discharge.   Musculoskeletal:  Positive for back pain.   Neurological:  Negative for weakness, numbness and headaches.   Psychiatric/Behavioral:  Negative for behavioral problems and confusion.          Health Maintenance:     Immunizations:   Influenza 10/2024  TDap 9/2013  Pneumovax is up to date. 9/2013, 12/2015  Shingrix 6/2020, 9/2020 completed.  COVID vaccines 1/2021 x2, 10/2021, 9/2022, 10/2024  RSV 11/2023     Cancer Screening:  PAP: is up to date. 6/2017 wnl, WWE done 11/2020 c Dr. Greco  Mammogram: 7/2024 on L benign, repeat for July  Colonoscopy: 7/2023 c polyps, path c/w tubular adenoma and rec repeat in 5 yrs.  DEXA: 2/2024 c osteoporosis, was referred to Endo for rec on tx.    Objective:   /74   Pulse 68   Ht 5' 1" (1.549 m)   Wt 80.6 kg (177 lb 11.1 oz)   LMP 07/05/1987   SpO2 97%   BMI 33.57 kg/m²        General: AAO x3, no apparent distress  HEENT: no cervical LAD, no thyroid masses appreciated  CV: RRR, no m/r/g  Pulm: Lungs CTAB, no crackles, no wheezes  Abd: s/NT/ND +BS  Extremities: no c/c/e    Labs:     Lab Results   Component Value Date    WBC 6.67 01/23/2024    HGB 12.2 01/23/2024    HCT 38.2 01/23/2024    MCV 99 (H) 01/23/2024     01/23/2024     Lab Results   Component Value Date    OFUHPDJT09 320 02/25/2021     Lab Results   Component Value Date    FOLATE 16.0 02/25/2021       Sodium   Date Value Ref Range Status   01/23/2024 138 136 - 145 mmol/L Final     Potassium   Date Value Ref Range Status   01/23/2024 4.0 3.5 - 5.1 mmol/L Final     Chloride   Date Value Ref Range Status   01/23/2024 " 104 95 - 110 mmol/L Final     CO2   Date Value Ref Range Status   01/23/2024 25 23 - 29 mmol/L Final     Glucose   Date Value Ref Range Status   01/23/2024 91 70 - 110 mg/dL Final     BUN   Date Value Ref Range Status   01/23/2024 19 8 - 23 mg/dL Final     Creatinine   Date Value Ref Range Status   01/23/2024 0.9 0.5 - 1.4 mg/dL Final     Calcium   Date Value Ref Range Status   01/23/2024 10.0 8.7 - 10.5 mg/dL Final     Total Protein   Date Value Ref Range Status   01/23/2024 7.3 6.0 - 8.4 g/dL Final     Albumin   Date Value Ref Range Status   01/23/2024 4.1 3.5 - 5.2 g/dL Final     Total Bilirubin   Date Value Ref Range Status   01/23/2024 0.6 0.1 - 1.0 mg/dL Final     Comment:     For infants and newborns, interpretation of results should be based  on gestational age, weight and in agreement with clinical  observations.    Premature Infant recommended reference ranges:  Up to 24 hours.............<8.0 mg/dL  Up to 48 hours............<12.0 mg/dL  3-5 days..................<15.0 mg/dL  6-29 days.................<15.0 mg/dL       Alkaline Phosphatase   Date Value Ref Range Status   01/23/2024 40 (L) 55 - 135 U/L Final     AST   Date Value Ref Range Status   01/23/2024 28 10 - 40 U/L Final     ALT   Date Value Ref Range Status   01/23/2024 11 10 - 44 U/L Final     Anion Gap   Date Value Ref Range Status   01/23/2024 9 8 - 16 mmol/L Final     eGFR if    Date Value Ref Range Status   02/25/2021 >60.0 >60 mL/min/1.73 m^2 Final     eGFR if non    Date Value Ref Range Status   02/25/2021 53.3 (A) >60 mL/min/1.73 m^2 Final     Comment:     Calculation used to obtain the estimated glomerular filtration  rate (eGFR) is the CKD-EPI equation.        Lab Results   Component Value Date    HGBA1C 5.4 01/23/2024     Lab Results   Component Value Date    LDLCALC 93.2 02/07/2023     Lab Results   Component Value Date    TSH 0.906 01/23/2024         Assessment/Plan     Nicho was seen today for annual  exam.    Diagnoses and all orders for this visit:    Interstitial lung disease  As per HPI  Rec continued f/u c Pulm  Autoimmune w/u neg thus far  Pt to call pulm to schedule f/u visit.    Thyroid nodule  As per HPI  Will check u/s to further assess.  -     US Soft Tissue Head Neck; Future  -     TSH; Future    Hoarseness  As per HPI  Hoarseness noted on exam.  Will check thyroid u/s as above and refer to ENT for eval, former smoker  -     Ambulatory referral/consult to ENT; Future    Stage 3a chronic kidney disease  Cr last year slightly improved.    Hypertriglyceridemia  At goal on Fenofibrate on last check year's labs  Cont meds    Anemia of chronic disease  H/h wnl on last check  Will monitor  -     CBC Auto Differential; Future    Macrocytosis  Noted on previous labs  Will check B12 levels, previously low normal  -     Vitamin B12; Future    Malignant neoplasm of upper-outer quadrant of right breast in female, estrogen receptor positive  History of breast cancer  As per HPI  Repeat due this summer, will order.  -     Mammo Digital Diagnostic Left with Jatinder (XPD); Future    Pre-diabetes  Lab Results   Component Value Date    HGBA1C 5.4 01/23/2024     Normal on past few checks  Cont lifestyle modifications.  -     Hemoglobin A1C; Future  -     Comprehensive Metabolic Panel; Future  -     Lipid Panel; Future    Age-related osteoporosis without current pathological fracture  Referred to endo previously but never made appt   Advised to schedule at checkout  -     Ambulatory referral/consult to Endocrinology; Future    Mild vitamin D deficiency  As per HPI  Will send for f/u labs since none recent  -     Vitamin D; Future    Primary insomnia  Stable on Trazodone, pt states she takes most nights  -     traZODone (DESYREL) 50 MG tablet; Take 1 tablet (50 mg total) by mouth every evening.    Bilateral hearing loss, unspecified hearing loss type  -     Ambulatory referral/consult to Audiology; Future    Abnormal finding  of blood chemistry, unspecified  -     Lipid Panel; Future    Routine eye exam  -     Ambulatory referral/consult to Optometry; Future    Unspecified disturbances of skin sensation  -     Vitamin B12; Future    Screening mammogram, encounter for  -     Mammo Digital Diagnostic Left with Jatinder (XPD); Future        HM as above  RTC in 6mos, sooner if needed.    Azalea Walker MD  Department of Internal Medicine - [facility]  2025    Visit complexity inherent to evaluation and management associated with medical care services that serve as the continuing focal point for all needed health care services and/or with medical care services that are part of ongoing care related to a patient's single, serious condition or a complex condition provided today.           [1]   Social History  Tobacco Use    Smoking status: Former     Current packs/day: 0.00     Average packs/day: 0.5 packs/day for 40.0 years (20.0 ttl pk-yrs)     Types: Cigarettes     Start date: 1971     Quit date: 2011     Years since quittin.2    Smokeless tobacco: Never   Substance Use Topics    Alcohol use: Yes     Comment: one glass of wine every 2 months    Drug use: No

## 2025-04-10 ENCOUNTER — LAB VISIT (OUTPATIENT)
Dept: LAB | Facility: HOSPITAL | Age: 85
End: 2025-04-10
Attending: INTERNAL MEDICINE
Payer: MEDICARE

## 2025-04-10 ENCOUNTER — RESULTS FOLLOW-UP (OUTPATIENT)
Dept: INTERNAL MEDICINE | Facility: CLINIC | Age: 85
End: 2025-04-10

## 2025-04-10 DIAGNOSIS — E55.9 MILD VITAMIN D DEFICIENCY: ICD-10-CM

## 2025-04-10 DIAGNOSIS — R79.9 ABNORMAL FINDING OF BLOOD CHEMISTRY, UNSPECIFIED: ICD-10-CM

## 2025-04-10 DIAGNOSIS — E04.1 THYROID NODULE: ICD-10-CM

## 2025-04-10 DIAGNOSIS — D75.89 MACROCYTOSIS: ICD-10-CM

## 2025-04-10 DIAGNOSIS — R20.9 UNSPECIFIED DISTURBANCES OF SKIN SENSATION: ICD-10-CM

## 2025-04-10 DIAGNOSIS — D63.8 ANEMIA OF CHRONIC DISEASE: ICD-10-CM

## 2025-04-10 DIAGNOSIS — R73.03 PRE-DIABETES: ICD-10-CM

## 2025-04-10 LAB
25(OH)D3+25(OH)D2 SERPL-MCNC: 39 NG/ML (ref 30–96)
ABSOLUTE EOSINOPHIL (OHS): 0.14 K/UL
ABSOLUTE MONOCYTE (OHS): 0.48 K/UL (ref 0.3–1)
ABSOLUTE NEUTROPHIL COUNT (OHS): 2.39 K/UL (ref 1.8–7.7)
ALBUMIN SERPL BCP-MCNC: 3.9 G/DL (ref 3.5–5.2)
ALP SERPL-CCNC: 41 UNIT/L (ref 40–150)
ALT SERPL W/O P-5'-P-CCNC: 11 UNIT/L (ref 10–44)
ANION GAP (OHS): 7 MMOL/L (ref 8–16)
AST SERPL-CCNC: 17 UNIT/L (ref 11–45)
BASOPHILS # BLD AUTO: 0.04 K/UL
BASOPHILS NFR BLD AUTO: 0.8 %
BILIRUB SERPL-MCNC: 0.5 MG/DL (ref 0.1–1)
BUN SERPL-MCNC: 21 MG/DL (ref 8–23)
CALCIUM SERPL-MCNC: 9.7 MG/DL (ref 8.7–10.5)
CHLORIDE SERPL-SCNC: 105 MMOL/L (ref 95–110)
CHOLEST SERPL-MCNC: 165 MG/DL (ref 120–199)
CHOLEST/HDLC SERPL: 3.6 {RATIO} (ref 2–5)
CO2 SERPL-SCNC: 26 MMOL/L (ref 23–29)
CREAT SERPL-MCNC: 1 MG/DL (ref 0.5–1.4)
EAG (OHS): 111 MG/DL (ref 68–131)
ERYTHROCYTE [DISTWIDTH] IN BLOOD BY AUTOMATED COUNT: 12.7 % (ref 11.5–14.5)
GFR SERPLBLD CREATININE-BSD FMLA CKD-EPI: 56 ML/MIN/1.73/M2
GLUCOSE SERPL-MCNC: 90 MG/DL (ref 70–110)
HBA1C MFR BLD: 5.5 % (ref 4–5.6)
HCT VFR BLD AUTO: 38.6 % (ref 37–48.5)
HDLC SERPL-MCNC: 46 MG/DL (ref 40–75)
HDLC SERPL: 27.9 % (ref 20–50)
HGB BLD-MCNC: 12.3 GM/DL (ref 12–16)
IMM GRANULOCYTES # BLD AUTO: 0.02 K/UL (ref 0–0.04)
IMM GRANULOCYTES NFR BLD AUTO: 0.4 % (ref 0–0.5)
LDLC SERPL CALC-MCNC: 94.8 MG/DL (ref 63–159)
LYMPHOCYTES # BLD AUTO: 2.19 K/UL (ref 1–4.8)
MCH RBC QN AUTO: 31.5 PG (ref 27–31)
MCHC RBC AUTO-ENTMCNC: 31.9 G/DL (ref 32–36)
MCV RBC AUTO: 99 FL (ref 82–98)
NONHDLC SERPL-MCNC: 119 MG/DL
NUCLEATED RBC (/100WBC) (OHS): 0 /100 WBC
PLATELET # BLD AUTO: 294 K/UL (ref 150–450)
PMV BLD AUTO: 9.4 FL (ref 9.2–12.9)
POTASSIUM SERPL-SCNC: 3.9 MMOL/L (ref 3.5–5.1)
PROT SERPL-MCNC: 7.3 GM/DL (ref 6–8.4)
RBC # BLD AUTO: 3.91 M/UL (ref 4–5.4)
RELATIVE EOSINOPHIL (OHS): 2.7 %
RELATIVE LYMPHOCYTE (OHS): 41.6 % (ref 18–48)
RELATIVE MONOCYTE (OHS): 9.1 % (ref 4–15)
RELATIVE NEUTROPHIL (OHS): 45.4 % (ref 38–73)
SODIUM SERPL-SCNC: 138 MMOL/L (ref 136–145)
TRIGL SERPL-MCNC: 121 MG/DL (ref 30–150)
TSH SERPL-ACNC: 1.07 UIU/ML (ref 0.4–4)
VIT B12 SERPL-MCNC: 1006 PG/ML (ref 210–950)
WBC # BLD AUTO: 5.26 K/UL (ref 3.9–12.7)

## 2025-04-10 PROCEDURE — 36415 COLL VENOUS BLD VENIPUNCTURE: CPT

## 2025-04-10 PROCEDURE — 82607 VITAMIN B-12: CPT

## 2025-04-10 PROCEDURE — 85025 COMPLETE CBC W/AUTO DIFF WBC: CPT

## 2025-04-10 PROCEDURE — 80061 LIPID PANEL: CPT

## 2025-04-10 PROCEDURE — 80053 COMPREHEN METABOLIC PANEL: CPT

## 2025-04-10 PROCEDURE — 84443 ASSAY THYROID STIM HORMONE: CPT

## 2025-04-10 PROCEDURE — 82306 VITAMIN D 25 HYDROXY: CPT

## 2025-04-10 PROCEDURE — 83036 HEMOGLOBIN GLYCOSYLATED A1C: CPT

## 2025-04-15 ENCOUNTER — OFFICE VISIT (OUTPATIENT)
Dept: OTOLARYNGOLOGY | Facility: CLINIC | Age: 85
End: 2025-04-15
Payer: MEDICARE

## 2025-04-15 ENCOUNTER — CLINICAL SUPPORT (OUTPATIENT)
Dept: AUDIOLOGY | Facility: CLINIC | Age: 85
End: 2025-04-15
Payer: MEDICARE

## 2025-04-15 VITALS
SYSTOLIC BLOOD PRESSURE: 133 MMHG | WEIGHT: 178.69 LBS | DIASTOLIC BLOOD PRESSURE: 79 MMHG | BODY MASS INDEX: 33.76 KG/M2 | HEART RATE: 76 BPM

## 2025-04-15 DIAGNOSIS — R49.0 HOARSENESS: ICD-10-CM

## 2025-04-15 DIAGNOSIS — H90.3 SENSORINEURAL HEARING LOSS (SNHL) OF BOTH EARS: Primary | ICD-10-CM

## 2025-04-15 DIAGNOSIS — H91.93 BILATERAL HEARING LOSS, UNSPECIFIED HEARING LOSS TYPE: ICD-10-CM

## 2025-04-15 DIAGNOSIS — R09.82 POST-NASAL DRIP: Primary | ICD-10-CM

## 2025-04-15 PROCEDURE — 1125F AMNT PAIN NOTED PAIN PRSNT: CPT | Mod: CPTII,S$GLB,, | Performed by: NURSE PRACTITIONER

## 2025-04-15 PROCEDURE — 3078F DIAST BP <80 MM HG: CPT | Mod: CPTII,S$GLB,, | Performed by: NURSE PRACTITIONER

## 2025-04-15 PROCEDURE — 31575 DIAGNOSTIC LARYNGOSCOPY: CPT | Mod: S$GLB,,, | Performed by: NURSE PRACTITIONER

## 2025-04-15 PROCEDURE — 3075F SYST BP GE 130 - 139MM HG: CPT | Mod: CPTII,S$GLB,, | Performed by: NURSE PRACTITIONER

## 2025-04-15 PROCEDURE — 92567 TYMPANOMETRY: CPT | Mod: S$GLB,,,

## 2025-04-15 PROCEDURE — 99204 OFFICE O/P NEW MOD 45 MIN: CPT | Mod: 25,S$GLB,, | Performed by: NURSE PRACTITIONER

## 2025-04-15 PROCEDURE — 3288F FALL RISK ASSESSMENT DOCD: CPT | Mod: CPTII,S$GLB,, | Performed by: NURSE PRACTITIONER

## 2025-04-15 PROCEDURE — 1159F MED LIST DOCD IN RCRD: CPT | Mod: CPTII,S$GLB,, | Performed by: NURSE PRACTITIONER

## 2025-04-15 PROCEDURE — 99999 PR PBB SHADOW E&M-EST. PATIENT-LVL II: CPT | Mod: PBBFAC,,,

## 2025-04-15 PROCEDURE — 92557 COMPREHENSIVE HEARING TEST: CPT | Mod: S$GLB,,,

## 2025-04-15 PROCEDURE — 1101F PT FALLS ASSESS-DOCD LE1/YR: CPT | Mod: CPTII,S$GLB,, | Performed by: NURSE PRACTITIONER

## 2025-04-15 RX ORDER — FLUTICASONE PROPIONATE 50 MCG
1 SPRAY, SUSPENSION (ML) NASAL 2 TIMES DAILY
Qty: 18.2 ML | Refills: 3 | Status: SHIPPED | OUTPATIENT
Start: 2025-04-15

## 2025-04-15 NOTE — PROGRESS NOTES
OTOLARYNGOLOGY CLINIC NOTE  Date:  04/15/2025     Chief complaint:  Chief Complaint   Patient presents with    Consult     Referred by VARUN Traylor  hoarseness     History of Present Illness  Nicho Espinosa is a 84 y.o. female  presenting today for a new evaluation and treatment of changes to voice.  Pt has history of tremors and her voice has been changing over the past 1-2 years.  Pt reports her brother was recently diagnosed with laryngeal CA and is being treated now.  Pt reports this caused her concern she should have her vocal cords check.  Pt denies any sore throat and has a cough occasionally.  Pt reports she has recently started having post nasal drip.  Pt reports she was taking oral allergy medication but is not doing so now.  Pt reports she sometimes feels like certain meats are hard to swallow and she has been seen by GI for this who advised her she may need her throat stretched in the future.  Pt has history of smoking but quit about 14 years ago.      Review of medical records and prior documentation  Past medical records were reviewed with data pertinent to the chief complaint summarized in the HPI. Information obtained from review of medical records is attributed to respective sources in the HPI with reference to sources of information at their mention. Records reviewed included all recent notes from referring provider, primary care, and related subspecialty evaluations as available. This review of records was performed and additional data obtained to supplement history obtained from the patient and further inform medical decision making involved in formulating a plan of care accounting for all history and treatment relevant to the issues addressed.    Past Medical History  Past Medical History:   Diagnosis Date    Allergy     Arthritis     Blepharitis of both eyes     breast ca 7/18/2017    right    Complication of anesthesia     nausea    DDD (degenerative disc disease), cervical 9/19/2013     Degenerative disc disease     GERD (gastroesophageal reflux disease)     patient denies reflux    History of compression fracture of spine 4/10/2014    Hyperlipidemia     Lumbar disc disease     Meibomitis     Obesity 9/19/2013    Osteoporosis     Papilloma of eyelid     RUL    Postoperative anemia 11/21/2013    Thoracic or lumbosacral neuritis or radiculitis, unspecified 9/20/2013    Tremors of nervous system 2/2015      Past Surgical History  Past Surgical History:   Procedure Laterality Date    APPENDECTOMY      BREAST BIOPSY Right 2017    BREAST SURGERY      COLONOSCOPY N/A 11/1/2016    Procedure: COLONOSCOPY;  Surgeon: Candelario Oviedo MD;  Location: [facility] (4TH FLR);  Service: Endoscopy;  Laterality: N/A;  may use Prepopik or other low volume prep    COLONOSCOPY N/A 7/27/2023    Procedure: COLONOSCOPY;  Surgeon: Jacobo Fuentes MD;  Location: [facility] (4TH FLR);  Service: Colon and Rectal;  Laterality: N/A;  81 y/o-ok to schedule per Dr Low  referral Dr [id number]-[id number]-sdyej portal-GT  6/22 r/s -instr portal -ml  pre-call complete, pt confirmed 7/21    EXCISION OF GANGLION CYST OF HAND Left 2/26/2024    Procedure: LEFT LONG FINGER EXCISION, GANGLION CYST,;  Surgeon: Michelle Beth MD;  Location: [facility];  Service: Orthopedics;  Laterality: Left;  MAC/REGIONAL    FOOT SURGERY      JOINT REPLACEMENT Bilateral     2013, 2019    MASTECTOMY Right 08/2017    SHOULDER ARTHROSCOPY      left    SPINE SURGERY  1-12-15    RICH    TONSILLECTOMY      TOTAL KNEE ARTHROPLASTY        Medications  Medications Ordered Prior to Encounter[1]    Review of Systems  Review of Systems   Constitutional: Negative.    HENT: Negative.     Eyes: Negative.    Respiratory: Negative.     Cardiovascular: Negative.    Gastrointestinal: Negative.    Skin: Negative.       Social History   reports that she quit smoking about 14 years ago. Her smoking use included cigarettes. She started smoking about 54 years ago. She has a 20  pack-year smoking history. She has never used smokeless tobacco. She reports current alcohol use. She reports that she does not use drugs.     Family History  Family History   Problem Relation Name Age of Onset    Cancer Father          bone    Breast cancer Mother          never had biopsy    Dementia Mother          vascular    No Known Problems Brother      No Known Problems Brother      Stroke Daughter x1     Diabetes Daughter x1         was obese    Cancer Paternal Grandmother          skin cancer, unknown type    Cancer Paternal Uncle          stomach    Cancer Other          possibly other cancers in other relatives    Dementia Maternal Grandmother      Hypertension Neg Hx      Ovarian cancer Neg Hx      Melanoma Neg Hx        Physical Exam   Vitals:    04/15/25 1308   BP: 133/79   Pulse: 76    Body mass index is 33.76 kg/m².  Weight: 81.1 kg (178 lb 10.9 oz)       GENERAL: no acute distress.  HEAD: normocephalic.   EYES: lids and lashes normal. No scleral icterus  EARS: external ear without lesion, normal pinna shape and position.  External auditory canal with normal cerumen, tympanic membrane fully visible, no perforation , no retraction. No middle ear effusion.   NOSE: external nose without significant bony abnormality  ORAL CAVITY/OROPHARYNX: tongue midline and mobile. Symmetric palate rise.    NECK: trachea midline.   LYMPH NODES:No cervical lymphadenopathy.  RESPIRATORY: no stridor, no stertor. Voice raspy. Respirations nonlabored.  NEURO: alert, responds to questions appropriately.   PSYCH:mood appropriate    PROCEDURE NOTE  NAME OF PROCEDURE: Flexible Laryngoscopy, diagnostic  INDICATIONS: gag reflex precludes mirror exam, dysphonia // cough  FINDINGS:     Consent: After procedure was explained in detail and all questions answered, verbal consent was obtained for performing flexible laryngoscopy.  Anesthesia: topical 4% lidocaine and neosynephrine  Procedure: With patient in seated position, the scope  was inserted into the bilateral nasal passageway and advanced atraumatically into the nasopharynx to examine the following structures:  Nasal cavity: Turbinates with mild hypertrophy. No purulent drainage.   Nasopharynx: no mass or lesion noted in nasopharynx.   Oropharynx: base of tongue without  mass or ulceration. Lingual tonsils normal in appearance  Hypopharynx: posterior pharyngeal wall without mass or lesion. No pooling of secretions. Pyriform sinuses visible without mass or lesion  Larynx: epiglottis normal without lesion. False vocal folds without edema/erythema/lesion. True vocal folds mobile and without lesion. Mild interarytenoid edema no erythema . Postcricoid region with mild edema no lesion   Subglottis: visualized portion of subglottis normal in appearance    After examination performed, the scope was removed atraumatically . The patient tolerated the procedure well. Photodocumentation obtained with representative images below, all images and/or videos uploaded in media section of epic.     Imaging:  The patient does not have any pertinent and/or recent imaging of the head and neck.     Labs:  CBC  Recent Labs   Lab 02/07/23  0956 01/23/24  1011 04/10/25  0818   WBC 5.95 6.67 5.26   Hemoglobin 12.3 12.2  --    HGB  --   --  12.3   Hematocrit 37.9 38.2  --    HCT  --   --  38.6   MCV 98 99 H 99 H   Platelet Count  --   --  294   Platelets 361 329  --      BMP  Recent Labs   Lab 02/07/23  0956 01/23/24  1011 04/10/25  0818   Glucose 98 91  --    Sodium 138 138 138   Potassium 4.0 4.0 3.9   Chloride 103 104 105   CO2 22 L 25 26   BUN 19 19 21   Creatinine 1.1 0.9 1.0   Calcium 9.9 10.0 9.7     Assessment  1. Hoarseness  - Ambulatory referral/consult to ENT    2. Post-nasal drip     Plan:  Hoarseness:  Pt concerned about her changing voice since her brother is being treated for laryngeal CA.  Pt advised of her laryngoscopy results which were essentially normal with aging changes.  Pt advised she is being  referred to speech therapy for evaluation and treatment.  Pt advised to f/u in 1 year and prn for any additional ENT concerns.    Post nasal drip:  Pt advised of allergies being contributing factor.  Pt advised to start nasal spray regimen as advised and continue her oral antihistamine.  F/u prn.  Discussed plan of care with patient in detail and all questions answered. Patient reported understanding of plan of care.        [1]   Current Outpatient Medications on File Prior to Visit   Medication Sig Dispense Refill    albuterol (VENTOLIN HFA) 90 mcg/actuation inhaler Inhale 2 puffs into the lungs every 6 (six) hours as needed for Wheezing. Rescue 18 g 0    b complex vitamins capsule Take 1 capsule by mouth once daily.      calcium-vitamin D3 (OS-DIANDRA 500 + D3) 500 mg-5 mcg (200 unit) per tablet Take 1 tablet by mouth 3 (three) times a week.      cholecalciferol, vitamin D3, 1,000 unit capsule Take 3 capsules daily 90 capsule 11    docusate (COLACE) 50 mg/5 mL liquid Take 50 mg by mouth once daily.      fenofibrate micronized (LOFIBRA) 67 MG capsule TAKE 2 CAPSULES EVERY DAY WITH BREAKFAST 180 capsule 3    fluticasone propionate (FLONASE) 50 mcg/actuation nasal spray 1 spray by Each Nostril route once daily.      HYDROcodone-acetaminophen (NORCO) 5-325 mg per tablet Take 1/2  tablet 3 times per day prn      ketoconazole (NIZORAL) 2 % cream aaa bid prn flare 60 g 3    loratadine (CLARITIN) 10 mg tablet Take 10 mg by mouth once daily.      MULTIVIT WITH CALCIUM,IRON,MIN (WOMEN'S DAILY MULTIVITAMIN ORAL) Take by mouth.      polycarbophil (FIBERCON) 625 mg tablet Take 625 mg by mouth once daily.      propranoloL (INDERAL) 10 MG tablet Take 1 tablet (10 mg total) by mouth 3 (three) times daily. 270 tablet 3    traZODone (DESYREL) 50 MG tablet Take 1 tablet (50 mg total) by mouth every evening. 90 tablet 3    triamcinolone acetonide 0.025% (KENALOG) 0.025 % cream aaa qd to face prn flare.  Not more than 1-2 weeks straight  in same location 45 g 1    fluticasone-salmeterol diskus inhaler 100-50 mcg Inhale 1 puff into the lungs 2 (two) times daily. Controller (Patient not taking: Reported on 10/14/2024) 60 each 3     Current Facility-Administered Medications on File Prior to Visit   Medication Dose Route Frequency Provider Last Rate Last Admin    fentaNYL 50 mcg/mL injection 100 mcg  100 mcg Intravenous PRN Jamie Bello PA-C   50 mcg at 02/26/24 0746    midazolam (VERSED) 1 mg/mL injection 1 mg  1 mg Intravenous PRN Jamie Bello PA-C

## 2025-04-15 NOTE — Clinical Note
Angelito Walker, Please see the results of Ms. Espinosa's audiogram. Let me know if there are any questions! Best, Franco Johnson, CCC-A

## 2025-04-15 NOTE — PROGRESS NOTES
Nicho Espinosa, a 84 y.o. female, was seen today in the clinic for an audiologic evaluation. Ms. Espinosa reported decreased hearing in both ears. She reported difficulty especially in crowded environments. Patient denied tinnitus, otalgia, aural fullness, and dizziness.    Tympanometry revealed Type A tympanogram in the left ear. Tympanometry of the right ear could not be obtained due to an inability to maintain a hermetic seal. Audiogram results revealed mild sloping to moderately severe sensorineural hearing loss (SNHL) in the right ear and mild sloping to moderately severe SNHL in the left ear.  Speech reception thresholds were noted at 40 dBHL in the right ear and 40 dBHL in the left ear.  Speech discrimination scores were 92% in the right ear and 92% in the left ear.    The results of today's evaluation were discussed in detail with Ms. Espinosa. Hearing aids are recommended at this time. Patient reported understanding of all things discussed.    Recommendations:  Hearing aid consultation  Annual audiogram or sooner if change is perceived  Hearing protection in noise

## 2025-04-16 ENCOUNTER — CLINICAL SUPPORT (OUTPATIENT)
Dept: AUDIOLOGY | Facility: CLINIC | Age: 85
End: 2025-04-16
Payer: MEDICARE

## 2025-04-16 DIAGNOSIS — H90.3 SENSORINEURAL HEARING LOSS (SNHL), BILATERAL: Primary | ICD-10-CM

## 2025-04-16 NOTE — PROGRESS NOTES
Hearing Aid Consultation      Nicho Espinosa was seen today for a hearing aid consultation. Audiogram completed on 4/15/2025 indicated essentially moderate sloping to moderately severe sensorineural hearing loss in both ears.     We discussed the patient's hearing loss and how it impacts daily life in detail. Patient stated that she has difficulty understanding speech, especially when whispered; she often finds herself bluffing in these difficult listening situations. She also attends a bible study and has trouble understanding one of the leaders and group discussions in a large room. She has difficulty understanding speech on television and often uses closed captions.   Hearing aid options and recommendations were presented and discussed, including connectivity to her [employer] phone, technology levels, and bundled/itemized care packages. Recommended a pair of [employer] crowd[employer]eting Intent 3 hearing aids, and ears were sized for length 3 receivers. Patient selected the color 116.   Patient acknowledged understanding of the 30 day trial period  and the fact that we do not file insurance on behalf of the patient.    Patient was encouraged to verify her hearing aid benefit before proceeding with a hearing aid order. She was given contact information to call and place order if she decides to proceed.      Recommendations:  Patient will contact the clinic when she is ready to place a hearing aid order; review $250 non-refundable deposit before placing order.  Hearing protection in noise.  Annual audiologic evaluations.

## 2025-04-17 ENCOUNTER — HOSPITAL ENCOUNTER (OUTPATIENT)
Dept: RADIOLOGY | Facility: HOSPITAL | Age: 85
Discharge: HOME | End: 2025-04-17
Attending: INTERNAL MEDICINE
Payer: MEDICARE

## 2025-04-17 DIAGNOSIS — E04.1 THYROID NODULE: ICD-10-CM

## 2025-04-17 DIAGNOSIS — E78.1 HYPERTRIGLYCERIDEMIA: ICD-10-CM

## 2025-04-17 PROCEDURE — 76536 US EXAM OF HEAD AND NECK: CPT | Mod: TC

## 2025-04-17 PROCEDURE — 76536 US EXAM OF HEAD AND NECK: CPT | Mod: 26,,, | Performed by: RADIOLOGY

## 2025-04-17 RX ORDER — FENOFIBRATE 67 MG/1
CAPSULE ORAL
Qty: 180 CAPSULE | Refills: 3 | Status: SHIPPED | OUTPATIENT
Start: 2025-04-17

## 2025-04-17 NOTE — TELEPHONE ENCOUNTER
----- Message from Aura sent at 4/17/2025  9:54 AM CDT -----  Contact: Mobile  [phone]  Requesting an RX refill or new RX.Is this a refill or new RX: RefillRX name and strength  fenofibrate micronized (LOFIBRA) 67 MG capsuleIs this a 30 day or 90 day RX: 180Pharmacy name and phone # [id number] - [place], [place] - [address] AT [facility] [address] [address]Mitrx: [phone] Fax: [phone] The doctors have asked that we provide their patients with the following 2 reminders -- prescription refills can take up to 72 hours, and a friendly reminder that in the future you can use your [facility] account to request refills:

## 2025-04-17 NOTE — TELEPHONE ENCOUNTER
No care due was identified.  [facility] Embedded Care Due Messages. Reference number: [id number].   4/17/2025 10:09:16 AM CDT

## 2025-04-22 ENCOUNTER — TELEPHONE (OUTPATIENT)
Dept: INTERNAL MEDICINE | Facility: CLINIC | Age: 85
End: 2025-04-22
Payer: MEDICARE

## 2025-04-22 DIAGNOSIS — E04.1 THYROID NODULE: Primary | ICD-10-CM

## 2025-04-22 NOTE — TELEPHONE ENCOUNTER
Spoke to pt regarding thyroid u/s results.  Rec FNA and also u/s in a year to f/u other nodules.  Will refer to IR for FNA.  Explained procedure to pt.

## 2025-04-23 ENCOUNTER — TELEPHONE (OUTPATIENT)
Dept: INTERVENTIONAL RADIOLOGY/VASCULAR | Facility: CLINIC | Age: 85
End: 2025-04-23
Payer: MEDICARE

## 2025-04-29 ENCOUNTER — HOSPITAL ENCOUNTER (OUTPATIENT)
Dept: PULMONOLOGY | Facility: CLINIC | Age: 85
Discharge: HOME | End: 2025-04-29
Payer: MEDICARE

## 2025-04-29 DIAGNOSIS — J44.9 CHRONIC OBSTRUCTIVE PULMONARY DISEASE, UNSPECIFIED COPD TYPE: ICD-10-CM

## 2025-04-29 LAB
DLCO ADJ PRE: 6.69 ML/(MIN*MMHG) (ref 11.64–23.11)
DLCO SINGLE BREATH LLN: 11.64
DLCO SINGLE BREATH PRE REF: 37.2 %
DLCO SINGLE BREATH REF: 17.37
DLCOC SBVA LLN: 2.35
DLCOC SBVA PRE REF: 57.5 %
DLCOC SBVA REF: 3.92
DLCOC SINGLE BREATH LLN: 11.64
DLCOC SINGLE BREATH PRE REF: 38.5 %
DLCOC SINGLE BREATH REF: 17.37
DLCOCSBVAULN: 5.48
DLCOCSINGLEBREATHULN: 23.11
DLCOCSINGLEBREATHZSCORE: -3.06
DLCOSINGLEBREATHULN: 23.11
DLCOSINGLEBREATHZSCORE: -3.13
DLCOVA LLN: 2.35
DLCOVA PRE REF: 55.5 %
DLCOVA PRE: 2.17 ML/(MIN*MMHG*L) (ref 2.35–5.48)
DLCOVA REF: 3.92
DLCOVAULN: 5.48
DLVAADJ PRE: 2.25 ML/(MIN*MMHG*L) (ref 2.35–5.48)
ERV LLN: -16449.59
ERV PRE REF: 47.4 %
ERV REF: 0.41
ERVULN: ABNORMAL
FEF 25 75 LLN: 0.6
FEF 25 75 PRE REF: 61.1 %
FEF 25 75 REF: 2
FET100 CHG: 0.2 %
FEV05 LLN: 0.51
FEV05 REF: 1.36
FEV1 CHG: 8.2 %
FEV1 FVC LLN: 62
FEV1 FVC PRE REF: 101.3 %
FEV1 FVC REF: 77
FEV1 LLN: 1.13
FEV1 PRE REF: 78.2 %
FEV1 REF: 1.65
FEV1 VOL CHG: 0.11
FEV1FVCZSCORE: 0.11
FEV1ZSCORE: -1.16
FRCPLETH LLN: 1.73
FRCPLETH PREREF: 98.8 %
FRCPLETH REF: 2.55
FRCPLETHULN: 3.38
FVC CHG: 7.2 %
FVC LLN: 1.49
FVC PRE REF: 76.1 %
FVC REF: 2.17
FVC VOL CHG: 0.12
FVCZSCORE: -1.25
IVC PRE: 1.62 L (ref 1.49–2.89)
IVC SINGLE BREATH LLN: 1.49
IVC SINGLE BREATH PRE REF: 74.4 %
IVC SINGLE BREATH REF: 2.17
IVCSINGLEBREATHULN: 2.89
LLN IC: -16448.48
PEF LLN: 2.39
PEF PRE REF: 83.7 %
PEF REF: 3.94
PHYSICIAN COMMENT: ABNORMAL
POST FEF 25 75: 1.26 L/S (ref 0.6–3.4)
POST FET 100: 6.44 SEC
POST FEV1 FVC: 78.69 % (ref 61.62–90.33)
POST FEV1: 1.4 L (ref 1.13–2.14)
POST FEV5: 1.16 L (ref 0.51–2.22)
POST FVC: 1.77 L (ref 1.49–2.89)
POST PEF: 4.1 L/S (ref 2.39–5.5)
PRE DLCO: 6.45 ML/(MIN*MMHG) (ref 11.64–23.11)
PRE ERV: 0.19 L (ref -16449.59–16450.41)
PRE FEF 25 75: 1.22 L/S (ref 0.6–3.4)
PRE FET 100: 6.42 SEC
PRE FEV05 REF: 77 %
PRE FEV1 FVC: 77.92 % (ref 61.62–90.33)
PRE FEV1: 1.29 L (ref 1.13–2.14)
PRE FEV5: 1.05 L (ref 0.51–2.22)
PRE FRC PL: 2.52 L (ref 1.73–3.38)
PRE FVC: 1.65 L (ref 1.49–2.89)
PRE IC: 1.46 L (ref -16448.48–16451.52)
PRE PEF: 3.3 L/S (ref 2.39–5.5)
PRE REF IC: 96.3 %
PRE RV: 2.33 L (ref 1.57–2.72)
PRE TLC: 3.98 L (ref 3.45–5.42)
RAW PRE REF: 143.4 %
RAW PRE: 4.39 CMH2O*S/L (ref 3.06–3.06)
RAW REF: 3.06
REF IC: 1.52
RV LLN: 1.57
RV PRE REF: 108.5 %
RV REF: 2.15
RVTLC LLN: 38
RVTLC PRE REF: 123.1 %
RVTLC PRE: 58.49 % (ref 37.93–57.11)
RVTLC REF: 48
RVTLCULN: 57
RVULN: 2.72
SGAW PRE REF: 78.5 %
SGAW PRE: 0.08 1/(CMH2O*S) (ref 0.1–0.1)
SGAW REF: 0.1
TLC LLN: 3.45
TLC PRE REF: 89.8 %
TLC REF: 4.44
TLC ULN: 5.42
TLCZSCORE: -0.75
ULN IC: ABNORMAL
VA PRE: 2.97 L (ref 4.29–4.29)
VA SINGLE BREATH LLN: 4.29
VA SINGLE BREATH PRE REF: 69.3 %
VA SINGLE BREATH REF: 4.29
VASINGLEBREATHULN: 4.29
VC LLN: 1.49
VC PRE REF: 76.1 %
VC PRE: 1.65 L (ref 1.49–2.89)
VC REF: 2.17
VC ULN: 2.89

## 2025-04-29 PROCEDURE — 94729 DIFFUSING CAPACITY: CPT | Mod: S$GLB,,, | Performed by: INTERNAL MEDICINE

## 2025-04-29 PROCEDURE — 94726 PLETHYSMOGRAPHY LUNG VOLUMES: CPT | Mod: S$GLB,,, | Performed by: INTERNAL MEDICINE

## 2025-04-29 PROCEDURE — 94060 EVALUATION OF WHEEZING: CPT | Mod: S$GLB,,, | Performed by: INTERNAL MEDICINE

## 2025-04-29 NOTE — PROGRESS NOTES
"Subjective:      Patient ID: Nicho Espinosa is a 84 y.o. female.    Chief Complaint:  Age-related osteoporosis without current pathological fract    History of Present Illness  Nicho Espinosa is here for initial of osteoporosis.  Referred by Dr. Cheney 4/8/2025.  This is their first visit with me.      With regards to osteoporosis:     Bone Density exclude T4 due to implanted hardware from 2017 to 2024.     BMD: 1/2024  COMPARISON:  Comparison study done on 03/12/2021.  Lumbar spine:     BMD 1.399 g/cm2 and T-score 3.5.  Total Hip:           BMD 0.878 g/cm2 and T-score -0.5.  Distal 1/3 radius: Not applicable     FINDINGS:  Lumbar spine (L1-L4):               BMD is 1.385 g/cm2, T-score is 3.3, and Z-score is 6.1.  Total hip:                                BMD is 0.862 g/cm2, T-score is -0.7, and Z-score is 1.6.  Femoral neck:                          BMD is 0.682 g/cm2, T-score is -1.5, and Z-score is 0.9.  Distal 1/3 radius:                      Not applicable     FRAX:  18% risk of a major osteoporotic fracture in the next 10 years.  4.2% risk of hip fracture in the next 10 years.     Impression:  *Osteoporosis based on T-score between -1.0 and -2.5 and elevated risk based on FRAX  *Fracture risk is very high due to history of multiple fractures  *Compared with previous DXA, BMD at the lumbar spine has remained stable, and BMD at the total hip has remained stable.    Medications:    Fosamax 2013 - 2021  Stopped taking due to concern for side effects  Calcium and vitamin D intake: MVI and dietary    Weight bearing exercise: Stays active with yard work and water aerobics  Falls:11/2024 fell from a trip in parking lot denies fracture  Fractures: Denies recent fracture  Significant height loss (>2 inches): Used to be 5'3"    Family history: Mother and maternal aunt    Menopause: 40s denies HRT    Tobacco Use: History when younger quit 15 years ago  Alcohol Use: Denies  Glucocorticoid History: Denies  Anticoagulant " "Use: Alberto  GERD/PPI Use: GERD with occasional use of TUMS  History of Malabsorption: Denies  Antiseizure Medications: Denies  History of Thyroid Disease: Denies  Kidney Disease: Stage 3 kidney disease  Personal history of kidney stones: Denies  Family history of kidney stones: Father with kidney stones  MI: Denies  Stroke: Denies  Dental Visit: UTD  Cancer Hx:  Breast cancer  Radiation Treatment:  Denies    Denies point tenderness along spine  Denies bilateral hip tenderness  Ambulates Independently    Lab Results   Component Value Date    CALCIUM 9.7 04/10/2025    PHOS 4.7 (H) 01/18/2015     Vitamin D   Date Value Ref Range Status   04/10/2025 39 30 - 96 ng/mL Final     Comment:     Vitamin D deficiency.........<10 ng/mL                              Vitamin D insufficiency......10-29 ng/mL       Vitamin D sufficiency........> or equal to 30 ng/mL  Vitamin D toxicity............>100 ng/mL       Vit D, 25-Hydroxy   Date Value Ref Range Status   02/19/2018 61 30 - 96 ng/mL Final     Comment:     Vitamin D deficiency.........<10 ng/mL                              Vitamin D insufficiency......10-29 ng/mL       Vitamin D sufficiency........> or equal to 30 ng/mL  Vitamin D toxicity............>100 ng/mL         With regards to thyroid;    MNG with FNA recommended being followed per ENT with bx scheduled.    Review of Systems - As above    Objective:   Physical Exam  Constitutional:       Appearance: She is well-developed. She is obese.   HENT:      Head: Normocephalic.   Eyes:      Conjunctiva/sclera: Conjunctivae normal.   Pulmonary:      Effort: Pulmonary effort is normal.   Musculoskeletal:         General: Normal range of motion.   Skin:     General: Skin is warm.   Neurological:      Mental Status: She is alert and oriented to person, place, and time.         Visit Vitals  BP (!) 142/82 (BP Location: Left arm, Patient Position: Sitting)   Pulse 60   Ht 5' 1" (1.549 m)   Wt 80.6 kg (177 lb 11.1 oz)   LMP 07/05/1987 "   SpO2 (!) 90%   BMI 33.57 kg/m²       Body mass index is 33.57 kg/m².    Lab Review:   Lab Results   Component Value Date    HGBA1C 5.5 04/10/2025    HGBA1C 5.4 01/23/2024    HGBA1C 5.6 02/07/2023       Lab Results   Component Value Date    CHOL 165 04/10/2025    HDL 46 04/10/2025    LDLCALC 94.8 04/10/2025    TRIG 121 04/10/2025    CHOLHDL 27.9 04/10/2025     Lab Results   Component Value Date     04/10/2025    K 3.9 04/10/2025     04/10/2025    CO2 26 04/10/2025    GLU 90 04/10/2025    BUN 21 04/10/2025    CREATININE 1.0 04/10/2025    CALCIUM 9.7 04/10/2025    PROT 7.3 04/10/2025    ALBUMIN 3.9 04/10/2025    BILITOT 0.5 04/10/2025    ALKPHOS 41 04/10/2025    AST 17 04/10/2025    ALT 11 04/10/2025    ANIONGAP 7 (L) 04/10/2025    ESTGFRAFRICA >60.0 02/25/2021    EGFRNONAA 53.3 (A) 02/25/2021    TSH 1.067 04/10/2025     Vitamin D   Date Value Ref Range Status   04/10/2025 39 30 - 96 ng/mL Final     Comment:     Vitamin D deficiency.........<10 ng/mL                              Vitamin D insufficiency......10-29 ng/mL       Vitamin D sufficiency........> or equal to 30 ng/mL  Vitamin D toxicity............>100 ng/mL       Vit D, 25-Hydroxy   Date Value Ref Range Status   02/19/2018 61 30 - 96 ng/mL Final     Comment:     Vitamin D deficiency.........<10 ng/mL                              Vitamin D insufficiency......10-29 ng/mL       Vitamin D sufficiency........> or equal to 30 ng/mL  Vitamin D toxicity............>100 ng/mL       Assessment and Plan     1. Age-related osteoporosis without current pathological fracture  Ambulatory referral/consult to Endocrinology      2. Mild vitamin D deficiency        3. Gastroesophageal reflux disease, unspecified whether esophagitis present        4. Age-related osteoporosis with current pathological fracture, initial encounter            Mild vitamin D deficiency  Continue vitamin D replacement    GERD (gastroesophageal reflux disease)  Will avoid oral  bisphosphonate.    Age-related osteoporosis with current pathological fracture  Osteoporosis based on low T scores and elevated FRAX with history of vertebral compression fracture.  Reviewed basics of quantity versus quality  Reassuring they are not fracturing   RDA of calcium and Vitamin D discussed  Fall precautions emphasized  Weight bearing exercises encouraged  Treatment options and potential side effects discussed for Reclast, Prolia,   Discussed the risk of osteonecrosis of the jaw with anti resorptive therapy, with incidence estimated from 1-10/714508 treated patients. Discussed that the incidence of ONJ is higher in patients undergoing invasive dental surgery (excludes routine cleaning, fillings or root canals). Dental work should ideally be completed prior to initiation of treatment; and to alert your dentist/oral surgeon if you are planning any invasive dental work while on one of these agents. Preventative measures such as good oral hygiene are encouraged.  Discussed the risk of atypical femur fractures with anti resorptive therapy. The exact incidence is unknown, but has been estimated at approximately 1 in [id number] patients treated with oral bisphosphonates and increasing incidence was correlated with increased duration of bisphosphonate use. Despite this risk, the significant benefit on fracture reduction far outweighs the potential for this rare event.  Alerted that if dental work needs to be done it should be done prior to initiating therapy. Dental health: UTD  Repeat DEXA scan 1/2026  Treatment:  Recommend restating osteoporosis treatment with Reclast x 3 doses followed by drug holiday      Follow up in about 1 year (around 5/1/2026).    [facility]     Case discussed with Dr. Elliott.  Recommendations were discussed with the patient in detail.  The patient verbalized understanding and agrees with the plan outlined as above.     Visit today included increased complexity  associated with the care of the problems addressed and managing the longitudinal care of the patient due to the serious and/or complex managed problems.

## 2025-04-29 NOTE — ASSESSMENT & PLAN NOTE
Osteoporosis based on low T scores and elevated FRAX with history of vertebral compression fracture.  Reviewed basics of quantity versus quality  Reassuring they are not fracturing   RDA of calcium and Vitamin D discussed  Fall precautions emphasized  Weight bearing exercises encouraged  Treatment options and potential side effects discussed for Reclast, Prolia,   Discussed the risk of osteonecrosis of the jaw with anti resorptive therapy, with incidence estimated from 1-10/800519 treated patients. Discussed that the incidence of ONJ is higher in patients undergoing invasive dental surgery (excludes routine cleaning, fillings or root canals). Dental work should ideally be completed prior to initiation of treatment; and to alert your dentist/oral surgeon if you are planning any invasive dental work while on one of these agents. Preventative measures such as good oral hygiene are encouraged.  Discussed the risk of atypical femur fractures with anti resorptive therapy. The exact incidence is unknown, but has been estimated at approximately 1 in 49987 patients treated with oral bisphosphonates and increasing incidence was correlated with increased duration of bisphosphonate use. Despite this risk, the significant benefit on fracture reduction far outweighs the potential for this rare event.  Alerted that if dental work needs to be done it should be done prior to initiating therapy. Dental health: UTD  Repeat DEXA scan 1/2026  Treatment:  Recommend restating osteoporosis treatment with Reclast x 3 doses followed by drug holiday

## 2025-04-30 ENCOUNTER — PATIENT MESSAGE (OUTPATIENT)
Dept: CRITICAL CARE MEDICINE | Facility: HOSPITAL | Age: 85
End: 2025-04-30
Payer: MEDICARE

## 2025-04-30 DIAGNOSIS — J84.9 INTERSTITIAL LUNG DISEASE: Primary | ICD-10-CM

## 2025-04-30 NOTE — TELEPHONE ENCOUNTER
Patient's most recent PFTs show stability of FVC and increase in TLC and DLCO.  We will continue to monitor her PFTs every 6 months.  I discussed this plan with the patient by telephone.  She is in agreement verbalized understanding.  I advised her to call me if her breathing worsens in any way.    Wesly Ji MD   Pulmonary and critical care medicine

## 2025-05-01 ENCOUNTER — OFFICE VISIT (OUTPATIENT)
Dept: ENDOCRINOLOGY | Facility: CLINIC | Age: 85
End: 2025-05-01
Payer: MEDICARE

## 2025-05-01 VITALS
BODY MASS INDEX: 33.55 KG/M2 | DIASTOLIC BLOOD PRESSURE: 82 MMHG | HEART RATE: 60 BPM | WEIGHT: 177.69 LBS | OXYGEN SATURATION: 90 % | HEIGHT: 61 IN | SYSTOLIC BLOOD PRESSURE: 142 MMHG

## 2025-05-01 DIAGNOSIS — M81.0 AGE-RELATED OSTEOPOROSIS WITHOUT CURRENT PATHOLOGICAL FRACTURE: Primary | ICD-10-CM

## 2025-05-01 DIAGNOSIS — E55.9 MILD VITAMIN D DEFICIENCY: ICD-10-CM

## 2025-05-01 DIAGNOSIS — M80.00XA AGE-RELATED OSTEOPOROSIS WITH CURRENT PATHOLOGICAL FRACTURE, INITIAL ENCOUNTER: ICD-10-CM

## 2025-05-01 DIAGNOSIS — K21.9 GASTROESOPHAGEAL REFLUX DISEASE, UNSPECIFIED WHETHER ESOPHAGITIS PRESENT: ICD-10-CM

## 2025-05-01 PROCEDURE — 99999 PR PBB SHADOW E&M-EST. PATIENT-LVL V: CPT | Mod: PBBFAC,,,

## 2025-05-01 RX ORDER — HEPARIN 100 UNIT/ML
500 SYRINGE INTRAVENOUS
OUTPATIENT
Start: 2025-05-01

## 2025-05-01 RX ORDER — SODIUM CHLORIDE 0.9 % (FLUSH) 0.9 %
10 SYRINGE (ML) INJECTION
OUTPATIENT
Start: 2025-05-01

## 2025-05-01 RX ORDER — ZOLEDRONIC ACID 5 MG/100ML
5 INJECTION, SOLUTION INTRAVENOUS
OUTPATIENT
Start: 2025-05-01

## 2025-05-01 NOTE — ASSESSMENT & PLAN NOTE
Osteoporosis based on low T scores and elevated FRAX with history of vertebral compression fracture.  Reviewed basics of quantity versus quality  Reassuring they are not fracturing   RDA of calcium and Vitamin D discussed  Fall precautions emphasized  Weight bearing exercises encouraged  Treatment options and potential side effects discussed for Reclast, Prolia,   Discussed the risk of osteonecrosis of the jaw with anti resorptive therapy, with incidence estimated from 1-10/162701 treated patients. Discussed that the incidence of ONJ is higher in patients undergoing invasive dental surgery (excludes routine cleaning, fillings or root canals). Dental work should ideally be completed prior to initiation of treatment; and to alert your dentist/oral surgeon if you are planning any invasive dental work while on one of these agents. Preventative measures such as good oral hygiene are encouraged.  Discussed the risk of atypical femur fractures with anti resorptive therapy. The exact incidence is unknown, but has been estimated at approximately 1 in [id number] patients treated with oral bisphosphonates and increasing incidence was correlated with increased duration of bisphosphonate use. Despite this risk, the significant benefit on fracture reduction far outweighs the potential for this rare event.  Alerted that if dental work needs to be done it should be done prior to initiating therapy. Dental health: UTD  Repeat DEXA scan 1/2026  Treatment:  Recommend restating osteoporosis treatment with Reclast x 3 doses followed by drug holiday

## 2025-05-01 NOTE — PATIENT INSTRUCTIONS
With regards to bone health;  Start Reclast x3 doses    Osteoporosis medications  These are the medications we discussed for osteoporosis treatment:    - Bisphosphonates:         - these slow down bone loss         - oral forms (Fosamax and Actonel are examples) - taken once weekly or once monthly         - IV form (Reclast) - given intravenously once yearly    For more information on medications:  https://www.nof.org/patients/treatment/medicationadherence/   Home - [employer] (www.bonehealthandosteoporosis.org)    Infusion Center:  [phone]    For calcium intake:  I advise you get 1200 mg per day of calcium, split up over the day into 2 to 4 divided doses.  This amount includes calcium from both dietary sources and/or calcium supplements, and it is best to get smaller amounts throughout the day rather than all of the calcium at one time; this allows for better absorption of the calcium.     To estimate calcium content from a nutrition label, the label lists the % calcium in that food.   For example, the label for an 8 oz glass of milk lists the calcium content as 30%.  This is equivalent to 300 mg of calcium (multiply the % by 10 to get the mg of calcium per serving).  It is best to try to get the majority of calcium intake from the diet.  I've included a table below of some calcium-rich foods.    If you are not getting enough calcium in the diet, then you can add low doses of calcium supplements.  For calcium supplements, your body can only absorb about 500-600 mg of calcium at one time.  Thus, it is best to split the tablets up over the course of a day.  It is also best to avoid taking calcium supplements at the same time as a calcium-rich food to maximize your calcium absorption.  Calcium carbonate is best taken with or after a meal.  Calcium citrate can be taken on an empty stomach or with/after a meal.  Please look at any multivitamin you are taking as these often usually  contain calcium as well.      Estimated Calcium Content of Foods:  Produce  Serving Size Estimated Calcium*    Shanti greens, frozen 8 oz 360 mg   Broccoli dinah 8 oz 200 mg   Kale, frozen 8 oz 180 mg   Soy Beans, green, boiled 8 oz 175 mg   Bok Irene, cooked, boiled 8 oz 160 mg   Figs, dried 2 figs 65 mg   Broccoli, fresh, cooked 8 oz 60 mg   Oranges 1 whole 55 mg   Seafood Serving Size Estimated Calcium*    Sardines, canned with bones 3 oz 325 mg   [place], canned with bones 3 oz 180 mg   Shrimp, canned 3 oz 125 mg   Dairy Serving Size Estimated Calcium*    Ricotta, part-skim 4 oz 335 mg   Yogurt, plain, low-fat 6 oz 310 mg   Milk, skim, low-fat, whole 8 oz 300 mg   Yogurt with fruit, low-fat 6 oz 260 mg   Mozzarella, part-skim 1 oz 210 mg   Cheddar 1 oz 205 mg   Yogurt, Greek 6 oz 200 mg   American Cheese 1 oz 195 mg   Feta Cheese 4 oz 140 mg   Cottage Cheese, 2% 4 oz 105 mg   Frozen yogurt, vanilla 8 oz 105 mg   Ice Cream, vanilla 8 oz 85 mg   Parmesan 1 tbsp 55 mg   Fortified Food Serving Size Estimated Calcium*   [place] milk, rice milk or soy milk, fortified 8 oz 300 mg   Orange juice and other fruit juices, fortified 8 oz 300 mg   Tofu, prepared with calcium 4 oz 205 mg   Waffle, frozen, fortified 2 pieces 200 mg   Oatmeal, fortified 1 packet 140 mg   English muffin, fortified 1 muffin 100 mg   Cereal, fortified 8 oz 100-1,000 mg   Other Serving Size Estimated Calcium*   Mac & cheese, frozen 1 package 325 mg   Pizza, cheese, frozen 1 serving 115 mg   Pudding, chocolate, prepared with 2% milk 4 oz 160 mg   Beans, baked, canned 4 oz 160 mg   *The calcium content listed for most foods is estimated and can vary due to multiple factors. Check the food label to determine how much calcium is in a particular product.  If you read the nutrition label for a food source, it lists the % calcium in that food.  For an 8 oz glass of milk, for example, the label states calcium 30%.  This is equivalent to 300 mg of calcium  (multiply the listed number by 10).   **Table from the National Osteoporosis Foundation

## 2025-05-02 ENCOUNTER — PATIENT MESSAGE (OUTPATIENT)
Dept: INTERVENTIONAL RADIOLOGY/VASCULAR | Facility: HOSPITAL | Age: 85
End: 2025-05-02
Payer: MEDICARE

## 2025-05-02 NOTE — NURSING
V/M: advised to arrive at 8:30, where to check in, no need to fast, may drive self, take medications as usual, bring list of current home meds. Unable to confirm four allergies or blood thinner use.

## 2025-05-05 ENCOUNTER — HOSPITAL ENCOUNTER (OUTPATIENT)
Dept: INTERVENTIONAL RADIOLOGY/VASCULAR | Facility: HOSPITAL | Age: 85
Discharge: HOME | End: 2025-05-05
Attending: INTERNAL MEDICINE
Payer: MEDICARE

## 2025-05-05 VITALS
BODY MASS INDEX: 33.42 KG/M2 | HEART RATE: 56 BPM | TEMPERATURE: 98 F | WEIGHT: 177 LBS | HEIGHT: 61 IN | OXYGEN SATURATION: 93 % | SYSTOLIC BLOOD PRESSURE: 167 MMHG | DIASTOLIC BLOOD PRESSURE: 77 MMHG | RESPIRATION RATE: 19 BRPM

## 2025-05-05 DIAGNOSIS — E04.1 THYROID NODULE: ICD-10-CM

## 2025-05-05 PROCEDURE — 10005 FNA BX W/US GDN 1ST LES: CPT | Performed by: STUDENT IN AN ORGANIZED HEALTH CARE EDUCATION/TRAINING PROGRAM

## 2025-05-05 PROCEDURE — 88173 CYTOPATH EVAL FNA REPORT: CPT | Mod: TC | Performed by: INTERNAL MEDICINE

## 2025-05-05 PROCEDURE — 63600175 PHARM REV CODE 636 W HCPCS: Performed by: PHYSICIAN ASSISTANT

## 2025-05-05 RX ORDER — LIDOCAINE HYDROCHLORIDE 10 MG/ML
INJECTION, SOLUTION INFILTRATION; PERINEURAL
Status: COMPLETED | OUTPATIENT
Start: 2025-05-05 | End: 2025-05-05

## 2025-05-05 RX ADMIN — LIDOCAINE HYDROCHLORIDE 5 ML: 10 INJECTION, SOLUTION INFILTRATION; PERINEURAL at 09:05

## 2025-05-05 NOTE — SEDATION DOCUMENTATION
Pt arrived to C ARM room for FNA of left thyroid. Pt oriented to unit and staff. Plan of care reviewed with patient, patient verbalizes understanding. Fall risk reviewed with patient, fall risk interventions maintained. Safety strap applied, positioner pillows utilized to minimize pressure points. Blankets applied. Pt prepped and draped utilizing standard sterile technique. Patient placed on continuous monitoring, as required by sedation policy. Timeouts completed utilizing standard universal time-out, per department and facility policy. RN to remain at bedside, continuous monitoring maintained. Pt resting comfortably. Denies pain/discomfort. Will continue to monitor. See flow sheets for monitoring, medication administration, and updates.

## 2025-05-05 NOTE — PROCEDURES
Interventional Radiology Immediate Post-Procedure Note    Pre-Op Diagnosis: Thyroid Nodule  Post-Op Diagnosis: Same    Procedure:     Procedure performed by: Jamila Tabares PA-C  Assistants: None    Estimated Blood Loss: Minimal  Specimen Removed: Yes    Findings/description of procedure:  25 g FNA x 2 passes made through nodule in the mid left lobe.  Adequacy of specimen confirmed.    No immediate complications. Patient tolerated procedure well. Please see full dictated procedure report for additional details and recommendations.      Cindy Chava, PA-C [facility]

## 2025-05-05 NOTE — DISCHARGE SUMMARY
Interventional Radiology Short Stay Discharge Summary      Admit Date: 5/5/2025  Discharge Date: 05/05/2025     Hospital Course: Uneventful    Discharge Diagnosis: multinodular thyroid    Discharge Condition: Stable    Discharge Disposition: Home    Diet: Resume prior diet    Activity: activity as tolerated    Follow-up: With referring provider    Cindy Chava, PA-C [facility]

## 2025-05-05 NOTE — H&P
Interventional Radiology Pre-Procedure History & Physical      Chief Complaint/Reason for Referral: thyroid nodule    History of Present Illness:  Nicho Espinosa is a 84 y.o. female who presents for FNA    Past Medical History:   Diagnosis Date    Allergy     Arthritis     Blepharitis of both eyes     breast ca 7/18/2017    right    Complication of anesthesia     nausea    DDD (degenerative disc disease), cervical 9/19/2013    Degenerative disc disease     GERD (gastroesophageal reflux disease)     patient denies reflux    History of compression fracture of spine 4/10/2014    Hyperlipidemia     Lumbar disc disease     Meibomitis     Obesity 9/19/2013    Osteoporosis     Papilloma of eyelid     RUL    Postoperative anemia 11/21/2013    Thoracic or lumbosacral neuritis or radiculitis, unspecified 9/20/2013    Tremors of nervous system 2/2015     Past Surgical History:   Procedure Laterality Date    APPENDECTOMY      BREAST BIOPSY Right 2017    BREAST SURGERY      COLONOSCOPY N/A 11/1/2016    Procedure: COLONOSCOPY;  Surgeon: Candelario Oviedo MD;  Location: [facility] (4TH FLR);  Service: Endoscopy;  Laterality: N/A;  may use Prepopik or other low volume prep    COLONOSCOPY N/A 7/27/2023    Procedure: COLONOSCOPY;  Surgeon: Jacobo Fuentes MD;  Location: [facility] (4TH FLR);  Service: Colon and Rectal;  Laterality: N/A;  79 y/o-ok to schedule per Dr Low  referral Dr [id number] portal-GT  6/22 r/s -instr portal -ml  pre-call complete, pt confirmed 7/21    EXCISION OF GANGLION CYST OF HAND Left 2/26/2024    Procedure: LEFT LONG FINGER EXCISION, GANGLION CYST,;  Surgeon: Michelle Beth MD;  Location: [facility];  Service: Orthopedics;  Laterality: Left;  MAC/REGIONAL    FOOT SURGERY      JOINT REPLACEMENT Bilateral     2013, 2019    MASTECTOMY Right 08/2017    SHOULDER ARTHROSCOPY      left    SPINE SURGERY  1-12-15    RICH    TONSILLECTOMY      TOTAL KNEE ARTHROPLASTY         Allergies:   Review of  patient's allergies indicates:   Allergen Reactions    Sulfa (sulfonamide antibiotics) Other (See Comments)     Yeast infection and irritation    Ciprofloxacin      Rash    Latex, natural rubber     Adhesive Hives and Itching       Home Meds:   Prior to Admission medications    Medication Sig Start Date End Date Taking? Authorizing Provider   b complex vitamins capsule Take 1 capsule by mouth once daily.   Yes Provider, Historical   calcium-vitamin D3 (OS-DIANDRA 500 + D3) 500 mg-5 mcg (200 unit) per tablet Take 1 tablet by mouth 3 (three) times a week.   Yes Provider, Historical   cholecalciferol, vitamin D3, 1,000 unit capsule Take 3 capsules daily 2/1/17  Yes Arleen Lara MD   docusate (COLACE) 50 mg/5 mL liquid Take 50 mg by mouth once daily.   Yes Provider, Historical   fenofibrate micronized (LOFIBRA) 67 MG capsule TAKE 2 CAPSULES EVERY DAY WITH BREAKFAST 4/17/25  Yes Azalea Walker MD   fluticasone propionate (FLONASE) 50 mcg/actuation nasal spray 1 spray by Each Nostril route once daily.   Yes Provider, Historical   fluticasone propionate (FLONASE) 50 mcg/actuation nasal spray 1 spray (50 mcg total) by Each Nostril route 2 (two) times daily. 4/15/25  Yes Alejandro Gomez FNP   HYDROcodone-acetaminophen (NORCO) 5-325 mg per tablet Take 1/2  tablet 3 times per day prn 1/7/20  Yes Provider, Historical   albuterol (VENTOLIN HFA) 90 mcg/actuation inhaler Inhale 2 puffs into the lungs every 6 (six) hours as needed for Wheezing. Rescue 1/23/24   Yani Jolley NP   fluticasone-salmeterol diskus inhaler 100-50 mcg Inhale 1 puff into the lungs 2 (two) times daily. Controller  Patient not taking: Reported on 10/14/2024 1/26/24 1/25/25  Yani Jolley NP   ketoconazole (NIZORAL) 2 % cream aaa bid prn flare 10/31/22   Amber Fallon MD   loratadine (CLARITIN) 10 mg tablet Take 10 mg by mouth once daily.    Provider, Historical   MULTIVIT WITH CALCIUM,IRON,MIN (WOMEN'S DAILY MULTIVITAMIN ORAL)  Take by mouth.    Provider, Historical   polycarbophil (FIBERCON) 625 mg tablet Take 625 mg by mouth once daily.    Provider, Historical   propranoloL (INDERAL) 10 MG tablet Take 1 tablet (10 mg total) by mouth 3 (three) times daily.  Patient taking differently: Take 10 mg by mouth 2 (two) times a day. 10/28/24   Azalea Walker MD   traZODone (DESYREL) 50 MG tablet Take 1 tablet (50 mg total) by mouth every evening. 4/8/25   Azalea Walker MD   triamcinolone acetonide 0.025% (KENALOG) 0.025 % cream aaa qd to face prn flare.  Not more than 1-2 weeks straight in same location 2/8/23   Amber Fallon MD       Anticoagulation/Antiplatelet Meds: no anticoagulation    Review of Systems:   Hematological: no known coagulopathies  Respiratory: no shortness of breath  Cardiovascular: no chest pain  Gastrointestinal: no abdominal pain  Genitourinary: no dysuria  Musculoskeletal: negative  Neurological: no TIA or stroke symptoms     Physical Exam:       General: WNWD, NAD  HEENT: Normocephalic, sclera anicteric  Neck: Supple  Heart: RRR by pulse  Lungs: Symmetric excursions, breathing unlabored  Abd: Nondistended  Extremities: MAEW  Neuro: AA x 3    Laboratory:  Lab Results   Component Value Date    INR 1.7 02/13/2019       Lab Results   Component Value Date    WBC 5.26 04/10/2025    HGB 12.3 04/10/2025    HCT 38.6 04/10/2025    MCV 99 (H) 04/10/2025     04/10/2025      Lab Results   Component Value Date    GLU 90 04/10/2025     04/10/2025    K 3.9 04/10/2025     04/10/2025    CO2 26 04/10/2025    BUN 21 04/10/2025    CREATININE 1.0 04/10/2025    CALCIUM 9.7 04/10/2025    MG 1.8 01/18/2015    ALT 11 04/10/2025    AST 17 04/10/2025    ALBUMIN 3.9 04/10/2025    BILITOT 0.5 04/10/2025       Imaging:  US reviewed.    Assessment/Plan:  84 y.o. female with thyroid nodule. Will undergo FNA today.    Sedation plan: None    Risks (including, but not limited to, pain, bleeding, infection, damage to nearby structures,  treatment failure/recurrence, and the need for additional procedures), potential benefits, and alternatives were discussed with the patient. All questions were answered to the best of my abilities. The patient wishes to proceed. Written informed consent was obtained.    Cindy Chava, PA-C [facility]

## 2025-05-06 ENCOUNTER — RESULTS FOLLOW-UP (OUTPATIENT)
Dept: INTERNAL MEDICINE | Facility: CLINIC | Age: 85
End: 2025-05-06

## 2025-05-06 LAB
ESTROGEN SERPL-MCNC: NORMAL PG/ML
INSULIN SERPL-ACNC: NORMAL U[IU]/ML
LAB AP CLINICAL INFORMATION: NORMAL
LAB AP GROSS DESCRIPTION: NORMAL
LAB AP NON-GYN INTERPRETATION SPECIMEN 1: NORMAL
LAB AP PATHOLOGIST ADEQUACY INTERP: NORMAL
LAB AP PERFORMING LOCATION(S): NORMAL
LAB AP REPORT FOOTNOTES: NORMAL

## 2025-05-08 ENCOUNTER — TELEPHONE (OUTPATIENT)
Dept: SPEECH THERAPY | Facility: HOSPITAL | Age: 85
End: 2025-05-08
Payer: MEDICARE

## 2025-06-16 ENCOUNTER — INFUSION (OUTPATIENT)
Dept: INFECTIOUS DISEASES | Facility: HOSPITAL | Age: 85
End: 2025-06-16
Payer: MEDICARE

## 2025-06-16 VITALS
BODY MASS INDEX: 35.4 KG/M2 | SYSTOLIC BLOOD PRESSURE: 171 MMHG | DIASTOLIC BLOOD PRESSURE: 74 MMHG | HEIGHT: 61 IN | OXYGEN SATURATION: 96 % | TEMPERATURE: 99 F | WEIGHT: 187.5 LBS | HEART RATE: 66 BPM | RESPIRATION RATE: 18 BRPM

## 2025-06-16 DIAGNOSIS — M80.00XA AGE-RELATED OSTEOPOROSIS WITH CURRENT PATHOLOGICAL FRACTURE, INITIAL ENCOUNTER: Primary | ICD-10-CM

## 2025-06-16 PROCEDURE — 96374 THER/PROPH/DIAG INJ IV PUSH: CPT

## 2025-06-16 PROCEDURE — 63600175 PHARM REV CODE 636 W HCPCS

## 2025-06-16 PROCEDURE — 25000003 PHARM REV CODE 250

## 2025-06-16 RX ORDER — ZOLEDRONIC ACID 5 MG/100ML
5 INJECTION, SOLUTION INTRAVENOUS
OUTPATIENT
Start: 2025-06-16

## 2025-06-16 RX ORDER — ZOLEDRONIC ACID 5 MG/100ML
5 INJECTION, SOLUTION INTRAVENOUS
Status: COMPLETED | OUTPATIENT
Start: 2025-06-16 | End: 2025-06-16

## 2025-06-16 RX ORDER — SODIUM CHLORIDE 0.9 % (FLUSH) 0.9 %
10 SYRINGE (ML) INJECTION
OUTPATIENT
Start: 2025-06-16

## 2025-06-16 RX ORDER — HEPARIN 100 UNIT/ML
500 SYRINGE INTRAVENOUS
OUTPATIENT
Start: 2025-06-16

## 2025-06-16 RX ORDER — SODIUM CHLORIDE 0.9 % (FLUSH) 0.9 %
10 SYRINGE (ML) INJECTION
Status: DISCONTINUED | OUTPATIENT
Start: 2025-06-16 | End: 2025-06-16 | Stop reason: HOSPADM

## 2025-06-16 RX ADMIN — SODIUM CHLORIDE: 9 INJECTION, SOLUTION INTRAVENOUS at 03:06

## 2025-06-16 RX ADMIN — ZOLEDRONIC ACID 5 MG: 5 INJECTION, SOLUTION INTRAVENOUS at 03:06

## 2025-06-16 NOTE — PROGRESS NOTES
Arrived for reclast infusion over 15 mins. Flushed with NS following infusion. Consented Reports taking Vitamin D and Calcium. Dental work done in April. ( Root canal and crown placement ). No problems with dental work   Consented    Limited head-to-toe assessment due to privacy issues and visit reason though the opportunity was given for patient to express any concerns

## 2025-07-11 ENCOUNTER — CLINICAL SUPPORT (OUTPATIENT)
Dept: SPEECH THERAPY | Facility: HOSPITAL | Age: 85
End: 2025-07-11
Payer: MEDICARE

## 2025-07-11 DIAGNOSIS — R49.0 HOARSENESS: Primary | ICD-10-CM

## 2025-07-11 PROCEDURE — 92507 TX SP LANG VOICE COMM INDIV: CPT | Mod: GN

## 2025-07-11 PROCEDURE — 92524 BEHAVRAL QUALIT ANALYS VOICE: CPT | Mod: GN

## 2025-07-11 NOTE — PROGRESS NOTES
Referring provider: Alejandro Gomez  Reason for visit: Behavioral and qualitative analysis of voice and resonance (CPT 92675)    Subjective / History    Nicho Espinosa is a 84 y.o. female referred for voice evaluation (CPT 99672) by Dr. Gomez. She presents with complaints of changes in vocal quality (gravely) which began about a year or more ago. The patient also reported the following complaints: voice changes to persist all day and reduced volume. Patient noted with vocal tremor - reports has been chronic and not really bothered by tremor. 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 Social history: lives in duplex with daughter downstairs.  Patient works outside the home - patient gives presentations for an organization related to [employer]. Patient reports completes public speaking 1-2x/month lasting around 2-3 minutes on average but can be longer. Patient denies changes in voice quality following speaking engagement. Patient reports recent history of thyroid nodule - patient reports being followed by Endocrinology.    Swallowing: full oral - regular/thin liquids (IDDSI 0/7); patient reports difficulty with bread sticking at beginning of meal but will improve; patient reports history of GI recommendation for esophageal dilation but patient declines   Breathing: SOB    Smoking: see below  Caffeine: tea in AM; sometimes will do iced coffee  Reflux: yes; will take Tums PRN  Water: 48 oz/day     Recent relevant physician/provider visits:  Patient last seen by ENT  (Alejandro Gomez, MARIBELL - referring provider) on 4/15/2025. Per that appointment note:  History of Present Illness  Nicho Espinosa is a 84 y.o. female  presenting today for a new evaluation and treatment of changes to voice.  Pt has history of tremors and her voice has been changing over the past 1-2 years.  Pt reports her brother was recently diagnosed with laryngeal CA and is being treated now.  Pt reports this caused her concern she should have her vocal  cords check.  Pt denies any sore throat and has a cough occasionally.  Pt reports she has recently started having post nasal drip.  Pt reports she was taking oral allergy medication but is not doing so now.  Pt reports she sometimes feels like certain meats are hard to swallow and she has been seen by GI for this who advised her she may need her throat stretched in the future.  Pt has history of smoking but quit about 14 years ago.    reports that she quit smoking about 14 years ago. Her smoking use included cigarettes. She started smoking about 54 years ago. She has a 20 pack-year smoking history. She has never used smokeless tobacco. She reports current alcohol use. She reports that she does not use drugs   Plan:  Hoarseness:  Pt concerned about her changing voice since her brother is being treated for laryngeal CA.  Pt advised of her laryngoscopy results which were essentially normal with aging changes.  Pt advised she is being referred to speech therapy for evaluation and treatment.  Pt advised to f/u in 1 year and prn for any additional ENT concerns.    Post nasal drip:  Pt advised of allergies being contributing factor.  Pt advised to start nasal spray regimen as advised and continue her oral antihistamine.  F/u prn.  Discussed plan of care with patient in detail and all questions answered. Patient reported understanding of plan of care.     Stroboscopy findings (per MARIBELL Leiva on 4/15/2025)  Consent: After procedure was explained in detail and all questions answered, verbal consent was obtained for performing flexible laryngoscopy.  Anesthesia: topical 4% lidocaine and neosynephrine  Procedure: With patient in seated position, the scope was inserted into the bilateral nasal passageway and advanced atraumatically into the nasopharynx to examine the following structures:  Nasal cavity: Turbinates with mild hypertrophy. No purulent drainage.   Nasopharynx: no mass or lesion noted in nasopharynx.    Oropharynx: base of tongue without  mass or ulceration. Lingual tonsils normal in appearance  Hypopharynx: posterior pharyngeal wall without mass or lesion. No pooling of secretions. Pyriform sinuses visible without mass or lesion  Larynx: epiglottis normal without lesion. False vocal folds without edema/erythema/lesion. True vocal folds mobile and without lesion. Mild interarytenoid edema no erythema . Postcricoid region with mild edema no lesion   Subglottis: visualized portion of subglottis normal in appearance     Past Medical History:   Diagnosis Date    Allergy     Arthritis     Blepharitis of both eyes     breast ca 7/18/2017    right    Complication of anesthesia     nausea    DDD (degenerative disc disease), cervical 9/19/2013    Degenerative disc disease     GERD (gastroesophageal reflux disease)     patient denies reflux    History of compression fracture of spine 4/10/2014    Hyperlipidemia     Lumbar disc disease     Meibomitis     Obesity 9/19/2013    Osteoporosis     Papilloma of eyelid     RUL    Postoperative anemia 11/21/2013    Thoracic or lumbosacral neuritis or radiculitis, unspecified 9/20/2013    Tremors of nervous system 2/2015     Medications Ordered Prior to Encounter[1]    Objective    Perceptual/behavioral assessment  -CAPE-V Overall Score: 70  -Quality: rough, horvath/pulse mode phonation, and tremulous  -Volume: appropriate for age and gender  -Pitch: appropriate for age and gender identity  -Flexibility: appropriate for age and gender norms  -Habitual respiratory pattern: diaphragmatic    VHI-10 (completed to assess self-perceived handicap associated with dysphonia; >11 considered abnormal): 7   RSI (completed to assess the possible presence and/or severity of LPR symptoms and any relationship between this condition and the pt's dysphonia; score of ~15 may indicate LPR): 7    Education / Stimulability Trials  Discussed importance of vocal hygiene including: hydration. Patient was  stimulable for improved voice using SOVT exercises with straw phonation. She experienced moderate success achieving a clearer voice after 4 trials of straw phonation, and reported clearer voice sounding at baseline. She was able to maintain the clearer voice for 3-4 words. She demonstrated good awareness of her voicing behavior and was  able to discriminate between a clearer voice and a gravely voice with 70% accuracy. Encouraged practicing exercises several times daily in isolation and into short phrases to solidify muscle memory patterns and reduce extralaryngeal tension during speech tasks. She was amenable to all suggestions.     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 Prognosis for continued improvement is fair.     Recommendations / POC    -Recommend 3-5 sessions of voice therapy over 10-12 weeks with a speech-language pathologist to optimize glottal postures for improved vocal function, vocal efficiency, and ease of phonation  -Continue exercises as discussed in session  -Contact clinician with any further questions     Functional goals  Length Status Goal   Long term Initiated  Patient will implement and adhere to vocal hygiene protocols on a daily basis, including the elimination of phonotraumatic behaviors.    Long term Initiated  Patient and clinician will facilitate changes in vocal function in order to restore functional use of voice for daily occupational, social, and emotional demands.    Long term Initiated  Patient will re-establish phonation with adequate balance of airflow and resonance with decreased muscle tension.    Long term Initiated   Patient will improve coordination of respiration and phonation for efficient vocal production at a conversational level.    Short term Initiated  Patient will complete SOVT exercises and/or resonant-focused exercises 3-5x daily to strengthen and balance the intrinsic  laryngeal musculature and maximize glottic closure without medial hyperfunction.   Short term Initiated  Patient will improve the quality, efficiency, and ease of phonation through resonant and/or airflow exercises from the syllable to conversation level with 80% accuracy.   Short term Initiated  Patient will discriminate between easy and strained phonation with 80% accuracy.    Short term Initiated   Patient will identify the sensations associated with muscle relaxation in the abdominal, thoracic, neck and facial areas during efficient phonation with minimal clinician cue.    Short term Initiated   Patient will increase awareness for voice conservations behaviors by following the 50/10 rule and reduce voice use in competing noise environments.    Short term Initiated  Patient will demonstrate the ability to increase awareness of voicing behavior through self-monitoring to facilitate generalization in functional speaking situations with 80% accuracy.               [1]   Current Outpatient Medications on File Prior to Visit   Medication Sig Dispense Refill    albuterol (VENTOLIN HFA) 90 mcg/actuation inhaler Inhale 2 puffs into the lungs every 6 (six) hours as needed for Wheezing. Rescue 18 g 0    b complex vitamins capsule Take 1 capsule by mouth once daily.      calcium-vitamin D3 (OS-DIANDRA 500 + D3) 500 mg-5 mcg (200 unit) per tablet Take 1 tablet by mouth 3 (three) times a week.      cholecalciferol, vitamin D3, 1,000 unit capsule Take 3 capsules daily 90 capsule 11    docusate (COLACE) 50 mg/5 mL liquid Take 50 mg by mouth once daily.      fenofibrate micronized (LOFIBRA) 67 MG capsule TAKE 2 CAPSULES EVERY DAY WITH BREAKFAST 180 capsule 3    fluticasone propionate (FLONASE) 50 mcg/actuation nasal spray 1 spray by Each Nostril route once daily.      fluticasone propionate (FLONASE) 50 mcg/actuation nasal spray 1 spray (50 mcg total) by Each Nostril route 2 (two) times daily. 18.2 mL 3    fluticasone-salmeterol  diskus inhaler 100-50 mcg Inhale 1 puff into the lungs 2 (two) times daily. Controller (Patient not taking: Reported on 10/14/2024) 60 each 3    HYDROcodone-acetaminophen (NORCO) 5-325 mg per tablet Take 1/2  tablet 3 times per day prn      ketoconazole (NIZORAL) 2 % cream aaa bid prn flare 60 g 3    loratadine (CLARITIN) 10 mg tablet Take 10 mg by mouth once daily.      MULTIVIT WITH CALCIUM,IRON,MIN (WOMEN'S DAILY MULTIVITAMIN ORAL) Take by mouth.      polycarbophil (FIBERCON) 625 mg tablet Take 625 mg by mouth once daily.      propranoloL (INDERAL) 10 MG tablet Take 1 tablet (10 mg total) by mouth 3 (three) times daily. (Patient taking differently: Take 10 mg by mouth 2 (two) times a day.) 270 tablet 3    traZODone (DESYREL) 50 MG tablet Take 1 tablet (50 mg total) by mouth every evening. 90 tablet 3    triamcinolone acetonide 0.025% (KENALOG) 0.025 % cream aaa qd to face prn flare.  Not more than 1-2 weeks straight in same location 45 g 1     Current Facility-Administered Medications on File Prior to Visit   Medication Dose Route Frequency Provider Last Rate Last Admin    fentaNYL 50 mcg/mL injection 100 mcg  100 mcg Intravenous PRN Jamie Bello PA-C   50 mcg at 02/26/24 0746    midazolam (VERSED) 1 mg/mL injection 1 mg  1 mg Intravenous PRN Jamie Bello PA-C

## 2025-07-11 NOTE — PLAN OF CARE
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 Prognosis for continued improvement is fair.     Recommendations / POC    -Recommend 3-5 sessions of voice therapy over 10-12 weeks with a speech-language pathologist to optimize glottal postures for improved vocal function, vocal efficiency, and ease of phonation  -Continue exercises as discussed in session  -Contact clinician with any further questions     Functional goals  Length Status Goal   Long term Initiated  Patient will implement and adhere to vocal hygiene protocols on a daily basis, including the elimination of phonotraumatic behaviors.    Long term Initiated  Patient and clinician will facilitate changes in vocal function in order to restore functional use of voice for daily occupational, social, and emotional demands.    Long term Initiated  Patient will re-establish phonation with adequate balance of airflow and resonance with decreased muscle tension.    Long term Initiated   Patient will improve coordination of respiration and phonation for efficient vocal production at a conversational level.    Short term Initiated  Patient will complete SOVT exercises and/or resonant-focused exercises 3-5x daily to strengthen and balance the intrinsic laryngeal musculature and maximize glottic closure without medial hyperfunction.   Short term Initiated  Patient will improve the quality, efficiency, and ease of phonation through resonant and/or airflow exercises from the syllable to conversation level with 80% accuracy.   Short term Initiated  Patient will discriminate between easy and strained phonation with 80% accuracy.    Short term Initiated   Patient will identify the sensations associated with muscle relaxation in the abdominal, thoracic, neck and facial areas during efficient phonation with minimal clinician cue.    Short term Initiated   Patient will increase awareness for voice  conservations behaviors by following the 50/10 rule and reduce voice use in competing noise environments.    Short term Initiated  Patient will demonstrate the ability to increase awareness of voicing behavior through self-monitoring to facilitate generalization in functional speaking situations with 80% accuracy.

## 2025-07-14 DIAGNOSIS — F51.01 PRIMARY INSOMNIA: ICD-10-CM

## 2025-07-15 RX ORDER — TRAZODONE HYDROCHLORIDE 50 MG/1
50 TABLET ORAL NIGHTLY
Qty: 90 TABLET | Refills: 2 | Status: SHIPPED | OUTPATIENT
Start: 2025-07-15

## 2025-07-15 NOTE — TELEPHONE ENCOUNTER
No care due was identified.  [facility] Embedded Care Due Messages. Reference number: [id number].   7/15/2025 1:43:20 PM CDT   [Postmenopausal] : The patient is postmenopausal

## 2025-07-15 NOTE — TELEPHONE ENCOUNTER
Refill Decision Note   Nicho Jesús  is requesting a refill authorization.  Brief Assessment and Rationale for Refill:  Approve     Medication Therapy Plan:         Comments:     Note composed:1:49 PM 07/15/2025

## 2025-07-17 ENCOUNTER — TELEPHONE (OUTPATIENT)
Dept: INTERNAL MEDICINE | Facility: CLINIC | Age: 85
End: 2025-07-17
Payer: MEDICARE

## 2025-07-17 DIAGNOSIS — Z12.31 SCREENING MAMMOGRAM, ENCOUNTER FOR: Primary | ICD-10-CM

## 2025-07-17 NOTE — TELEPHONE ENCOUNTER
Pt is requesting Mammogram.    Please advise, thank you.    Copied from CRM #[id number]. Topic: Appointments - Amb Referral  >> Jul 17, 2025 11:38 AM Myrna wrote:  Consult/Advisory/Prders    Name Of Caller:Pt      Contact Preference: [phone]    Nature of call: pt requesting orders for her annual mammogram    Please Call to Advise,Thank you.

## 2025-07-28 ENCOUNTER — HOSPITAL ENCOUNTER (OUTPATIENT)
Dept: RADIOLOGY | Facility: HOSPITAL | Age: 85
Discharge: HOME | End: 2025-07-28
Attending: INTERNAL MEDICINE
Payer: MEDICARE

## 2025-07-28 DIAGNOSIS — Z12.31 SCREENING MAMMOGRAM, ENCOUNTER FOR: ICD-10-CM

## 2025-07-28 PROCEDURE — 77067 SCR MAMMO BI INCL CAD: CPT | Mod: TC,52

## 2025-07-31 DIAGNOSIS — G25.0 ESSENTIAL TREMOR: ICD-10-CM

## 2025-07-31 RX ORDER — PROPRANOLOL HYDROCHLORIDE 10 MG/1
10 TABLET ORAL 3 TIMES DAILY
Qty: 90 TABLET | Refills: 2 | Status: SHIPPED | OUTPATIENT
Start: 2025-07-31 | End: 2025-08-30

## 2025-07-31 NOTE — TELEPHONE ENCOUNTER
No care due was identified.  [facility] Embedded Care Due Messages. Reference number: [id number].   7/31/2025 8:07:17 AM CDT

## 2025-07-31 NOTE — TELEPHONE ENCOUNTER
Refill Routing Note   Medication(s) are not appropriate for processing by [facility] for the following reason(s):        Required vitals abnormal  Clarification of medication (Rx) details    [facility] action(s):  Defer      Medication Therapy Plan: 1 BID OR 1 TID      Appointments  past 12m or future 3m with PCP    Date Provider   Last Visit   4/8/2025 Azalea Walker MD   Next Visit   10/9/2025 Azalea Walker MD   ED visits in past 90 days: 0        Note composed:9:51 AM 07/31/2025

## 2025-08-06 NOTE — PROGRESS NOTES
"Referring provider: Alejandro Gomez  Reason for visit:  Voice treatment (CPT 22116)  Session #1    History / Subjective   I had the pleasure of seeing Nicho Espinosa for her first treatment session following complete voice evaluation on 711/2025.  During that time, improvements were noted on SOVT via straw phonation voice exercises.    8/8/2025: Since evaluation, patient reports she has been practicing straw phonation 1-2x/day "not well". Patient reports noted noted improvement with voice with use of SOVT and when lowers pitch in conversation. Patient reports she noted some improvement with her voice since eval.     Objective   The primary goal of todays session was SOVT review.  This was targeted using SOVT treatment modalities.    Perceptual/behavioral assessment  -CAPE-V Overall Score: 70  -Quality: tremulous  -Volume: appropriate for age and gender identity  -Pitch: appropriate for age and gender identity  -Flexibility: appropriate for age and gender identity  -Habitual respiratory pattern: diaphragmatic    Treatment  Patient demonstrated use of SOVT with straw phonation she has been completing at home though humming and counting. Patient needed moderate verbal A to improve upward/forward phonation in task. Patient completed then with functional phrases, again patient needing moderate-max verbal A to improve upward forward phonation. To improve feedback and understanding, patient completed SOVT with use of cup bubbels with straw - patient noted with much improved efficacy in task with use of cup bubbles. Patient noted and reported improvement of vocal quality when completed but difficulty maintaining following task. Speech Language Pathologist encouraged patient to complete across the day to improve efficacy and mental awareness in task in effort to improve carryover/maintenance - patient voiced understanding.   For home practice, clinician reviewed home exercises as practiced during the session with the " patient.     Assessment/POC   Patient presents with moderate dysphonia secondary to Hoarseness as diagnosed by MARIBELL Leiva.  Prognosis for continued improvement is fair.  Patient demonstrates progress toward goals. Continue home practice with focus on SOVT with use of straw phonation and cup bubbles with use of straw.      Functional goals  Length Status Goal   Long term Ongoing   Patient will implement and adhere to vocal hygiene protocols on a daily basis, including the elimination of phonotraumatic behaviors.    Long term Ongoing   Patient and clinician will facilitate changes in vocal function in order to restore functional use of voice for daily occupational, social, and emotional demands.    Long term Ongoing   Patient will re-establish phonation with adequate balance of airflow and resonance with decreased muscle tension.    Long term Ongoing   Patient will improve coordination of respiration and phonation for efficient vocal production at a conversational level.    Short term Ongoing   Patient will complete SOVT exercises and/or resonant-focused exercises 3-5x daily to strengthen and balance the intrinsic laryngeal musculature and maximize glottic closure without medial hyperfunction.   Short term Ongoing   Patient will improve the quality, efficiency, and ease of phonation through resonant and/or airflow exercises from the syllable to conversation level with 80% accuracy.   Short term Ongoing   Patient will discriminate between easy and strained phonation with 80% accuracy.    Short term Ongoing   Patient will identify the sensations associated with muscle relaxation in the abdominal, thoracic, neck and facial areas during efficient phonation with minimal clinician cue.    Short term Ongoing   Patient will increase awareness for voice conservations behaviors by following the 50/10 rule and reduce voice use in competing noise environments.    Short term Ongoing   Patient will demonstrate the ability  to increase awareness of voicing behavior through self-monitoring to facilitate generalization in functional speaking situations with 80% accuracy.

## 2025-08-08 ENCOUNTER — CLINICAL SUPPORT (OUTPATIENT)
Dept: SPEECH THERAPY | Facility: HOSPITAL | Age: 85
End: 2025-08-08
Payer: MEDICARE

## 2025-08-08 DIAGNOSIS — R49.0 HOARSENESS: Primary | ICD-10-CM

## 2025-08-08 PROCEDURE — 92507 TX SP LANG VOICE COMM INDIV: CPT | Mod: GN

## 2025-08-14 ENCOUNTER — CLINICAL SUPPORT (OUTPATIENT)
Dept: SPEECH THERAPY | Facility: HOSPITAL | Age: 85
End: 2025-08-14
Payer: MEDICARE

## 2025-08-14 DIAGNOSIS — R49.0 HOARSENESS: Primary | ICD-10-CM

## 2025-08-14 PROCEDURE — 92507 TX SP LANG VOICE COMM INDIV: CPT | Mod: GN

## 2025-08-15 DIAGNOSIS — R25.1 TREMOR OF HANDS AND FACE: Primary | ICD-10-CM

## 2025-08-28 ENCOUNTER — OFFICE VISIT (OUTPATIENT)
Facility: CLINIC | Age: 85
End: 2025-08-28
Payer: MEDICARE

## 2025-08-28 VITALS
SYSTOLIC BLOOD PRESSURE: 135 MMHG | WEIGHT: 178 LBS | HEART RATE: 62 BPM | DIASTOLIC BLOOD PRESSURE: 66 MMHG | BODY MASS INDEX: 33.61 KG/M2 | HEIGHT: 61 IN

## 2025-08-28 DIAGNOSIS — Z71.89 COUNSELING REGARDING GOALS OF CARE: ICD-10-CM

## 2025-08-28 DIAGNOSIS — M51.35 DDD (DEGENERATIVE DISC DISEASE), THORACOLUMBAR: ICD-10-CM

## 2025-08-28 DIAGNOSIS — G25.0 ESSENTIAL TREMOR: Primary | ICD-10-CM

## 2025-08-28 DIAGNOSIS — Z98.1 S/P LUMBAR SPINAL FUSION: ICD-10-CM

## 2025-08-28 PROCEDURE — 3075F SYST BP GE 130 - 139MM HG: CPT | Mod: CPTII,S$GLB,, | Performed by: PHYSICIAN ASSISTANT

## 2025-08-28 PROCEDURE — 3078F DIAST BP <80 MM HG: CPT | Mod: CPTII,S$GLB,, | Performed by: PHYSICIAN ASSISTANT

## 2025-08-28 PROCEDURE — 1159F MED LIST DOCD IN RCRD: CPT | Mod: CPTII,S$GLB,, | Performed by: PHYSICIAN ASSISTANT

## 2025-08-28 PROCEDURE — G2211 COMPLEX E/M VISIT ADD ON: HCPCS | Mod: S$GLB,,, | Performed by: PHYSICIAN ASSISTANT

## 2025-08-28 PROCEDURE — 99204 OFFICE O/P NEW MOD 45 MIN: CPT | Mod: S$GLB,,, | Performed by: PHYSICIAN ASSISTANT

## 2025-08-28 PROCEDURE — 1126F AMNT PAIN NOTED NONE PRSNT: CPT | Mod: CPTII,S$GLB,, | Performed by: PHYSICIAN ASSISTANT

## 2025-08-28 PROCEDURE — 1160F RVW MEDS BY RX/DR IN RCRD: CPT | Mod: CPTII,S$GLB,, | Performed by: PHYSICIAN ASSISTANT

## 2025-08-28 PROCEDURE — 99999 PR PBB SHADOW E&M-EST. PATIENT-LVL IV: CPT | Mod: PBBFAC,,, | Performed by: PHYSICIAN ASSISTANT

## 2025-08-28 RX ORDER — PRIMIDONE 50 MG/1
50 TABLET ORAL 2 TIMES DAILY
Qty: 60 TABLET | Refills: 11 | Status: SHIPPED | OUTPATIENT
Start: 2025-08-28 | End: 2026-08-28

## (undated) DEVICE — SOL IRR NACL .9% 3000ML

## (undated) DEVICE — SEE MEDLINE ITEM 154981

## (undated) DEVICE — SEE MEDLINE ITEM 146271

## (undated) DEVICE — CONTAINER SPECIMEN OR STER 4OZ

## (undated) DEVICE — DRAPE THYROID WITH ARMBOARD

## (undated) DEVICE — TRAY MINOR GEN SURG

## (undated) DEVICE — SEE MEDLINE ITEM 152515

## (undated) DEVICE — BRA SURGICAL LG 38-40

## (undated) DEVICE — PAD CAST SPECIALIST STRL 6

## (undated) DEVICE — NDL 18GA X1 1/2 REG BEVEL

## (undated) DEVICE — DRESSING XEROFORM FOIL PK 1X8

## (undated) DEVICE — KIT IRR SUCTION HND PIECE

## (undated) DEVICE — GLOVE BIOGEL PI MICRO INDIC 7

## (undated) DEVICE — EVACUATOR WOUND BULB 100CC

## (undated) DEVICE — SEE MEDLINE ITEM 146417

## (undated) DEVICE — SUT MONOCRYL 3-0 PS-1

## (undated) DEVICE — DRAPE STERI INSTRUMENT 1018

## (undated) DEVICE — HOOD T-5 TEAR AWAY STERILE

## (undated) DEVICE — ADHESIVE DERMABOND ADVANCED

## (undated) DEVICE — SYS LABEL CORRECT MED

## (undated) DEVICE — CLOSURE SKIN STERI STRIP 1/4X3

## (undated) DEVICE — PUMP COLD THERAPY

## (undated) DEVICE — SUT MCRYL PLUS 4-0 PS2 27IN

## (undated) DEVICE — TOURNIQUET SB QC DP 18X4IN

## (undated) DEVICE — COVER LIGHT HANDLE 80/CA

## (undated) DEVICE — BOWL CEMENT

## (undated) DEVICE — PACK UNIVERSAL SPLIT II

## (undated) DEVICE — SUT VICRYL PLUS 0 CT1 18IN

## (undated) DEVICE — GAUZE SPONGE 4X4 12PLY

## (undated) DEVICE — BLADE SAGITTAL 18 X 1.27 X 90M

## (undated) DEVICE — SOL IRR SOD CHL .9% POUR

## (undated) DEVICE — DRAPE STERI-DRAPE 1000 17X11IN

## (undated) DEVICE — ADHESIVE MASTISOL VIAL 48/BX

## (undated) DEVICE — DRESSING COVER AQUACEL AG SURG

## (undated) DEVICE — DRESSING AQUACEL AG ADV 3.5X12

## (undated) DEVICE — CORD FOR BIPOLAR FORCEPS 12

## (undated) DEVICE — FORCEP STRAIGHT DISP

## (undated) DEVICE — Device

## (undated) DEVICE — SUT VICRYL 3-0 27 SH

## (undated) DEVICE — DRAPE INCISE IOBAN 2 23X33IN

## (undated) DEVICE — SEE MEDLINE ITEM 157128

## (undated) DEVICE — SYR ONLY LUER LOCK 20CC

## (undated) DEVICE — SUT 4/0 18IN PDS II CLR MO

## (undated) DEVICE — SUT VICRYL+ 1 CT1 18IN

## (undated) DEVICE — STOCKINET 4INX48

## (undated) DEVICE — TAPE SURG DURAPORE 2 X10YD

## (undated) DEVICE — ELECTRODE BLADE INSULATED 1 IN

## (undated) DEVICE — DRAIN CHANNEL ROUND 19FR

## (undated) DEVICE — SUT 4/0 18IN ETHILON BL P3

## (undated) DEVICE — TOURNIQUET SB QC DP 34X4IN

## (undated) DEVICE — SEE MEDLINE ITEM 152622

## (undated) DEVICE — GLOVE BIOGEL ECLIPSE SZ 7

## (undated) DEVICE — SEE MEDLINE ITEM 152487

## (undated) DEVICE — MASK FLYTE HOOD PEEL AWAY

## (undated) DEVICE — DRESSING N ADH OIL EMUL 3X3

## (undated) DEVICE — COVER CAMERA OPERATING ROOM

## (undated) DEVICE — SCRUB 10% POVIDONE IODINE 4OZ

## (undated) DEVICE — ELECTRODE REM PLYHSV RETURN 9

## (undated) DEVICE — SET CEMENT (SCULP)

## (undated) DEVICE — PAD COLD THERAPY KNEE WRAP ON

## (undated) DEVICE — KIT TOTAL KNEE TKOFG

## (undated) DEVICE — SUT 2-0 VICRYL / SH (J417)

## (undated) DEVICE — CUP MEDICINE STERILE 2OZ

## (undated) DEVICE — STAPLER SKIN REGULAR

## (undated) DEVICE — GAUZE FLUFF XXLG 36X36 2 PLY

## (undated) DEVICE — BANDAGE MATRIX HK LOOP 2IN 5YD

## (undated) DEVICE — SLING ARM LARGE FOAM STRAP

## (undated) DEVICE — SEE MEDLINE ITEM 146298

## (undated) DEVICE — COVER PROBE NL STRL 3.6X96IN

## (undated) DEVICE — CLOSURE SKIN 1X5 STERI-STRIP

## (undated) DEVICE — SPONGE LAP 18X18 PREWASHED

## (undated) DEVICE — NEOGUARD COVER 4X30CM STERILE

## (undated) DEVICE — SUT VICRYL PLUS 2-0 CT1 18

## (undated) DEVICE — SYR 50CC LL

## (undated) DEVICE — SUT ETHILON 2-0 PSLX 30IN

## (undated) DEVICE — SYR DISP LL 5CC